# Patient Record
Sex: MALE | Race: ASIAN | NOT HISPANIC OR LATINO
[De-identification: names, ages, dates, MRNs, and addresses within clinical notes are randomized per-mention and may not be internally consistent; named-entity substitution may affect disease eponyms.]

---

## 2018-08-15 PROBLEM — Z00.00 ENCOUNTER FOR PREVENTIVE HEALTH EXAMINATION: Status: ACTIVE | Noted: 2018-08-15

## 2018-08-17 ENCOUNTER — OTHER (OUTPATIENT)
Age: 78
End: 2018-08-17

## 2018-08-17 DIAGNOSIS — K31.89 OTHER DISEASES OF STOMACH AND DUODENUM: ICD-10-CM

## 2020-01-30 ENCOUNTER — APPOINTMENT (OUTPATIENT)
Dept: CARDIOLOGY | Facility: CLINIC | Age: 80
End: 2020-01-30
Payer: MEDICARE

## 2020-01-30 ENCOUNTER — NON-APPOINTMENT (OUTPATIENT)
Age: 80
End: 2020-01-30

## 2020-01-30 VITALS
TEMPERATURE: 98.1 F | HEART RATE: 79 BPM | BODY MASS INDEX: 24.99 KG/M2 | DIASTOLIC BLOOD PRESSURE: 65 MMHG | RESPIRATION RATE: 18 BRPM | SYSTOLIC BLOOD PRESSURE: 136 MMHG | WEIGHT: 150 LBS | OXYGEN SATURATION: 96 % | HEIGHT: 65 IN

## 2020-01-30 DIAGNOSIS — I10 ESSENTIAL (PRIMARY) HYPERTENSION: ICD-10-CM

## 2020-01-30 DIAGNOSIS — E78.5 HYPERLIPIDEMIA, UNSPECIFIED: ICD-10-CM

## 2020-01-30 DIAGNOSIS — R94.31 ABNORMAL ELECTROCARDIOGRAM [ECG] [EKG]: ICD-10-CM

## 2020-01-30 DIAGNOSIS — Z87.891 PERSONAL HISTORY OF NICOTINE DEPENDENCE: ICD-10-CM

## 2020-01-30 PROCEDURE — 93000 ELECTROCARDIOGRAM COMPLETE: CPT

## 2020-01-30 PROCEDURE — 99205 OFFICE O/P NEW HI 60 MIN: CPT

## 2020-01-30 RX ORDER — ASPIRIN 81 MG/1
81 TABLET ORAL
Qty: 30 | Refills: 5 | Status: ACTIVE | COMMUNITY
Start: 2020-01-30 | End: 1900-01-01

## 2020-01-30 NOTE — HISTORY OF PRESENT ILLNESS
[FreeTextEntry1] : 1. HTN: on medications, controlled.\par 2. Hyperlipidemia: on statin.\par 3. CP: midline, substernal, without radiation lasting minutes and associated with dizziness. The patient also has exertional SOB that is progressive, increasing in severity and associated with mild leg edema. The symptoms occur on a daily basis and are worsening over one week. He has palpitations as well.\par His symptoms limit his ET. \par His symptoms are progressively worsening.

## 2020-01-30 NOTE — REASON FOR VISIT
[Initial Evaluation] : an initial evaluation of [Chest Pain] : chest pain [Dyspnea] : dyspnea [Hyperlipidemia] : hyperlipidemia [Hypertension] : hypertension [FreeTextEntry1] : 79 M HTN hyperlipidemia ex-smoker with CP and SOB.

## 2020-01-30 NOTE — DISCUSSION/SUMMARY
[FreeTextEntry1] : 79 M HTN hyperlipidemia ex-smoker with CP and SOB.\par CHECK ECHOCARDIOGRAM.\par CHECK NST TO ASSESS PERFUSION (limited ET).\par Continue medications for HTN.\par Start ASA 81 qd.\par Condition deteriorating, see me after tests.

## 2020-01-30 NOTE — PHYSICAL EXAM
[General Appearance - Well Developed] : well developed [Well Groomed] : well groomed [Normal Appearance] : normal appearance [No Deformities] : no deformities [General Appearance - Well Nourished] : well nourished [General Appearance - In No Acute Distress] : no acute distress [Normal Conjunctiva] : the conjunctiva exhibited no abnormalities [Normal Oral Mucosa] : normal oral mucosa [Eyelids - No Xanthelasma] : the eyelids demonstrated no xanthelasmas [No Oral Cyanosis] : no oral cyanosis [No Oral Pallor] : no oral pallor [Normal Jugular Venous A Waves Present] : normal jugular venous A waves present [Normal Jugular Venous V Waves Present] : normal jugular venous V waves present [No Jugular Venous Whitten A Waves] : no jugular venous whitten A waves [Heart Rate And Rhythm] : heart rate and rhythm were normal [Heart Sounds] : normal S1 and S2 [Respiration, Rhythm And Depth] : normal respiratory rhythm and effort [Exaggerated Use Of Accessory Muscles For Inspiration] : no accessory muscle use [Auscultation Breath Sounds / Voice Sounds] : lungs were clear to auscultation bilaterally [Abdomen Tenderness] : non-tender [Abdomen Soft] : soft [Abdomen Mass (___ Cm)] : no abdominal mass palpated [Petechial Hemorrhages (___cm)] : no petechial hemorrhages [Nail Clubbing] : no clubbing of the fingernails [Cyanosis, Localized] : no localized cyanosis [] : no ischemic changes [Skin Color & Pigmentation] : normal skin color and pigmentation [No Venous Stasis] : no venous stasis [Skin Lesions] : no skin lesions [No Xanthoma] : no  xanthoma was observed [No Skin Ulcers] : no skin ulcer [FreeTextEntry1] : 2/6 [employer]

## 2020-02-14 ENCOUNTER — APPOINTMENT (OUTPATIENT)
Dept: CARDIOLOGY | Facility: CLINIC | Age: 80
End: 2020-02-14
Payer: MEDICARE

## 2020-02-14 PROCEDURE — 93306 TTE W/DOPPLER COMPLETE: CPT

## 2020-02-14 PROCEDURE — A9500: CPT

## 2020-02-14 PROCEDURE — 78452 HT MUSCLE IMAGE SPECT MULT: CPT

## 2020-02-14 PROCEDURE — 93015 CV STRESS TEST SUPVJ I&R: CPT

## 2020-02-20 ENCOUNTER — APPOINTMENT (OUTPATIENT)
Dept: CARDIOLOGY | Facility: CLINIC | Age: 80
End: 2020-02-20
Payer: MEDICARE

## 2020-02-20 VITALS
RESPIRATION RATE: 18 BRPM | BODY MASS INDEX: 24.96 KG/M2 | OXYGEN SATURATION: 95 % | SYSTOLIC BLOOD PRESSURE: 124 MMHG | WEIGHT: 150 LBS | TEMPERATURE: 97.8 F | DIASTOLIC BLOOD PRESSURE: 66 MMHG | HEART RATE: 90 BPM

## 2020-02-20 PROCEDURE — 99214 OFFICE O/P EST MOD 30 MIN: CPT

## 2020-02-20 PROCEDURE — 93000 ELECTROCARDIOGRAM COMPLETE: CPT

## 2020-02-20 RX ORDER — LIFITEGRAST 50 MG/ML
5 SOLUTION/ DROPS OPHTHALMIC
Qty: 60 | Refills: 0 | Status: ACTIVE | COMMUNITY
Start: 2020-02-04

## 2020-02-20 RX ORDER — ATORVASTATIN CALCIUM 10 MG/1
10 TABLET, FILM COATED ORAL
Refills: 0 | Status: ACTIVE | COMMUNITY
Start: 2020-02-20

## 2020-02-20 RX ORDER — LATANOPROST/PF 0.005 %
0.01 DROPS OPHTHALMIC (EYE)
Qty: 8 | Refills: 0 | Status: ACTIVE | COMMUNITY
Start: 2020-01-30

## 2020-02-20 RX ORDER — LISINOPRIL AND HYDROCHLOROTHIAZIDE TABLETS 10; 12.5 MG/1; MG/1
10-12.5 TABLET ORAL
Refills: 0 | Status: ACTIVE | COMMUNITY
Start: 2020-02-20

## 2020-02-20 NOTE — HISTORY OF PRESENT ILLNESS
[FreeTextEntry1] : 1. HTN: on medications, no SE noted. \par 2. Hyperlipidemia: on statin. Lipid profile is followed by PMD.\par 3. CP: midline, substernal, without radiation lasting minutes and associated with dizziness. The patient also has exertional SOB that is progressive, increasing in severity and associated with mild leg edema. The symptoms occur on a daily basis and are worsening over one week. He has palpitations as well.\par His symptoms have been worsening over the last month and are severe.\par

## 2020-02-20 NOTE — REVIEW OF SYSTEMS
[Chest Pain] : chest pain [Dyspnea on exertion] : dyspnea during exertion [Shortness Of Breath] : shortness of breath [Negative] : Heme/Lymph

## 2020-02-20 NOTE — DISCUSSION/SUMMARY
[FreeTextEntry1] : 79 M HTN hyperlipidemia ex-smoker abnormal EKG with inferior wall defect on NST and symptoms consistent with progressive angina.\par REFER FOR CARDIAC CATHETERIZATION STAT.\par Continue medications for HTN.\par Continue statin.\par Condition deteriorating, see me after tests.

## 2020-02-20 NOTE — PHYSICAL EXAM
[General Appearance - Well Developed] : well developed [General Appearance - Well Nourished] : well nourished [Normal Appearance] : normal appearance [Well Groomed] : well groomed [General Appearance - In No Acute Distress] : no acute distress [No Deformities] : no deformities [Normal Conjunctiva] : the conjunctiva exhibited no abnormalities [No Oral Pallor] : no oral pallor [Eyelids - No Xanthelasma] : the eyelids demonstrated no xanthelasmas [Normal Oral Mucosa] : normal oral mucosa [No Oral Cyanosis] : no oral cyanosis [Normal Jugular Venous V Waves Present] : normal jugular venous V waves present [Normal Jugular Venous A Waves Present] : normal jugular venous A waves present [No Jugular Venous Whitten A Waves] : no jugular venous whitten A waves [Respiration, Rhythm And Depth] : normal respiratory rhythm and effort [Heart Rate And Rhythm] : heart rate and rhythm were normal [Auscultation Breath Sounds / Voice Sounds] : lungs were clear to auscultation bilaterally [Exaggerated Use Of Accessory Muscles For Inspiration] : no accessory muscle use [FreeTextEntry1] : 2/6 [employer]  [Abdomen Tenderness] : non-tender [Abdomen Soft] : soft [Heart Sounds] : normal S1 and S2 [Nail Clubbing] : no clubbing of the fingernails [Abdomen Mass (___ Cm)] : no abdominal mass palpated [Petechial Hemorrhages (___cm)] : no petechial hemorrhages [Cyanosis, Localized] : no localized cyanosis [No Venous Stasis] : no venous stasis [Skin Color & Pigmentation] : normal skin color and pigmentation [] : no rash [No Xanthoma] : no  xanthoma was observed [Skin Lesions] : no skin lesions [No Skin Ulcers] : no skin ulcer [Mood] : the mood was normal [Affect] : the affect was normal [Oriented To Time, Place, And Person] : oriented to person, place, and time [No Anxiety] : not feeling anxious

## 2020-02-20 NOTE — REASON FOR VISIT
[Follow-Up - Clinic] : a clinic follow-up of [Chest Pain] : chest pain [Dyspnea] : dyspnea [Hyperlipidemia] : hyperlipidemia [Hypertension] : hypertension [FreeTextEntry1] : 79 M HTN hyperlipidemia ex-smoker with CP and SOB. \par

## 2020-02-27 VITALS
DIASTOLIC BLOOD PRESSURE: 66 MMHG | RESPIRATION RATE: 16 BRPM | SYSTOLIC BLOOD PRESSURE: 144 MMHG | WEIGHT: 147.93 LBS | OXYGEN SATURATION: 96 % | TEMPERATURE: 99 F | HEIGHT: 65 IN | HEART RATE: 68 BPM

## 2020-02-27 RX ORDER — CHLORHEXIDINE GLUCONATE 213 G/1000ML
1 SOLUTION TOPICAL ONCE
Refills: 0 | Status: DISCONTINUED | OUTPATIENT
Start: 2020-02-28 | End: 2020-02-28

## 2020-02-27 NOTE — H&P ADULT - ASSESSMENT
Patient is a 80 yo M with a PMH of HLD, HTN, BPH who presented to outpatient cardiologist Dr. Mendieta with complaints of  progressively worsening, non-radiating, SSCP  with associated DUNLAP & dizziness, lasting minutes before self-resolving, after walking up 1 large hill or after climbing 1-2 flights of stairs over past 1 month.  As per MD note, pt with mild b/l LE edema and occasional palpitations but  patient currently denies.  He denies syncope or PND/orthopnea. NST 2/14/20: Small, moderate defect in the inferior wall that is fixed, suggestive of diaphragmatic attenuation artifact. LEVF 50% with mild global hypokinesis. In light of patient’s RF, CCS anginal equivalent class III sx and abnormal NST, patient is referred for cardiac catheterization with possible intervention if clinically indicated.   Pt states he last took his Ecotrin 81 mg PO on 2/27/20. Loaded with Ecotrin 325 mg PO x1 and Plavix 600 mg PO x1 pre-cath.   IV NS @ 75 cc/hr pre-cath fluids. Euvolemic on exam.   Endorses dark stools. Denies BRBPR. H/H WNL. Has never had colonoscopy/EGD. Encouraged pt to see Gastroenterologist at outpatient.   ASA Class III    Mallampati Class III    Risks & benefits of procedure and alternative therapy have been explained to the patient including but not limited to: allergic reaction, bleeding with possible need for blood transfusion, infection, renal and vascular compromise, limb damage, arrhythmia, stroke, vessel dissection/perforation, Myocardial infarction, emergent CABG. Informed consent obtained and in chart.       Patient a candidate for sedation: Yes    Sedation Type: Moderate Patient is a 78 yo M with a PMH of HLD, HTN, BPH who presented to outpatient cardiologist Dr. Mendieta with complaints of  progressively worsening, non-radiating, SSCP  with associated DUNLAP & dizziness, lasting minutes before self-resolving, after walking up 1 large hill or after climbing 1-2 flights of stairs over past 1 month.  As per MD note, pt with mild b/l LE edema and occasional palpitations but  patient currently denies.  He denies syncope or PND/orthopnea. NST 2/14/20: Small, moderate defect in the inferior wall that is fixed, suggestive of diaphragmatic attenuation artifact. LEVF 50% with mild global hypokinesis. In light of patient’s RF, CCS anginal equivalent class III sx and abnormal NST, patient is referred for cardiac catheterization with possible intervention if clinically indicated.   Pt states he last took his Ecotrin 81 mg PO on 2/27/20. Loaded with Ecotrin 325 mg PO x1 and Plavix 600 mg PO x1 pre-cath.   IV NS @ 75 cc/hr pre-cath fluids. Euvolemic on exam.   Endorses dark stools. Denies BRBPR. H/H WNL. Has never had colonoscopy/EGD. Encouraged pt to see Gastroenterologist at outpatient.   Recent COPD exacerbation, on prednisone (3 doses left), lungs currently CTA without w/r/r  ASA Class III    Mallampati Class III    Risks & benefits of procedure and alternative therapy have been explained to the patient including but not limited to: allergic reaction, bleeding with possible need for blood transfusion, infection, renal and vascular compromise, limb damage, arrhythmia, stroke, vessel dissection/perforation, Myocardial infarction, emergent CABG. Informed consent obtained and in chart.       Patient a candidate for sedation: Yes    Sedation Type: Moderate Patient is a 80 yo M with a PMH of HLD, HTN, BPH who presented to outpatient cardiologist Dr. Mendieta with complaints of  progressively worsening, non-radiating, SSCP  with associated DUNLAP & dizziness, lasting minutes before self-resolving, after walking up 1 large hill or after climbing 1-2 flights of stairs over past 1 month.  As per MD note, pt with mild b/l LE edema and occasional palpitations but  patient currently denies.  He denies syncope or PND/orthopnea. NST 2/14/20: Small, moderate defect in the inferior wall that is fixed, suggestive of diaphragmatic attenuation artifact. LEVF 50% with mild global hypokinesis. In light of patient’s RF, CCS anginal equivalent class III sx and abnormal NST, patient is referred for cardiac catheterization with possible intervention if clinically indicated.   Pt states he last took his Ecotrin 81 mg PO on 2/27/20. Loaded with Ecotrin 325 mg PO x1 and Plavix 600 mg PO x1 pre-cath.   IV NS @ 75 cc/hr pre-cath fluids. Euvolemic on exam.   Endorses dark stools. Denies BRBPR. H/H WNL. Has never had colonoscopy/EGD. Encouraged pt to see Gastroenterologist at outpatient.   Recent COPD exacerbation, on prednisone (3 doses left), lungs currently CTA without w/r/r  Oral temp 99.4F. No leukocytosis. Recent URI but denies any current  symptoms. Feels warm on exam. Requested that RN Kitty complete rectal temp and order placed.   ASA Class III    Mallampati Class III    Risks & benefits of procedure and alternative therapy have been explained to the patient including but not limited to: allergic reaction, bleeding with possible need for blood transfusion, infection, renal and vascular compromise, limb damage, arrhythmia, stroke, vessel dissection/perforation, Myocardial infarction, emergent CABG. Informed consent obtained and in chart.       Patient a candidate for sedation: Yes    Sedation Type: Moderate Patient is a 78 yo M with a PMH of HLD, HTN, BPH who presented to outpatient cardiologist Dr. Mendieta with complaints of  progressively worsening, non-radiating, SSCP  with associated DUNLAP & dizziness, lasting minutes before self-resolving, after walking up 1 large hill or after climbing 1-2 flights of stairs over past 1 month.  As per MD note, pt with mild b/l LE edema and occasional palpitations but  patient currently denies.  He denies syncope or PND/orthopnea. NST 2/14/20: Small, moderate defect in the inferior wall that is fixed, suggestive of diaphragmatic attenuation artifact. LEVF 50% with mild global hypokinesis. In light of patient’s RF, CCS anginal equivalent class III sx and abnormal NST, patient is referred for cardiac catheterization with possible intervention if clinically indicated.   Pt states he last took his Ecotrin 81 mg PO on 2/27/20. Loaded with Ecotrin 325 mg PO x1 and Plavix 600 mg PO x1 pre-cath.   IV NS @ 75 cc/hr pre-cath fluids. Euvolemic on exam.   Endorses dark stools. Denies BRBPR. H/H WNL. Has never had colonoscopy/EGD. Encouraged pt to see Gastroenterologist at outpatient.   Recent COPD exacerbation, on prednisone (3 doses left), lungs currently CTA without w/r/r  Forehead temp 99.4F --> 00.7F. No leukocytosis. Recent URI but denies any current  symptoms. Feels warm on exam. Requested that RN Kitty complete rectal temp and order placed.  No recent travel or sick contact. RVP sent. Dr. Garcias and Dr. Yusuf made aware.   ASA Class III    Mallampati Class III    Risks & benefits of procedure and alternative therapy have been explained to the patient including but not limited to: allergic reaction, bleeding with possible need for blood transfusion, infection, renal and vascular compromise, limb damage, arrhythmia, stroke, vessel dissection/perforation, Myocardial infarction, emergent CABG. Informed consent obtained and in chart.       Patient a candidate for sedation: Yes    Sedation Type: Moderate

## 2020-02-27 NOTE — H&P ADULT - NSHPLABSRESULTS_GEN_ALL_CORE
14.3   6.73  )-----------( 209      ( 28 Feb 2020 11:33 )             41.0       02-28    140  |  102  |  15  ----------------------------<  107<H>  4.0   |  25  |  1.26    Ca    9.2      28 Feb 2020 11:33    TPro  6.5  /  Alb  4.1  /  TBili  1.3<H>  /  DBili  x   /  AST  21  /  ALT  21  /  AlkPhos  60  02-28      PT/INR - ( 28 Feb 2020 11:33 )   PT: 11.2 sec;   INR: 0.98          PTT - ( 28 Feb 2020 11:33 )  PTT:22.3 sec    CARDIAC MARKERS ( 28 Feb 2020 11:33 )  x     / x     / 34 U/L / x     / 1.1 ng/mL        EKG: NSR at 69 BPM with RBBB and 1st degree AV block

## 2020-02-27 NOTE — H&P ADULT - HISTORY OF PRESENT ILLNESS
SKELETON - INCOMPLETE (Meds obtained from [employer] note on 2/20/20 however needs to be verified)    80 yo M with a PMH of HLD, HTN, bPH who presented to outpatient cardiologist Dr. Mendieta with complaints of midline, substernal chest pain without radiation lasting minutes. CP is associated with dizziness, DUNLAP to ___ blocks increasing in severity, mild b/l LE edema, and palpitations. (?rest) He denies ____. His symptoms have been worsening over the past month. NST 2/14/20: Small, moderate defect in the inferior wall that is fixed, suggestive of diaphragmatic attenuation artifact. LEVF 50% with mild global hypokinesis.   In light of patient’s RF, CCS angina class ___ sx and abnormal NST, patient is referred for cardiac catheterization with possible intervention if clinically indicated. Hx obtained via [place]  [id number]  (Meds obtained from [employer] note on 2/20/20 however needs to be verified)    Patient is a 78 yo M with a PMH of HLD, HTN, BPH who presented to outpatient cardiologist Dr. Mendieta with complaints of midline, substernal chest pain without radiation lasting minutes. CP is associated with dizziness, mild b/l LE edema, and palpitations per MD note patient currently denies. He endorses DUNLAP when on an incline or 1-2 flight of stairs, resolving with rest. He denies syncope or PND/orthopnea. His symptoms have been worsening over the past month. NST 2/14/20: Small, moderate defect in the inferior wall that is fixed, suggestive of diaphragmatic attenuation artifact. LEVF 50% with mild global hypokinesis.   In light of patient’s RF, CCS anginal equivalent class III sx and abnormal NST, patient is referred for cardiac catheterization with possible intervention if clinically indicated. Hx obtained via [place]  [id number]  (Meds obtained from [facility]\"" note on 2/20/20 however needs to be verified)    Patient is a 80 yo M with a PMH of HLD, HTN, BPH who presented to outpatient cardiologist Dr. Mendieta with complaints of  progressively worsening, non-radiating, SSCP  with associated DUNLAP & dizziness, lasting minutes before self-resolving, after walking up 1 large hill or after climbing 1-2 flights of stairs over past 1 month.  As per MD note, pt with mild b/l LE edema and occasional palpitations but  patient currently denies.  He denies syncope or PND/orthopnea. NST 2/14/20: Small, moderate defect in the inferior wall that is fixed, suggestive of diaphragmatic attenuation artifact. LEVF 50% with mild global hypokinesis. In light of patient’s RF, CCS anginal equivalent class III sx and abnormal NST, patient is referred for cardiac catheterization with possible intervention if clinically indicated.

## 2020-02-28 ENCOUNTER — OUTPATIENT (OUTPATIENT)
Dept: OUTPATIENT SERVICES | Facility: HOSPITAL | Age: 80
LOS: 1 days | Discharge: NURSING, REHAB OR LONG-TERM CARE | End: 2020-02-28
Payer: MEDICARE

## 2020-02-28 LAB
ALBUMIN SERPL ELPH-MCNC: 4.1 G/DL — SIGNIFICANT CHANGE UP (ref 3.3–5)
ALP SERPL-CCNC: 60 U/L — SIGNIFICANT CHANGE UP (ref 40–120)
ALT FLD-CCNC: 21 U/L — SIGNIFICANT CHANGE UP (ref 10–45)
ANION GAP SERPL CALC-SCNC: 13 MMOL/L — SIGNIFICANT CHANGE UP (ref 5–17)
APTT BLD: 22.3 SEC — LOW (ref 27.5–36.3)
AST SERPL-CCNC: 21 U/L — SIGNIFICANT CHANGE UP (ref 10–40)
BASOPHILS # BLD AUTO: 0.05 K/UL — SIGNIFICANT CHANGE UP (ref 0–0.2)
BASOPHILS NFR BLD AUTO: 0.7 % — SIGNIFICANT CHANGE UP (ref 0–2)
BILIRUB SERPL-MCNC: 1.3 MG/DL — HIGH (ref 0.2–1.2)
BUN SERPL-MCNC: 15 MG/DL — SIGNIFICANT CHANGE UP (ref 7–23)
CALCIUM SERPL-MCNC: 9.2 MG/DL — SIGNIFICANT CHANGE UP (ref 8.4–10.5)
CHLORIDE SERPL-SCNC: 102 MMOL/L — SIGNIFICANT CHANGE UP (ref 96–108)
CHOLEST SERPL-MCNC: 142 MG/DL — SIGNIFICANT CHANGE UP (ref 10–199)
CK MB CFR SERPL CALC: 1.1 NG/ML — SIGNIFICANT CHANGE UP (ref 0–6.7)
CK SERPL-CCNC: 34 U/L — SIGNIFICANT CHANGE UP (ref 30–200)
CO2 SERPL-SCNC: 25 MMOL/L — SIGNIFICANT CHANGE UP (ref 22–31)
CREAT SERPL-MCNC: 1.26 MG/DL — SIGNIFICANT CHANGE UP (ref 0.5–1.3)
CRP SERPL-MCNC: <0.03 MG/DL — SIGNIFICANT CHANGE UP (ref 0–0.4)
EOSINOPHIL # BLD AUTO: 0.08 K/UL — SIGNIFICANT CHANGE UP (ref 0–0.5)
EOSINOPHIL NFR BLD AUTO: 1.2 % — SIGNIFICANT CHANGE UP (ref 0–6)
GLUCOSE SERPL-MCNC: 107 MG/DL — HIGH (ref 70–99)
HBA1C BLD-MCNC: 5.9 % — HIGH (ref 4–5.6)
HCT VFR BLD CALC: 41 % — SIGNIFICANT CHANGE UP (ref 39–50)
HDLC SERPL-MCNC: 68 MG/DL — SIGNIFICANT CHANGE UP
HGB BLD-MCNC: 14.3 G/DL — SIGNIFICANT CHANGE UP (ref 13–17)
IMM GRANULOCYTES NFR BLD AUTO: 0.1 % — SIGNIFICANT CHANGE UP (ref 0–1.5)
INR BLD: 0.98 — SIGNIFICANT CHANGE UP (ref 0.88–1.16)
LIPID PNL WITH DIRECT LDL SERPL: 54 MG/DL — SIGNIFICANT CHANGE UP
LYMPHOCYTES # BLD AUTO: 2.1 K/UL — SIGNIFICANT CHANGE UP (ref 1–3.3)
LYMPHOCYTES # BLD AUTO: 31.2 % — SIGNIFICANT CHANGE UP (ref 13–44)
MCHC RBC-ENTMCNC: 32 PG — SIGNIFICANT CHANGE UP (ref 27–34)
MCHC RBC-ENTMCNC: 34.9 GM/DL — SIGNIFICANT CHANGE UP (ref 32–36)
MCV RBC AUTO: 91.7 FL — SIGNIFICANT CHANGE UP (ref 80–100)
MONOCYTES # BLD AUTO: 0.78 K/UL — SIGNIFICANT CHANGE UP (ref 0–0.9)
MONOCYTES NFR BLD AUTO: 11.6 % — SIGNIFICANT CHANGE UP (ref 2–14)
NEUTROPHILS # BLD AUTO: 3.71 K/UL — SIGNIFICANT CHANGE UP (ref 1.8–7.4)
NEUTROPHILS NFR BLD AUTO: 55.2 % — SIGNIFICANT CHANGE UP (ref 43–77)
NRBC # BLD: 0 /100 WBCS — SIGNIFICANT CHANGE UP (ref 0–0)
PLATELET # BLD AUTO: 209 K/UL — SIGNIFICANT CHANGE UP (ref 150–400)
POTASSIUM SERPL-MCNC: 4 MMOL/L — SIGNIFICANT CHANGE UP (ref 3.5–5.3)
POTASSIUM SERPL-SCNC: 4 MMOL/L — SIGNIFICANT CHANGE UP (ref 3.5–5.3)
PROT SERPL-MCNC: 6.5 G/DL — SIGNIFICANT CHANGE UP (ref 6–8.3)
PROTHROM AB SERPL-ACNC: 11.2 SEC — SIGNIFICANT CHANGE UP (ref 10–12.9)
RAPID RVP RESULT: SIGNIFICANT CHANGE UP
RBC # BLD: 4.47 M/UL — SIGNIFICANT CHANGE UP (ref 4.2–5.8)
RBC # FLD: 12 % — SIGNIFICANT CHANGE UP (ref 10.3–14.5)
SODIUM SERPL-SCNC: 140 MMOL/L — SIGNIFICANT CHANGE UP (ref 135–145)
TOTAL CHOLESTEROL/HDL RATIO MEASUREMENT: 2.1 RATIO — LOW (ref 3.4–9.6)
TRIGL SERPL-MCNC: 99 MG/DL — SIGNIFICANT CHANGE UP (ref 10–149)
WBC # BLD: 6.73 K/UL — SIGNIFICANT CHANGE UP (ref 3.8–10.5)
WBC # FLD AUTO: 6.73 K/UL — SIGNIFICANT CHANGE UP (ref 3.8–10.5)

## 2020-02-28 PROCEDURE — 93458 L HRT ARTERY/VENTRICLE ANGIO: CPT | Mod: 26

## 2020-02-28 PROCEDURE — 83036 HEMOGLOBIN GLYCOSYLATED A1C: CPT

## 2020-02-28 PROCEDURE — 82550 ASSAY OF CK (CPK): CPT

## 2020-02-28 PROCEDURE — 87581 M.PNEUMON DNA AMP PROBE: CPT

## 2020-02-28 PROCEDURE — 87633 RESP VIRUS 12-25 TARGETS: CPT

## 2020-02-28 PROCEDURE — C1769: CPT

## 2020-02-28 PROCEDURE — C1894: CPT

## 2020-02-28 PROCEDURE — 85730 THROMBOPLASTIN TIME PARTIAL: CPT

## 2020-02-28 PROCEDURE — C1887: CPT

## 2020-02-28 PROCEDURE — 80061 LIPID PANEL: CPT

## 2020-02-28 PROCEDURE — 85610 PROTHROMBIN TIME: CPT

## 2020-02-28 PROCEDURE — 80053 COMPREHEN METABOLIC PANEL: CPT

## 2020-02-28 PROCEDURE — 85025 COMPLETE CBC W/AUTO DIFF WBC: CPT

## 2020-02-28 PROCEDURE — 86140 C-REACTIVE PROTEIN: CPT

## 2020-02-28 PROCEDURE — 87798 DETECT AGENT NOS DNA AMP: CPT

## 2020-02-28 PROCEDURE — 87486 CHLMYD PNEUM DNA AMP PROBE: CPT

## 2020-02-28 PROCEDURE — 99235 HOSP IP/OBS SAME DATE MOD 70: CPT

## 2020-02-28 PROCEDURE — 93458 L HRT ARTERY/VENTRICLE ANGIO: CPT

## 2020-02-28 PROCEDURE — 82553 CREATINE MB FRACTION: CPT

## 2020-02-28 RX ORDER — SODIUM CHLORIDE 9 MG/ML
500 INJECTION INTRAMUSCULAR; INTRAVENOUS; SUBCUTANEOUS
Refills: 0 | Status: DISCONTINUED | OUTPATIENT
Start: 2020-02-28 | End: 2020-02-28

## 2020-02-28 RX ORDER — CLOPIDOGREL BISULFATE 75 MG/1
600 TABLET, FILM COATED ORAL ONCE
Refills: 0 | Status: COMPLETED | OUTPATIENT
Start: 2020-02-28 | End: 2020-02-28

## 2020-02-28 RX ORDER — FLUTICASONE FUROATE AND VILANTEROL TRIFENATATE 100; 25 UG/1; UG/1
1 POWDER RESPIRATORY (INHALATION)
Qty: 0 | Refills: 0 | DISCHARGE

## 2020-02-28 RX ORDER — TIOTROPIUM BROMIDE 18 UG/1
2 CAPSULE ORAL; RESPIRATORY (INHALATION)
Qty: 0 | Refills: 0 | DISCHARGE

## 2020-02-28 RX ORDER — ASPIRIN/CALCIUM CARB/MAGNESIUM 324 MG
325 TABLET ORAL ONCE
Refills: 0 | Status: COMPLETED | OUTPATIENT
Start: 2020-02-28 | End: 2020-02-28

## 2020-02-28 RX ORDER — LATANOPROST 0.05 MG/ML
1 SOLUTION/ DROPS OPHTHALMIC; TOPICAL
Qty: 0 | Refills: 0 | DISCHARGE

## 2020-02-28 RX ADMIN — Medication 325 MILLIGRAM(S): at 15:28

## 2020-02-28 RX ADMIN — CLOPIDOGREL BISULFATE 600 MILLIGRAM(S): 75 TABLET, FILM COATED ORAL at 15:29

## 2020-02-28 NOTE — PROGRESS NOTE ADULT - SUBJECTIVE AND OBJECTIVE BOX
Interventional Cardiology PA SDA Discharge Note    Patient without complaints. Ambulated and voided without difficulties    Afebrile, VSS    Ext:    				Right            Radial :  no  hematoma,  no   bleeding, dressing; C/D/I      Pulses:    intact RAD to baseline     A/P:        80 yo M with a PMH of HLD, HTN, BPH who presented to outpatient cardiologist Dr. Mendieta with complaints of  progressively worsening, non-radiating, SSCP  with associated DUNLAP & dizziness, lasting minutes before self-resolving, after walking up 1 large hill or after climbing 1-2 flights of stairs over past 1 month.  As per MD note, pt with mild b/l LE edema and occasional palpitations but  patient currently denies.  He denies syncope or PND/orthopnea. NST 2/14/20: Small, moderate defect in the inferior wall that is fixed, suggestive of diaphragmatic attenuation artifact. LEVF 50% with mild global hypokinesis. In light of patient’s RF, CCS anginal equivalent class III sx and abnormal NST, patient is referred for cardiac catheterization with possible intervention if clinically indicated. Pt is now s/p diagnostic cardiac cath showing normal coronaries.           1.	Stable for discharge as per attending Dr. Garcias after bed rest, pt voids, wrist stable and 30 minutes of ambulation.  2.	Follow-up with PMD/Cardiologist Dr Mendieta in 1-2 weeks  3.	Discharged forms signed and copies in chart

## 2020-03-03 PROBLEM — I10 ESSENTIAL (PRIMARY) HYPERTENSION: Chronic | Status: ACTIVE | Noted: 2020-02-27

## 2020-03-03 PROBLEM — E78.5 HYPERLIPIDEMIA, UNSPECIFIED: Chronic | Status: ACTIVE | Noted: 2020-02-27

## 2020-03-03 PROBLEM — N40.0 BENIGN PROSTATIC HYPERPLASIA WITHOUT LOWER URINARY TRACT SYMPTOMS: Chronic | Status: ACTIVE | Noted: 2020-02-27

## 2020-03-05 ENCOUNTER — APPOINTMENT (OUTPATIENT)
Dept: CARDIOLOGY | Facility: CLINIC | Age: 80
End: 2020-03-05

## 2020-03-16 ENCOUNTER — APPOINTMENT (OUTPATIENT)
Dept: CARDIOLOGY | Facility: CLINIC | Age: 80
End: 2020-03-16

## 2021-05-20 ENCOUNTER — INPATIENT (INPATIENT)
Facility: HOSPITAL | Age: 81
LOS: 3 days | Discharge: HOME | End: 2021-05-24
Attending: STUDENT IN AN ORGANIZED HEALTH CARE EDUCATION/TRAINING PROGRAM | Admitting: STUDENT IN AN ORGANIZED HEALTH CARE EDUCATION/TRAINING PROGRAM
Payer: MEDICARE

## 2021-05-20 VITALS
HEART RATE: 89 BPM | HEIGHT: 65 IN | OXYGEN SATURATION: 98 % | RESPIRATION RATE: 16 BRPM | WEIGHT: 141.98 LBS | TEMPERATURE: 98 F | SYSTOLIC BLOOD PRESSURE: 185 MMHG | DIASTOLIC BLOOD PRESSURE: 79 MMHG

## 2021-05-20 LAB
ALBUMIN SERPL ELPH-MCNC: 4.3 G/DL — SIGNIFICANT CHANGE UP (ref 3.5–5.2)
ALP SERPL-CCNC: 69 U/L — SIGNIFICANT CHANGE UP (ref 30–115)
ALT FLD-CCNC: 21 U/L — SIGNIFICANT CHANGE UP (ref 0–41)
ANION GAP SERPL CALC-SCNC: 12 MMOL/L — SIGNIFICANT CHANGE UP (ref 7–14)
APTT BLD: 34.8 SEC — SIGNIFICANT CHANGE UP (ref 27–39.2)
AST SERPL-CCNC: 27 U/L — SIGNIFICANT CHANGE UP (ref 0–41)
BASOPHILS # BLD AUTO: 0.04 K/UL — SIGNIFICANT CHANGE UP (ref 0–0.2)
BASOPHILS NFR BLD AUTO: 0.5 % — SIGNIFICANT CHANGE UP (ref 0–1)
BILIRUB SERPL-MCNC: 1.4 MG/DL — HIGH (ref 0.2–1.2)
BUN SERPL-MCNC: 20 MG/DL — SIGNIFICANT CHANGE UP (ref 10–20)
CALCIUM SERPL-MCNC: 9.2 MG/DL — SIGNIFICANT CHANGE UP (ref 8.5–10.1)
CHLORIDE SERPL-SCNC: 100 MMOL/L — SIGNIFICANT CHANGE UP (ref 98–110)
CO2 SERPL-SCNC: 22 MMOL/L — SIGNIFICANT CHANGE UP (ref 17–32)
CREAT SERPL-MCNC: 1.1 MG/DL — SIGNIFICANT CHANGE UP (ref 0.7–1.5)
EOSINOPHIL # BLD AUTO: 0.05 K/UL — SIGNIFICANT CHANGE UP (ref 0–0.7)
EOSINOPHIL NFR BLD AUTO: 0.6 % — SIGNIFICANT CHANGE UP (ref 0–8)
GLUCOSE SERPL-MCNC: 111 MG/DL — HIGH (ref 70–99)
HCT VFR BLD CALC: 44.2 % — SIGNIFICANT CHANGE UP (ref 42–52)
HGB BLD-MCNC: 15.3 G/DL — SIGNIFICANT CHANGE UP (ref 14–18)
IMM GRANULOCYTES NFR BLD AUTO: 0.6 % — HIGH (ref 0.1–0.3)
INR BLD: 1.03 RATIO — SIGNIFICANT CHANGE UP (ref 0.65–1.3)
LYMPHOCYTES # BLD AUTO: 1.4 K/UL — SIGNIFICANT CHANGE UP (ref 1.2–3.4)
LYMPHOCYTES # BLD AUTO: 17.6 % — LOW (ref 20.5–51.1)
MCHC RBC-ENTMCNC: 31.2 PG — HIGH (ref 27–31)
MCHC RBC-ENTMCNC: 34.6 G/DL — SIGNIFICANT CHANGE UP (ref 32–37)
MCV RBC AUTO: 90.2 FL — SIGNIFICANT CHANGE UP (ref 80–94)
MONOCYTES # BLD AUTO: 0.7 K/UL — HIGH (ref 0.1–0.6)
MONOCYTES NFR BLD AUTO: 8.8 % — SIGNIFICANT CHANGE UP (ref 1.7–9.3)
NEUTROPHILS # BLD AUTO: 5.72 K/UL — SIGNIFICANT CHANGE UP (ref 1.4–6.5)
NEUTROPHILS NFR BLD AUTO: 71.9 % — SIGNIFICANT CHANGE UP (ref 42.2–75.2)
NRBC # BLD: 0 /100 WBCS — SIGNIFICANT CHANGE UP (ref 0–0)
PLATELET # BLD AUTO: 208 K/UL — SIGNIFICANT CHANGE UP (ref 130–400)
POTASSIUM SERPL-MCNC: 4.1 MMOL/L — SIGNIFICANT CHANGE UP (ref 3.5–5)
POTASSIUM SERPL-SCNC: 4.1 MMOL/L — SIGNIFICANT CHANGE UP (ref 3.5–5)
PROT SERPL-MCNC: 6.7 G/DL — SIGNIFICANT CHANGE UP (ref 6–8)
PROTHROM AB SERPL-ACNC: 11.9 SEC — SIGNIFICANT CHANGE UP (ref 9.95–12.87)
RBC # BLD: 4.9 M/UL — SIGNIFICANT CHANGE UP (ref 4.7–6.1)
RBC # FLD: 12.1 % — SIGNIFICANT CHANGE UP (ref 11.5–14.5)
SARS-COV-2 RNA SPEC QL NAA+PROBE: SIGNIFICANT CHANGE UP
SODIUM SERPL-SCNC: 134 MMOL/L — LOW (ref 135–146)
TROPONIN T SERPL-MCNC: <0.01 NG/ML — SIGNIFICANT CHANGE UP
TROPONIN T SERPL-MCNC: <0.01 NG/ML — SIGNIFICANT CHANGE UP
WBC # BLD: 7.96 K/UL — SIGNIFICANT CHANGE UP (ref 4.8–10.8)
WBC # FLD AUTO: 7.96 K/UL — SIGNIFICANT CHANGE UP (ref 4.8–10.8)

## 2021-05-20 PROCEDURE — 93010 ELECTROCARDIOGRAM REPORT: CPT

## 2021-05-20 PROCEDURE — 70496 CT ANGIOGRAPHY HEAD: CPT | Mod: 26,MA

## 2021-05-20 PROCEDURE — 99285 EMERGENCY DEPT VISIT HI MDM: CPT

## 2021-05-20 PROCEDURE — 99283 EMERGENCY DEPT VISIT LOW MDM: CPT

## 2021-05-20 PROCEDURE — 70498 CT ANGIOGRAPHY NECK: CPT | Mod: 26,MA

## 2021-05-20 PROCEDURE — 70450 CT HEAD/BRAIN W/O DYE: CPT | Mod: 26,MA,59

## 2021-05-20 RX ORDER — MIRTAZAPINE 45 MG/1
15 TABLET, ORALLY DISINTEGRATING ORAL AT BEDTIME
Refills: 0 | Status: DISCONTINUED | OUTPATIENT
Start: 2021-05-20 | End: 2021-05-24

## 2021-05-20 RX ORDER — ENOXAPARIN SODIUM 100 MG/ML
40 INJECTION SUBCUTANEOUS DAILY
Refills: 0 | Status: DISCONTINUED | OUTPATIENT
Start: 2021-05-20 | End: 2021-05-24

## 2021-05-20 RX ORDER — MECLIZINE HCL 12.5 MG
25 TABLET ORAL THREE TIMES A DAY
Refills: 0 | Status: DISCONTINUED | OUTPATIENT
Start: 2021-05-20 | End: 2021-05-24

## 2021-05-20 RX ORDER — TAMSULOSIN HYDROCHLORIDE 0.4 MG/1
0.4 CAPSULE ORAL AT BEDTIME
Refills: 0 | Status: DISCONTINUED | OUTPATIENT
Start: 2021-05-20 | End: 2021-05-24

## 2021-05-20 RX ORDER — ZOLPIDEM TARTRATE 10 MG/1
5 TABLET ORAL AT BEDTIME
Refills: 0 | Status: DISCONTINUED | OUTPATIENT
Start: 2021-05-20 | End: 2021-05-24

## 2021-05-20 RX ORDER — ATORVASTATIN CALCIUM 80 MG/1
10 TABLET, FILM COATED ORAL AT BEDTIME
Refills: 0 | Status: DISCONTINUED | OUTPATIENT
Start: 2021-05-20 | End: 2021-05-24

## 2021-05-20 RX ORDER — ASPIRIN/CALCIUM CARB/MAGNESIUM 324 MG
81 TABLET ORAL DAILY
Refills: 0 | Status: DISCONTINUED | OUTPATIENT
Start: 2021-05-20 | End: 2021-05-24

## 2021-05-20 RX ORDER — QUETIAPINE FUMARATE 200 MG/1
50 TABLET, FILM COATED ORAL AT BEDTIME
Refills: 0 | Status: DISCONTINUED | OUTPATIENT
Start: 2021-05-20 | End: 2021-05-24

## 2021-05-20 RX ORDER — PANTOPRAZOLE SODIUM 20 MG/1
40 TABLET, DELAYED RELEASE ORAL
Refills: 0 | Status: DISCONTINUED | OUTPATIENT
Start: 2021-05-20 | End: 2021-05-24

## 2021-05-20 RX ORDER — LISINOPRIL 2.5 MG/1
10 TABLET ORAL DAILY
Refills: 0 | Status: DISCONTINUED | OUTPATIENT
Start: 2021-05-20 | End: 2021-05-23

## 2021-05-20 RX ORDER — ROFLUMILAST 500 UG/1
1 TABLET ORAL
Qty: 0 | Refills: 0 | DISCHARGE

## 2021-05-20 RX ORDER — HYDROCHLOROTHIAZIDE 25 MG
12.5 TABLET ORAL DAILY
Refills: 0 | Status: DISCONTINUED | OUTPATIENT
Start: 2021-05-20 | End: 2021-05-23

## 2021-05-20 RX ORDER — CHOLECALCIFEROL (VITAMIN D3) 125 MCG
1000 CAPSULE ORAL DAILY
Refills: 0 | Status: DISCONTINUED | OUTPATIENT
Start: 2021-05-20 | End: 2021-05-24

## 2021-05-20 RX ORDER — MONTELUKAST 4 MG/1
10 TABLET, CHEWABLE ORAL DAILY
Refills: 0 | Status: DISCONTINUED | OUTPATIENT
Start: 2021-05-20 | End: 2021-05-24

## 2021-05-20 RX ADMIN — ATORVASTATIN CALCIUM 10 MILLIGRAM(S): 80 TABLET, FILM COATED ORAL at 22:41

## 2021-05-20 RX ADMIN — TAMSULOSIN HYDROCHLORIDE 0.4 MILLIGRAM(S): 0.4 CAPSULE ORAL at 22:41

## 2021-05-20 RX ADMIN — ZOLPIDEM TARTRATE 5 MILLIGRAM(S): 10 TABLET ORAL at 22:42

## 2021-05-20 RX ADMIN — MIRTAZAPINE 15 MILLIGRAM(S): 45 TABLET, ORALLY DISINTEGRATING ORAL at 22:41

## 2021-05-20 RX ADMIN — LISINOPRIL 10 MILLIGRAM(S): 2.5 TABLET ORAL at 22:41

## 2021-05-20 RX ADMIN — QUETIAPINE FUMARATE 50 MILLIGRAM(S): 200 TABLET, FILM COATED ORAL at 23:34

## 2021-05-20 NOTE — ED PROVIDER NOTE - CLINICAL SUMMARY MEDICAL DECISION MAKING FREE TEXT BOX
79 yo M presented to ED for gait instability and stroke code was called. CT did not demonstrate any acute abnormality. Pt admitted for further workup.

## 2021-05-20 NOTE — CONSULT NOTE ADULT - SUBJECTIVE AND OBJECTIVE BOX
Stroke Consult Note:    LATASHA WATTS    1. Chief Complaint: dizziness    HPI: 81 yo male hx of hypertension presenting with acute onset of dizziness and gait instability since this morning at 10 am. Initially it was mostly positional, but then he continued to be dizzy regardless of the position. He went to Urgent care and they sent him to ER for cardio w/u. In ED stroke code was called, NIHSS is 0. CTH/CTA are negative for acute pathology. Patient denies any headache, visual changes, or unsteady gait at this time.      2. Relevant PMH:   Prior ischemic stroke/TIA[ ], Afib [ ], CAD [ ], HTN [ ], DLD [ ], DM [ ], PVD [ ], Obesity [ ],   Sedentary lifestyle [ ], CHF [ ], IVANNA [ ], Cancer Hx [ ].    3. Social History: Smoking [ ], Drug Use [ ], Alcohol Use [ ], Other [ ]    4. Possible Location of Stroke:    5. Relevant Brain Tissue Imagin. Relevant Cerebrovascular Imaging:   CT Angio Neck w/ IV Cont:   EXAM:  CT ANGIO BRAIN (W)AW IC        EXAM:  CT ANGIO NECK (W)AW IC            PROCEDURE DATE:  2021            INTERPRETATION:  Clinical History / Reason for exam: Stroke code. Dizziness.    Technique: CT angiogram of the head and neck. CTA of the head and neck was performed following the intravenous administration of 100 cc Optiray 320 (0 cc discarded) with coronal, sagittal and multiple 3-D MIP and volume rendered reformats.    Comparison: None    Findings:    NECK:  The visualized aortic arch and great vessel origins demonstrate atheromatous plaque with mild stenosis of the proximal left subclavian artery. There is dilatation of the ascending aorta up to 4.8 cm.    The common carotid arteries are patent. There is calcific plaque at the carotid bifurcations bilaterally without significant stenosis.    The vertebral arteries are patent, dominant on the left.    HEAD:  There is calcific plaque of the carotid siphons without significant stenosis.. The anterior and middle cerebral arteries are patent. There is hypoplastic A1 segment of the right CLYDE, normal variant.    The right vertebral artery is diminutive beyond the PICA origin, normal variant. The left vertebral artery is patent. The basilar artery is patent. The posteriorcerebral arteries are patent.    OTHER:  Moderate degenerative changes of the cervical spine.  Emphysematous changes at the lung apices.  3 mm left thyroid nodule.  There is apparent medialization and/or thickening of the right vocal cord.    IMPRESSION:    1.  No evidence of major vascular stenosis or occlusion.    2.  Apparent medialization and/or thickening of the right vocal cord, recommend correlation with direct visualization.    3.  Pulmonary emphysema.              HIEU BRYANT MD; Attending Radiologist  This document has been electronically signed. May 20 2021  2:22PM (21 @ 13:45)     CT Angio Head w/ IV Cont:   EXAM:  CT ANGIO BRAIN (W)AW IC        EXAM:  CT ANGIO NECK (W)AW IC            PROCEDURE DATE:  2021            INTERPRETATION:  Clinical History / Reason for exam: Stroke code. Dizziness.    Technique: CT angiogram of the head and neck. CTA of the head and neck was performed following the intravenous administration of 100 cc Optiray 320 (0 cc discarded) with coronal, sagittal and multiple 3-D MIP and volume rendered reformats.    Comparison: None    Findings:    NECK:  The visualized aortic arch and great vessel origins demonstrate atheromatous plaque with mild stenosis of the proximal left subclavian artery. There is dilatation of the ascending aorta up to 4.8 cm.    The common carotid arteries are patent. There is calcific plaque at the carotid bifurcations bilaterally without significant stenosis.    The vertebral arteries are patent, dominant on the left.    HEAD:  There is calcific plaque of the carotid siphons without significant stenosis.. The anterior and middle cerebral arteries are patent. There is hypoplastic A1 segment of the right CLYDE, normal variant.    The right vertebral artery is diminutive beyond the PICA origin, normal variant. The left vertebral artery is patent. The basilar artery is patent. The posteriorcerebral arteries are patent.    OTHER:  Moderate degenerative changes of the cervical spine.  Emphysematous changes at the lung apices.  3 mm left thyroid nodule.  There is apparent medialization and/or thickening of the right vocal cord.    IMPRESSION:    1.  No evidence of major vascular stenosis or occlusion.    2.  Apparent medialization and/or thickening of the right vocal cord, recommend correlation with direct visualization.    3.  Pulmonary emphysema.      HIEU BRYANT MD; Attending Radiologist  This document has been electronically signed. May 20 2021  2:22PM (21 @ 13:45)            7. Relevant blood tests:      8. Relevant cardiac rhythm monitorin. Relevant Cardiac Structure: (TTE/NICK +/-):[ ]No intracardiac thrombus/[ ] no vegetation/[ ]no [place]/EF:    Home Medications:  aspirin 81 mg oral tablet: 1 tab(s) orally once a day (2020 12:42)  atorvastatin 10 mg oral tablet: 1 tab(s) orally once a day (2020 12:42)  Breo Ellipta 200 mcg-25 mcg/inh inhalation powder: 1 puff(s) inhaled once a day (2020 12:42)  Flomax 0.4 mg oral capsule: 1 cap(s) orally once a day (2020 12:42)  lisinopril-hydrochlorothiazide 10 mg-12.5 mg oral tablet: 1 tab(s) orally once a day (2020 12:42)  montelukast 10 mg oral tablet: 1 tab(s) orally once a day (2020 12:42)  Mucinex 600 mg oral tablet, extended release: 1 tab(s) orally every 12 hours, As Needed (2020 12:42)  omeprazole 20 mg oral delayed release capsule: 1 cap(s) orally once a day (2020 12:42)  predniSONE 5 mg oral tablet: 1 tab(s) orally 2 times a day (to complete 5 day course, 3 more doses left) (2020 12:42)  raNITIdine 150 mg oral capsule: 1 cap(s) orally 2 times a day (2020 12:42)  roflumilast 250 mcg oral tablet: 1 tab(s) orally once a day (2020 12:42)  Vitamin D3 1000 intl units (25 mcg) oral tablet: 1 tab(s) orally once a day (2020 12:42)      MEDICATIONS  (STANDING):      10. PT/OT/Speech/Rehab/S&Sw/ Cognitive eval results and recommendations:    11. Exam:    Vital Signs Last 24 Hrs  T(C): 37.1 (20 May 2021 15:28), Max: 37.1 (20 May 2021 15:28)  T(F): 98.8 (20 May 2021 15:28), Max: 98.8 (20 May 2021 15:28)  HR: 88 (20 May 2021 15:28) (88 - 89)  BP: 159/67 (20 May 2021 15:28) (159/67 - 185/79)  BP(mean): 97 (20 May 2021 15:28) (97 - 97)  RR: 16 (20 May 2021 15:28) (16 - 16)  SpO2: 97% (20 May 2021 15:28) (97% - 98%)    12.   NIH STROKE SCALE  Item	                                                        Score  1 a.	Level of Consciousness	               	0  1 b. LOC Questions	                                0  1 c.	LOC Commands	                               	0  2.	Best Gaze	                                        0  3.	Visual	                                                0  4.	Facial Palsy	                                        0  5 a.	Motor Arm - Left	                                0  5 b.	Motor Arm - Right	                        0  6 a.	Motor Leg - Left	                                0  6 b.	Motor Leg - Right	                                0  7.	Limb Ataxia	                                        0  8.	Sensory	                                                0  9.	Language	                                        0  10.	Dysarthria	                                        0  11.	Extinction and Inattention  	        0  ______________________________________  TOTAL	                                                        0    Total NIHSS on admission:      NIHSS yesterday:      NIHSS today: 0    mRS:  0 No symptoms at all  1 No significant disability despite symptoms; able to carry out all usual duties and activities without assistance  2 Slight disability; unable to carry out all previous activities, but able to look after own affairs  3 Moderate disability; requiring some help, but able to walk without assistance  4 Moderately severe disability; unable to walk without assistance and unable to attend to own bodily needs without assistance  5 Severe disability; bedridden, incontinent and requiring constant nursing care and attention  6 Dead      13. Impression:  81 yo male hx of hypertension presenting with acute onset of dizziness and gait instability since this morning at 10 am. Initially it was mostly positional, but then he continued to be dizzy regardless of the position. He went to Urgent care and they sent him to ER for cardio w/u. In ED stroke code was called, NIHSS is 0. CTH/CTA are negative for acute pathology. Patient denies any headache, visual changes, or unsteady gait at this time.  He is not a tpa candidate, since his NIHSS is 0.    R/o post circ stroke      15. Suggestions:   Telemetry  MRI brain NC  Check orthostatics  PT eval  Try meclizine 25 mg TID PRN

## 2021-05-20 NOTE — ED PROVIDER NOTE - OBJECTIVE STATEMENT
81 yo male hx of hypertension presenting with acute onset of dizziness and gait instability since this morning at 10 am. otherwise denies headache, fever, chest pain, sob, weakness, numbness, tingling, visual changes. 79 yo male hx of hypertension presenting with acute onset of dizziness and gait instability since this morning at 10 am. otherwise denies headache, fever, chest pain, sob, weakness, numbness, tingling, visual changes.     Code stroke called in ED triage, NIH 0 on exam with mild gait instability. CT/CTA neg, pt's dizziness improved on rpt exam with no intervention, as per neuro can go to tele for further work-up.
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
## 2021-05-20 NOTE — H&P ADULT - NSHPSOCIALHISTORY_GEN_ALL_CORE
Non smoker, occasional drinker. 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

## 2021-05-20 NOTE — H&P ADULT - ATTENDING COMMENTS
**Pt refused , asked that his Grandson at bedside interpret for him    79 YO M with a PMH of HTN, BPH, COPD, mood disorder, and HLD who was sent to the hospital from [facility] for eval of elevated BP and dizziness (room-spinning) that started abruptly this AM. Associated with gait instability. The pt denies any sensation/visual changes, or headache. In the ED, STROKE CODE activation for NIHSS of 0. CTH was negative for acute changes. CTP/CTA was negative for acute process. No tPA administered due to low NIHSS. Neuro consulted and recommended the pt be admitted to tele unit, MRI, echo, orthostatics, and Meclizine trial.     Physical exam showed pt in NAD. VSS, afebrile, not hypoxic on RA. A&Ox3. 5/5 strength in RUE/RLE/LUE/LLE, otherwise negative. No sensation changes. CNs are intact. No dysarthria. CTA B/L. RRR, no M/G/R. ABD is soft and non-tender. LEs without swelling. Labs and radiology as above.     Dizziness, suspect peripheral vs hypertensive vs ischemic CVA. Neuro is following. Tele admit. MRI. Echo. A1c, lipids, and TSH. Meclizine PRN. Fall precautions.    HX of BPH, COPD, mood disorder, and HLD. Restart home meds, except as stated above. DVT PPX. Inform PCP of pt's admission to hospital. My note supersedes the residents note.

## 2021-05-20 NOTE — H&P ADULT - ASSESSMENT
80 M PMH HTN presenting for dizziness and gait instability since morning. Patient symptoms began around 10AM on day of admission. Was brought into hospital by patient son.  In ED, patient was a code stroke for dizziness, although NIH was 0. There was some mild gait instability noted. Patient CTH and CTA H/N were both unremarkable. EKG showing ? twave inversions with possible AV block, but troponin negative X 1 with no chest pain    Patient being admitted for workup of dizziness. Seen by neurology team, [facility] tele admit     IMPRESSION  Dizziness  History of Hypertension    #Dizziness-r/o CVA. never syncopized. no evidence for stroke, no imaging findings concerning for stroke. Only symptom was "dizziness".  -neuro check per routine  -neuro follow up  -tele monitoring  -can repeat 1 set tropns  -check TSH  -check A1c/  -checl lipid panel  -PT  -OT  -physiatry  -check orthostatics, patient on anti-hypertensive therapy, possible etiology of symptoms    #HTN  ________________________  DVT ppx-lovenox  DIET-DASH  Activity with assistance   dispo-jarrett, tele monitoring        80 M PMH HTN presenting for dizziness and gait instability since morning. Patient symptoms began around 10AM on day of admission. Was brought into hospital by patient son.  In ED, patient was a code stroke for dizziness, although NIH was 0. There was some mild gait instability noted. Patient CTH and CTA H/N were both unremarkable. EKG showing ? twave inversions with possible AV block, but troponin negative X 1 with no chest pain    Patient being admitted for workup of dizziness. Seen by neurology team, re[facility] tele admit     IMPRESSION  Dizziness  History of Hypertension    #Dizziness-r/o CVA. never syncopized. no evidence for stroke, no imaging findings concerning for stroke. Only symptom was "dizziness".  -neuro check per routine  -neuro follow up  -tele monitoring  -can repeat 1 set tropns  -check TSH  -check A1c/  -checl lipid panel  -PT  -OT  -physiatry  -check orthostatics, patient on anti-hypertensive therapy, possible etiology of symptoms  -check echo  -MR brain as per neuro reccs    #HTN-c/w. check orthostatics  #HLD-check lipid panel. c/w  ________________________  DVT ppx- lovenox  DIET-DASH  Activity with assistance   dispo-from home, tele monitoring        80 M PMH HTN presenting for dizziness and gait instability since morning. Patient symptoms began around 10AM on day of admission. Was brought into hospital by patient son.  In ED, patient was a code stroke for dizziness, although NIH was 0. There was some mild gait instability noted. Patient CTH and CTA H/N were both unremarkable. EKG showing ? twave inversions with possible AV block, but troponin negative X 1 with no chest pain    Patient being admitted for workup of dizziness. Seen by neurology team, [facility] tele admit     IMPRESSION  Dizziness  History of Hypertension    #Dizziness-r/o CVA. never syncopized. no evidence for stroke, no imaging findings concerning for stroke. Only symptom was "dizziness". Possible t-wave inversion ekg but no other sign/sx of ACS; plus negative cath in 2020.   -neuro check per routine  -neuro follow up  -tele monitoring  -can repeat 1 set tropns  -check TSH  -check A1c  -checl lipid panel  -PT  -OT  -physiatry  -check orthostatics, patient on anti-hypertensive therapy, possible etiology of symptoms  -check echo  -MR brain as per neuro reccs. As per grandson-no history metal in body    #HTN-c/w lisinopril 10, HCTZ 12.5 check orthostatics  #HLD- check lipid panel. c/w Lipitor 20mg  #Mood Disorder/Insomina- c/w Seroquel 50mg, Ambien 10mg (5+5prn), Mirtazapine   #COPD-nebs prn, c/w moteleukast  ________________________  DVT ppx- lovenox 40mg  DIET-DASH  Activity with assistance   dispo-from home, tele monitoring    FULL CODE    Med recc completed with patient's grandson Adalberto at 4:30PM

## 2021-05-20 NOTE — ED PROVIDER NOTE - PHYSICAL EXAMINATION
CONSTITUTIONAL: Well-developed; well-nourished; in no acute distress.   SKIN: warm, dry  HEAD: Normocephalic; atraumatic.  EYES: PERRL, EOMI, normal sclera and conjunctiva   ENT: No nasal discharge; airway clear.  NECK: Supple; non tender.  CARD: S1, S2 normal; no murmurs, gallops, or rubs. Regular rate and rhythm.   RESP: No wheezes, rales or rhonchi.  ABD: soft ntnd  EXT: Normal ROM.  No clubbing, cyanosis or edema.   LYMPH: No acute cervical adenopathy.  NEURO: Alert, oriented, grossly unremarkable. no pronator drift. mild gait instability. normal strength and sensation b/l, no facial droop.   PSYCH: Cooperative, appropriate.

## 2021-05-20 NOTE — H&P ADULT - NSICDXPASTMEDICALHX_GEN_ALL_CORE_FT
PAST MEDICAL HISTORY:  BPH (benign prostatic hyperplasia)     HLD (hyperlipidemia)     HTN (hypertension)

## 2021-05-20 NOTE — H&P ADULT - HISTORY OF PRESENT ILLNESS
80 M PMH HTN presenting for dizziness and gait instability since morning. Patient symptoms began around 10AM on day of admission. Was brought into hospital by patient son.  In ED, patient was a code stroke for dizziness, although NIH was 0. There was some mild gait instability noted. Patient CTH and CTA H/N were both unremarkable. EKG showing ? twave inversions with possible AV block, but troponin negative X 1 with no chest pain    Patient being admitted for workup of dizziness. Seen by neurology team, [facility] tele admit      80 M PMH HTN, BPH, HLD presenting for dizziness and gait instability since morning. Patient symptoms began around 10AM on day of admission. Was brought into hospital by patient son.  In ED, patient was a code stroke for dizziness, although NIH was 0. There was some mild gait instability noted. Patient CTH and CTA H/N were both unremarkable. EKG showing ? twave inversions with possible AV block, but troponin negative X 1 with no chest pain. Patient had diagnostic cath with Dr. Mendieta in Feb 2020, result showed normal coronaries. Last known echo was 50%.    Patient being admitted for workup of dizziness. Seen by neurology team, [facility] tele admit       80 M PMH HTN, BPH, HLD presenting for dizziness and gait instability since morning. Patient symptoms began around 10AM on day of admission. He notified his family around 11:30AM and was brought into hospital by patient son. Patient had associated sensation of "numbness" in his legs.   Supplemental history obtained from grandson Adalberto. Patient has history insomnia, did not sleep well overnight. Is very functional at baseline, works around house etc. No history dementia. Denied any chest pain, recent illnesses, travel, shortness of breath, new bowel/urinary symptoms.     In ED, patient was a code stroke for dizziness, although NIH was 0. There was some mild gait instability noted. Patient CTH and CTA H/N were both unremarkable. EKG showing ? twave inversions with possible AV block, but troponin negative X 1 with no chest pain. Patient had diagnostic cath with Dr. Mendieta in Feb 2020, result showed normal coronaries. Last known echo was 50%.    Patient being admitted for workup of dizziness. Seen by neurology team, [facility] tele admit

## 2021-05-20 NOTE — PATIENT PROFILE ADULT - LEGAL HELP
Anesthesia ROS/Med Hx        Pulmonary Review:    Negative for pulmonary    Neuro/Psych Review:    Pt. positive for psychiatric history (anxiety)    Cardiovascular Review:    Exercise tolerance: good  Pt. positive for hypertension - poorly controlled    GI/HEPATIC/RENAL Review:    Pt. positive for renal disease - ESRD and dialysis    End/Other Review:    Pt. positive for diabetes - poorly controlled - using insulin  Pt. positive for anemia - Chronic      Anesthesia Plan     ASA Status: 3  Anesthesia Type: MAC  Induction: Intravenous  Reviewed: Allergies, Past Med History, Problem List, Medications, NPO Status, Patient Summary, Pre-Induction Reassessment, DNR Status, Beta Blocker Status and Lab Results  The proposed anesthetic plan, including its risks and benefits, have been discussed with the Patient - along with the risks and benefits of alternatives.  Questions were encouraged and answered and the patient and/or representative agrees to proceed.  Blood Products: Not Anticipated  Plan Comments: Pr requested iv sedation for procedure due to anxiety      Physical Exam  Mallampati: II  TM Distance: >3 FB  Neck ROM: Full  cardiovascular exam normal  pulmonary exam normal  Patient Demonstrates:  Gravid                   no

## 2021-05-20 NOTE — ED PROVIDER NOTE - NS ED ROS FT
Constitutional:  see HPI  Head:  dizziness  Eyes:  no visual changes; no eye pain, redness, or discharge  ENMT:  no ear pain or discharge; no hearing problems; no mouth or throat sores or lesions; no throat pain  Cardiac: no chest pain, tachycardia or palpitations  Respiratory: no cough, wheezing, shortness of breath, chest tightness, or trouble breathing  GI: no nausea, vomiting, diarrhea or abdominal pain  :  no dysuria, frequency, or burning with urination; no change in urine output  MS: no myalgias, muscle weakness, joint pain,or  injury; no joint swelling  Neuro: gait instability  Skin:  no rashes or color changes; no lacerations or abrasions

## 2021-05-20 NOTE — H&P ADULT - NSHPPHYSICALEXAM_GEN_ALL_CORE
VITAL SIGNS: AFebrile, vital signs stable  CONSTITUTIONAL: Well-developed; well-nourished; in no acute distress. Appears younger than stated age. well groomed   SKIN: Skin exam is warm and dry, no acute rash.  HEAD: Normocephalic; atraumatic.  EYES: Pupils equal round reactive to light, Extraocular movements intact; conjunctiva and sclera clear.  ENT: No nasal discharge; airway clear. Moist mucus membranes.  NECK: Supple; non tender. No rigidity  CARD: Regular rate and rhythm. Normal S1, S2; no murmurs, gallops, or rubs.  RESP: Lungs clear to auscultation bilaterally. No wheezes, rales or rhonchi.  ABD: Abdomen soft; non-tender; non-distended;  no hepatosplenomegaly. No costovertebral angle tenderness.   EXT: Normal ROM. No clubbing, cyanosis or edema. No calf tenderness to palpation.  NEURO: Alert and oriented x 3. No focal deficits.  PSYCH: Cooperative, appropriate.

## 2021-05-20 NOTE — ED PROVIDER NOTE - ATTENDING CONTRIBUTION TO CARE
79 yo M with PMH of HTN presents to ED for dizziness and gait instability which began at 10am. No weakness, blurred vision, nausea, vomiting, CP, SOB, weakness.     Const: Well nourished, well developed, appears stated age  Eyes: PERRL, no conjunctival injection  HENT:  Neck supple without meningismus   CV: RRR, Warm, well-perfused extremities  RESP: CTA B/L, no tachypnea   GI: soft, non-tender, non-distended  MSK: No gross deformities appreciated  Skin: Warm, dry. No rashes  Neuro: Alert, CNs II-XII grossly intact. Sensation and motor function of extremities grossly intact. Normal gait. Normal finger to nose.   Psych: Appropriate mood and affect.    Stroke code

## 2021-05-20 NOTE — ED ADULT TRIAGE NOTE - CHIEF COMPLAINT QUOTE
pt sent to ED from urgent care for high blood pressure, new onset of dizziness that began this morning. pt is having balance, coordination issues. pt is having difficulty ambulating. began at 10AM.  stroke code initiated in triage   FS = 107

## 2021-05-20 NOTE — ED ADULT TRIAGE NOTE - BEFAST BALANCE
I have reviewed and agree with my Chiropractic Technician's note.    SUBJECTIVE (cont'd):  Neck \"feeling much better, 90%.\" Intermittent lower back pain comes and goes.     OBJECTIVE FINDINGS:   (Cervical spine) Cervical spine facet joint function is within normal limits except for her C5-7 facet joints that exhibited limited passive range of motion and segmental restriction with tenderness upon palpation. The following muscles were examined for normal flexibility and tone: right and left paraspinal muscles; right and left upper trapezius muscle: right and left scalene muscle; right and left levator scapulae muscle;  right and left suboccipital muscle. These muscles were within normal limits except for her bilateral suboccipital muscles that exhibited limited flexibility and were hypertonic at rest.    (Thoracic spine) Thoracic spine facet joint function is within normal limits except for her T3-7 facet joints that exhibited limited passive range of motion and segmental restriction and tenderness upon palpation. The following muscles were examined for normal flexibility and tone: right and left rhomboid muscle; right and left serratus muscle; thoracic paraspinal muscles. These muscles were within normal limits except for her bilateral trapezius and bilateral rhomboid muscles that exhibited limited flexibility and abnormal resting tone.    (Lumbar, sacral and Pelvic Spine) Lumbar spine facet joint function is within normal limits except for her L1-5 facet joints that exhibited limited passive range of motion and segmental restriction and tenderness on palpation; sacroiliac joint function is within normal limits except for her left Sacroiliac joint that exhibited limited passive range of motion and joint restriction.The following muscles were examined for flexibility and tone at rest: right and left piriformis muscle; right and left hamstring muscle; right and left gluteus medius muscle and gluteus orin muscle;  right and left quadratus lumborum; right and left tensor fasciae latae muscle; right and left quadriceps muscle; right and left lumbar paraspinal muscles. These muscles were found to be within normal limits except for her bilateral quadratus lumborum muscles that exhibited limited flexibility and was hypertonic at rest.    Orthopedic/Neurological tests:  None performed today.     Assessment:   1. Cervical disc disorder    2. Muscle spasm    3. Somatic dysfunction of spine, thoracic    4. Somatic dysfunction of lumbar region      Complicating Factors/Co-morbidities:   Likely cervical disc injury    Plan:  Patient was evaluated and then treated with manipulation to her thoracic spine via diversified manipulation technique and to her cervical spine via manual cervical traction to improve function and passive range of motion of facet and/or sacroiliac joints. Patient also treated with contract/relax stretch to muscle noted as taut in objective findings to improve flexibility and decrease strain to spinal structures. Patient reports no adverse response to manipulation.     Therapeutic Exercise/Rehab/Modalities performed today: Attended interferential electrical stimulation was performed to her bilateral lumbar paravertebral muscles for 8 minutes.     Patient Instruction/Education:   Patient was educated in the nature of condition and likely pain generators as well as plan of care to resolve symptoms and improve muscular and skeletal function.  Patient noted verbal understanding of condition and is agreeable to plan of care. Patient stated understanding of, and was in agreement with, the discussed instructions.    Goals of Care: Goal of care is to improve muscular and skeletal function and provide symptom relief.   Additional Goals Include and to be obtained by end of this plan of care.   To be obtained by end of this plan of care:  1. Patient independent with modified and progressed home exercise program.  2. Patient  will decrease symptoms by 60-80% of current Visual Analog Pain Scale Score.  3. Patient’s active range-of-motion will be within normal limits with no/minimal pain.   4. Patient will be able to tolerate standing activities for greater than or equal to 90 minutes without pain/difficulty.  5. Patient will demonstrate proper body mechanics for sitting and standing.  6. Patient will be able to tolerate sitting activities for greater than or equal to 90 minutes without pain/difficulty.  7. Patient will be able to sleep/rollover in bed without pain or difficulty.   8. Patient will be able to walk without pain or difficulty for greater than or equal to 45 minutes.   9. Patient will be able to bend and lift for activities of daily living and instrumental activities of daily living completion at home and work without pain/difficulty.  10. Patient’s DOD/VA pain questionnaire will be decreased by 50-70%.     Other treatment options discussed with patient: Start home prone tim work.     Patient rates her pain post treatment at 1/10 with 10 being worst.    Patient is to return Monday for continued care and treatment of her condition consistent with plan of care.    Length of Visit: 30 minutes           Yes

## 2021-05-21 LAB
A1C WITH ESTIMATED AVERAGE GLUCOSE RESULT: 5.7 % — HIGH (ref 4–5.6)
ALBUMIN SERPL ELPH-MCNC: 3.9 G/DL — SIGNIFICANT CHANGE UP (ref 3.5–5.2)
ALP SERPL-CCNC: 73 U/L — SIGNIFICANT CHANGE UP (ref 30–115)
ALT FLD-CCNC: 17 U/L — SIGNIFICANT CHANGE UP (ref 0–41)
ANION GAP SERPL CALC-SCNC: 14 MMOL/L — SIGNIFICANT CHANGE UP (ref 7–14)
AST SERPL-CCNC: 19 U/L — SIGNIFICANT CHANGE UP (ref 0–41)
BASOPHILS # BLD AUTO: 0.02 K/UL — SIGNIFICANT CHANGE UP (ref 0–0.2)
BASOPHILS NFR BLD AUTO: 0.4 % — SIGNIFICANT CHANGE UP (ref 0–1)
BILIRUB SERPL-MCNC: 1.7 MG/DL — HIGH (ref 0.2–1.2)
BUN SERPL-MCNC: 16 MG/DL — SIGNIFICANT CHANGE UP (ref 10–20)
CALCIUM SERPL-MCNC: 9.3 MG/DL — SIGNIFICANT CHANGE UP (ref 8.5–10.1)
CHLORIDE SERPL-SCNC: 102 MMOL/L — SIGNIFICANT CHANGE UP (ref 98–110)
CHOLEST SERPL-MCNC: 137 MG/DL — SIGNIFICANT CHANGE UP
CO2 SERPL-SCNC: 21 MMOL/L — SIGNIFICANT CHANGE UP (ref 17–32)
COVID-19 SPIKE DOMAIN AB INTERP: POSITIVE
COVID-19 SPIKE DOMAIN ANTIBODY RESULT: >250 U/ML — HIGH
CREAT SERPL-MCNC: 1.1 MG/DL — SIGNIFICANT CHANGE UP (ref 0.7–1.5)
EOSINOPHIL # BLD AUTO: 0.07 K/UL — SIGNIFICANT CHANGE UP (ref 0–0.7)
EOSINOPHIL NFR BLD AUTO: 1.2 % — SIGNIFICANT CHANGE UP (ref 0–8)
ESTIMATED AVERAGE GLUCOSE: 117 MG/DL — HIGH (ref 68–114)
GLUCOSE SERPL-MCNC: 93 MG/DL — SIGNIFICANT CHANGE UP (ref 70–99)
HCT VFR BLD CALC: 41.8 % — LOW (ref 42–52)
HDLC SERPL-MCNC: 58 MG/DL — SIGNIFICANT CHANGE UP
HGB BLD-MCNC: 14.2 G/DL — SIGNIFICANT CHANGE UP (ref 14–18)
IMM GRANULOCYTES NFR BLD AUTO: 0.4 % — HIGH (ref 0.1–0.3)
LIPID PNL WITH DIRECT LDL SERPL: 69 MG/DL — SIGNIFICANT CHANGE UP
LYMPHOCYTES # BLD AUTO: 1.77 K/UL — SIGNIFICANT CHANGE UP (ref 1.2–3.4)
LYMPHOCYTES # BLD AUTO: 31.2 % — SIGNIFICANT CHANGE UP (ref 20.5–51.1)
MAGNESIUM SERPL-MCNC: 2.2 MG/DL — SIGNIFICANT CHANGE UP (ref 1.8–2.4)
MCHC RBC-ENTMCNC: 30.9 PG — SIGNIFICANT CHANGE UP (ref 27–31)
MCHC RBC-ENTMCNC: 34 G/DL — SIGNIFICANT CHANGE UP (ref 32–37)
MCV RBC AUTO: 91.1 FL — SIGNIFICANT CHANGE UP (ref 80–94)
MONOCYTES # BLD AUTO: 0.58 K/UL — SIGNIFICANT CHANGE UP (ref 0.1–0.6)
MONOCYTES NFR BLD AUTO: 10.2 % — HIGH (ref 1.7–9.3)
NEUTROPHILS # BLD AUTO: 3.21 K/UL — SIGNIFICANT CHANGE UP (ref 1.4–6.5)
NEUTROPHILS NFR BLD AUTO: 56.6 % — SIGNIFICANT CHANGE UP (ref 42.2–75.2)
NON HDL CHOLESTEROL: 79 MG/DL — SIGNIFICANT CHANGE UP
NRBC # BLD: 0 /100 WBCS — SIGNIFICANT CHANGE UP (ref 0–0)
PLATELET # BLD AUTO: 222 K/UL — SIGNIFICANT CHANGE UP (ref 130–400)
POTASSIUM SERPL-MCNC: 4.1 MMOL/L — SIGNIFICANT CHANGE UP (ref 3.5–5)
POTASSIUM SERPL-SCNC: 4.1 MMOL/L — SIGNIFICANT CHANGE UP (ref 3.5–5)
PROT SERPL-MCNC: 6.1 G/DL — SIGNIFICANT CHANGE UP (ref 6–8)
RBC # BLD: 4.59 M/UL — LOW (ref 4.7–6.1)
RBC # FLD: 12.2 % — SIGNIFICANT CHANGE UP (ref 11.5–14.5)
SARS-COV-2 IGG+IGM SERPL QL IA: >250 U/ML — HIGH
SARS-COV-2 IGG+IGM SERPL QL IA: POSITIVE
SODIUM SERPL-SCNC: 137 MMOL/L — SIGNIFICANT CHANGE UP (ref 135–146)
TRIGL SERPL-MCNC: 97 MG/DL — SIGNIFICANT CHANGE UP
TROPONIN T SERPL-MCNC: <0.01 NG/ML — SIGNIFICANT CHANGE UP
TSH SERPL-MCNC: 0.92 UIU/ML — SIGNIFICANT CHANGE UP (ref 0.27–4.2)
WBC # BLD: 5.67 K/UL — SIGNIFICANT CHANGE UP (ref 4.8–10.8)
WBC # FLD AUTO: 5.67 K/UL — SIGNIFICANT CHANGE UP (ref 4.8–10.8)

## 2021-05-21 PROCEDURE — 70551 MRI BRAIN STEM W/O DYE: CPT | Mod: 26

## 2021-05-21 PROCEDURE — 99222 1ST HOSP IP/OBS MODERATE 55: CPT

## 2021-05-21 PROCEDURE — 99223 1ST HOSP IP/OBS HIGH 75: CPT

## 2021-05-21 RX ADMIN — PANTOPRAZOLE SODIUM 40 MILLIGRAM(S): 20 TABLET, DELAYED RELEASE ORAL at 06:46

## 2021-05-21 RX ADMIN — Medication 1000 UNIT(S): at 11:25

## 2021-05-21 RX ADMIN — Medication 81 MILLIGRAM(S): at 11:25

## 2021-05-21 RX ADMIN — QUETIAPINE FUMARATE 50 MILLIGRAM(S): 200 TABLET, FILM COATED ORAL at 21:43

## 2021-05-21 RX ADMIN — ATORVASTATIN CALCIUM 10 MILLIGRAM(S): 80 TABLET, FILM COATED ORAL at 21:43

## 2021-05-21 RX ADMIN — Medication 12.5 MILLIGRAM(S): at 05:47

## 2021-05-21 RX ADMIN — ZOLPIDEM TARTRATE 5 MILLIGRAM(S): 10 TABLET ORAL at 21:46

## 2021-05-21 RX ADMIN — LISINOPRIL 10 MILLIGRAM(S): 2.5 TABLET ORAL at 11:25

## 2021-05-21 RX ADMIN — TAMSULOSIN HYDROCHLORIDE 0.4 MILLIGRAM(S): 0.4 CAPSULE ORAL at 21:44

## 2021-05-21 RX ADMIN — MIRTAZAPINE 15 MILLIGRAM(S): 45 TABLET, ORALLY DISINTEGRATING ORAL at 21:43

## 2021-05-21 RX ADMIN — ENOXAPARIN SODIUM 40 MILLIGRAM(S): 100 INJECTION SUBCUTANEOUS at 11:25

## 2021-05-21 RX ADMIN — MONTELUKAST 10 MILLIGRAM(S): 4 TABLET, CHEWABLE ORAL at 11:25

## 2021-05-21 NOTE — PROGRESS NOTE ADULT - SUBJECTIVE AND OBJECTIVE BOX
LATASHA WATTS   [id number]  80y   Male   SUBJECTIVE:  Patient is a 80y old Male who presents with a chief complaint of Dizziness (20 May 2021 16:05)    Today is hospital day 1d. This morning he is resting comfortably in bed and assigned RN reports no new issues or overnight events.   Currently admitted to medicine with the primary diagnosis of Dizziness       PAST MEDICAL & SURGICAL HISTORY  HTN (hypertension)    HLD (hyperlipidemia)    BPH (benign prostatic hyperplasia)    No significant past surgical history      FAMILY HISTORY:  No pertinent family history in first degree relatives    ALLERGIES:  No Known Allergies    MEDICATIONS:  STANDING MEDICATIONS  aspirin  chewable 81 milliGRAM(s) Oral daily  atorvastatin 10 milliGRAM(s) Oral at bedtime  cholecalciferol 1000 Unit(s) Oral daily  enoxaparin Injectable 40 milliGRAM(s) SubCutaneous daily  hydrochlorothiazide 12.5 milliGRAM(s) Oral daily  lisinopril 10 milliGRAM(s) Oral daily  mirtazapine 15 milliGRAM(s) Oral at bedtime  montelukast 10 milliGRAM(s) Oral daily  pantoprazole    Tablet 40 milliGRAM(s) Oral before breakfast  QUEtiapine 50 milliGRAM(s) Oral at bedtime  tamsulosin 0.4 milliGRAM(s) Oral at bedtime    PRN MEDICATIONS  meclizine 25 milliGRAM(s) Oral three times a day PRN  zolpidem 5 milliGRAM(s) Oral at bedtime PRN  zolpidem 5 milliGRAM(s) Oral at bedtime PRN    VITALS:   T(F): 96.4 (05-21-21 @ 04:56)  HR: 68 (05-21-21 @ 06:53)  BP: 105/53 (05-21-21 @ 06:53)  BP(mean): 97 (05-20-21 @ 15:28)  RR: 18 (05-21-21 @ 04:56)  SpO2: 95% (05-21-21 @ 05:44)    LABS:                        14.2   5.67  )-----------( 222      ( 21 May 2021 05:10 )             41.8     Hemoglobin: 14.2 g/dL (05-21 @ 05:10)  Hemoglobin: 15.3 g/dL (05-20 @ 14:03)    05-21    137  |  102  |  16  ----------------------------<  93  4.1   |  21  |  1.1    Ca    9.3      21 May 2021 05:10  Mg     2.2     05-21    TPro  6.1  /  Alb  3.9  /  TBili  1.7<H>  /  DBili  x   /  AST  19  /  ALT  17  /  AlkPhos  73  05-21    Potassium Trend: 4.1<--, 4.1<--  SODIUM TREND:  Sodium 137 [05-21 @ 05:10]  Sodium 134 [05-20 @ 14:03]    Creatinine Trend: 1.1<--, 1.1<--  PT/INR - ( 20 May 2021 14:03 )   PT: 11.90 sec;   INR: 1.03 ratio         PTT - ( 20 May 2021 14:03 )  PTT:34.8 sec      Troponin T, Serum: <0.01 ng/mL (05-21-21 @ 05:10)  Calcium, Total Serum: 9.3 mg/dL (05-21-21 @ 05:10)  Magnesium, Serum: 2.2 mg/dL (05-21-21 @ 05:10)  Troponin T, Serum: <0.01 ng/mL (05-20-21 @ 21:16)  Calcium, Total Serum: 9.2 mg/dL (05-20-21 @ 14:03)  Troponin T, Serum: <0.01 ng/mL (05-20-21 @ 14:03)      CARDIAC MARKERS ( 21 May 2021 05:10 )  x     / <0.01 ng/mL / x     / x     / x      CARDIAC MARKERS ( 20 May 2021 21:16 )  x     / <0.01 ng/mL / x     / x     / x      CARDIAC MARKERS ( 20 May 2021 14:03 )  x     / <0.01 ng/mL / x     / x     / x          COVID  05-20-21 @ 15:14  COVID -   [facility]      RADIOLOGY:  < from: CT Brain Stroke Protocol (05.20.21 @ 13:33) >  IMPRESSION:    No CT evidence of acute intracranial hemorrhage or large territory infarct.    < end of copied text >  < from: CT Angio Head w/ IV Cont (05.20.21 @ 13:45) >  OTHER:  Moderate degenerative changes of the cervical spine.  Emphysematous changes at the lung apices.  3 mm left thyroid nodule.  There is apparent medialization and/or thickening of the right vocal cord.    IMPRESSION:    1.  No evidence of major vascular stenosis or occlusion.    2.  Apparent medialization and/or thickening of the right vocal cord, recommend correlation with direct visualization.    3.  Pulmonary emphysema.    < end of copied text >  CARDIOLOGY IMAGING:  < from: 12 Lead ECG (05.20.21 @ 14:27) >  Ventricular Rate 86 BPM    Atrial Rate 86 BPM    P-R Interval 236 ms    QRS Duration 128 ms    Q-T Interval 396 ms    QTC Calculation(Bazett) 473 ms    P Axis 30 degrees    R Axis -76 degrees    T Axis 21 degrees    Diagnosis Line *** Poor data quality, interpretation may be adversely affected  Sinus rhythm with 1st degree A-V block  Right bundle branch block  Left anterior fascicular block  *** Bifascicular block ***  Abnormal ECG    < end of copied text >    CARDIAC TELEMETRY: no events overnight    PHYSICAL EXAM:  GEN: No acute distress  HEENT: normocephalic, atraumatic, aniceteric  LUNGS: Clear to auscultation bilaterally, no rales/wheezing/ rhonchi  HEART: S1/S2 present. RRR, no murmurs  ABD: Soft, non-tender, non-distended. Bowel sounds present  EXT: Warm, well perfused, skin color appropriate for ethnicity  NEURO: Awake, alert    PHYSICIAN COMMUNICATION:  Case discussed with Dr. Silvio Pollard the outpt cardiologist of the pt, EKG's reviewed with him telephonically, Old EKG's and cath results reviewed in allscripts. He agrees with EP consult regarding new bifascicular block.

## 2021-05-21 NOTE — CONSULT NOTE ADULT - ASSESSMENT
IMPRESSION: Rehab of ataxia and vertigo yesterday, resolved today; cervical rotation doesn't elicit any vertigo today    PRECAUTIONS: [  ] Cardiac  [  ] Respiratory  [  ] Seizures [  ] Contact Isolation  [  ] Droplet Isolation  [  ] Other    Weight Bearing Status:     RECOMMENDATION:  He was indep with amb with PT; PT is not required at this time. Vestibular rehab could be a consideration if vertigo returns.    Out of Bed to Chair     DVT/Decubiti Prophylaxis    REHAB PLAN:     [ x  ] Bedside P/T 3-5 times a week   [   ]   Bedside O/T  2-3 times a week             [   ] No Rehab Therapy Indicated                   [   ]  Speech Therapy   Conditioning/ROM                                    ADL  Bed Mobility                                               Conditioning/ROM  Transfers                                                     Bed Mobility  Sitting /Standing Balance                         Transfers                                        Gait Training                                               Sitting/Standing Balance  Stair Training [   ]Applicable                    Home equipment Eval                                                                        Splinting  [   ] Only      GOALS:   ADL   [x   ]   Independent                    Transfers  [x   ] Independent                          Ambulation  [x   ] Independent     [    ] With device                            [   ]  CG                                                         [   ]  CG                                                                  [   ] CG                            [    ] Min A                                                   [   ] Min A                                                              [   ] Min  A          DISCHARGE PLAN:   [   ]  Good candidate for Intensive Rehabilitation/Hospital based-4A [facility]                                             Will tolerate 3hrs Intensive Rehab Daily                                       [    ]  Short Term Rehab in Skilled Nursing Facility                                       [   x ]  Home with Outpatient or VN services                                         [    ]  Possible Candidate for Intensive Hospital based Rehab

## 2021-05-21 NOTE — OCCUPATIONAL THERAPY INITIAL EVALUATION ADULT - PERTINENT HX OF CURRENT PROBLEM, REHAB EVAL
Pt is a right hand dominant male admitted 5/20 with c/o dizziness, gait instability, and LE paresthesias. Paresthesias has since resolved per pt report. CTH, CTA H/N (-), possible T WAVE inversion on EKG.

## 2021-05-21 NOTE — OCCUPATIONAL THERAPY INITIAL EVALUATION ADULT - SHORT TERM MEMORY, REHAB EVAL
Pt able to repeat 3/3 words, recall 0/3 words following 3 min delay despite prompts. Pt reports being I with medication and financial management

## 2021-05-21 NOTE — PHYSICAL THERAPY INITIAL EVALUATION ADULT - SPECIFY REASON(S)
Upon assessment ; pt is independent with transfers and ambulation ; without an Assistive Device ; will not require skilled PT at this time.

## 2021-05-21 NOTE — CONSULT NOTE ADULT - SUBJECTIVE AND OBJECTIVE BOX
HPI:  80 M PMH HTN, BPH, HLD presenting for dizziness and gait instability since morning. Patient symptoms began around 10AM on day of admission. He notified his family around 11:30AM and was brought into hospital by patient son. Patient had associated sensation of "numbness" in his legs.   Supplemental history obtained from grandson Adalberto. Patient has history insomnia, did not sleep well overnight. Is very functional at baseline, works around house etc. No history dementia. Denied any chest pain, recent illnesses, travel, shortness of breath, new bowel/urinary symptoms.     In ED, patient was a code stroke for dizziness, although NIH was 0. There was some mild gait instability noted. Patient CTH and CTA H/N were both unremarkable. EKG showing ? twave inversions with possible AV block, but troponin negative X 1 with no chest pain. Patient had diagnostic cath with Dr. Mendieta in Feb 2020, result showed normal coronaries. Last known echo was 50%.    Patient being admitted for workup of dizziness. Seen by neurology team, [facility] tele admit       (20 May 2021 16:05)      PAST MEDICAL & SURGICAL HISTORY:  HTN (hypertension)    HLD (hyperlipidemia)    BPH (benign prostatic hyperplasia)    No significant past surgical history        Hospital Course:  Had vertigo yesterday but gone today. he amb nicely with PT and doesn't require an assistive device.   TODAY'S SUBJECTIVE & REVIEW OF SYMPTOMS:     Constitutional WNL   Cardio WNL   Resp WNL   GI WNL  Heme WNL  Endo WNL  Skin WNL  MSK WNL  Neuro WNL  Cognitive WNL  Psych WNL      MEDICATIONS  (STANDING):  aspirin  chewable 81 milliGRAM(s) Oral daily  atorvastatin 10 milliGRAM(s) Oral at bedtime  cholecalciferol 1000 Unit(s) Oral daily  enoxaparin Injectable 40 milliGRAM(s) SubCutaneous daily  hydrochlorothiazide 12.5 milliGRAM(s) Oral daily  lisinopril 10 milliGRAM(s) Oral daily  mirtazapine 15 milliGRAM(s) Oral at bedtime  montelukast 10 milliGRAM(s) Oral daily  pantoprazole    Tablet 40 milliGRAM(s) Oral before breakfast  QUEtiapine 50 milliGRAM(s) Oral at bedtime  tamsulosin 0.4 milliGRAM(s) Oral at bedtime    MEDICATIONS  (PRN):  meclizine 25 milliGRAM(s) Oral three times a day PRN Dizziness  zolpidem 5 milliGRAM(s) Oral at bedtime PRN Insomnia  zolpidem 5 milliGRAM(s) Oral at bedtime PRN Insomnia      FAMILY HISTORY:  No pertinent family history in first degree relatives        Allergies    No Known Allergies    Intolerances        SOCIAL HISTORY:    [  ] Etoh  [  ] Smoking  [  ] Substance abuse     Home Environment:  [  ] Home Alone  [  ] Lives with Family  [  ] Home Health Aid    Dwelling:  [  ] Apartment  [  ] Private House  [  ] Adult Home  [  ] Skilled Nursing Facility      [  ] Short Term  [  ] Long Term  [  ] Stairs       Elevator [  ]    FUNCTIONAL STATUS PTA: (Check all that apply)  Ambulation: [   ]Independent    [  ] Dependent     [  ] Non-Ambulatory  Assistive Device: [  ] SA Cane  [  ]  Q Cane  [  ] Walker  [  ]  Wheelchair  ADL : [  ] Independent  [  ]  Dependent       Vital Signs Last 24 Hrs  T(C): 35.8 (21 May 2021 04:56), Max: 37.1 (20 May 2021 15:28)  T(F): 96.4 (21 May 2021 04:56), Max: 98.8 (20 May 2021 15:28)  HR: 68 (21 May 2021 06:53) (68 - 89)  BP: 105/53 (21 May 2021 06:53) (105/53 - 185/79)  BP(mean): 97 (20 May 2021 15:28) (97 - 97)  RR: 18 (21 May 2021 04:56) (16 - 18)  SpO2: 95% (21 May 2021 05:44) (95% - 98%)      PHYSICAL EXAM: Alert & Oriented X3  GENERAL: NAD, well-groomed, well-developed  HEAD:  Atraumatic, Normocephalic  EYES: EOMI, PERRLA, conjunctiva and sclera clear  NECK: Supple, No JVD, Normal thyroid  CHEST/LUNG: Clear  bilaterally; No rales, rhonchi, wheezing, or rubs  HEART: Regular rate and rhythm; No murmurs, rubs, or gallops  ABDOMEN: Soft, Nontender, Nondistended; Bowel sounds present  EXTREMITIES:  2+ Peripheral Pulses, No clubbing, cyanosis, or edema    NERVOUS SYSTEM:  Cranial Nerves 2-12 intact [  ] Abnormal  [  ]  ROM: WFL all extremities [  ]  Abnormal [  ]  Motor Strength: WFL all extremities  [ x ]  Abnormal [  ] 5/5 all 4 extrem  Sensation: intact to light touch [x  ] Abnormal [  ]  Reflexes: Symmetric [  ]  Abnormal [  ]    FUNCTIONAL STATUS:  Bed Mobility: Independent [  ]  Supervision [  ]  Needs Assistance [  ]  N/A [  ]  Transfers: Independent [  ]  Supervision [  ]  Needs Assistance [  ]  N/A [  ]   Ambulation: Independent [  ]  Supervision [  ]  Needs Assistance [  ]  N/A [  ]  ADL: Independent [  ] Requires Assistance [  ] N/A [  ]      LABS:                        14.2   5.67  )-----------( 222      ( 21 May 2021 05:10 )             41.8     05-21    137  |  102  |  16  ----------------------------<  93  4.1   |  21  |  1.1    Ca    9.3      21 May 2021 05:10  Mg     2.2     05-21    TPro  6.1  /  Alb  3.9  /  TBili  1.7<H>  /  DBili  x   /  AST  19  /  ALT  17  /  AlkPhos  73  05-21    PT/INR - ( 20 May 2021 14:03 )   PT: 11.90 sec;   INR: 1.03 ratio         PTT - ( 20 May 2021 14:03 )  PTT:34.8 sec      RADIOLOGY & ADDITIONAL STUDIES:    Assesment:

## 2021-05-21 NOTE — CONSULT NOTE ADULT - ATTENDING COMMENTS
Patient examined in CT scanner and stroke score = 0.  TPA not given due to minor symptom and low (0) stroke score.  No LVO on vascular imaging.  Continue aspirin 81 mg/day, hydrate and check orthostatics.  MRI brain w/o.
Dizziness with positive orthostatics. 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     Rec  - long term monitoring with MCOT vs loop (depending on MRI brain) given evidence of conduction abnormalities  - IVF and repeat orthostatics  - Avoid alpha blockers  - Follow up in 6 weeks

## 2021-05-21 NOTE — PROGRESS NOTE ADULT - ASSESSMENT
ASSESSMENT:  80 M PMH HTN presenting for dizziness and gait instability since morning. Patient symptoms began around 10AM on day of admission. Was brought into hospital by patient son.  In ED, patient was a code stroke for dizziness, although NIH was 0. There was some mild gait instability noted. Patient CTH and CTA H/N were both unremarkable. EKG showing ? twave inversions with possible AV block, but troponin. negative X 1 with no chest pain. Patient being admitted for workup of dizziness.   PLAN/ACTIVE PROBLEMS:  # Dizziness   - neuro check per routine  - neuro follow up  - tele monitoring  - Trop negative x 3  - f/u TSH  - f/u A1c  - f/u lipid panel  - PT/OT  - physiatry  - f/u 2D echo  - f/u MR brain    # EKG changes  - EP consult, Cardio,   - Possible t-wave inversion ekg + RBBB (old) +/- NEW BiFascicular block sees Dr. Mendieta outpt will call to obtain old EKG   - cath in 2/2020 performed by Dr. Silvio Mendieta @ [facility]: 30% occlusion LAD no interventions    # Chronic Essential HTN  -c/w lisinopril 10  - c/w HCTZ 12.5   - check orthostatics    # HLD  - f/u lipid panel  - c/w Lipitor 20mg    #Mood Disorder/Insomina  - c/w Seroquel 50mg  - c/w Ambien 10mg (5+5prn), Mirtazapine   #COPD-nebs prn, c/w moteleukast    DVT ppx- lovenox 40mg  DIET-DASH  Activity with assistance   dispo-from home, tele monitoring    FULL CODE  DISPOSITION: acute pending EP

## 2021-05-21 NOTE — CONSULT NOTE ADULT - ASSESSMENT
Cardiologist: Dr. Silvio Mendieta    80 M PMH HTN, BPH, HLD presenting for dizziness and gait instability since morning, w/u for CVA. EP consulted c/w AVB on EKG.     Impression:      Plan:   Cardiologist: Dr. Silvio Mendieta    80 M PMH HTN, BPH, HLD, cath 2/2020 (LAD 30% stenosis), Echo 2/2020 (55-60%), RBBB (2/2020) presenting for dizziness and gait instability since this morning, w/u for CVA. EP consulted c/w AVB on EKG.     Impression:  Dizziness  Hx HTN, HLD    Plan:   Cardiologist: Dr. Silvio Mendieta    80 M PMH HTN, BPH, HLD, cath 2/2020 at [place] (LAD 30% stenosis), Echo 2/2020 (55-60%), RBBB (2/2020) presenting for dizziness and gait instability since this morning, w/u for CVA. EP consulted c/w TWI and AVB on EKG.     Impression:  Dizziness  EKG: RBBB (), 1st AVB, (), LAFB  Hx HTN, HLD    Plan:  - Recommend MCOT vs ILR on discharge pending MRI brain  - No further EP intervention at current time  - Echo pending  - TSH pending  - Care per primary team  - Cont tele  - Will follow    Plan discussed with pt and Adalberto rider [phone]    Georgian  # [id number] Leander ; # [id number] Chantel Robin Cardiologist: Dr. Silvio Mendieta    80 M PMH HTN, BPH, HLD, cath 2/2020 at [place] (LAD 30% stenosis), Echo 2/2020 (55-60%), RBBB (2/2020) presenting for dizziness and gait instability since this morning, w/u for CVA. EP consulted c/w TWI and AVB on EKG. + orthostatics.     Impression:  Dizziness, + orthostatic VS  EKG: RBBB (), 1st AVB, (), LAFB  Hx HTN, HLD    Plan:  - Recommend MCOT vs ILR on discharge pending MRI brain  - Please repeat orthostatic VS daily  - No further EP intervention at current time  - Echo pending  - TSH pending  - Encourage PO fluid intake  - Care per primary team  - Cont tele  - Will follow    Plan discussed with pt and Adalberto rider [phone]    Divehi  # [id number] Leander ; # [id number] Chantel Robin Cardiologist: Dr. Silvio Mendieta    80 M PMH HTN, BPH, HLD, cath 2/2020 at [place] (LAD 30% stenosis), Echo 2/2020 (55-60%), RBBB (2/2020) presenting for dizziness and gait instability since this morning, w/u for CVA. EP consulted c/w TWI and AVB on EKG. + orthostatics.     Impression:  Dizziness, + orthostatic VS  EKG: RBBB (), 1st AVB, (), LAFB  Hx HTN, HLD    Plan:  - Recommend MCOT vs ILR on discharge pending MRI brain  - Please repeat orthostatic VS daily  - Echo pending  - TSH pending  - Encourage PO fluid intake  - Care per primary team  - Cont tele  - Will follow    Plan discussed with pt and Adalberto rider [phone]    English  # [id number] Leander ; # [id number] Chantel Robin

## 2021-05-21 NOTE — PHYSICAL THERAPY INITIAL EVALUATION ADULT - GENERAL OBSERVATIONS, REHAB EVAL
[id number] am Chart reviewed. Pt. seen semirecline in bed , in No apparent distress , + telemetry , IV lock; Belarusian  used to communicate with pt ( ID [id number]). Pt. agreed to activity/therapy.

## 2021-05-21 NOTE — OCCUPATIONAL THERAPY INITIAL EVALUATION ADULT - GENERAL OBSERVATIONS, REHAB EVAL
Pt encountered semi oakley in bed, NAD. +TELE, +iv lock. PT Jackelyn present at bedside. Pt agreeable to eval. Estonian  #[id number] utilized during assessment. /59 mmHg. Pt left in b/s chair, +chair alarm, in NAD.

## 2021-05-21 NOTE — CHART NOTE - NSCHARTNOTEFT_GEN_A_CORE
Pt seen and examined at bedside. Maori  [id number] used. Pt states he didn't feel dizzy, he felt like his "head was clouded". He report a sensation of coldness behind his knees which radiated to feet. Symtpoms have resolved. New bifascicular block found compared to outpt EKG. EP consulted. Neuro on board, recommending MRI non contrast. Pt aware.

## 2021-05-21 NOTE — PHYSICAL THERAPY INITIAL EVALUATION ADULT - PERTINENT HX OF CURRENT PROBLEM, REHAB EVAL
80 M PMH HTN presenting for dizziness and gait instability since morning. Patient symptoms began around 10AM on day of admission. Was brought into hospital by patient son.Patient being admitted for workup of dizziness.

## 2021-05-21 NOTE — CONSULT NOTE ADULT - SUBJECTIVE AND OBJECTIVE BOX
Patient is a 80y old  Male who presents with a chief complaint of Dizziness (21 May 2021 10:30)    HPI:  80 M PMH HTN, BPH, HLD presenting for dizziness and gait instability since morning. Patient symptoms began around 10AM on day of admission. He notified his family around 11:30AM and was brought into hospital by patient son. Patient had associated sensation of "numbness" in his legs.   Supplemental history obtained from grandson Adalberto. Patient has history insomnia, did not sleep well overnight. Is very functional at baseline, works around house etc. No history dementia. Denied any chest pain, recent illnesses, travel, shortness of breath, new bowel/urinary symptoms.     In ED, patient was a code stroke for dizziness, although NIH was 0. There was some mild gait instability noted. Patient CTH and CTA H/N were both unremarkable. EKG showing ? twave inversions with possible AV block, but troponin negative X 1 with no chest pain. Patient had diagnostic cath with Dr. Mendieta in Feb 2020, result showed normal coronaries. Last known echo was 50%.    Patient being admitted for workup of dizziness. Seen by neurology team, [facility] tele admit   (20 May 2021 16:05)    EP consulted for evaluation of AVB.     REVIEW OF SYSTEMS    [x] A ten-point review of systems was otherwise negative except as noted.  [ ] Due to altered mental status/intubation, subjective information were not able to be obtained from the patient. History was obtained, to the extent possible, from review of the chart and collateral sources of information.      PAST MEDICAL & SURGICAL HISTORY:  HTN (hypertension)    HLD (hyperlipidemia)    BPH (benign prostatic hyperplasia)    No significant past surgical history        Home Medications:  aspirin 81 mg oral tablet: 1 tab(s) orally once a day (20 May 2021 16:20)  atorvastatin 10 mg oral tablet: 1 tab(s) orally once a day (20 May 2021 16:20)  Breo Ellipta 200 mcg-25 mcg/inh inhalation powder: 1 puff(s) inhaled once a day (20 May 2021 16:20)  Flomax 0.4 mg oral capsule: 1 cap(s) orally once a day (20 May 2021 16:20)  hydroCHLOROthiazide 12.5 mg oral capsule: 1 cap(s) orally once a day (20 May 2021 16:33)  lisinopril 10 mg oral tablet: 1 tab(s) orally once a day (20 May 2021 16:32)  lisinopril-hydrochlorothiazide 10 mg-12.5 mg oral tablet: 1 tab(s) orally once a day (20 May 2021 16:20)  mirtazapine 15 mg oral tablet: 1 tab(s) orally once a day (at bedtime) (20 May 2021 16:34)  montelukast 10 mg oral tablet: 1 tab(s) orally once a day (28 Feb 2020 12:42)  Mucinex 600 mg oral tablet, extended release: 1 tab(s) orally every 12 hours, As Needed (28 Feb 2020 12:42)  omeprazole 20 mg oral delayed release capsule: 1 cap(s) orally once a day (28 Feb 2020 12:42)  raNITIdine 150 mg oral capsule: 1 cap(s) orally 2 times a day (28 Feb 2020 12:42)  SEROquel XR 50 mg oral tablet, extended release: 1 tab(s) orally once a day (in the evening) (20 May 2021 16:34)  Vitamin D3 1000 intl units (25 mcg) oral tablet: 1 tab(s) orally once a day (28 Feb 2020 12:42)  zolpidem 10 mg oral tablet: 1 tab(s) orally once a day (at bedtime) (20 May 2021 16:34)      Allergies:  No Known Allergies    FAMILY HISTORY:  No pertinent family history in first degree relatives    SOCIAL HISTORY:  Non smoker, occasional drinker. 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    MEDICATIONS  (STANDING):  aspirin  chewable 81 milliGRAM(s) Oral daily  atorvastatin 10 milliGRAM(s) Oral at bedtime  cholecalciferol 1000 Unit(s) Oral daily  enoxaparin Injectable 40 milliGRAM(s) SubCutaneous daily  hydrochlorothiazide 12.5 milliGRAM(s) Oral daily  lisinopril 10 milliGRAM(s) Oral daily  mirtazapine 15 milliGRAM(s) Oral at bedtime  montelukast 10 milliGRAM(s) Oral daily  pantoprazole    Tablet 40 milliGRAM(s) Oral before breakfast  QUEtiapine 50 milliGRAM(s) Oral at bedtime  tamsulosin 0.4 milliGRAM(s) Oral at bedtime    MEDICATIONS  (PRN):  meclizine 25 milliGRAM(s) Oral three times a day PRN Dizziness  zolpidem 5 milliGRAM(s) Oral at bedtime PRN Insomnia  zolpidem 5 milliGRAM(s) Oral at bedtime PRN Insomnia      Vital Signs Last 24 Hrs  T(C): 35.8 (21 May 2021 04:56), Max: 37.1 (20 May 2021 15:28)  T(F): 96.4 (21 May 2021 04:56), Max: 98.8 (20 May 2021 15:28)  HR: 68 (21 May 2021 06:53) (68 - 89)  BP: 105/53 (21 May 2021 06:53) (105/53 - 185/79)  BP(mean): 97 (20 May 2021 15:28) (97 - 97)  RR: 18 (21 May 2021 04:56) (16 - 18)  SpO2: 95% (21 May 2021 05:44) (95% - 98%)    PHYSICAL EXAM:    GENERAL: In no apparent distress, well nourished, and hydrated.  HEART: Regular rate and rhythm; No murmurs, rubs, or gallops.  PULMONARY: Clear to auscultation and perfusion.  No rales, wheezing, or rhonchi bilaterally.  ABDOMEN: Soft, Nontender, Nondistended; Bowel sounds present  EXTREMITIES:  2+ Peripheral Pulses, No clubbing, cyanosis, or edema  NEUROLOGICAL: Grossly nonfocal      INTERPRETATION OF TELEMETRY:      ECG: < from: 12 Lead ECG (05.20.21 @ 14:27) >    Ventricular Rate 86 BPM    Atrial Rate 86 BPM    P-R Interval 236 ms    QRS Duration 128 ms    Q-T Interval 396 ms    QTC Calculation(Bazett) 473 ms    P Axis 30 degrees    R Axis -76 degrees    T Axis 21 degrees    Diagnosis Line *** Poor data quality, interpretation may be adversely affected  Sinus rhythm with 1st degree A-V block  Right bundle branch block  Left anterior fascicular block  *** Bifascicular block ***  Abnormal ECG    < end of copied text >      I&O's Detail      LABS:                        14.2   5.67  )-----------( 222      ( 21 May 2021 05:10 )             41.8     05-21    137  |  102  |  16  ----------------------------<  93  4.1   |  21  |  1.1    Ca    9.3      21 May 2021 05:10  Mg     2.2     05-21    TPro  6.1  /  Alb  3.9  /  TBili  1.7<H>  /  DBili  x   /  AST  19  /  ALT  17  /  AlkPhos  73  05-21    CARDIAC MARKERS ( 21 May 2021 05:10 )  x     / <0.01 ng/mL / x     / x     / x      CARDIAC MARKERS ( 20 May 2021 21:16 )  x     / <0.01 ng/mL / x     / x     / x      CARDIAC MARKERS ( 20 May 2021 14:03 )  x     / <0.01 ng/mL / x     / x     / x          PT/INR - ( 20 May 2021 14:03 )   PT: 11.90 sec;   INR: 1.03 ratio         PTT - ( 20 May 2021 14:03 )  PTT:34.8 sec    I&O's Detail    Daily Height in cm: 162.56 (20 May 2021 20:00)    Daily     RADIOLOGY & ADDITIONAL STUDIES:  < from: CT Brain Stroke Protocol (05.20.21 @ 13:33) >  Findings:    The ventricles and cortical sulci are within normal limits for age.    There are patchy hypodensities throughout the hemispheric white matter without mass effect compatible with mild chronic microvascular changes.    There is no acute intracranial hemorrhage, extra-axial fluid collection or midline shift.  Gray white matter differentiation is maintained.    There is calcific atherosclerotic disease at the skull base.    The visualized paranasal sinuses and mastoids are clear.    IMPRESSION:    No CT evidence of acute intracranial hemorrhage or large territory infarct.    < end of copied text >   Patient is a 80y old  Male who presents with a chief complaint of Dizziness (21 May 2021 10:30)    HPI:  80 M PMH HTN, BPH, HLD presenting for dizziness and gait instability since morning. Patient symptoms began around 10AM on day of admission. He notified his family around 11:30AM and was brought into hospital by patient son. Patient had associated sensation of "numbness" in his legs.   Supplemental history obtained from grandson Adalberto. Patient has history insomnia, did not sleep well overnight. Is very functional at baseline, works around house etc. No history dementia. Denied any chest pain, recent illnesses, travel, shortness of breath, new bowel/urinary symptoms.     In ED, patient was a code stroke for dizziness, although NIH was 0. There was some mild gait instability noted. Patient CTH and CTA H/N were both unremarkable. EKG showing ? twave inversions with possible AV block, but troponin negative X 1 with no chest pain. Patient had diagnostic cath with Dr. Mendieta in Feb 2020, result showed normal coronaries. Last known echo was 50%.    Patient being admitted for workup of dizziness. Seen by neurology team, [facility] tele admit   (20 May 2021 16:05)    EP consulted for evaluation of AVB. Pt states the night before his symptoms began, he was drinking [place] wine. Denies any history of prior dizziness or syncope. CTA head neg.     REVIEW OF SYSTEMS    [x] A ten-point review of systems was otherwise negative except as noted.  [ ] Due to altered mental status/intubation, subjective information were not able to be obtained from the patient. History was obtained, to the extent possible, from review of the chart and collateral sources of information.      PAST MEDICAL & SURGICAL HISTORY:  HTN (hypertension)    HLD (hyperlipidemia)    BPH (benign prostatic hyperplasia)    No significant past surgical history        Home Medications:  aspirin 81 mg oral tablet: 1 tab(s) orally once a day (20 May 2021 16:20)  atorvastatin 10 mg oral tablet: 1 tab(s) orally once a day (20 May 2021 16:20)  Breo Ellipta 200 mcg-25 mcg/inh inhalation powder: 1 puff(s) inhaled once a day (20 May 2021 16:20)  Flomax 0.4 mg oral capsule: 1 cap(s) orally once a day (20 May 2021 16:20)  hydroCHLOROthiazide 12.5 mg oral capsule: 1 cap(s) orally once a day (20 May 2021 16:33)  lisinopril 10 mg oral tablet: 1 tab(s) orally once a day (20 May 2021 16:32)  lisinopril-hydrochlorothiazide 10 mg-12.5 mg oral tablet: 1 tab(s) orally once a day (20 May 2021 16:20)  mirtazapine 15 mg oral tablet: 1 tab(s) orally once a day (at bedtime) (20 May 2021 16:34)  montelukast 10 mg oral tablet: 1 tab(s) orally once a day (28 Feb 2020 12:42)  Mucinex 600 mg oral tablet, extended release: 1 tab(s) orally every 12 hours, As Needed (28 Feb 2020 12:42)  omeprazole 20 mg oral delayed release capsule: 1 cap(s) orally once a day (28 Feb 2020 12:42)  raNITIdine 150 mg oral capsule: 1 cap(s) orally 2 times a day (28 Feb 2020 12:42)  SEROquel XR 50 mg oral tablet, extended release: 1 tab(s) orally once a day (in the evening) (20 May 2021 16:34)  Vitamin D3 1000 intl units (25 mcg) oral tablet: 1 tab(s) orally once a day (28 Feb 2020 12:42)  zolpidem 10 mg oral tablet: 1 tab(s) orally once a day (at bedtime) (20 May 2021 16:34)      Allergies:  No Known Allergies    FAMILY HISTORY:  No pertinent family history in first degree relatives    SOCIAL HISTORY:  Non smoker, occasional drinker. 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    MEDICATIONS  (STANDING):  aspirin  chewable 81 milliGRAM(s) Oral daily  atorvastatin 10 milliGRAM(s) Oral at bedtime  cholecalciferol 1000 Unit(s) Oral daily  enoxaparin Injectable 40 milliGRAM(s) SubCutaneous daily  hydrochlorothiazide 12.5 milliGRAM(s) Oral daily  lisinopril 10 milliGRAM(s) Oral daily  mirtazapine 15 milliGRAM(s) Oral at bedtime  montelukast 10 milliGRAM(s) Oral daily  pantoprazole    Tablet 40 milliGRAM(s) Oral before breakfast  QUEtiapine 50 milliGRAM(s) Oral at bedtime  tamsulosin 0.4 milliGRAM(s) Oral at bedtime    MEDICATIONS  (PRN):  meclizine 25 milliGRAM(s) Oral three times a day PRN Dizziness  zolpidem 5 milliGRAM(s) Oral at bedtime PRN Insomnia  zolpidem 5 milliGRAM(s) Oral at bedtime PRN Insomnia      Vital Signs Last 24 Hrs  T(C): 35.8 (21 May 2021 04:56), Max: 37.1 (20 May 2021 15:28)  T(F): 96.4 (21 May 2021 04:56), Max: 98.8 (20 May 2021 15:28)  HR: 68 (21 May 2021 06:53) (68 - 89)  BP: 105/53 (21 May 2021 06:53) (105/53 - 185/79)  BP(mean): 97 (20 May 2021 15:28) (97 - 97)  RR: 18 (21 May 2021 04:56) (16 - 18)  SpO2: 95% (21 May 2021 05:44) (95% - 98%)    PHYSICAL EXAM:    GENERAL: In no apparent distress, well nourished, and hydrated.  HEART: Regular rate and rhythm; No murmurs, rubs, or gallops.  PULMONARY: Clear to auscultation and perfusion.  No rales, wheezing, or rhonchi bilaterally.  ABDOMEN: Soft, Nontender, Nondistended; Bowel sounds present  EXTREMITIES:  2+ Peripheral Pulses, No clubbing, cyanosis, or edema  NEUROLOGICAL: Grossly nonfocal      INTERPRETATION OF TELEMETRY:  SR 85 BPM / no events     ECG: < from: 12 Lead ECG (05.20.21 @ 14:27) >    Ventricular Rate 86 BPM    Atrial Rate 86 BPM    P-R Interval 236 ms    QRS Duration 128 ms    Q-T Interval 396 ms    QTC Calculation(Bazett) 473 ms    P Axis 30 degrees    R Axis -76 degrees    T Axis 21 degrees    Diagnosis Line *** Poor data quality, interpretation may be adversely affected  Sinus rhythm with 1st degree A-V block  Right bundle branch block  Left anterior fascicular block  *** Bifascicular block ***  Abnormal ECG    < end of copied text >      I&O's Detail      LABS:                        14.2   5.67  )-----------( 222      ( 21 May 2021 05:10 )             41.8     05-21    137  |  102  |  16  ----------------------------<  93  4.1   |  21  |  1.1    Ca    9.3      21 May 2021 05:10  Mg     2.2     05-21    TPro  6.1  /  Alb  3.9  /  TBili  1.7<H>  /  DBili  x   /  AST  19  /  ALT  17  /  AlkPhos  73  05-21    CARDIAC MARKERS ( 21 May 2021 05:10 )  x     / <0.01 ng/mL / x     / x     / x      CARDIAC MARKERS ( 20 May 2021 21:16 )  x     / <0.01 ng/mL / x     / x     / x      CARDIAC MARKERS ( 20 May 2021 14:03 )  x     / <0.01 ng/mL / x     / x     / x          PT/INR - ( 20 May 2021 14:03 )   PT: 11.90 sec;   INR: 1.03 ratio         PTT - ( 20 May 2021 14:03 )  PTT:34.8 sec    I&O's Detail    Daily Height in cm: 162.56 (20 May 2021 20:00)    Daily     RADIOLOGY & ADDITIONAL STUDIES:  < from: CT Brain Stroke Protocol (05.20.21 @ 13:33) >  Findings:    The ventricles and cortical sulci are within normal limits for age.    There are patchy hypodensities throughout the hemispheric white matter without mass effect compatible with mild chronic microvascular changes.    There is no acute intracranial hemorrhage, extra-axial fluid collection or midline shift.  Gray white matter differentiation is maintained.    There is calcific atherosclerotic disease at the skull base.    The visualized paranasal sinuses and mastoids are clear.    IMPRESSION:    No CT evidence of acute intracranial hemorrhage or large territory infarct.    < end of copied text >   Patient is a 80y old  Male who presents with a chief complaint of Dizziness (21 May 2021 10:30)    HPI:  80 M PMH HTN, BPH, HLD presenting for dizziness and gait instability since morning. Patient symptoms began around 10AM on day of admission. He notified his family around 11:30AM and was brought into hospital by patient son. Patient had associated sensation of "numbness" in his legs.   Supplemental history obtained from grandson Adalberto. Patient has history insomnia, did not sleep well overnight. Is very functional at baseline, works around house etc. No history dementia. Denied any chest pain, recent illnesses, travel, shortness of breath, new bowel/urinary symptoms.     In ED, patient was a code stroke for dizziness, although NIH was 0. There was some mild gait instability noted. Patient CTH and CTA H/N were both unremarkable. EKG showing ? twave inversions with possible AV block, but troponin negative X 1 with no chest pain. Patient had diagnostic cath with Dr. Mendieta in Feb 2020, result showed normal coronaries. Last known echo was 50%.    Patient being admitted for workup of dizziness. Seen by neurology team, [facility] tele admit   (20 May 2021 16:05)    EP consulted for evaluation of AVB. Pt states the night before his symptoms began, he was drinking wine. Denies any history of prior dizziness or syncope. CTA head neg.     REVIEW OF SYSTEMS    [x] A ten-point review of systems was otherwise negative except as noted.  [ ] Due to altered mental status/intubation, subjective information were not able to be obtained from the patient. History was obtained, to the extent possible, from review of the chart and collateral sources of information.      PAST MEDICAL & SURGICAL HISTORY:  HTN (hypertension)    HLD (hyperlipidemia)    BPH (benign prostatic hyperplasia)    No significant past surgical history        Home Medications:  aspirin 81 mg oral tablet: 1 tab(s) orally once a day (20 May 2021 16:20)  atorvastatin 10 mg oral tablet: 1 tab(s) orally once a day (20 May 2021 16:20)  Breo Ellipta 200 mcg-25 mcg/inh inhalation powder: 1 puff(s) inhaled once a day (20 May 2021 16:20)  Flomax 0.4 mg oral capsule: 1 cap(s) orally once a day (20 May 2021 16:20)  hydroCHLOROthiazide 12.5 mg oral capsule: 1 cap(s) orally once a day (20 May 2021 16:33)  lisinopril 10 mg oral tablet: 1 tab(s) orally once a day (20 May 2021 16:32)  lisinopril-hydrochlorothiazide 10 mg-12.5 mg oral tablet: 1 tab(s) orally once a day (20 May 2021 16:20)  mirtazapine 15 mg oral tablet: 1 tab(s) orally once a day (at bedtime) (20 May 2021 16:34)  montelukast 10 mg oral tablet: 1 tab(s) orally once a day (28 Feb 2020 12:42)  Mucinex 600 mg oral tablet, extended release: 1 tab(s) orally every 12 hours, As Needed (28 Feb 2020 12:42)  omeprazole 20 mg oral delayed release capsule: 1 cap(s) orally once a day (28 Feb 2020 12:42)  raNITIdine 150 mg oral capsule: 1 cap(s) orally 2 times a day (28 Feb 2020 12:42)  SEROquel XR 50 mg oral tablet, extended release: 1 tab(s) orally once a day (in the evening) (20 May 2021 16:34)  Vitamin D3 1000 intl units (25 mcg) oral tablet: 1 tab(s) orally once a day (28 Feb 2020 12:42)  zolpidem 10 mg oral tablet: 1 tab(s) orally once a day (at bedtime) (20 May 2021 16:34)      Allergies:  No Known Allergies    FAMILY HISTORY:  No pertinent family history in first degree relatives    SOCIAL HISTORY:  Non smoker, occasional drinker. 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    MEDICATIONS  (STANDING):  aspirin  chewable 81 milliGRAM(s) Oral daily  atorvastatin 10 milliGRAM(s) Oral at bedtime  cholecalciferol 1000 Unit(s) Oral daily  enoxaparin Injectable 40 milliGRAM(s) SubCutaneous daily  hydrochlorothiazide 12.5 milliGRAM(s) Oral daily  lisinopril 10 milliGRAM(s) Oral daily  mirtazapine 15 milliGRAM(s) Oral at bedtime  montelukast 10 milliGRAM(s) Oral daily  pantoprazole    Tablet 40 milliGRAM(s) Oral before breakfast  QUEtiapine 50 milliGRAM(s) Oral at bedtime  tamsulosin 0.4 milliGRAM(s) Oral at bedtime    MEDICATIONS  (PRN):  meclizine 25 milliGRAM(s) Oral three times a day PRN Dizziness  zolpidem 5 milliGRAM(s) Oral at bedtime PRN Insomnia  zolpidem 5 milliGRAM(s) Oral at bedtime PRN Insomnia      Vital Signs Last 24 Hrs  T(C): 35.8 (21 May 2021 04:56), Max: 37.1 (20 May 2021 15:28)  T(F): 96.4 (21 May 2021 04:56), Max: 98.8 (20 May 2021 15:28)  HR: 68 (21 May 2021 06:53) (68 - 89)  BP: 105/53 (21 May 2021 06:53) (105/53 - 185/79)  BP(mean): 97 (20 May 2021 15:28) (97 - 97)  RR: 18 (21 May 2021 04:56) (16 - 18)  SpO2: 95% (21 May 2021 05:44) (95% - 98%)    PHYSICAL EXAM:    GENERAL: In no apparent distress, well nourished, and hydrated.  HEART: Regular rate and rhythm; No murmurs, rubs, or gallops.  PULMONARY: Clear to auscultation and perfusion.  No rales, wheezing, or rhonchi bilaterally.  ABDOMEN: Soft, Nontender, Nondistended; Bowel sounds present  EXTREMITIES:  2+ Peripheral Pulses, No clubbing, cyanosis, or edema  NEUROLOGICAL: AO x4, CURTIS, speech clear.       INTERPRETATION OF TELEMETRY:  SR 85 BPM / no events     ECG: < from: 12 Lead ECG (05.20.21 @ 14:27) >    Ventricular Rate 86 BPM    Atrial Rate 86 BPM    P-R Interval 236 ms    QRS Duration 128 ms    Q-T Interval 396 ms    QTC Calculation(Bazett) 473 ms    P Axis 30 degrees    R Axis -76 degrees    T Axis 21 degrees    Diagnosis Line *** Poor data quality, interpretation may be adversely affected  Sinus rhythm with 1st degree A-V block  Right bundle branch block  Left anterior fascicular block  *** Bifascicular block ***  Abnormal ECG    < end of copied text >      I&O's Detail      LABS:                        14.2   5.67  )-----------( 222      ( 21 May 2021 05:10 )             41.8     05-21    137  |  102  |  16  ----------------------------<  93  4.1   |  21  |  1.1    Ca    9.3      21 May 2021 05:10  Mg     2.2     05-21    TPro  6.1  /  Alb  3.9  /  TBili  1.7<H>  /  DBili  x   /  AST  19  /  ALT  17  /  AlkPhos  73  05-21    CARDIAC MARKERS ( 21 May 2021 05:10 )  x     / <0.01 ng/mL / x     / x     / x      CARDIAC MARKERS ( 20 May 2021 21:16 )  x     / <0.01 ng/mL / x     / x     / x      CARDIAC MARKERS ( 20 May 2021 14:03 )  x     / <0.01 ng/mL / x     / x     / x          PT/INR - ( 20 May 2021 14:03 )   PT: 11.90 sec;   INR: 1.03 ratio         PTT - ( 20 May 2021 14:03 )  PTT:34.8 sec    I&O's Detail    Daily Height in cm: 162.56 (20 May 2021 20:00)    Daily     RADIOLOGY & ADDITIONAL STUDIES:  < from: CT Brain Stroke Protocol (05.20.21 @ 13:33) >  Findings:    The ventricles and cortical sulci are within normal limits for age.    There are patchy hypodensities throughout the hemispheric white matter without mass effect compatible with mild chronic microvascular changes.    There is no acute intracranial hemorrhage, extra-axial fluid collection or midline shift.  Gray white matter differentiation is maintained.    There is calcific atherosclerotic disease at the skull base.    The visualized paranasal sinuses and mastoids are clear.    IMPRESSION:    No CT evidence of acute intracranial hemorrhage or large territory infarct.    < end of copied text >

## 2021-05-22 ENCOUNTER — TRANSCRIPTION ENCOUNTER (OUTPATIENT)
Age: 81
End: 2021-05-22

## 2021-05-22 PROCEDURE — 93306 TTE W/DOPPLER COMPLETE: CPT | Mod: 26

## 2021-05-22 PROCEDURE — 99233 SBSQ HOSP IP/OBS HIGH 50: CPT

## 2021-05-22 RX ORDER — TAMSULOSIN HYDROCHLORIDE 0.4 MG/1
1 CAPSULE ORAL
Qty: 0 | Refills: 0 | DISCHARGE

## 2021-05-22 RX ORDER — LISINOPRIL 2.5 MG/1
1 TABLET ORAL
Qty: 0 | Refills: 0 | DISCHARGE

## 2021-05-22 RX ADMIN — ATORVASTATIN CALCIUM 10 MILLIGRAM(S): 80 TABLET, FILM COATED ORAL at 21:39

## 2021-05-22 RX ADMIN — Medication 1000 UNIT(S): at 12:00

## 2021-05-22 RX ADMIN — ZOLPIDEM TARTRATE 5 MILLIGRAM(S): 10 TABLET ORAL at 21:40

## 2021-05-22 RX ADMIN — QUETIAPINE FUMARATE 50 MILLIGRAM(S): 200 TABLET, FILM COATED ORAL at 21:40

## 2021-05-22 RX ADMIN — PANTOPRAZOLE SODIUM 40 MILLIGRAM(S): 20 TABLET, DELAYED RELEASE ORAL at 05:56

## 2021-05-22 RX ADMIN — LISINOPRIL 10 MILLIGRAM(S): 2.5 TABLET ORAL at 05:56

## 2021-05-22 RX ADMIN — MONTELUKAST 10 MILLIGRAM(S): 4 TABLET, CHEWABLE ORAL at 12:00

## 2021-05-22 RX ADMIN — ENOXAPARIN SODIUM 40 MILLIGRAM(S): 100 INJECTION SUBCUTANEOUS at 12:00

## 2021-05-22 RX ADMIN — TAMSULOSIN HYDROCHLORIDE 0.4 MILLIGRAM(S): 0.4 CAPSULE ORAL at 21:40

## 2021-05-22 RX ADMIN — MIRTAZAPINE 15 MILLIGRAM(S): 45 TABLET, ORALLY DISINTEGRATING ORAL at 21:40

## 2021-05-22 RX ADMIN — Medication 81 MILLIGRAM(S): at 12:00

## 2021-05-22 RX ADMIN — Medication 12.5 MILLIGRAM(S): at 05:56

## 2021-05-22 NOTE — DISCHARGE NOTE PROVIDER - NSDCMRMEDTOKEN_GEN_ALL_CORE_FT
aspirin 81 mg oral tablet: 1 tab(s) orally once a day  atorvastatin 10 mg oral tablet: 1 tab(s) orally once a day  Breo Ellipta 200 mcg-25 mcg/inh inhalation powder: 1 puff(s) inhaled once a day  lisinopril-hydrochlorothiazide 10 mg-12.5 mg oral tablet: 1 tab(s) orally once a day  mirtazapine 15 mg oral tablet: 1 tab(s) orally once a day (at bedtime)  montelukast 10 mg oral tablet: 1 tab(s) orally once a day  Mucinex 600 mg oral tablet, extended release: 1 tab(s) orally every 12 hours, As Needed  omeprazole 20 mg oral delayed release capsule: 1 cap(s) orally once a day  raNITIdine 150 mg oral capsule: 1 cap(s) orally 2 times a day  SEROquel XR 50 mg oral tablet, extended release: 1 tab(s) orally once a day (in the evening)  Vitamin D3 1000 intl units (25 mcg) oral tablet: 1 tab(s) orally once a day  zolpidem 10 mg oral tablet: 1 tab(s) orally once a day (at bedtime)   aspirin 81 mg oral tablet: 1 tab(s) orally once a day  atorvastatin 10 mg oral tablet: 1 tab(s) orally once a day  Breo Ellipta 200 mcg-25 mcg/inh inhalation powder: 1 puff(s) inhaled once a day  mirtazapine 15 mg oral tablet: 1 tab(s) orally once a day (at bedtime)  montelukast 10 mg oral tablet: 1 tab(s) orally once a day  Mucinex 600 mg oral tablet, extended release: 1 tab(s) orally every 12 hours, As Needed  omeprazole 20 mg oral delayed release capsule: 1 cap(s) orally once a day  raNITIdine 150 mg oral capsule: 1 cap(s) orally 2 times a day  SEROquel XR 50 mg oral tablet, extended release: 1 tab(s) orally once a day (in the evening)  Vitamin D3 1000 intl units (25 mcg) oral tablet: 1 tab(s) orally once a day  zolpidem 10 mg oral tablet: 1 tab(s) orally once a day (at bedtime)

## 2021-05-22 NOTE — DISCHARGE NOTE PROVIDER - CARE PROVIDER_API CALL
Silvio Mendieta)  Cardiovascular Disease; Internal Medicine  [address], [address] [place] [address]  Phone: [phone]  Fax: [phone]  Established Patient  Follow Up Time: 1 week    Primary Care Doctor,   Phone: (   )    -  Fax: (   )    -  Follow Up Time:    Silvio Mendieta)  Cardiovascular Disease; Internal Medicine  [address], [address] [place] [address]  Phone: [phone]  Fax: [phone]  Established Patient  Follow Up Time: 1 week    Primary Care Doctor,   Phone: (   )    -  Fax: (   )    -  Follow Up Time:     Idania Angulo)  Cardiovascular Disease; Internal Medicine  [address] [facility], [address]  [place], [place] [id number]  Phone: [phone]  Fax: [phone]  Follow Up Time: 1 month

## 2021-05-22 NOTE — DISCHARGE NOTE PROVIDER - CARE PROVIDERS DIRECT ADDRESSES
[email or web address],DirectAddress_Unknown ,[email or web address],DirectAddress_Unknown,[email or web address]

## 2021-05-22 NOTE — PROGRESS NOTE ADULT - SUBJECTIVE AND OBJECTIVE BOX
LATASHA WATTS   [id number]  80y   Male     SUBJECTIVE:  Patient is a 80y old Male who presents with a chief complaint of Dizziness (21 May 2021 12:15)    Today is hospital day 2d. This morning he is resting comfortably in bed and reports no new issues or overnight events.   Currently admitted to medicine with the primary diagnosis of Dizziness       PAST MEDICAL & SURGICAL HISTORY  HTN (hypertension)    HLD (hyperlipidemia)    BPH (benign prostatic hyperplasia)    No significant past surgical history      FAMILY HISTORY:  No pertinent family history in first degree relatives    ALLERGIES:  No Known Allergies    MEDICATIONS:  STANDING MEDICATIONS  aspirin  chewable 81 milliGRAM(s) Oral daily  atorvastatin 10 milliGRAM(s) Oral at bedtime  cholecalciferol 1000 Unit(s) Oral daily  enoxaparin Injectable 40 milliGRAM(s) SubCutaneous daily  hydrochlorothiazide 12.5 milliGRAM(s) Oral daily  lisinopril 10 milliGRAM(s) Oral daily  mirtazapine 15 milliGRAM(s) Oral at bedtime  montelukast 10 milliGRAM(s) Oral daily  pantoprazole    Tablet 40 milliGRAM(s) Oral before breakfast  QUEtiapine 50 milliGRAM(s) Oral at bedtime  tamsulosin 0.4 milliGRAM(s) Oral at bedtime    PRN MEDICATIONS  meclizine 25 milliGRAM(s) Oral three times a day PRN  zolpidem 5 milliGRAM(s) Oral at bedtime PRN  zolpidem 5 milliGRAM(s) Oral at bedtime PRN    VITALS:   T(F): 96.5 (05-22-21 @ 05:00)  HR: 72 (05-22-21 @ 05:00)  BP: 122/58 (05-22-21 @ 05:00)  BP(mean): --  RR: 18 (05-21-21 @ 20:49)  SpO2: 92% (05-22-21 @ 05:55)    LABS:                        14.2   5.67  )-----------( 222      ( 21 May 2021 05:10 )             41.8     Hemoglobin: 14.2 g/dL (05-21 @ 05:10)  Hemoglobin: 15.3 g/dL (05-20 @ 14:03)    05-21    137  |  102  |  16  ----------------------------<  93  4.1   |  21  |  1.1    Ca    9.3      21 May 2021 05:10  Mg     2.2     05-21    TPro  6.1  /  Alb  3.9  /  TBili  1.7<H>  /  DBili  x   /  AST  19  /  ALT  17  /  AlkPhos  73  05-21    Potassium Trend: 4.1<--, 4.1<--  SODIUM TREND:  Sodium 137 [05-21 @ 05:10]  Sodium 134 [05-20 @ 14:03]    Creatinine Trend: 1.1<--, 1.1<--  PT/INR - ( 20 May 2021 14:03 )   PT: 11.90 sec;   INR: 1.03 ratio         PTT - ( 20 May 2021 14:03 )  PTT:34.8 sec          CARDIAC MARKERS ( 21 May 2021 05:10 )  x     / <0.01 ng/mL / x     / x     / x      CARDIAC MARKERS ( 20 May 2021 21:16 )  x     / <0.01 ng/mL / x     / x     / x      CARDIAC MARKERS ( 20 May 2021 14:03 )  x     / <0.01 ng/mL / x     / x     / x          COVID  05-20-21 @ 15:14  COVID -   [facility]      RADIOLOGY:  < from: CT Brain Stroke Protocol (05.20.21 @ 13:33) >  IMPRESSION:    No CT evidence of acute intracranial hemorrhage or large territory infarct.    < end of copied text >  < from: CT Angio Head w/ IV Cont (05.20.21 @ 13:45) >  OTHER:  Moderate degenerative changes of the cervical spine.  Emphysematous changes at the lung apices.  3 mm left thyroid nodule.  There is apparent medialization and/or thickening of the right vocal cord.    IMPRESSION:    1.  No evidence of major vascular stenosis or occlusion.    2.  Apparent medialization and/or thickening of the right vocal cord, recommend correlation with direct visualization.    3.  Pulmonary emphysema.    < end of copied text >  < from: MR Head No Cont (05.21.21 @ 15:36) >  IMPRESSION:    1.  No evidence of recent infarct or acute intracranial hemorrhage.    2.  Mild chronic microvascular changes.    < end of copied text >    CARDIOLOGY IMAGING:  < from: 12 Lead ECG (05.20.21 @ 14:27) >  Ventricular Rate 86 BPM    Atrial Rate 86 BPM    P-R Interval 236 ms    QRS Duration 128 ms    Q-T Interval 396 ms    QTC Calculation(Bazett) 473 ms    P Axis 30 degrees    R Axis -76 degrees    T Axis 21 degrees    Diagnosis Line *** Poor data quality, interpretation may be adversely affected  Sinus rhythm with 1st degree A-V block  Right bundle branch block  Left anterior fascicular block  *** Bifascicular block ***  Abnormal ECG    < end of copied text >    CARDIAC TELEMETRY: no events overnight    PHYSICAL EXAM:  GEN: No acute distress  HEENT: normocephalic, atraumatic, aniceteric  LUNGS: Clear to auscultation bilaterally, no rales/wheezing/ rhonchi  HEART: S1/S2 present. RRR, no murmurs  ABD: Soft, non-tender, non-distended. Bowel sounds present  EXT: Warm, well perfused, skin color appropriate for ethnicity, +distal pulses, +motor/sensory fnxn intact x all 4 ext's.   NEURO: AAOX3, normal affect

## 2021-05-22 NOTE — PROGRESS NOTE ADULT - ASSESSMENT
ASSESSMENT:  80 M PMH HTN presenting for dizziness and gait instability since morning. Patient admitted for workup of dizziness.     PLAN/ACTIVE PROBLEMS:  # Dizziness - resolved   - neuro check per routine  - neuro follow up  - tele monitoring  - Trop negative x 3  - TSH: 0.97  - A1c: 5.7  - lipid panel: WNL  - PT/OT  - physiatry  - f/u 2D echo  - MR Brain no acute findings  - EP for event monitor    # EKG changes  - EP consult, Cardio,   - Possible t-wave inversion ekg + RBBB (old) +/- NEW BiFascicular block - Discussed w/Dr. Mendieta pt will follow outpt for possible PPM  - cath in 2/2020 performed by Dr. Silvio Mendieta @ [facility]: 30% occlusion LAD no interventions    # Chronic Essential HTN  -c/w lisinopril 10  - c/w HCTZ 12.5     # HLD  - c/w Lipitor 20mg    #Mood Disorder/Insomina  - c/w Seroquel 50mg  - c/w Ambien 10mg (5+5prn), Mirtazapine   #COPD-nebs prn, c/w moteleukast    DVT ppx- lovenox 40mg  DIET-DASH  Activity with assistance   dispo-from home, tele monitoring    FULL CODE  DISPOSITION: acute pending MR head and MCOT

## 2021-05-22 NOTE — DISCHARGE NOTE PROVIDER - NSDCCPCAREPLAN_GEN_ALL_CORE_FT
PRINCIPAL DISCHARGE DIAGNOSIS  Diagnosis: Dizziness  Assessment and Plan of Treatment: Vertigo  ImageVertigo means that you feel like you are moving when you are not. Vertigo can also make you feel like things around you are moving when they are not. This feeling can come and go at any time. Vertigo often goes away on its own.  Follow these instructions at home:  Avoid making fast movements.  Avoid driving.  Avoid using heavy machinery.  Avoid doing any task or activity that might cause danger to you or other people if you would have a vertigo attack while you are doing it.  Sit down right away if you feel dizzy or have trouble with your balance.  Take over-the-counter and prescription medicines only as told by your doctor.  Follow instructions from your doctor about which positions or movements you should avoid.  Drink enough fluid to keep your pee (urine) clear or pale yellow.  Keep all follow-up visits as told by your doctor. This is important.  Contact a doctor if:  Medicine does not help your vertigo.  You have a fever.  Your problems get worse or you have new symptoms.  Your family or friends see changes in your behavior.  You feel sick to your stomach (nauseous) or you throw up (vomit).  You have a “pins and needles” feeling or you are numb in part of your body.  Get help right away if:  You have trouble moving or talking.  You are always dizzy.  You pass out (faint).  You get very bad headaches.  You feel weak or have trouble using your hands, arms, or legs.  You have changes in your hearing.  You have changes in your seeing (vision).  You get a stiff neck.  Bright light starts to bother you.  This information is not intended to replace advice given to you by your health care provider. Make sure you discuss any questions you have with your health care provider.      SECONDARY DISCHARGE DIAGNOSES  Diagnosis: 1st degree AV block  Assessment and Plan of Treatment:      PRINCIPAL DISCHARGE DIAGNOSIS  Diagnosis: Dizziness  Assessment and Plan of Treatment: This may be related to the electricity in your heart. An MCOT was placed which is a device that will monitor your heart beat. Please follow up with Dr. Mendieta and Dr. Angulo for follow up. You may need a pace maker.      SECONDARY DISCHARGE DIAGNOSES  Diagnosis: Orthostatic hypertension  Assessment and Plan of Treatment: Your blood pressure drops when you stand up. Please stand up slowly to avoid passing out. WE are also holding your blood pressure medications for now. Please follow up with your PMD within a week to have your blood pressure checked.    Diagnosis: Dilated aortic root  Assessment and Plan of Treatment: Your aorta is dilated to 4.5 cm. Typically no intervention is done until it grows to 5 cm. If the aorta becomes very dilated it can be extremely dangerous or even deadly. You must have follow up imaging with in 3 months.

## 2021-05-22 NOTE — DISCHARGE NOTE PROVIDER - HOSPITAL COURSE
80 M PMH HTN presenting for dizziness and gait instability since morning. Patient admitted for workup of dizziness. EKG showing existing RBBB +  new 1 deg AV block. Case discussed with pt outpatient cardiologist who recommends PPM outpatient. Dizziness evaluated w CT head Otherwise workup negative for any acute pathology, EP evaluated pt will place MCOT prior to d/c. Pt medically stable for discharge at this time. All symptoms have resolved as of this AM.

## 2021-05-22 NOTE — DISCHARGE NOTE PROVIDER - PROVIDER TOKENS
PROVIDER:[TOKEN:[[id number]],FOLLOWUP:[1 week],ESTABLISHEDPATIENT:[T]],FREE:[LAST:[Primary Care Doctor],PHONE:[(   )    -],FAX:[(   )    -]] PROVIDER:[TOKEN:[[id number]],FOLLOWUP:[1 week],ESTABLISHEDPATIENT:[T]],FREE:[LAST:[Primary Care Doctor],PHONE:[(   )    -],FAX:[(   )    -]],PROVIDER:[TOKEN:[[id number]],FOLLOWUP:[1 month]]

## 2021-05-22 NOTE — PROGRESS NOTE ADULT - ASSESSMENT
79 yo M PMHx HTN presented for evaluation of head clouding, leg weakness which began morning of presentation. Found to have EKG changes    Dizziness/weakness  - resolved  - likely due to cardiac conduction deficits  - MRI normal  - pending ECHO read, MCOT placement  - outpt for PPM  - pt's out pt cardiologist aware    Chronic Essential HTN  -c/w lisinopril , HCTZ    HLD  - c/w Lipitor    Mood Disorder/Insomina  - c/w Seroquel, ambien, mirtazapine     COPD-nebs prn, c/w moteleukast    #Progress Note Handoff:  Pending (specify):  echo  Family discussion: discussed with pt (pacific intepreter [id number]) and grandson Adalberto that dc should be tomorrow-MCOT to be placed in AM  Disposition: Homex___/SNF___/Other________/Unknown at this time________    FULL CODE  DISPOSITION: acute pending MR head and MCOT

## 2021-05-22 NOTE — PROGRESS NOTE ADULT - SUBJECTIVE AND OBJECTIVE BOX
CHIEF COMPLAINT:    Patient is a 80y old  Male who presents with a chief complaint of Dizziness     INTERVAL HPI/OVERNIGHT EVENTS:    Patient seen and examined at bedside. No acute overnight events occurred.    ROS: Denies dizziness, chest pain, leg weakness. All other systems are negative.    Vital Signs:    T(F): 96.5 (05-22-21 @ 05:00), Max: 97.3 (05-21-21 @ 20:49)  HR: 72 (05-22-21 @ 05:00) (70 - 72)  BP: 122/58 (05-22-21 @ 05:00) (122/58 - 122/59)  RR: 18 (05-21-21 @ 20:49) (18 - 18)  SpO2: 92% (05-22-21 @ 05:55) (92% - 98%)  I&O's Summary    Daily     Daily   CAPILLARY BLOOD GLUCOSE          PHYSICAL EXAM:  GENERAL:  NAD  SKIN: No rashes or lesions  HEENT: Atraumatic. Normocephalic. Anicteric  NECK:  No JVD.   PULMONARY: Clear to ausculation bilaterally. No wheezing. No rales  CVS: Normal S1, S2. Regular rate and rhythm. No murmurs.  ABDOMEN/GI: Soft, Nontender, Nondistended; Bowel sounds are present  EXTREMITIES:  No edema B/L LE.  NEUROLOGIC:  No motor deficit.  PSYCH: Alert & oriented x 3, normal affect    Consultant(s) Notes Reviewed:  [x ] YES  [ ] NO  Care Discussed with Consultants/Other Providers [ x] YES  [ ] NO    LABS:                        14.2   5.67  )-----------( 222      ( 21 May 2021 05:10 )             41.8     05-21    137  |  102  |  16  ----------------------------<  93  4.1   |  21  |  1.1    Ca    9.3      21 May 2021 05:10  Mg     2.2     05-21    TPro  6.1  /  Alb  3.9  /  TBili  1.7<H>  /  DBili  x   /  AST  19  /  ALT  17  /  AlkPhos  73  05-21        Trop <0.01, CKMB --, CK --, 05-21-21 @ 05:10  Trop <0.01, CKMB --, CK --, 05-20-21 @ 21:16  Trop <0.01, CKMB --, CK --, 05-20-21 @ 14:03        RADIOLOGY & ADDITIONAL TESTS:  Imaging or report Personally Reviewed:  [ ] YES  [ ] NO    EKG reviewed independently    Medications:  Standing  aspirin  chewable 81 milliGRAM(s) Oral daily  atorvastatin 10 milliGRAM(s) Oral at bedtime  cholecalciferol 1000 Unit(s) Oral daily  enoxaparin Injectable 40 milliGRAM(s) SubCutaneous daily  hydrochlorothiazide 12.5 milliGRAM(s) Oral daily  lisinopril 10 milliGRAM(s) Oral daily  mirtazapine 15 milliGRAM(s) Oral at bedtime  montelukast 10 milliGRAM(s) Oral daily  pantoprazole    Tablet 40 milliGRAM(s) Oral before breakfast  QUEtiapine 50 milliGRAM(s) Oral at bedtime  tamsulosin 0.4 milliGRAM(s) Oral at bedtime    PRN Meds  meclizine 25 milliGRAM(s) Oral three times a day PRN  zolpidem 5 milliGRAM(s) Oral at bedtime PRN  zolpidem 5 milliGRAM(s) Oral at bedtime PRN      Case discussed with resident  Care discussed with pt

## 2021-05-23 LAB
ALBUMIN SERPL ELPH-MCNC: 4 G/DL — SIGNIFICANT CHANGE UP (ref 3.5–5.2)
ALP SERPL-CCNC: 67 U/L — SIGNIFICANT CHANGE UP (ref 30–115)
ALT FLD-CCNC: 16 U/L — SIGNIFICANT CHANGE UP (ref 0–41)
ANION GAP SERPL CALC-SCNC: 10 MMOL/L — SIGNIFICANT CHANGE UP (ref 7–14)
AST SERPL-CCNC: 20 U/L — SIGNIFICANT CHANGE UP (ref 0–41)
BASOPHILS # BLD AUTO: 0.05 K/UL — SIGNIFICANT CHANGE UP (ref 0–0.2)
BASOPHILS NFR BLD AUTO: 0.8 % — SIGNIFICANT CHANGE UP (ref 0–1)
BILIRUB SERPL-MCNC: 0.6 MG/DL — SIGNIFICANT CHANGE UP (ref 0.2–1.2)
BUN SERPL-MCNC: 24 MG/DL — HIGH (ref 10–20)
CALCIUM SERPL-MCNC: 9 MG/DL — SIGNIFICANT CHANGE UP (ref 8.5–10.1)
CHLORIDE SERPL-SCNC: 99 MMOL/L — SIGNIFICANT CHANGE UP (ref 98–110)
CO2 SERPL-SCNC: 23 MMOL/L — SIGNIFICANT CHANGE UP (ref 17–32)
CREAT SERPL-MCNC: 1.4 MG/DL — SIGNIFICANT CHANGE UP (ref 0.7–1.5)
EOSINOPHIL # BLD AUTO: 0.15 K/UL — SIGNIFICANT CHANGE UP (ref 0–0.7)
EOSINOPHIL NFR BLD AUTO: 2.3 % — SIGNIFICANT CHANGE UP (ref 0–8)
GLUCOSE SERPL-MCNC: 156 MG/DL — HIGH (ref 70–99)
HCT VFR BLD CALC: 42.3 % — SIGNIFICANT CHANGE UP (ref 42–52)
HGB BLD-MCNC: 14.7 G/DL — SIGNIFICANT CHANGE UP (ref 14–18)
IMM GRANULOCYTES NFR BLD AUTO: 0.5 % — HIGH (ref 0.1–0.3)
LYMPHOCYTES # BLD AUTO: 1.84 K/UL — SIGNIFICANT CHANGE UP (ref 1.2–3.4)
LYMPHOCYTES # BLD AUTO: 28.6 % — SIGNIFICANT CHANGE UP (ref 20.5–51.1)
MAGNESIUM SERPL-MCNC: 1.9 MG/DL — SIGNIFICANT CHANGE UP (ref 1.8–2.4)
MCHC RBC-ENTMCNC: 31.1 PG — HIGH (ref 27–31)
MCHC RBC-ENTMCNC: 34.8 G/DL — SIGNIFICANT CHANGE UP (ref 32–37)
MCV RBC AUTO: 89.6 FL — SIGNIFICANT CHANGE UP (ref 80–94)
MONOCYTES # BLD AUTO: 0.74 K/UL — HIGH (ref 0.1–0.6)
MONOCYTES NFR BLD AUTO: 11.5 % — HIGH (ref 1.7–9.3)
NEUTROPHILS # BLD AUTO: 3.62 K/UL — SIGNIFICANT CHANGE UP (ref 1.4–6.5)
NEUTROPHILS NFR BLD AUTO: 56.3 % — SIGNIFICANT CHANGE UP (ref 42.2–75.2)
NRBC # BLD: 0 /100 WBCS — SIGNIFICANT CHANGE UP (ref 0–0)
PLATELET # BLD AUTO: 222 K/UL — SIGNIFICANT CHANGE UP (ref 130–400)
POTASSIUM SERPL-MCNC: 4.1 MMOL/L — SIGNIFICANT CHANGE UP (ref 3.5–5)
POTASSIUM SERPL-SCNC: 4.1 MMOL/L — SIGNIFICANT CHANGE UP (ref 3.5–5)
PROT SERPL-MCNC: 6 G/DL — SIGNIFICANT CHANGE UP (ref 6–8)
RBC # BLD: 4.72 M/UL — SIGNIFICANT CHANGE UP (ref 4.7–6.1)
RBC # FLD: 12.1 % — SIGNIFICANT CHANGE UP (ref 11.5–14.5)
SODIUM SERPL-SCNC: 132 MMOL/L — LOW (ref 135–146)
WBC # BLD: 6.43 K/UL — SIGNIFICANT CHANGE UP (ref 4.8–10.8)
WBC # FLD AUTO: 6.43 K/UL — SIGNIFICANT CHANGE UP (ref 4.8–10.8)

## 2021-05-23 PROCEDURE — 99232 SBSQ HOSP IP/OBS MODERATE 35: CPT

## 2021-05-23 PROCEDURE — 99233 SBSQ HOSP IP/OBS HIGH 50: CPT

## 2021-05-23 PROCEDURE — 71275 CT ANGIOGRAPHY CHEST: CPT | Mod: 26

## 2021-05-23 RX ADMIN — Medication 12.5 MILLIGRAM(S): at 05:04

## 2021-05-23 RX ADMIN — ZOLPIDEM TARTRATE 5 MILLIGRAM(S): 10 TABLET ORAL at 21:42

## 2021-05-23 RX ADMIN — QUETIAPINE FUMARATE 50 MILLIGRAM(S): 200 TABLET, FILM COATED ORAL at 21:42

## 2021-05-23 RX ADMIN — TAMSULOSIN HYDROCHLORIDE 0.4 MILLIGRAM(S): 0.4 CAPSULE ORAL at 21:42

## 2021-05-23 RX ADMIN — PANTOPRAZOLE SODIUM 40 MILLIGRAM(S): 20 TABLET, DELAYED RELEASE ORAL at 05:04

## 2021-05-23 RX ADMIN — ENOXAPARIN SODIUM 40 MILLIGRAM(S): 100 INJECTION SUBCUTANEOUS at 11:39

## 2021-05-23 RX ADMIN — Medication 1000 UNIT(S): at 11:39

## 2021-05-23 RX ADMIN — Medication 81 MILLIGRAM(S): at 11:39

## 2021-05-23 RX ADMIN — MIRTAZAPINE 15 MILLIGRAM(S): 45 TABLET, ORALLY DISINTEGRATING ORAL at 21:43

## 2021-05-23 RX ADMIN — MONTELUKAST 10 MILLIGRAM(S): 4 TABLET, CHEWABLE ORAL at 11:39

## 2021-05-23 RX ADMIN — ATORVASTATIN CALCIUM 10 MILLIGRAM(S): 80 TABLET, FILM COATED ORAL at 21:43

## 2021-05-23 RX ADMIN — LISINOPRIL 10 MILLIGRAM(S): 2.5 TABLET ORAL at 05:04

## 2021-05-23 NOTE — PROGRESS NOTE ADULT - ATTENDING COMMENTS
plan as above  Treat positive orthostatics  Consider d/c'ing HCTZ  MCOT placed and teaching performed with family

## 2021-05-23 NOTE — PROGRESS NOTE ADULT - SUBJECTIVE AND OBJECTIVE BOX
CHIEF COMPLAINT:    Patient is a 80y old  Male who presents with a chief complaint of Dizziness     INTERVAL HPI/OVERNIGHT EVENTS:    Patient seen and examined at bedside. No acute overnight events occurred.    ROS: Denies dizziness, sob, weakness. All other systems are negative.    Vital Signs:    T(F): 97.6 (05-23-21 @ 05:12), Max: 97.7 (05-22-21 @ 22:54)  HR: 79 (05-22-21 @ 22:54) (79 - 84)  BP: 113/67 (05-22-21 @ 22:54) (111/58 - 113/67)  RR: 20 (05-22-21 @ 22:54) (18 - 20)    PHYSICAL EXAM:  GENERAL:  NAD  SKIN: No rashes or lesions  HEENT: Atraumatic. Normocephalic. Anicteric  NECK:  No JVD.   PULMONARY: Clear to ausculation bilaterally. No wheezing. No rales  CVS: Normal S1, S2. Regular rate and rhythm. No murmurs.  ABDOMEN/GI: Soft, Nontender, Nondistended; Bowel sounds are present  EXTREMITIES:  No edema B/L LE.  NEUROLOGIC:  No motor deficit.  PSYCH: Alert & oriented x 3, normal affect    Consultant(s) Notes Reviewed:  [x ] YES  [ ] NO  Care Discussed with Consultants/Other Providers [ x] YES  [ ] NO    LABS:              Trop <0.01, CKMB --, CK --, 05-21-21 @ 05:10  Trop <0.01, CKMB --, CK --, 05-20-21 @ 21:16  Trop <0.01, CKMB --, CK --, 05-20-21 @ 14:03        RADIOLOGY & ADDITIONAL TESTS:  Imaging or report Personally Reviewed:  [ ] YES  [ ] NO    EKG reviewed independently    Medications:  Standing  aspirin  chewable 81 milliGRAM(s) Oral daily  atorvastatin 10 milliGRAM(s) Oral at bedtime  cholecalciferol 1000 Unit(s) Oral daily  enoxaparin Injectable 40 milliGRAM(s) SubCutaneous daily  mirtazapine 15 milliGRAM(s) Oral at bedtime  montelukast 10 milliGRAM(s) Oral daily  pantoprazole    Tablet 40 milliGRAM(s) Oral before breakfast  QUEtiapine 50 milliGRAM(s) Oral at bedtime  tamsulosin 0.4 milliGRAM(s) Oral at bedtime    PRN Meds  meclizine 25 milliGRAM(s) Oral three times a day PRN  zolpidem 5 milliGRAM(s) Oral at bedtime PRN  zolpidem 5 milliGRAM(s) Oral at bedtime PRN      Case discussed with resident  Care discussed with pt

## 2021-05-23 NOTE — PROGRESS NOTE ADULT - SUBJECTIVE AND OBJECTIVE BOX
INTERVAL HPI/OVERNIGHT EVENTS:    evaluated with tereso Glover over Facetime    no events on tele  still orthostatic on AM vitals      MEDICATIONS  (STANDING):  aspirin  chewable 81 milliGRAM(s) Oral daily  atorvastatin 10 milliGRAM(s) Oral at bedtime  cholecalciferol 1000 Unit(s) Oral daily  enoxaparin Injectable 40 milliGRAM(s) SubCutaneous daily  hydrochlorothiazide 12.5 milliGRAM(s) Oral daily  lisinopril 10 milliGRAM(s) Oral daily  mirtazapine 15 milliGRAM(s) Oral at bedtime  montelukast 10 milliGRAM(s) Oral daily  pantoprazole    Tablet 40 milliGRAM(s) Oral before breakfast  QUEtiapine 50 milliGRAM(s) Oral at bedtime  tamsulosin 0.4 milliGRAM(s) Oral at bedtime    MEDICATIONS  (PRN):  meclizine 25 milliGRAM(s) Oral three times a day PRN Dizziness  zolpidem 5 milliGRAM(s) Oral at bedtime PRN Insomnia  zolpidem 5 milliGRAM(s) Oral at bedtime PRN Insomnia      Allergies    No Known Allergies    Intolerances        REVIEW OF SYSTEMS    [ x] A ten-point review of systems was otherwise negative except as noted.  [ ] Due to altered mental status/intubation, subjective information were not able to be obtained from the patient. History was obtained, to the extent possible, from review of the chart and collateral sources of information.      Vital Signs Last 24 Hrs  T(C): 36.4 (23 May 2021 05:12), Max: 36.5 (22 May 2021 22:54)  T(F): 97.6 (23 May 2021 05:12), Max: 97.7 (22 May 2021 22:54)  HR: 79 (22 May 2021 22:54) (79 - 84)  BP: 113/67 (22 May 2021 22:54) (111/58 - 113/67)  BP(mean): --  RR: 20 (22 May 2021 22:54) (18 - 20)  SpO2: --    Orthostatic VS    05-23-21 @ 05:12                10:40am  Lying BP: 118/54 HR: 71      113/53  82  Sitting BP: 119/58 HR: 75     98/53    91  Standing BP: 92/48 HR: 96  94/47    97  Site: --   Mode: --      PHYSICAL EXAM:    GENERAL: In no apparent distress, well nourished, and hydrated.  HEART: Regular rate and rhythm; No murmur; NO rubs, or gallops.  PULMONARY: Clear to auscultation and percussion.  Normal expansion/effort. No rales, wheezing, or rhonchi bilaterally.  ABDOMEN: Soft, Nontender, Nondistended; Bowel sounds present  EXTREMITIES:  Extremities warm, pink, well-perfused, 2+ Peripheral Pulses, No clubbing, cyanosis, or edema  NEUROLOGICAL: alert & oriented x 3, no focal deficits, PERRLA, EOMI    LABS:    Thyroid Stimulating Hormone, Serum: 0.92 uIU/mL (05.21.21 @ 05:10)                 RADIOLOGY & ADDITIONAL TESTS:    < from: CT Angio Neck w/ IV Cont (05.20.21 @ 13:45) >  Findings:    NECK:  The visualized aortic arch and great vessel origins demonstrate atheromatous plaque with mild stenosis of the proximal left subclavian artery. There is dilatation of the ascending aorta up to 4.8 cm.    The common carotid arteries are patent. There is calcific plaque at the carotid bifurcations bilaterally without significant stenosis.    The vertebral arteries are patent, dominant on the left.    HEAD:  There is calcific plaque of the carotid siphons without significant stenosis.. The anterior and middle cerebral arteries are patent. There is hypoplastic A1 segment of the right CLYDE, normal variant.    The right vertebral artery is diminutive beyond the PICA origin, normal variant. The left vertebral artery is patent. The basilar artery is patent. The posteriorcerebral arteries are patent.    OTHER:  Moderate degenerative changes of the cervical spine.  Emphysematous changes at the lung apices.  3 mm left thyroid nodule.  There is apparent medialization and/or thickening of the right vocal cord.    IMPRESSION:    1.  No evidence of major vascular stenosis or occlusion.    2.  Apparent medialization and/or thickening of the right vocal cord, recommend correlation with direct visualization.    3.  Pulmonary emphysema.      < end of copied text >    < from: TTE Echo Complete w/o Contrast w/ Doppler (05.22.21 @ 10:04) >  Summary:   1. Normal global left ventricular systolic function.   2. LV Ejection Fraction by Montanez's Method with a biplane EF of 51 %.   3. Spectral Doppler shows impaired relaxation pattern of left ventricular myocardial filling (Grade I diastolic dysfunction).   4. Normal left atrial size.   5. Normal right atrial size.   6. Mild thickening of the anterior mitral valve leaflet.   7. Trace mitral valve regurgitation.   8. Moderate aortic regurgitation.   9. Dilatation of the aortic root and ascending aorta.  10. Recommend CTA of the chest for evaluation of ascending aortic aneurysm.    < end of copied text >   INTERVAL HPI/OVERNIGHT EVENTS:  evaluated with tereso Glover over Facetime  no events on tele  still orthostatic on AM vitals      MEDICATIONS  (STANDING):  aspirin  chewable 81 milliGRAM(s) Oral daily  atorvastatin 10 milliGRAM(s) Oral at bedtime  cholecalciferol 1000 Unit(s) Oral daily  enoxaparin Injectable 40 milliGRAM(s) SubCutaneous daily  hydrochlorothiazide 12.5 milliGRAM(s) Oral daily  lisinopril 10 milliGRAM(s) Oral daily  mirtazapine 15 milliGRAM(s) Oral at bedtime  montelukast 10 milliGRAM(s) Oral daily  pantoprazole    Tablet 40 milliGRAM(s) Oral before breakfast  QUEtiapine 50 milliGRAM(s) Oral at bedtime  tamsulosin 0.4 milliGRAM(s) Oral at bedtime    MEDICATIONS  (PRN):  meclizine 25 milliGRAM(s) Oral three times a day PRN Dizziness  zolpidem 5 milliGRAM(s) Oral at bedtime PRN Insomnia  zolpidem 5 milliGRAM(s) Oral at bedtime PRN Insomnia      Allergies    No Known Allergies    Intolerances        REVIEW OF SYSTEMS    [ x] A ten-point review of systems was otherwise negative except as noted.  [ ] Due to altered mental status/intubation, subjective information were not able to be obtained from the patient. History was obtained, to the extent possible, from review of the chart and collateral sources of information.      Vital Signs Last 24 Hrs  T(C): 36.4 (23 May 2021 05:12), Max: 36.5 (22 May 2021 22:54)  T(F): 97.6 (23 May 2021 05:12), Max: 97.7 (22 May 2021 22:54)  HR: 79 (22 May 2021 22:54) (79 - 84)  BP: 113/67 (22 May 2021 22:54) (111/58 - 113/67)  BP(mean): --  RR: 20 (22 May 2021 22:54) (18 - 20)  SpO2: --    Orthostatic VS    05-23-21 @ 05:12                10:40am  Lying BP: 118/54 HR: 71      113/53  82  Sitting BP: 119/58 HR: 75     98/53    91  Standing BP: 92/48 HR: 96  94/47    97  Site: --   Mode: --      PHYSICAL EXAM:  GENERAL: In no apparent distress, well nourished, and hydrated.  HEART: Regular rate and rhythm; No murmur; NO rubs, or gallops.  PULMONARY: Clear to auscultation and percussion.  Normal expansion/effort. No rales, wheezing, or rhonchi bilaterally.  ABDOMEN: Soft, Nontender, Nondistended; Bowel sounds present  EXTREMITIES:  Extremities warm, pink, well-perfused, 2+ Peripheral Pulses, No clubbing, cyanosis, or edema  NEUROLOGICAL: alert & oriented x 3, no focal deficits, PERRLA, EOMI    LABS:    Thyroid Stimulating Hormone, Serum: 0.92 uIU/mL (05.21.21 @ 05:10)       RADIOLOGY & ADDITIONAL TESTS:    < from: CT Angio Neck w/ IV Cont (05.20.21 @ 13:45) >  Findings:    NECK:  The visualized aortic arch and great vessel origins demonstrate atheromatous plaque with mild stenosis of the proximal left subclavian artery. There is dilatation of the ascending aorta up to 4.8 cm.    The common carotid arteries are patent. There is calcific plaque at the carotid bifurcations bilaterally without significant stenosis.    The vertebral arteries are patent, dominant on the left.    HEAD:  There is calcific plaque of the carotid siphons without significant stenosis.. The anterior and middle cerebral arteries are patent. There is hypoplastic A1 segment of the right CLYDE, normal variant.    The right vertebral artery is diminutive beyond the PICA origin, normal variant. The left vertebral artery is patent. The basilar artery is patent. The posteriorcerebral arteries are patent.    OTHER:  Moderate degenerative changes of the cervical spine.  Emphysematous changes at the lung apices.  3 mm left thyroid nodule.  There is apparent medialization and/or thickening of the right vocal cord.    IMPRESSION:    1.  No evidence of major vascular stenosis or occlusion.    2.  Apparent medialization and/or thickening of the right vocal cord, recommend correlation with direct visualization.    3.  Pulmonary emphysema.      < end of copied text >    < from: TTE Echo Complete w/o Contrast w/ Doppler (05.22.21 @ 10:04) >  Summary:   1. Normal global left ventricular systolic function.   2. LV Ejection Fraction by Montanez's Method with a biplane EF of 51 %.   3. Spectral Doppler shows impaired relaxation pattern of left ventricular myocardial filling (Grade I diastolic dysfunction).   4. Normal left atrial size.   5. Normal right atrial size.   6. Mild thickening of the anterior mitral valve leaflet.   7. Trace mitral valve regurgitation.   8. Moderate aortic regurgitation.   9. Dilatation of the aortic root and ascending aorta.  10. Recommend CTA of the chest for evaluation of ascending aortic aneurysm.    < end of copied text >

## 2021-05-23 NOTE — PROGRESS NOTE ADULT - ASSESSMENT
79 yo M PMHx HTN presented for evaluation of head clouding, leg weakness which began morning of presentation. Found to have EKG changes    Dizziness/weakness  - resolved  - likely due to cardiac conduction deficits  - MRI normal  - MCOT planned  - orthostatics positive today  hold HCTZ, change lisinopril to PM dosing (start tomorrow, pt already received meds today)  - outpt for PPM  - pt's out pt cardiologist aware    Dilated ascending aorta  - seen on echo  - cardio recommended CTA chest  - pt and grandson aware    Chronic Essential HTN  -c/w lisinopril , HCTZ    HLD  - c/w Lipitor    Mood Disorder/Insomina  - c/w Seroquel, ambien, mirtazapine     COPD-nebs prn, c/w moteleukast    #Progress Note Handoff:  Pending (specify):  echo  Family discussion: discussed with pt (pacific intepreter [id number]) and grandson Adalberto that dc is held due to orthostatics and dilated aortic aneurysm   Disposition: Home x___/SNF___/Other________/Unknown at this time________

## 2021-05-23 NOTE — CHART NOTE - NSCHARTNOTEFT_GEN_A_CORE
Electrophysiology PA Note    Event monitor [employer] placed on the patient for 30 days  Bedside teaching provided to patient and family, grandmarc Glover over FT  Follow up with Dr Angulo in 6 weeks      Instructions for use:  - Patch placed on left chest wall  - Take patch off every 7 days and remove the white sensor. Throw the patch away and place the sensor on the  until fully charged.  - After sensor charged, attach to a new patch and place on left side of chest. Repeat this EVERY 7 DAYS  - Event monitor comes with phone monitor to assess for any patient symptoms. It is touch screen and needs to be charged EVERY night. If patient has any symptoms follow the prompts on the phone which will send to the office. If no symptoms, phone does not need to be used.  - Patient is to wear MCOT for 30 days,   - When patch is removed, place everything back in the box and use included the pre-paid UPS envelop to send back to the company.  - All chargers and patches can be found in the second small box inside the big box  - Call company, [employer], for more supplies  - For any questions regarding use can call the office [phone]

## 2021-05-23 NOTE — PROGRESS NOTE ADULT - ASSESSMENT
Cardiologist: Dr. Silvio Mendieta    80 M PMH HTN, BPH, HLD, cath 2/2020 at [facility] (LAD 30% stenosis), Echo 2/2020 (55-60%), RBBB (2/2020) presenting for dizziness and gait instability since this morning, w/u for CVA. EP consulted c/w TWI and AVB on EKG. + orthostatics.     Impression:  Dizziness, + orthostatic VS  EKG: RBBB (), 1st AVB, (), LAFB  Hx HTN, HLD    MCOT placed, teaching with family over FT  follow up with Dr Idania Angulo in 6 weeks  [address] [place] [address], [address]  [phone]     Echo as above  Please is persistently orthostatic  Consider stopping HCTZ and give Lisinopril at night  Encourage PO fluid intake  Discuss with Cardiology ascending aortic aneurysm on CTA brain,  Care per primary team   Cardiologist: Dr. Silvio Mendieta    80 M PMH HTN, BPH, HLD, cath 2/2020 at [facility] (LAD 30% stenosis), Echo 2/2020 (55-60%), RBBB (2/2020) presenting for dizziness and gait instability since this morning, w/u for CVA. EP consulted c/w TWI and AVB on EKG. + orthostatics.     Impression:  Dizziness, + orthostatic VS  Aortic aneursym  EKG: RBBB (), 1st AVB, (), LAFB  Hx HTN, HLD    MCOT placed, teaching with family over FT  follow up with Dr Idania Angulo in 6 weeks  [address] [place] [address], [address]  [phone]     Echo as above  Please is persistently orthostatic  Consider stopping HCTZ and give Lisinopril at night  Encourage PO fluid intake  Discuss with Cardiology ascending aortic aneurysm on CTA brain  Care per primary team

## 2021-05-24 ENCOUNTER — TRANSCRIPTION ENCOUNTER (OUTPATIENT)
Age: 81
End: 2021-05-24

## 2021-05-24 VITALS
HEART RATE: 81 BPM | TEMPERATURE: 99 F | SYSTOLIC BLOOD PRESSURE: 138 MMHG | DIASTOLIC BLOOD PRESSURE: 63 MMHG | RESPIRATION RATE: 19 BRPM

## 2021-05-24 PROCEDURE — 99239 HOSP IP/OBS DSCHRG MGMT >30: CPT

## 2021-05-24 RX ORDER — LISINOPRIL/HYDROCHLOROTHIAZIDE 10-12.5 MG
1 TABLET ORAL
Qty: 0 | Refills: 0 | DISCHARGE

## 2021-05-24 RX ADMIN — MONTELUKAST 10 MILLIGRAM(S): 4 TABLET, CHEWABLE ORAL at 11:38

## 2021-05-24 RX ADMIN — ENOXAPARIN SODIUM 40 MILLIGRAM(S): 100 INJECTION SUBCUTANEOUS at 11:38

## 2021-05-24 RX ADMIN — PANTOPRAZOLE SODIUM 40 MILLIGRAM(S): 20 TABLET, DELAYED RELEASE ORAL at 05:14

## 2021-05-24 RX ADMIN — Medication 1000 UNIT(S): at 11:38

## 2021-05-24 RX ADMIN — Medication 81 MILLIGRAM(S): at 11:38

## 2021-05-24 NOTE — PROGRESS NOTE ADULT - SUBJECTIVE AND OBJECTIVE BOX
DAILY PROGRESS NOTE  ===========================================================    Patient Information:  LATASHA WATTS  /  80y  /  Male  /  MRN#: [id number]    Hospital Day: 4d   Location: [address] ([address])    |:::::::::::::::::::::::::::| SUBJECTIVE |:::::::::::::::::::::::::::|    OVERNIGHT EVENTS: No acute events overnight.   TODAY: Patient was seen today at bedside. Feels ok. Able to walk to bathroom. Review of systems is otherwise negative.    |:::::::::::::::::::::::::::| ADMISSION HPI |:::::::::::::::::::::::::::|    HPI:  80 M PMH HTN, BPH, HLD presenting for dizziness and gait instability since morning. Patient symptoms began around 10AM on day of admission. He notified his family around 11:30AM and was brought into hospital by patient son. Patient had associated sensation of "numbness" in his legs.   Supplemental history obtained from grandson Adalberto. Patient has history insomnia, did not sleep well overnight. Is very functional at baseline, works around house etc. No history dementia. Denied any chest pain, recent illnesses, travel, shortness of breath, new bowel/urinary symptoms.     In ED, patient was a code stroke for dizziness, although NIH was 0. There was some mild gait instability noted. Patient CTH and CTA H/N were both unremarkable. EKG showing ? twave inversions with possible AV block, but troponin negative X 1 with no chest pain. Patient had diagnostic cath with Dr. Mendieta in Feb 2020, result showed normal coronaries. Last known echo was 50%.    Patient being admitted for workup of dizziness. Seen by neurology team, [facility] tele admit       (20 May 2021 16:05)      |:::::::::::::::::::::::::::| OBJECTIVE |:::::::::::::::::::::::::::|    STANDING MEDICATIONS  aspirin  chewable 81 milliGRAM(s) Oral daily  atorvastatin 10 milliGRAM(s) Oral at bedtime  cholecalciferol 1000 Unit(s) Oral daily  enoxaparin Injectable 40 milliGRAM(s) SubCutaneous daily  mirtazapine 15 milliGRAM(s) Oral at bedtime  montelukast 10 milliGRAM(s) Oral daily  pantoprazole    Tablet 40 milliGRAM(s) Oral before breakfast  QUEtiapine 50 milliGRAM(s) Oral at bedtime  tamsulosin 0.4 milliGRAM(s) Oral at bedtime    PRN MEDICATIONS  meclizine 25 milliGRAM(s) Oral three times a day PRN  zolpidem 5 milliGRAM(s) Oral at bedtime PRN  zolpidem 5 milliGRAM(s) Oral at bedtime PRN    HOME MEDICATIONS  aspirin 81 mg oral tablet: 1 tab(s) orally once a day  atorvastatin 10 mg oral tablet: 1 tab(s) orally once a day  Breo Ellipta 200 mcg-25 mcg/inh inhalation powder: 1 puff(s) inhaled once a day  lisinopril-hydrochlorothiazide 10 mg-12.5 mg oral tablet: 1 tab(s) orally once a day  mirtazapine 15 mg oral tablet: 1 tab(s) orally once a day (at bedtime)  montelukast 10 mg oral tablet: 1 tab(s) orally once a day  Mucinex 600 mg oral tablet, extended release: 1 tab(s) orally every 12 hours, As Needed  omeprazole 20 mg oral delayed release capsule: 1 cap(s) orally once a day  raNITIdine 150 mg oral capsule: 1 cap(s) orally 2 times a day  SEROquel XR 50 mg oral tablet, extended release: 1 tab(s) orally once a day (in the evening)  Vitamin D3 1000 intl units (25 mcg) oral tablet: 1 tab(s) orally once a day  zolpidem 10 mg oral tablet: 1 tab(s) orally once a day (at bedtime)      VITAL SIGNS: Last 24 Hours  T(C): 36 (24 May 2021 05:00), Max: 36.4 (23 May 2021 19:49)  T(F): 96.8 (24 May 2021 05:00), Max: 97.6 (23 May 2021 19:49)  HR: 77 (24 May 2021 05:00) (77 - 83)  BP: 124/59 (23 May 2021 20:52) (114/58 - 124/59)  BP(mean): --  RR: 20 (24 May 2021 05:00) (20 - 20)  SpO2: 94% (24 May 2021 08:00) (94% - 94%)    Input-Output      PHYSICAL EXAM:  GEN: No acute distress.  LUNGS: Clear to auscultation bilaterally.  HEART: Regular rate and rhythm. No murmurs.  ABD: Soft, non-tender, non-distended.  EXT: Normal range of motion. No edema.  NEURO: Alert, attentive, oriented. Clear, fluent speech. Eye movements intact. Moves all extremities.  PSYCH: Cooperative, appropriate.    LAB RESULTS:                        14.7   6.43  )-----------( 222      ( 23 May 2021 17:39 )             42.3     05-23    132<L>  |  99  |  24<H>  ----------------------------<  156<H>  4.1   |  23  |  1.4    Ca    9.0      23 May 2021 17:39  Mg     1.9     05-23    TPro  6.0  /  Alb  4.0  /  TBili  0.6  /  DBili  x   /  AST  20  /  ALT  16  /  AlkPhos  67  05-23                MICROBIOLOGY:    IMAGING/STUDIES:    < from: 12 Lead ECG (05.20.21 @ 14:27) >  Diagnosis Line *** Poor data quality, interpretation may be adversely affected  Sinus rhythm with 1st degree A-V block  Right bundle branch block  Left anterior fascicular block  *** Bifascicular block ***  Abnormal ECG    < end of copied text >      ALLERGIES:  No Known Allergies      ===========================================================

## 2021-05-24 NOTE — DISCHARGE NOTE NURSING/CASE MANAGEMENT/SOCIAL WORK - PATIENT PORTAL LINK FT
You can access the FollowMyHealth Patient Portal offered by [facility] by registering at the following website: [email or web address]. By joining [employer]’s FollowMyHealth portal, you will also be able to view your health information using other applications (apps) compatible with our system.

## 2021-05-24 NOTE — PROGRESS NOTE ADULT - PROVIDER SPECIALTY LIST ADULT
Electrophysiology
Internal Medicine

## 2021-05-24 NOTE — PROGRESS NOTE ADULT - ASSESSMENT
80 year-old man with a history of HTN, BPH, HLD, who presented with dizziness and was found to have T-wave inversions on EKG as well as ascending aortic dilation.    # Dizziness and weakness  - Bifascicular block, RBBB, 1st degree AVB on EKG.  - Orthostatic vitals positive. HCTZ and Lisinopril are being held.  - MILE stockings and abdominal binder.  - C/w meclizine PRN.  - Event monitor placed. F/u with Dr. Angulo (cardiac EP) in 6 weeks.    # Ascending aorta dilation  - Seen incidentally on CTA H/N.  - CTA chest was obtained showing Dilatation ascending aorta, 4.5 cm (6/140). Dilated sinus of Valsalva 5.4 cm (12/169) Extensive aortic calcifications. No dissection.  - F/u outpatient with his cardiologist.    # HTN  - Holding meds.    # HLD  - C/w atorvastatin    # BPH  - C/w tamsulosin.    # Mood disorder  - C/w quetiapine  - C/w mirtazapine    C/w vitamin D supplement.  C/w montelukast home med.  C/w aspirin home med.    DVT PPx: Lovenox 40 mg s/c  GI PPX: Protonix 40 mg PO  Diet: DASH  Activity: Increase as tolerated  Dispo: from home  Code Status: full code  80 year-old man with a history of HTN, BPH, HLD, who presented with dizziness and was found to have T-wave inversions on EKG as well as ascending aortic dilation.    # Dizziness and weakness  - Bifascicular block, RBBB, 1st degree AVB on EKG.  - Orthostatic vitals positive. HCTZ and Lisinopril are being held.  - MILE stockings and abdominal binder.  - C/w meclizine PRN.  - Event monitor placed. F/u with Dr. Angulo (cardiac EP) in 6 weeks.    # Ascending aorta dilation  - Seen incidentally on CTA H/N.  - CTA chest was obtained showing Dilatation ascending aorta, 4.5 cm (6/140). Dilated sinus of Valsalva 5.4 cm (12/169) Extensive aortic calcifications. No dissection.  - F/u outpatient with his cardiologist.    # HTN  - Holding meds.    # HLD  - C/w atorvastatin    # BPH  - C/w tamsulosin.    # Mood disorder  - C/w quetiapine  - C/w mirtazapine    C/w vitamin D supplement.  C/w montelukast home med.  C/w aspirin home med.    DVT PPx: Lovenox 40 mg s/c  GI PPX: Protonix 40 mg PO  Diet: DASH  Activity: Increase as tolerated  Dispo: from home  Code Status: full code      Around 3pm spoke to the patient's grandson Adalberto ([phone]), informed him about discharge and to come  the patient.

## 2021-05-24 NOTE — CHART NOTE - NSCHARTNOTEFT_GEN_A_CORE
Pt seen and examined at bedside. Denies dizziness. CTA shows aorta dilataion to 4.5 cm. Discussed this with grandson (unable to obtain Latvian  to discuss with pt directly) who understands risk and will ensure pt has follow up. Stable for dc home.    PHYSICAL EXAM:  GENERAL: NAD, speaks in full sentences, no signs of respiratory distress  HEAD:  Atraumatic, Normocephalic  EYES: Anicteric  NECK: Supple, No JVD  CHEST/LUNG: Clear to auscultation bilaterally; No wheeze; No crackles; No accessory muscles used  HEART: Regular rate and rhythm; No murmurs;   ABDOMEN: Soft, Nontender, Nondistended; Bowel sounds present; No guarding  EXTREMITIES:  2+ Peripheral Pulses, No cyanosis or edema  PSYCH: AAOx3  NEUROLOGY: non-focal  SKIN: No rashes or lesions    Time spent coordinating discharge 39 minutes

## 2021-06-02 DIAGNOSIS — I44.0 ATRIOVENTRICULAR BLOCK, FIRST DEGREE: ICD-10-CM

## 2021-06-02 DIAGNOSIS — E78.5 HYPERLIPIDEMIA, UNSPECIFIED: ICD-10-CM

## 2021-06-02 DIAGNOSIS — I77.819 AORTIC ECTASIA, UNSPECIFIED SITE: ICD-10-CM

## 2021-06-02 DIAGNOSIS — I45.2 BIFASCICULAR BLOCK: ICD-10-CM

## 2021-06-02 DIAGNOSIS — Z79.52 LONG TERM (CURRENT) USE OF SYSTEMIC STEROIDS: ICD-10-CM

## 2021-06-02 DIAGNOSIS — J43.9 EMPHYSEMA, UNSPECIFIED: ICD-10-CM

## 2021-06-02 DIAGNOSIS — R27.0 ATAXIA, UNSPECIFIED: ICD-10-CM

## 2021-06-02 DIAGNOSIS — R42 DIZZINESS AND GIDDINESS: ICD-10-CM

## 2021-06-02 DIAGNOSIS — I10 ESSENTIAL (PRIMARY) HYPERTENSION: ICD-10-CM

## 2021-06-02 DIAGNOSIS — N40.0 BENIGN PROSTATIC HYPERPLASIA WITHOUT LOWER URINARY TRACT SYMPTOMS: ICD-10-CM

## 2021-06-02 DIAGNOSIS — Z79.51 LONG TERM (CURRENT) USE OF INHALED STEROIDS: ICD-10-CM

## 2021-06-02 DIAGNOSIS — Z79.82 LONG TERM (CURRENT) USE OF ASPIRIN: ICD-10-CM

## 2021-06-02 DIAGNOSIS — F39 UNSPECIFIED MOOD [AFFECTIVE] DISORDER: ICD-10-CM

## 2021-06-02 DIAGNOSIS — I95.1 ORTHOSTATIC HYPOTENSION: ICD-10-CM

## 2021-06-28 ENCOUNTER — APPOINTMENT (OUTPATIENT)
Dept: CARDIOLOGY | Facility: CLINIC | Age: 81
End: 2021-06-28

## 2021-07-16 ENCOUNTER — INPATIENT (INPATIENT)
Facility: HOSPITAL | Age: 81
LOS: 3 days | Discharge: HOME HEALTH | End: 2021-07-20
Attending: STUDENT IN AN ORGANIZED HEALTH CARE EDUCATION/TRAINING PROGRAM | Admitting: STUDENT IN AN ORGANIZED HEALTH CARE EDUCATION/TRAINING PROGRAM
Payer: MEDICARE

## 2021-07-16 VITALS
HEIGHT: 64 IN | OXYGEN SATURATION: 99 % | TEMPERATURE: 98 F | RESPIRATION RATE: 20 BRPM | HEART RATE: 89 BPM | SYSTOLIC BLOOD PRESSURE: 206 MMHG | DIASTOLIC BLOOD PRESSURE: 82 MMHG

## 2021-07-16 LAB
ALBUMIN SERPL ELPH-MCNC: 3.6 G/DL — SIGNIFICANT CHANGE UP (ref 3.5–5.2)
ALP SERPL-CCNC: 68 U/L — SIGNIFICANT CHANGE UP (ref 30–115)
ALT FLD-CCNC: 20 U/L — SIGNIFICANT CHANGE UP (ref 0–41)
ANION GAP SERPL CALC-SCNC: 10 MMOL/L — SIGNIFICANT CHANGE UP (ref 7–14)
APTT BLD: 32.6 SEC — SIGNIFICANT CHANGE UP (ref 27–39.2)
AST SERPL-CCNC: 39 U/L — SIGNIFICANT CHANGE UP (ref 0–41)
BASOPHILS # BLD AUTO: 0.02 K/UL — SIGNIFICANT CHANGE UP (ref 0–0.2)
BASOPHILS NFR BLD AUTO: 0.4 % — SIGNIFICANT CHANGE UP (ref 0–1)
BILIRUB SERPL-MCNC: 0.6 MG/DL — SIGNIFICANT CHANGE UP (ref 0.2–1.2)
BUN SERPL-MCNC: 9 MG/DL — LOW (ref 10–20)
CALCIUM SERPL-MCNC: 8.7 MG/DL — SIGNIFICANT CHANGE UP (ref 8.5–10.1)
CHLORIDE SERPL-SCNC: 107 MMOL/L — SIGNIFICANT CHANGE UP (ref 98–110)
CK SERPL-CCNC: 81 U/L — SIGNIFICANT CHANGE UP (ref 0–225)
CO2 SERPL-SCNC: 24 MMOL/L — SIGNIFICANT CHANGE UP (ref 17–32)
CREAT SERPL-MCNC: 1.1 MG/DL — SIGNIFICANT CHANGE UP (ref 0.7–1.5)
D DIMER BLD IA.RAPID-MCNC: 244 NG/ML DDU — HIGH (ref 0–230)
EOSINOPHIL # BLD AUTO: 0.07 K/UL — SIGNIFICANT CHANGE UP (ref 0–0.7)
EOSINOPHIL NFR BLD AUTO: 1.2 % — SIGNIFICANT CHANGE UP (ref 0–8)
GLUCOSE SERPL-MCNC: 99 MG/DL — SIGNIFICANT CHANGE UP (ref 70–99)
HCT VFR BLD CALC: 40.8 % — LOW (ref 42–52)
HGB BLD-MCNC: 13.7 G/DL — LOW (ref 14–18)
IMM GRANULOCYTES NFR BLD AUTO: 0.5 % — HIGH (ref 0.1–0.3)
INR BLD: 1.03 RATIO — SIGNIFICANT CHANGE UP (ref 0.65–1.3)
LYMPHOCYTES # BLD AUTO: 0.97 K/UL — LOW (ref 1.2–3.4)
LYMPHOCYTES # BLD AUTO: 17.1 % — LOW (ref 20.5–51.1)
MAGNESIUM SERPL-MCNC: 2 MG/DL — SIGNIFICANT CHANGE UP (ref 1.8–2.4)
MCHC RBC-ENTMCNC: 31.4 PG — HIGH (ref 27–31)
MCHC RBC-ENTMCNC: 33.6 G/DL — SIGNIFICANT CHANGE UP (ref 32–37)
MCV RBC AUTO: 93.4 FL — SIGNIFICANT CHANGE UP (ref 80–94)
MONOCYTES # BLD AUTO: 0.58 K/UL — SIGNIFICANT CHANGE UP (ref 0.1–0.6)
MONOCYTES NFR BLD AUTO: 10.2 % — HIGH (ref 1.7–9.3)
NEUTROPHILS # BLD AUTO: 4 K/UL — SIGNIFICANT CHANGE UP (ref 1.4–6.5)
NEUTROPHILS NFR BLD AUTO: 70.6 % — SIGNIFICANT CHANGE UP (ref 42.2–75.2)
NRBC # BLD: 0 /100 WBCS — SIGNIFICANT CHANGE UP (ref 0–0)
NT-PROBNP SERPL-SCNC: 1142 PG/ML — HIGH (ref 0–300)
PLATELET # BLD AUTO: 209 K/UL — SIGNIFICANT CHANGE UP (ref 130–400)
POTASSIUM SERPL-MCNC: 3.9 MMOL/L — SIGNIFICANT CHANGE UP (ref 3.5–5)
POTASSIUM SERPL-SCNC: 3.9 MMOL/L — SIGNIFICANT CHANGE UP (ref 3.5–5)
PROT SERPL-MCNC: 5.9 G/DL — LOW (ref 6–8)
PROTHROM AB SERPL-ACNC: 11.8 SEC — SIGNIFICANT CHANGE UP (ref 9.95–12.87)
RBC # BLD: 4.37 M/UL — LOW (ref 4.7–6.1)
RBC # FLD: 12.6 % — SIGNIFICANT CHANGE UP (ref 11.5–14.5)
SODIUM SERPL-SCNC: 141 MMOL/L — SIGNIFICANT CHANGE UP (ref 135–146)
TROPONIN T SERPL-MCNC: <0.01 NG/ML — SIGNIFICANT CHANGE UP
WBC # BLD: 5.67 K/UL — SIGNIFICANT CHANGE UP (ref 4.8–10.8)
WBC # FLD AUTO: 5.67 K/UL — SIGNIFICANT CHANGE UP (ref 4.8–10.8)

## 2021-07-16 PROCEDURE — 93010 ELECTROCARDIOGRAM REPORT: CPT

## 2021-07-16 PROCEDURE — 99285 EMERGENCY DEPT VISIT HI MDM: CPT

## 2021-07-16 PROCEDURE — 71045 X-RAY EXAM CHEST 1 VIEW: CPT | Mod: 26

## 2021-07-16 RX ORDER — FUROSEMIDE 40 MG
40 TABLET ORAL ONCE
Refills: 0 | Status: COMPLETED | OUTPATIENT
Start: 2021-07-16 | End: 2021-07-16

## 2021-07-16 NOTE — ED ADULT TRIAGE NOTE - CHIEF COMPLAINT QUOTE
80 y M presents to ED with palpitation and HTN and mild dyspnea on exertion. ekg completed in triage. patient states he up to date with blood pressure meds.

## 2021-07-16 NOTE — ED PROVIDER NOTE - ATTENDING CONTRIBUTION TO CARE
Patient states that was admitted to hospital recently and sent home, about a week ago started feeling lightheaded, intermittent episodes, associated with DUNLAP, symptoms continued, so had decided to come to ED for evaluation. patient denies cp/abd pain/n/v/syncope/ symptoms. Patient states that, when he feels episodes of lightheadedness, feels warmth in the back. Denies back pain. No trauma, no rash. Patient states that was admitted to hospital recently and sent home, about a week ago started feeling lightheaded, intermittent episodes, associated with DUNLAP, symptoms continued, so had decided to come to ED for evaluation. patient denies cp/abd pain/n/v/syncope/ symptoms. Patient states that, when he feels episodes of lightheadedness, feels warmth in the back. Denies back pain. No trauma, no rash.  Vitals reviewed.   patient is awake, alert, resting comfortably on the bed, appears well and is not in distress.   Lungs: CTA  abd: +BS, NT, ND, soft, no rebound, no guarding, no pulsatile mass.   All 4 ext have full ROM and are distally NVI.   CNS: awake, alert, answering questions appropriately, moving all 4 ext.   A/P: Dyspnea,   labs, EKG, CXR,   reevaluation.

## 2021-07-16 NOTE — ED PROVIDER NOTE - CLINICAL SUMMARY MEDICAL DECISION MAKING FREE TEXT BOX
patient presented to ED for DUNLAP, patient remained hemodynamically stable, results of the labs, imaging findings reviewed and discussed with patient, discussed with admitting physician and MAR, patient is admitted to Medicine for further evaluation and care.

## 2021-07-16 NOTE — ED PROVIDER NOTE - NS ED ROS FT
Review of Systems:  	•	CONSTITUTIONAL: no fever, no chills  	•	SKIN: no rash  	•	ENT: no sore throat   	•	RESPIRATORY: +shortness of breath, no cough  	•	CARDIAC: no chest pain, no palpitations  	•	GI: no abd pain, no nausea, no vomiting, no diarrhea  	•	GENITO-URINARY: no discharge, no dysuria; no hematuria, no increased urinary frequency  	•	MUSCULOSKELETAL: +b/l le swelling/pain  	•	NEUROLOGIC: no weakness/numbness/paresthesias, no syncope, no loc   	•	PSYCH: no anxiety, non suicidal, non homicidal, no hallucination, no depression

## 2021-07-16 NOTE — ED PROVIDER NOTE - OBJECTIVE STATEMENT
80 year old male, past medical history HTN, first degree heart block, who presents with lightheadedness and DUNLAP. patient reports admission 5/2021, had known aortic aneurysm and first degree heart block, discharged with holter monitor. patient reports gradual worsening of shortness of breath, worsening xseveral days prompting presentation to ed. patient reports LE pain/swelling. denies fever, chills, chest pain, back pain, hemoptysis, abd pain, nausea/vomiting/diarrhea, urinary symptoms, skin changes. no recent trauma. no AC.

## 2021-07-16 NOTE — ED PROVIDER NOTE - PHYSICAL EXAMINATION
CONSTITUTIONAL: Well-developed; well-nourished; in no acute distress, nontoxic appearing  SKIN: skin exam is warm and dry  HEAD: Normocephalic; atraumatic  EYES: PERRL, conjunctiva and sclera clear.  ENT: MMM  NECK:  ROM intact  CARD: S1, S2 normal, no murmur  RESP: No wheezes, rales or rhonchi. Good air movement bilaterally  ABD: soft; non-distended; non-tender.   EXT: Normal ROM. No calf tenderness/swelling. No pitting edema. Pulses 2+ UE/LE bilaterally.   NEURO: awake, alert, following commands, oriented, grossly unremarkable. No Focal deficits. GCS 15.   PSYCH: Cooperative, appropriate.

## 2021-07-17 LAB
ALBUMIN SERPL ELPH-MCNC: 3.2 G/DL — LOW (ref 3.5–5.2)
ALP SERPL-CCNC: 62 U/L — SIGNIFICANT CHANGE UP (ref 30–115)
ALT FLD-CCNC: 15 U/L — SIGNIFICANT CHANGE UP (ref 0–41)
ANION GAP SERPL CALC-SCNC: 8 MMOL/L — SIGNIFICANT CHANGE UP (ref 7–14)
AST SERPL-CCNC: 22 U/L — SIGNIFICANT CHANGE UP (ref 0–41)
BASOPHILS # BLD AUTO: 0.02 K/UL — SIGNIFICANT CHANGE UP (ref 0–0.2)
BASOPHILS NFR BLD AUTO: 0.4 % — SIGNIFICANT CHANGE UP (ref 0–1)
BILIRUB SERPL-MCNC: 0.6 MG/DL — SIGNIFICANT CHANGE UP (ref 0.2–1.2)
BUN SERPL-MCNC: 7 MG/DL — LOW (ref 10–20)
CALCIUM SERPL-MCNC: 8.6 MG/DL — SIGNIFICANT CHANGE UP (ref 8.5–10.1)
CHLORIDE SERPL-SCNC: 111 MMOL/L — HIGH (ref 98–110)
CK MB CFR SERPL CALC: <1 NG/ML — SIGNIFICANT CHANGE UP (ref 0.6–6.3)
CK SERPL-CCNC: 62 U/L — SIGNIFICANT CHANGE UP (ref 0–225)
CO2 SERPL-SCNC: 23 MMOL/L — SIGNIFICANT CHANGE UP (ref 17–32)
COVID-19 SPIKE DOMAIN AB INTERP: POSITIVE
COVID-19 SPIKE DOMAIN ANTIBODY RESULT: >250 U/ML — HIGH
CREAT SERPL-MCNC: 1 MG/DL — SIGNIFICANT CHANGE UP (ref 0.7–1.5)
EOSINOPHIL # BLD AUTO: 0.09 K/UL — SIGNIFICANT CHANGE UP (ref 0–0.7)
EOSINOPHIL NFR BLD AUTO: 1.8 % — SIGNIFICANT CHANGE UP (ref 0–8)
GLUCOSE SERPL-MCNC: 106 MG/DL — HIGH (ref 70–99)
HCT VFR BLD CALC: 35.6 % — LOW (ref 42–52)
HGB BLD-MCNC: 12 G/DL — LOW (ref 14–18)
IMM GRANULOCYTES NFR BLD AUTO: 0.2 % — SIGNIFICANT CHANGE UP (ref 0.1–0.3)
LYMPHOCYTES # BLD AUTO: 1.58 K/UL — SIGNIFICANT CHANGE UP (ref 1.2–3.4)
LYMPHOCYTES # BLD AUTO: 31.8 % — SIGNIFICANT CHANGE UP (ref 20.5–51.1)
MAGNESIUM SERPL-MCNC: 2 MG/DL — SIGNIFICANT CHANGE UP (ref 1.8–2.4)
MCHC RBC-ENTMCNC: 30.9 PG — SIGNIFICANT CHANGE UP (ref 27–31)
MCHC RBC-ENTMCNC: 33.7 G/DL — SIGNIFICANT CHANGE UP (ref 32–37)
MCV RBC AUTO: 91.8 FL — SIGNIFICANT CHANGE UP (ref 80–94)
MONOCYTES # BLD AUTO: 0.61 K/UL — HIGH (ref 0.1–0.6)
MONOCYTES NFR BLD AUTO: 12.3 % — HIGH (ref 1.7–9.3)
NEUTROPHILS # BLD AUTO: 2.66 K/UL — SIGNIFICANT CHANGE UP (ref 1.4–6.5)
NEUTROPHILS NFR BLD AUTO: 53.5 % — SIGNIFICANT CHANGE UP (ref 42.2–75.2)
NRBC # BLD: 0 /100 WBCS — SIGNIFICANT CHANGE UP (ref 0–0)
PLATELET # BLD AUTO: 204 K/UL — SIGNIFICANT CHANGE UP (ref 130–400)
POTASSIUM SERPL-MCNC: 3.9 MMOL/L — SIGNIFICANT CHANGE UP (ref 3.5–5)
POTASSIUM SERPL-SCNC: 3.9 MMOL/L — SIGNIFICANT CHANGE UP (ref 3.5–5)
PROT SERPL-MCNC: 5.2 G/DL — LOW (ref 6–8)
RBC # BLD: 3.88 M/UL — LOW (ref 4.7–6.1)
RBC # FLD: 12.7 % — SIGNIFICANT CHANGE UP (ref 11.5–14.5)
SARS-COV-2 IGG+IGM SERPL QL IA: >250 U/ML — HIGH
SARS-COV-2 IGG+IGM SERPL QL IA: POSITIVE
SARS-COV-2 RNA SPEC QL NAA+PROBE: SIGNIFICANT CHANGE UP
SODIUM SERPL-SCNC: 142 MMOL/L — SIGNIFICANT CHANGE UP (ref 135–146)
TROPONIN T SERPL-MCNC: <0.01 NG/ML — SIGNIFICANT CHANGE UP
TSH SERPL-MCNC: 0.76 UIU/ML — SIGNIFICANT CHANGE UP (ref 0.27–4.2)
WBC # BLD: 4.97 K/UL — SIGNIFICANT CHANGE UP (ref 4.8–10.8)
WBC # FLD AUTO: 4.97 K/UL — SIGNIFICANT CHANGE UP (ref 4.8–10.8)

## 2021-07-17 PROCEDURE — 93306 TTE W/DOPPLER COMPLETE: CPT | Mod: 26

## 2021-07-17 PROCEDURE — 71275 CT ANGIOGRAPHY CHEST: CPT | Mod: 26

## 2021-07-17 PROCEDURE — 99221 1ST HOSP IP/OBS SF/LOW 40: CPT

## 2021-07-17 PROCEDURE — 93010 ELECTROCARDIOGRAM REPORT: CPT

## 2021-07-17 PROCEDURE — 74174 CTA ABD&PLVS W/CONTRAST: CPT | Mod: 26

## 2021-07-17 PROCEDURE — 99222 1ST HOSP IP/OBS MODERATE 55: CPT

## 2021-07-17 PROCEDURE — 99223 1ST HOSP IP/OBS HIGH 75: CPT | Mod: 25

## 2021-07-17 RX ORDER — ATORVASTATIN CALCIUM 80 MG/1
10 TABLET, FILM COATED ORAL DAILY
Refills: 0 | Status: DISCONTINUED | OUTPATIENT
Start: 2021-07-17 | End: 2021-07-19

## 2021-07-17 RX ORDER — LABETALOL HCL 100 MG
5 TABLET ORAL ONCE
Refills: 0 | Status: DISCONTINUED | OUTPATIENT
Start: 2021-07-17 | End: 2021-07-17

## 2021-07-17 RX ORDER — CHLORHEXIDINE GLUCONATE 213 G/1000ML
1 SOLUTION TOPICAL
Refills: 0 | Status: DISCONTINUED | OUTPATIENT
Start: 2021-07-17 | End: 2021-07-20

## 2021-07-17 RX ORDER — LABETALOL HCL 100 MG
200 TABLET ORAL THREE TIMES A DAY
Refills: 0 | Status: DISCONTINUED | OUTPATIENT
Start: 2021-07-17 | End: 2021-07-18

## 2021-07-17 RX ORDER — MIRTAZAPINE 45 MG/1
15 TABLET, ORALLY DISINTEGRATING ORAL AT BEDTIME
Refills: 0 | Status: DISCONTINUED | OUTPATIENT
Start: 2021-07-17 | End: 2021-07-20

## 2021-07-17 RX ORDER — QUETIAPINE FUMARATE 200 MG/1
50 TABLET, FILM COATED ORAL AT BEDTIME
Refills: 0 | Status: DISCONTINUED | OUTPATIENT
Start: 2021-07-17 | End: 2021-07-20

## 2021-07-17 RX ORDER — LABETALOL HCL 100 MG
5 TABLET ORAL ONCE
Refills: 0 | Status: COMPLETED | OUTPATIENT
Start: 2021-07-17 | End: 2021-07-17

## 2021-07-17 RX ORDER — LISINOPRIL 2.5 MG/1
40 TABLET ORAL DAILY
Refills: 0 | Status: DISCONTINUED | OUTPATIENT
Start: 2021-07-17 | End: 2021-07-18

## 2021-07-17 RX ORDER — MONTELUKAST 4 MG/1
10 TABLET, CHEWABLE ORAL DAILY
Refills: 0 | Status: DISCONTINUED | OUTPATIENT
Start: 2021-07-17 | End: 2021-07-20

## 2021-07-17 RX ORDER — ENOXAPARIN SODIUM 100 MG/ML
40 INJECTION SUBCUTANEOUS DAILY
Refills: 0 | Status: DISCONTINUED | OUTPATIENT
Start: 2021-07-17 | End: 2021-07-20

## 2021-07-17 RX ORDER — LABETALOL HCL 100 MG
10 TABLET ORAL ONCE
Refills: 0 | Status: COMPLETED | OUTPATIENT
Start: 2021-07-17 | End: 2021-07-17

## 2021-07-17 RX ORDER — LABETALOL HCL 100 MG
10 TABLET ORAL ONCE
Refills: 0 | Status: DISCONTINUED | OUTPATIENT
Start: 2021-07-17 | End: 2021-07-17

## 2021-07-17 RX ORDER — LABETALOL HCL 100 MG
100 TABLET ORAL THREE TIMES A DAY
Refills: 0 | Status: DISCONTINUED | OUTPATIENT
Start: 2021-07-17 | End: 2021-07-17

## 2021-07-17 RX ORDER — ZOLPIDEM TARTRATE 10 MG/1
5 TABLET ORAL AT BEDTIME
Refills: 0 | Status: DISCONTINUED | OUTPATIENT
Start: 2021-07-17 | End: 2021-07-20

## 2021-07-17 RX ORDER — PANTOPRAZOLE SODIUM 20 MG/1
40 TABLET, DELAYED RELEASE ORAL
Refills: 0 | Status: DISCONTINUED | OUTPATIENT
Start: 2021-07-17 | End: 2021-07-20

## 2021-07-17 RX ADMIN — LISINOPRIL 40 MILLIGRAM(S): 2.5 TABLET ORAL at 19:47

## 2021-07-17 RX ADMIN — Medication 10 MILLIGRAM(S): at 17:03

## 2021-07-17 RX ADMIN — Medication 100 MILLIGRAM(S): at 06:06

## 2021-07-17 RX ADMIN — ENOXAPARIN SODIUM 40 MILLIGRAM(S): 100 INJECTION SUBCUTANEOUS at 12:11

## 2021-07-17 RX ADMIN — Medication 100 MILLIGRAM(S): at 12:10

## 2021-07-17 RX ADMIN — QUETIAPINE FUMARATE 50 MILLIGRAM(S): 200 TABLET, FILM COATED ORAL at 21:14

## 2021-07-17 RX ADMIN — Medication 200 MILLIGRAM(S): at 21:15

## 2021-07-17 RX ADMIN — ATORVASTATIN CALCIUM 10 MILLIGRAM(S): 80 TABLET, FILM COATED ORAL at 12:11

## 2021-07-17 RX ADMIN — MIRTAZAPINE 15 MILLIGRAM(S): 45 TABLET, ORALLY DISINTEGRATING ORAL at 21:15

## 2021-07-17 RX ADMIN — PANTOPRAZOLE SODIUM 40 MILLIGRAM(S): 20 TABLET, DELAYED RELEASE ORAL at 06:06

## 2021-07-17 RX ADMIN — MONTELUKAST 10 MILLIGRAM(S): 4 TABLET, CHEWABLE ORAL at 12:11

## 2021-07-17 RX ADMIN — CHLORHEXIDINE GLUCONATE 1 APPLICATION(S): 213 SOLUTION TOPICAL at 06:07

## 2021-07-17 RX ADMIN — Medication 5 MILLIGRAM(S): at 02:32

## 2021-07-17 NOTE — H&P ADULT - HISTORY OF PRESENT ILLNESS
79 YO M with a PMH of HTN, BPH, COPD, mood disorder, HLD, and known ascending aortic aneurysm (4.5 cm in May 2021) who presents to the hospital with a c/o sudden onset SOB that started this PM while walking with family. Associated with palpitations, feeling of warmth in his upper back (progressively worsening since May 2021), cold B/L LEs, and B/L LE swelling. Denies any CP, ABD pain, N/V/D, rashes, dysuria, dizziness, headaches, or fevers/chills. Patient is Belarusian speaking, translation services used ([id number])    In the ED, Chest X-Ray with pulm congestion. BNP elevated. BP elevated (206/82).

## 2021-07-17 NOTE — CONSULT NOTE ADULT - ASSESSMENT
79 YO M with a PMH of HTN, BPH, COPD, mood disorder, HLD, and known ascending aortic aneurysm (4.5 cm in May 2021) who presents to the hospital with a c/o sudden onset SOB    Amharic  ID# [id number]    # Shortness of breath  - Opacity seen on CT chest; severe emphysema; long history of heavy smoking  - Less likely due to pulmonary edema  - BP better controlled  - Would hold diuretics for now  - Check ABG  - Pulm evaluation    # Ascending aortic aneurysm  - Stable  - CT surgery following  - Will need follow-up 81 YO M with a PMH of HTN, BPH, COPD, mood disorder, HLD, and known ascending aortic aneurysm (4.5 cm in May 2021) who presents to the hospital with a c/o sudden onset SOB    Upper sorbian  ID# [id number]    # Shortness of breath  - Opacity seen on CT chest; severe emphysema; long history of heavy smoking  - Less likely due to pulmonary edema at this time  - BP better controlled  - Would hold diuretics for now  - Check ABG  - Pulm evaluation for emphysema    # Ascending aortic aneurysm  - Stable  - CT surgery following  - Will need follow-up    # HTN  - Would discontinue Labetalol and start Lisinopril 20mg daily

## 2021-07-17 NOTE — H&P ADULT - ATTENDING COMMENTS
79 YO M with a PMH of HTN, BPH, COPD, mood disorder, HLD, and known ascending aortic aneurysm (4.5 cm in May 2021) who presents to the hospital with a c/o sudden onset SOB that started this PM while walking with family. Associated with palpitations, feeling of warmth in his upper back, cold B/L LEs, and B/L LE swelling. Denies any CP, ABD pain, N/V/D, rashes, dysuria, dizziness, headaches, or fevers/chils.     In the ED, Chest X-Ray with pulm congestion. BNP elevated. BP elevated (206/82).     FMHx: Reviewed, not relevant    Physical exam shows pt in NAD. VSS, afebrile, not hypoxic on RA (<90% on RA). A&Ox3. Non-focal neuro exam. Muscle strength/sensation intact. CTA B/L with no W/C/R. RRR, no M/G/R. ABD is soft and non-tender, normoactive BSs. LEs without swelling. No rashes. Labs and radiology as above.     Incomplete Note. 81 YO M with a PMH of HTN, BPH, COPD, mood disorder, HLD, and known ascending aortic aneurysm (4.5 cm in May 2021) who presents to the hospital with a c/o sudden onset SOB that started this PM while walking with family. Associated with palpitations, feeling of warmth in his upper back, cold B/L LEs, and B/L LE swelling. Denies any CP, ABD pain, N/V/D, rashes, dysuria, dizziness, headaches, or fevers/chills.     In the ED, Chest X-Ray with pulm congestion. BNP elevated. BP elevated (206/82).     FMHx: Reviewed, not relevant    Physical exam shows pt in NAD. RUE BP (161/71) LUE BP (141/67), afebrile, not hypoxic on RA. A&Ox3. Non-focal neuro exam. Muscle strength/sensation intact. Fine bibasilar crackles. RRR, murmur. ABD is soft and non-tender, normoactive BSs. LEs with trace pitting edema B/L; distal pulses are intact. No rashes. Labs and radiology as above.     Sudden-onset dyspnea + LE swelling, high-suspicion for progressing of ascending aortic aneurysm causing acute heart failure. STAT CTA-Chest/ABD/Pelvis. Hold IV Lasix. Echo. TSH. Supplemental O2 PRN. Upgrade to tele.     Hx of HTN, BPH, COPD, mood disorder, and HLD. Restart home meds, except as stated above. DVT PPX. Inform PCP of pt's admission to hospital. My note supersedes the residents note.     Spoke with family: Adalberto (Grandson) over the phone 81 YO M with a PMH of HTN, BPH, COPD, mood disorder, HLD, and known ascending aortic aneurysm (4.5 cm in May 2021) who presents to the hospital with a c/o sudden onset SOB that started this PM while walking with family. Associated with palpitations, feeling of warmth in his upper back (progressively worsening since May 2021), cold B/L LEs, and B/L LE swelling. Denies any CP, ABD pain, N/V/D, rashes, dysuria, dizziness, headaches, or fevers/chills.     In the ED, Chest X-Ray with pulm congestion. BNP elevated. BP elevated (206/82).     FMHx: Reviewed, not relevant    Physical exam shows pt in NAD. RUE BP (161/71) LUE BP (141/67), afebrile, not hypoxic on RA. A&Ox3. Non-focal neuro exam. Muscle strength/sensation intact. Fine bibasilar crackles. RRR, murmur. ABD is soft and non-tender, normoactive BSs. LEs with trace pitting edema B/L; distal pulses are intact. No rashes. Labs and radiology as above.     Sudden-onset dyspnea + LE swelling, high-suspicion for progressing of ascending aortic aneurysm causing acute heart failure. STAT CTA-Chest/ABD/Pelvis. Hold IV Lasix. Echo. TSH. Supplemental O2 PRN. Upgrade to tele.     Hx of HTN, BPH, COPD, mood disorder, and HLD. Restart home meds, except as stated above. DVT PPX. Inform PCP of pt's admission to hospital. My note supersedes the residents note.     Spoke with family: Adalberto (Grandson) over the phone  phone used: Kaveh [phone] (Syriac)     81 YO M with a PMH of HTN, BPH, COPD, mood disorder, HLD, and known ascending aortic aneurysm (4.5 cm in May 2021) who presents to the hospital with a c/o sudden onset SOB that started this PM while walking with family. Associated with palpitations, feeling of warmth in his upper back (progressively worsening since May 2021), cold B/L LEs, and B/L LE swelling. Denies any CP, ABD pain, N/V/D, rashes, dysuria, dizziness, headaches, or fevers/chills.     In the ED, Chest X-Ray with pulm congestion. BNP elevated. BP elevated (206/82).     FMHx: Reviewed, not relevant    Physical exam shows pt in NAD. RUE BP (161/71) LUE BP (141/67), afebrile, not hypoxic on RA. A&Ox3. Non-focal neuro exam. Muscle strength/sensation intact. Fine bibasilar crackles. RRR, murmur. ABD is soft and non-tender, normoactive BSs. LEs with trace pitting edema B/L; distal pulses are intact. No rashes. Labs and radiology as above.     Sudden-onset dyspnea + LE swelling, high-suspicion for progressing of ascending aortic aneurysm causing acute heart failure. STAT CTA-Chest/ABD/Pelvis. Hold IV Lasix. Echo. TSH. Supplemental O2 PRN. Upgrade to tele.     Hx of HTN, BPH, COPD, mood disorder, and HLD. Restart home meds, except as stated above. DVT PPX. Inform PCP of pt's admission to hospital. My note supersedes the residents note.     Spoke with family: Adalberto (Grandson) over the phone  phone used: Kaveh [phone] (Bengali)     81 YO M with a PMH of HTN, BPH, COPD, mood disorder, HLD, and known ascending aortic aneurysm (4.5 cm in May 2021) who presents to the hospital with a c/o sudden onset SOB that started this PM while walking with family. Associated with palpitations, feeling of warmth in his upper back (progressively worsening since May 2021), cold B/L LEs, and B/L LE swelling. Denies any CP, ABD pain, N/V/D, rashes, dysuria, dizziness, headaches, or fevers/chills.     In the ED, Chest X-Ray with pulm congestion. BNP elevated. BP elevated (206/82).     FMHx: Reviewed, not relevant    Physical exam shows pt in NAD. RUE BP (161/71) LUE BP (141/67), afebrile, not hypoxic on RA. A&Ox3. Non-focal neuro exam. Muscle strength/sensation intact. Fine bibasilar crackles. RRR, murmur. ABD is soft and non-tender, normoactive BSs. LEs with trace pitting edema B/L; distal pulses are intact. No rashes. Labs and radiology as above.     Sudden-onset dyspnea + LE swelling, high-suspicion for progressing of ascending aortic aneurysm causing acute heart failure. STAT CTA-Chest/ABD/Pelvis. Hold IV Lasix. Echo. TSH. Supplemental O2 PRN. Upgrade to tele. Cardio consult     Hx of HTN, BPH, COPD, mood disorder, and HLD. Restart home meds, except as stated above. DVT PPX. Inform PCP of pt's admission to hospital. My note supersedes the residents note.     Spoke with family: Adalberto (Grandson) over the phone  phone used: Kaveh [phone] (Sami)     79 YO M with a PMH of HTN, BPH, COPD, mood disorder, HLD, and known ascending aortic aneurysm (4.5 cm in May 2021) who presents to the hospital with a c/o sudden onset SOB that started this PM while walking with family. Associated with palpitations, feeling of warmth in his upper back (progressively worsening since May 2021), cold B/L LEs, and B/L LE swelling. Denies any CP, ABD pain, N/V/D, rashes, dysuria, dizziness, headaches, or fevers/chills.     In the ED, Chest X-Ray with pulm congestion. BNP elevated. BP elevated (206/82).     FMHx: Reviewed, not relevant    Physical exam shows pt in NAD. RUE BP (161/71) LUE BP (141/67), afebrile, not hypoxic on RA. A&Ox3. Non-focal neuro exam. Muscle strength/sensation intact. Fine bibasilar crackles. RRR, murmur. ABD is soft and non-tender, normoactive BSs. LEs with trace pitting edema B/L; distal pulses are intact. No rashes. Labs and radiology as above.     Sudden-onset dyspnea + LE swelling, high-suspicion for progressing of ascending aortic aneurysm causing acute heart failure. STAT CTA-Chest/ABD/Pelvis. Hold IV Lasix. Echo. TSH. Serial cardiac enzymes and EKGs. Supplemental O2 PRN. Upgrade to tele. Cardio consult     Hx of HTN, BPH, COPD, mood disorder, and HLD. Restart home meds, except as stated above. DVT PPX. Inform PCP of pt's admission to hospital. My note supersedes the residents note.     Spoke with family: Adalberto (Grandson) over the phone

## 2021-07-17 NOTE — CHART NOTE - NSCHARTNOTEFT_GEN_A_CORE
79 YO M with a PMH of HTN, BPH, COPD, mood disorder, HLD, and known ascending aortic aneurysm (4.5 cm in May 2021) who presents to the hospital with a c/o sudden onset SOB that started walking with family, associated with palpitations, b/l le swelling and cold LE extremities.  Patient seen and examined, currently HD stable, feels well, saturating 95 on RA    s/p CTA, AAA unchanged    strict BP control, target SBP <120, can titrate labetalol up as needed  check 2d echo  Cardio eval and CT sx fu  continue with telemetry monitoring for now  OOB to chair

## 2021-07-17 NOTE — CONSULT NOTE ADULT - SUBJECTIVE AND OBJECTIVE BOX
Surgeon: Dr. Han    HISTORY OF PRESENT ILLNESS:  79 YO M with a PMH of HTN, BPH, COPD, mood disorder, HLD, and known ascending aortic aneurysm (4.5 cm in May 2021) who presents to the hospital with a c/o sudden onset SOB that started this PM while walking with family. Associated with palpitations, feeling of warmth in his upper back (progressively worsening since May 2021), cold B/L LEs, and B/L LE swelling. Denies any CP, ABD pain, N/V/D, rashes, dysuria, dizziness, headaches, or fevers/chills. Patient is Nepali speaking, translation services used ([id number])    In the ED, Chest X-Ray with pulm congestion. BNP elevated. BP elevated (206/82). CTA chest re-demonstrated stable ascending aortic aneurysm. CTS consulted for possible surgical intervention.      PAST MEDICAL & SURGICAL HISTORY:  HTN (hypertension)  HLD (hyperlipidemia)  BPH (benign prostatic hyperplasia)  No significant past surgical history        MEDICATIONS  (STANDING):  atorvastatin Oral Tab/Cap - Peds 10 milliGRAM(s) Oral daily  chlorhexidine 4% Liquid 1 Application(s) Topical <User Schedule>  enoxaparin Injectable 40 milliGRAM(s) SubCutaneous daily  labetalol 100 milliGRAM(s) Oral three times a day  labetalol Injectable 5 milliGRAM(s) IV Push once  mirtazapine 15 milliGRAM(s) Oral at bedtime  montelukast 10 milliGRAM(s) Oral daily  pantoprazole    Tablet 40 milliGRAM(s) Oral before breakfast  QUEtiapine 50 milliGRAM(s) Oral at bedtime    MEDICATIONS  (PRN):  zolpidem 5 milliGRAM(s) Oral at bedtime PRN Insomnia    Home Medications:  aspirin 81 mg oral tablet: 1 tab(s) orally once a day (20 May 2021 16:20)  atorvastatin 10 mg oral tablet: 1 tab(s) orally once a day (20 May 2021 16:20)  Breo Ellipta 200 mcg-25 mcg/inh inhalation powder: 1 puff(s) inhaled once a day (20 May 2021 16:20)  mirtazapine 15 mg oral tablet: 1 tab(s) orally once a day (at bedtime) (20 May 2021 16:34)  montelukast 10 mg oral tablet: 1 tab(s) orally once a day (28 Feb 2020 12:42)  Mucinex 600 mg oral tablet, extended release: 1 tab(s) orally every 12 hours, As Needed (28 Feb 2020 12:42)  omeprazole 20 mg oral delayed release capsule: 1 cap(s) orally once a day (28 Feb 2020 12:42)  raNITIdine 150 mg oral capsule: 1 cap(s) orally 2 times a day (28 Feb 2020 12:42)  SEROquel XR 50 mg oral tablet, extended release: 1 tab(s) orally once a day (in the evening) (20 May 2021 16:34)  Vitamin D3 1000 intl units (25 mcg) oral tablet: 1 tab(s) orally once a day (28 Feb 2020 12:42)  zolpidem 10 mg oral tablet: 1 tab(s) orally once a day (at bedtime) (20 May 2021 16:34)      Allergies    No Known Allergies    Intolerances        Review of Systems  CONSTITUTIONAL:  Fevers[ ] chills[ ] sweats[ ] fatigue[ x] weight loss[ ] weight gain [ ]                                   NEURO:  paresthesias[ ] seizures [ ]  syncope [ ]  confusion [ ]                                                                                NEGATIVE[ X]   EYES: glasses[ ]  blurry vision[ ]  discharge[ ] pain[ ] glaucoma [ ]                                                                          NEGATIVE[X ]   ENMT:  difficulty hearing [ ]  vertigo[ ]  dysphagia[ ] epistaxis[ ] recent dental work [ ]                                    NEGATIVE[ X]   CV:  chest pain[ ] palpitations[ ] DUNLAP [x ] diaphoresis [ ]                                                                                           RESPIRATORY:  wheezing[ ] SOB[x ] cough [ ] sputum[ ] hemoptysis[ ]                                                                   GI:  nausea[ ]  vomiting [ ]  diarrhea[ ] constipation [ ] melena [ ]                                                                         NEGATIVE[ X]   : hematuria[ ]  dysuria[ ] urgency[ ] incontinence[ ]                                                                                            NEGATIVE[ X]   MUSKULOSKELETAL:  arthritis[ ]  joint swelling [ ] muscle weakness [ ] Hx vein stripping [ ]                             NEGATIVE[X ]   SKIN/BREAST:  rash[ ] itching [ ]  hair loss[ ] masses[ ]                                                                                              NEGATIVE[ X]   PSYCH:  dementia [ ] depression [ ] anxiety[ ]                                                                                                               NEGATIVE[X ]   HEME/LYMPH:  bruises easily[ ] enlarged lymph nodes[ ] tender lymph nodes[ ]                                               NEGATIVE[ X]   ENDOCRINE:  cold intolerance[ ] heat intolerance[ ] polydipsia[ ]                                                                          NEGATIVE[ X]     PHYSICAL EXAM  Vital Signs Last 24 Hrs  T(C): 36.2 (17 Jul 2021 07:00), Max: 36.8 (16 Jul 2021 19:41)  T(F): 97.1 (17 Jul 2021 07:00), Max: 98.2 (16 Jul 2021 19:41)  HR: 69 (17 Jul 2021 07:00) (68 - 89)  BP: 123/58 (17 Jul 2021 07:00) (121/58 - 206/82)  BP(mean): 96 (17 Jul 2021 03:49) (96 - 96)  RR: 20 (17 Jul 2021 03:49) (17 - 20)  SpO2: 95% (17 Jul 2021 03:49) (95% - 99%)      CONSTITUTIONAL:  WNL[ x]   GENERAL: NAD, lying in bed  HEAD:  Atraumatic, Normocephalic  EYES: EOMI, PERRLA, anicteric sclera  NECK: Supple, No JVD  CHEST/LUNG: slight crackles in bases b/l; No wheeze; No crackles; No accessory muscles used  HEART: Regular rate and rhythm; No murmurs;   ABDOMEN: Soft, Nontender, Nondistended; Bowel sounds present; No guarding  EXTREMITIES:  2+ Peripheral Pulses, trace edema b/l LE  PSYCH: AAOx3  NEUROLOGY: non-focal  SKIN: No rashes or lesions                                                          LABS:                        12.0   4.97  )-----------( 204      ( 17 Jul 2021 07:13 )             35.6     07-17    142  |  111<H>  |  7<L>  ----------------------------<  106<H>  3.9   |  23  |  1.0    Ca    8.6      17 Jul 2021 07:13  Mg     2.0     07-17    TPro  5.2<L>  /  Alb  3.2<L>  /  TBili  0.6  /  DBili  x   /  AST  22  /  ALT  15  /  AlkPhos  62  07-17    PT/INR - ( 16 Jul 2021 21:45 )   PT: 11.80 sec;   INR: 1.03 ratio         PTT - ( 16 Jul 2021 21:45 )  PTT:32.6 sec    CARDIAC MARKERS ( 17 Jul 2021 07:13 )  x     / <0.01 ng/mL / 62 U/L / x     / <1.0 ng/mL  CARDIAC MARKERS ( 16 Jul 2021 21:45 )  x     / <0.01 ng/mL / 81 U/L / x     / x              COVID Result: COVID-19 PCR: NotDetec (07-17-21 @ 00:08)  COVID-19 PCR: NotDetec (05-20-21 @ 15:14)      TTE: < from: TTE Echo Complete w/o Contrast w/ Doppler (07.17.21 @ 10:21) >  Summary:   1. Normal global left ventricular systolic function.   2. LV Ejection Fraction by Montanez's Method with a biplane EF of 63 %.   3. Normal left ventricular internal cavity size.   4. Mildly enlarged left atrium.   5. Normal right atrial size.   6. There is no evidence of pericardial effusion.   7. Mild mitral annular calcification.   8. Mild thickening of the anterior and posterior mitral valve leaflets.   9. Mild to moderate mitral valve regurgitation.  10. Mild tricuspid regurgitation.  11. Moderate aortic regurgitation.  12. Dilatation of the aortic root and ascending aorta.  13. Aortic root diameter measures 4.7 cm.      Ascending aorta measures 5.3 cm.    PHYSICIAN INTERPRETATION:  Left Ventricle: The left ventricular internal cavity size is normal. Left ventricular wall thickness is normal. Global LV systolic function was normal.  Right Ventricle: The right ventricular size is normal. RV systolic function is normal.  Left Atrium: Mildly enlarged left atrium.  Right Atrium: Normal right atrial size.  Pericardium: There is no evidence ofpericardial effusion.  Mitral Valve: Mild thickening of the anterior and posterior mitral valve leaflets. There is mild mitral annular calcification. Mild to moderate mitral valve regurgitation is seen.  Tricuspid Valve: The tricuspid valve is normalin structure. Mild tricuspid regurgitation is visualized.  Aortic Valve: The aortic valve is trileaflet. No evidence of aortic stenosis. Peak transaortic gradient equals 5.5 mmHg, mean transaortic gradient equals 3.0 mmHg, the calculated aortic valvearea equals 2.84 cm² by the continuity equation consistent with normally opening aortic valve. Moderate aortic valve regurgitation is seen.  Pulmonic Valve: The pulmonic valve is normal. No indication of pulmonic valve regurgitation.  Aorta: There isdilatation of the aortic root and ascending aorta. Aortic root diameter measures 4.7 cm.  Ascending aorta measures 5.3 cm.  Venous: The inferior vena cava was normal sized, with respiratory size variation greater than 50%.    < end of copied text >    CTA CHEST: < from: CT Angio Chest w/ IV Cont (05.23.21 @ 22:42) >  Findings:  Tubes/lines:none    Central airways/ Lung parenchyma/ pleura : Mucoid debris within trachea wall. Noendobronchial obstruction mass or effusions. Bilateral basilar atelectasis. Subsegmental atelectasis, lingula segment left upper lobe (6/171).  Cystic likely emphysematous changes right lower lobe (6/157).    Pulmonary Nodules:  Calcified granuloma, right middle lobe (6/158)    Chest wall/axilla: unremarkable    Mediastinum/Great vessels:No adenopathy or mass. No pericardial effusions.    No pulmonary emboli. Extensive thoracic aortic calcifications. No dissection.. Patent brachiocephalic vessels.    Aortic dimensions:  Dilatation, sinus of Valsalva 5.4 cm (12/169)  Sinotubular junction 3.5 cm (12/64)  Dilatation, Mid ascending aorta 4.5 cm (6/140).  mid aortic arch 3.4 cm (12/150).  Distal aortic arch 3.0 cm (12/138)  Mid descending aorta 2.5 cm (12/131).  Distal descending aorta 2.6 cm (12/143).      Upper abdomen:2.5 cm cyst, right kidney (6/299)    Osseous structures:      Impression:    Dilatation ascending aorta, 4.5 cm (6/140).    Dilated sinus of Valsalva 5.4 cm (12/169)    Extensive aortic calcifications. No dissection.    < end of copied text >          Assessment/ Plan: 79 YO M with a PMH of HTN, BPH, COPD, mood disorder, HLD, and known ascending aortic aneurysm (4.5 cm in May 2021) presents w/SOB found to be HTN w/SBP ~200 mmHg and w/elevated BNP. CTA chest re-demonstrated stable ascending aortic aneurysm 4.5cm. CTS consulted for possible surgical intervention.    -Case and plan discussed with CT surgeon Dr. Han. Attending note to follow.   Patient with stable ascending aortic aneurysm w/no dissection.   SOB likely pulmonary edema from poorly controlled HTN.     -Cardiology consult for medical management.  -No surgical intervention at this time.

## 2021-07-17 NOTE — H&P ADULT - NSHPPHYSICALEXAM_GEN_ALL_CORE
PHYSICAL EXAM:  GENERAL: NAD, lying in bed  HEAD:  Atraumatic, Normocephalic  EYES: EOMI, PERRLA, anicteric sclera  NECK: Supple, No JVD  CHEST/LUNG: slight crackles in bases b/l; No wheeze; No crackles; No accessory muscles used  HEART: Regular rate and rhythm; No murmurs;   ABDOMEN: Soft, Nontender, Nondistended; Bowel sounds present; No guarding  EXTREMITIES:  2+ Peripheral Pulses, trace edema b/l LE  PSYCH: AAOx3  NEUROLOGY: non-focal  SKIN: No rashes or lesions

## 2021-07-17 NOTE — CONSULT NOTE ADULT - SUBJECTIVE AND OBJECTIVE BOX
HPI:  79 YO M with a PMH of HTN, BPH, COPD, mood disorder, HLD, and known ascending aortic aneurysm (4.5 cm in May 2021) who presents to the hospital with a c/o sudden onset SOB that started this PM while walking with family. Associated with palpitations, feeling of warmth in his upper back (progressively worsening since May 2021), cold B/L LEs, and B/L LE swelling. Patient reports that for the last month and a half, he noticed shortness of breath on exertion as well as bringing up a lot of phlegm. Denies any CP, ABD pain, N/V/D, rashes, dysuria, dizziness, headaches, or fevers/chills. Patient is Greek speaking, translation services used    In the ED, Chest X-Ray with pulm congestion. BNP elevated. BP elevated (206/82).    (17 Jul 2021 01:16)      PAST MEDICAL & SURGICAL HISTORY  HTN (hypertension)    HLD (hyperlipidemia)    BPH (benign prostatic hyperplasia)    No significant past surgical history        FAMILY HISTORY:  FAMILY HISTORY:  No pertinent family history in first degree relatives        SOCIAL HISTORY:  []smoker: prior heavy smoker; quit 3 years ago  [x]Alcohol  []Drug    ALLERGIES:  No Known Allergies      MEDICATIONS:  MEDICATIONS  (STANDING):  atorvastatin Oral Tab/Cap - Peds 10 milliGRAM(s) Oral daily  chlorhexidine 4% Liquid 1 Application(s) Topical <User Schedule>  enoxaparin Injectable 40 milliGRAM(s) SubCutaneous daily  labetalol 100 milliGRAM(s) Oral three times a day  labetalol Injectable 5 milliGRAM(s) IV Push once  mirtazapine 15 milliGRAM(s) Oral at bedtime  montelukast 10 milliGRAM(s) Oral daily  pantoprazole    Tablet 40 milliGRAM(s) Oral before breakfast  QUEtiapine 50 milliGRAM(s) Oral at bedtime    MEDICATIONS  (PRN):  zolpidem 5 milliGRAM(s) Oral at bedtime PRN Insomnia      HOME MEDICATIONS:  Home Medications:  aspirin 81 mg oral tablet: 1 tab(s) orally once a day (20 May 2021 16:20)  atorvastatin 10 mg oral tablet: 1 tab(s) orally once a day (20 May 2021 16:20)  Breo Ellipta 200 mcg-25 mcg/inh inhalation powder: 1 puff(s) inhaled once a day (20 May 2021 16:20)  mirtazapine 15 mg oral tablet: 1 tab(s) orally once a day (at bedtime) (20 May 2021 16:34)  montelukast 10 mg oral tablet: 1 tab(s) orally once a day (28 Feb 2020 12:42)  Mucinex 600 mg oral tablet, extended release: 1 tab(s) orally every 12 hours, As Needed (28 Feb 2020 12:42)  omeprazole 20 mg oral delayed release capsule: 1 cap(s) orally once a day (28 Feb 2020 12:42)  raNITIdine 150 mg oral capsule: 1 cap(s) orally 2 times a day (28 Feb 2020 12:42)  SEROquel XR 50 mg oral tablet, extended release: 1 tab(s) orally once a day (in the evening) (20 May 2021 16:34)  Vitamin D3 1000 intl units (25 mcg) oral tablet: 1 tab(s) orally once a day (28 Feb 2020 12:42)  zolpidem 10 mg oral tablet: 1 tab(s) orally once a day (at bedtime) (20 May 2021 16:34)      VITALS:   T(F): 98 (07-17 @ 16:35), Max: 98.2 (07-16 @ 19:41)  HR: 73 (07-17 @ 16:35) (68 - 89)  BP: 145/64 (07-17 @ 16:35) (121/58 - 206/82)  BP(mean): 96 (07-17 @ 03:49) (96 - 96)  RR: 20 (07-17 @ 16:35) (17 - 20)  SpO2: 95% (07-17 @ 16:35) (95% - 99%)    I&O's Summary      REVIEW OF SYSTEMS:  CONSTITUTIONAL: No weakness, fevers or chills  EYES: No visual changes  ENT: No vertigo or throat pain   NECK: No pain or stiffness  RESPIRATORY: Shortness of breath and phlegm production as described in HPI  CARDIOVASCULAR: No chest pain or palpitations; LE edema  GASTROINTESTINAL: No abdominal or epigastric pain. No nausea, vomiting, or hematemesis; No diarrhea or constipation. No melena or hematochezia.  GENITOURINARY: No dysuria, frequency or hematuria  NEUROLOGICAL: No numbness or weakness  SKIN: No itching, no rashes  MSK: No pain    PHYSICAL EXAM:  NEURO: patient is awake , alert and oriented  GEN: Not in acute distress  NECK: no thyroid enlargement, no JVD  LUNGS: Clear to auscultation bilaterally   CARDIOVASCULAR: S1/S2 present, RRR , no murmurs or rubs, no carotid bruits,  + PP bilaterally  ABD: Soft, non-tender, non-distended, +BS  EXT: No KUMAR  SKIN: Intact    LABS:                        12.0   4.97  )-----------( 204      ( 17 Jul 2021 07:13 )             35.6     07-17    142  |  111<H>  |  7<L>  ----------------------------<  106<H>  3.9   |  23  |  1.0    Ca    8.6      17 Jul 2021 07:13  Mg     2.0     07-17    TPro  5.2<L>  /  Alb  3.2<L>  /  TBili  0.6  /  DBili  x   /  AST  22  /  ALT  15  /  AlkPhos  62  07-17    PT/INR - ( 16 Jul 2021 21:45 )   PT: 11.80 sec;   INR: 1.03 ratio         PTT - ( 16 Jul 2021 21:45 )  PTT:32.6 sec  Creatine Kinase, Serum: 62 U/L (07-17-21 @ 07:13)  Troponin T, Serum: <0.01 ng/mL (07-17-21 @ 07:13)  Troponin T, Serum: <0.01 ng/mL (07-16-21 @ 21:45)  Creatine Kinase, Serum: 81 U/L (07-16-21 @ 21:45)    CARDIAC MARKERS ( 17 Jul 2021 07:13 )  x     / <0.01 ng/mL / 62 U/L / x     / <1.0 ng/mL  CARDIAC MARKERS ( 16 Jul 2021 21:45 )  x     / <0.01 ng/mL / 81 U/L / x     / x            Troponin trend:    Serum Pro-Brain Natriuretic Peptide: 1142 pg/mL (07-16-21 @ 22:37)    05-21 Chol 137 LDL -- HDL 58 Trig 97      RADIOLOGY:  -CXR:  < from: Xray Chest 1 View- PORTABLE-Urgent (Xray Chest 1 View- PORTABLE-Urgent .) (07.16.21 @ 21:41) >  IMPRESSION:    Medial right upper lobe consolidative opacity is better seen on chest CT.    Bilateral interstitial opacities noted. Correlate with fluid status.    < end of copied text >    -TTE:  < from: TTE Echo Complete w/o Contrast w/ Doppler (07.17.21 @ 10:21) >  Summary:   1. Normal global left ventricular systolic function.   2. LV Ejection Fraction by Montanez's Method with a biplane EF of 63 %.   3. Normal left ventricular internal cavity size.   4. Mildly enlarged left atrium.   5. Normal right atrial size.   6. There is no evidence of pericardial effusion.   7. Mild mitral annular calcification.   8. Mild thickening of the anterior and posterior mitral valve leaflets.   9. Mild to moderate mitral valve regurgitation.  10. Mild tricuspid regurgitation.  11. Moderate aortic regurgitation.  12. Dilatation of the aortic root and ascending aorta.  13. Aortic root diameter measures 4.7 cm.      Ascending aorta measures 5.3 cm.    < end of copied text >    -CCTA:  -STRESS TEST:  -CATHETERIZATION in 2/2020: non-obstructive CAD    < from: CT Angio Chest Aorta w/wo IV Cont (07.17.21 @ 01:37) >  IMPRESSION:    1. Right upperlobe consolidative airspace opacity, consistent with pneumonia in the appropriate clinical setting.    2. New left lower lobe 0.9 cm nodular opacity, possibly infectious in etiology given short interval development. Follow-up CT chest in 3 months recommended.    3. No evidence of acute aortic pathology.    4 .Unchanged aneurysm dilation of ascending thoracic aorta up to 4.4 cm.    5. Trace bilateral pleural effusions.    6. Emphysema.    < end of copied text >      ECG: NSR with 1st degree Av block, RBBB HPI:  81 YO M with a PMH of HTN, BPH, COPD, mood disorder, HLD, and known ascending aortic aneurysm (4.5 cm in May 2021) who presents to the hospital with a c/o sudden onset SOB that started this PM while walking with family. Associated with palpitations, feeling of warmth in his upper back (progressively worsening since May 2021), cold B/L LEs, and B/L LE swelling. Patient reports that for the last month and a half, he noticed shortness of breath on exertion as well as bringing up a lot of phlegm. Denies any CP, ABD pain, N/V/D, rashes, dysuria, dizziness, headaches, or fevers/chills. Patient is Danish speaking, translation services used    In the ED, Chest X-Ray with pulm congestion. BNP elevated. BP elevated (206/82).    (17 Jul 2021 01:16)      PAST MEDICAL & SURGICAL HISTORY  HTN (hypertension)    HLD (hyperlipidemia)    BPH (benign prostatic hyperplasia)    No significant past surgical history        FAMILY HISTORY:  FAMILY HISTORY:  No pertinent family history in first degree relatives        SOCIAL HISTORY:  []smoker: prior heavy smoker; quit 3 years ago  [x]Alcohol  []Drug    ALLERGIES:  No Known Allergies      MEDICATIONS:  MEDICATIONS  (STANDING):  atorvastatin Oral Tab/Cap - Peds 10 milliGRAM(s) Oral daily  chlorhexidine 4% Liquid 1 Application(s) Topical <User Schedule>  enoxaparin Injectable 40 milliGRAM(s) SubCutaneous daily  labetalol 100 milliGRAM(s) Oral three times a day  labetalol Injectable 5 milliGRAM(s) IV Push once  mirtazapine 15 milliGRAM(s) Oral at bedtime  montelukast 10 milliGRAM(s) Oral daily  pantoprazole    Tablet 40 milliGRAM(s) Oral before breakfast  QUEtiapine 50 milliGRAM(s) Oral at bedtime    MEDICATIONS  (PRN):  zolpidem 5 milliGRAM(s) Oral at bedtime PRN Insomnia      HOME MEDICATIONS:  Home Medications:  aspirin 81 mg oral tablet: 1 tab(s) orally once a day (20 May 2021 16:20)  atorvastatin 10 mg oral tablet: 1 tab(s) orally once a day (20 May 2021 16:20)  Breo Ellipta 200 mcg-25 mcg/inh inhalation powder: 1 puff(s) inhaled once a day (20 May 2021 16:20)  mirtazapine 15 mg oral tablet: 1 tab(s) orally once a day (at bedtime) (20 May 2021 16:34)  montelukast 10 mg oral tablet: 1 tab(s) orally once a day (28 Feb 2020 12:42)  Mucinex 600 mg oral tablet, extended release: 1 tab(s) orally every 12 hours, As Needed (28 Feb 2020 12:42)  omeprazole 20 mg oral delayed release capsule: 1 cap(s) orally once a day (28 Feb 2020 12:42)  raNITIdine 150 mg oral capsule: 1 cap(s) orally 2 times a day (28 Feb 2020 12:42)  SEROquel XR 50 mg oral tablet, extended release: 1 tab(s) orally once a day (in the evening) (20 May 2021 16:34)  Vitamin D3 1000 intl units (25 mcg) oral tablet: 1 tab(s) orally once a day (28 Feb 2020 12:42)  zolpidem 10 mg oral tablet: 1 tab(s) orally once a day (at bedtime) (20 May 2021 16:34)      VITALS:   T(F): 98 (07-17 @ 16:35), Max: 98.2 (07-16 @ 19:41)  HR: 73 (07-17 @ 16:35) (68 - 89)  BP: 145/64 (07-17 @ 16:35) (121/58 - 206/82)  BP(mean): 96 (07-17 @ 03:49) (96 - 96)  RR: 20 (07-17 @ 16:35) (17 - 20)  SpO2: 95% (07-17 @ 16:35) (95% - 99%)    I&O's Summary      REVIEW OF SYSTEMS:  CONSTITUTIONAL: No weakness, fevers or chills  EYES: No visual changes  ENT: No vertigo or throat pain   NECK: No pain or stiffness  RESPIRATORY: Shortness of breath and phlegm production as described in HPI  CARDIOVASCULAR: No chest pain or palpitations; LE edema  GASTROINTESTINAL: No abdominal or epigastric pain. No nausea, vomiting, or hematemesis; No diarrhea or constipation. No melena or hematochezia.  GENITOURINARY: No dysuria, frequency or hematuria  NEUROLOGICAL: No numbness or weakness  SKIN: No itching, no rashes  MSK: No pain    PHYSICAL EXAM:  NEURO: patient is awake , alert and oriented  GEN: Not in acute distress  NECK: no thyroid enlargement, no JVD  LUNGS: Reduced breath sounds bilaterally   CARDIOVASCULAR: S1/S2 present, RRR , no murmurs or rubs, no carotid bruits,  + PP bilaterally  ABD: Soft, non-tender, non-distended, +BS  EXT: No KUMAR  SKIN: Intact    LABS:                        12.0   4.97  )-----------( 204      ( 17 Jul 2021 07:13 )             35.6     07-17    142  |  111<H>  |  7<L>  ----------------------------<  106<H>  3.9   |  23  |  1.0    Ca    8.6      17 Jul 2021 07:13  Mg     2.0     07-17    TPro  5.2<L>  /  Alb  3.2<L>  /  TBili  0.6  /  DBili  x   /  AST  22  /  ALT  15  /  AlkPhos  62  07-17    PT/INR - ( 16 Jul 2021 21:45 )   PT: 11.80 sec;   INR: 1.03 ratio         PTT - ( 16 Jul 2021 21:45 )  PTT:32.6 sec  Creatine Kinase, Serum: 62 U/L (07-17-21 @ 07:13)  Troponin T, Serum: <0.01 ng/mL (07-17-21 @ 07:13)  Troponin T, Serum: <0.01 ng/mL (07-16-21 @ 21:45)  Creatine Kinase, Serum: 81 U/L (07-16-21 @ 21:45)    CARDIAC MARKERS ( 17 Jul 2021 07:13 )  x     / <0.01 ng/mL / 62 U/L / x     / <1.0 ng/mL  CARDIAC MARKERS ( 16 Jul 2021 21:45 )  x     / <0.01 ng/mL / 81 U/L / x     / x            Troponin trend:    Serum Pro-Brain Natriuretic Peptide: 1142 pg/mL (07-16-21 @ 22:37)    05-21 Chol 137 LDL -- HDL 58 Trig 97      RADIOLOGY:  -CXR:  < from: Xray Chest 1 View- PORTABLE-Urgent (Xray Chest 1 View- PORTABLE-Urgent .) (07.16.21 @ 21:41) >  IMPRESSION:    Medial right upper lobe consolidative opacity is better seen on chest CT.    Bilateral interstitial opacities noted. Correlate with fluid status.    < end of copied text >    -TTE:  < from: TTE Echo Complete w/o Contrast w/ Doppler (07.17.21 @ 10:21) >  Summary:   1. Normal global left ventricular systolic function.   2. LV Ejection Fraction by Montanez's Method with a biplane EF of 63 %.   3. Normal left ventricular internal cavity size.   4. Mildly enlarged left atrium.   5. Normal right atrial size.   6. There is no evidence of pericardial effusion.   7. Mild mitral annular calcification.   8. Mild thickening of the anterior and posterior mitral valve leaflets.   9. Mild to moderate mitral valve regurgitation.  10. Mild tricuspid regurgitation.  11. Moderate aortic regurgitation.  12. Dilatation of the aortic root and ascending aorta.  13. Aortic root diameter measures 4.7 cm.      Ascending aorta measures 5.3 cm.    < end of copied text >    -CCTA:  -STRESS TEST:  -CATHETERIZATION in 2/2020: non-obstructive CAD    < from: CT Angio Chest Aorta w/wo IV Cont (07.17.21 @ 01:37) >  IMPRESSION:    1. Right upperlobe consolidative airspace opacity, consistent with pneumonia in the appropriate clinical setting.    2. New left lower lobe 0.9 cm nodular opacity, possibly infectious in etiology given short interval development. Follow-up CT chest in 3 months recommended.    3. No evidence of acute aortic pathology.    4 .Unchanged aneurysm dilation of ascending thoracic aorta up to 4.4 cm.    5. Trace bilateral pleural effusions.    6. Emphysema.    < end of copied text >      ECG: NSR with 1st degree Av block, RBBB

## 2021-07-17 NOTE — H&P ADULT - NSHPLABSRESULTS_GEN_ALL_CORE
13.7   5.67  )-----------( 209      ( 16 Jul 2021 21:45 )             40.8       07-16    141  |  107  |  9<L>  ----------------------------<  99  3.9   |  24  |  1.1    Ca    8.7      16 Jul 2021 21:45  Mg     2.0     07-16    TPro  5.9<L>  /  Alb  3.6  /  TBili  0.6  /  DBili  x   /  AST  39  /  ALT  20  /  AlkPhos  68  07-16                  PT/INR - ( 16 Jul 2021 21:45 )   PT: 11.80 sec;   INR: 1.03 ratio         PTT - ( 16 Jul 2021 21:45 )  PTT:32.6 sec    Lactate Trend      CARDIAC MARKERS ( 16 Jul 2021 21:45 )  x     / <0.01 ng/mL / 81 U/L / x     / x            CAPILLARY BLOOD GLUCOSE

## 2021-07-17 NOTE — ED ADULT NURSE NOTE - OBJECTIVE STATEMENT
pt presents to ed with c/o heart palpitation with dyspnea on exertion. pt recently diagnosed with aortic aneurysm 3 months ago.

## 2021-07-17 NOTE — CHART NOTE - NSCHARTNOTEFT_GEN_A_CORE
CT-chest/ABD/pelvis performed, pending official read. Pt with new-onset HF, normal echo in May 2021. STAT consult CTS. Give Labetalol IV and target SBP <120-130, if pt can tolerate. Supplemental O2 PRN.       CCT CT-chest/ABD/pelvis performed, pending official read. Pt with new-onset HF, normal echo in May 2021. STAT consult CTS. Give Labetalol IV and target SBP <120-130, if pt can tolerate. Supplemental O2 PRN.       Update at 0214:   -CTS states that because no dissection on CTA, they want pt to go to MICU. Will consult MICU.       CCT CT-chest/ABD/pelvis performed, pending official read. Pt with new-onset HF, normal echo in May 2021. STAT consult CTS. Give Labetalol IV and target SBP <120-130, if pt can tolerate. Supplemental O2 PRN.       Update at 0214:   -CTS states that because no dissection on CTA, they want pt to go to MICU. Will consult MICU.     Update at 0223:  -MICU states that the pt can go to the SDU. Pt updated on plan. Pt BP responded well to IV Labetalol. Will start on PO Labetalol.      CCT

## 2021-07-17 NOTE — H&P ADULT - ASSESSMENT
81 YO M with a PMH of HTN, BPH, COPD, mood disorder, HLD, and known ascending aortic aneurysm (4.5 cm in May 2021) who presents to the hospital with a c/o sudden onset SOB that started this PM while walking with family.    #Ascending Aortic Aneurysm/ concern for Acute HF  - New onset LE edema, shortness of breath  - 6.2 cm aneurysm on bedside echo, CTA chest read: Unchanged aneurysm dilation of ascending thoracic aorta up to 4.4 cm  - Goal systolic BP<120  - Labetalol 100 PO TID  - F/u echo, tsh  - CTS consult  - Cardio consult  - Tele monitoring    #HTN/HLD  -Labetalol 100 TID  -c/w atorvastatin    #COPD  - c/w Duonebs prn, Singulair    #Mood disorder  - c/w quetiapine mirtazepine      #DVT PPx- Lovenox  #GI PPx- Protonix  #Diet- NPO  #CHG  #Activity- bedrest  #Dispo- Acute  #Code- full

## 2021-07-17 NOTE — ED ADULT NURSE NOTE - NSIMPLEMENTINTERV_GEN_ALL_ED
Implemented All Universal Safety Interventions:  [place] to call system. Call bell, personal items and telephone within reach. Instruct patient to call for assistance. Room bathroom lighting operational. Non-slip footwear when patient is off stretcher. Physically safe environment: no spills, clutter or unnecessary equipment. Stretcher in lowest position, wheels locked, appropriate side rails in place.

## 2021-07-17 NOTE — H&P ADULT - CONVERSATION DETAILS
phone used: [employer] [id number] ([place])     The pt wishes to be FULL CODE with no limitations in medical interventions.  phone used: [employer] [id number] (French)     The pt wishes to be FULL CODE with no limitations in medical interventions.

## 2021-07-18 LAB
ALBUMIN SERPL ELPH-MCNC: 3.5 G/DL — SIGNIFICANT CHANGE UP (ref 3.5–5.2)
ALP SERPL-CCNC: 66 U/L — SIGNIFICANT CHANGE UP (ref 30–115)
ALT FLD-CCNC: 16 U/L — SIGNIFICANT CHANGE UP (ref 0–41)
ANION GAP SERPL CALC-SCNC: 13 MMOL/L — SIGNIFICANT CHANGE UP (ref 7–14)
AST SERPL-CCNC: 21 U/L — SIGNIFICANT CHANGE UP (ref 0–41)
BASOPHILS # BLD AUTO: 0.03 K/UL — SIGNIFICANT CHANGE UP (ref 0–0.2)
BASOPHILS NFR BLD AUTO: 0.6 % — SIGNIFICANT CHANGE UP (ref 0–1)
BILIRUB SERPL-MCNC: 0.8 MG/DL — SIGNIFICANT CHANGE UP (ref 0.2–1.2)
BUN SERPL-MCNC: 10 MG/DL — SIGNIFICANT CHANGE UP (ref 10–20)
CALCIUM SERPL-MCNC: 8.6 MG/DL — SIGNIFICANT CHANGE UP (ref 8.5–10.1)
CHLORIDE SERPL-SCNC: 108 MMOL/L — SIGNIFICANT CHANGE UP (ref 98–110)
CO2 SERPL-SCNC: 23 MMOL/L — SIGNIFICANT CHANGE UP (ref 17–32)
CREAT SERPL-MCNC: 1.2 MG/DL — SIGNIFICANT CHANGE UP (ref 0.7–1.5)
EOSINOPHIL # BLD AUTO: 0.19 K/UL — SIGNIFICANT CHANGE UP (ref 0–0.7)
EOSINOPHIL NFR BLD AUTO: 4 % — SIGNIFICANT CHANGE UP (ref 0–8)
GLUCOSE SERPL-MCNC: 105 MG/DL — HIGH (ref 70–99)
HCT VFR BLD CALC: 38 % — LOW (ref 42–52)
HGB BLD-MCNC: 12.4 G/DL — LOW (ref 14–18)
IMM GRANULOCYTES NFR BLD AUTO: 0.4 % — HIGH (ref 0.1–0.3)
LYMPHOCYTES # BLD AUTO: 1.58 K/UL — SIGNIFICANT CHANGE UP (ref 1.2–3.4)
LYMPHOCYTES # BLD AUTO: 33.1 % — SIGNIFICANT CHANGE UP (ref 20.5–51.1)
MAGNESIUM SERPL-MCNC: 2.2 MG/DL — SIGNIFICANT CHANGE UP (ref 1.8–2.4)
MCHC RBC-ENTMCNC: 30.8 PG — SIGNIFICANT CHANGE UP (ref 27–31)
MCHC RBC-ENTMCNC: 32.6 G/DL — SIGNIFICANT CHANGE UP (ref 32–37)
MCV RBC AUTO: 94.3 FL — HIGH (ref 80–94)
MONOCYTES # BLD AUTO: 0.62 K/UL — HIGH (ref 0.1–0.6)
MONOCYTES NFR BLD AUTO: 13 % — HIGH (ref 1.7–9.3)
NEUTROPHILS # BLD AUTO: 2.33 K/UL — SIGNIFICANT CHANGE UP (ref 1.4–6.5)
NEUTROPHILS NFR BLD AUTO: 48.9 % — SIGNIFICANT CHANGE UP (ref 42.2–75.2)
NRBC # BLD: 0 /100 WBCS — SIGNIFICANT CHANGE UP (ref 0–0)
PLATELET # BLD AUTO: 208 K/UL — SIGNIFICANT CHANGE UP (ref 130–400)
POTASSIUM SERPL-MCNC: 4 MMOL/L — SIGNIFICANT CHANGE UP (ref 3.5–5)
POTASSIUM SERPL-SCNC: 4 MMOL/L — SIGNIFICANT CHANGE UP (ref 3.5–5)
PROT SERPL-MCNC: 5.5 G/DL — LOW (ref 6–8)
RBC # BLD: 4.03 M/UL — LOW (ref 4.7–6.1)
RBC # FLD: 12.6 % — SIGNIFICANT CHANGE UP (ref 11.5–14.5)
SODIUM SERPL-SCNC: 144 MMOL/L — SIGNIFICANT CHANGE UP (ref 135–146)
WBC # BLD: 4.77 K/UL — LOW (ref 4.8–10.8)
WBC # FLD AUTO: 4.77 K/UL — LOW (ref 4.8–10.8)

## 2021-07-18 PROCEDURE — 99233 SBSQ HOSP IP/OBS HIGH 50: CPT

## 2021-07-18 RX ORDER — METOPROLOL TARTRATE 50 MG
25 TABLET ORAL ONCE
Refills: 0 | Status: COMPLETED | OUTPATIENT
Start: 2021-07-18 | End: 2021-07-18

## 2021-07-18 RX ORDER — NIFEDIPINE 30 MG
30 TABLET, EXTENDED RELEASE 24 HR ORAL ONCE
Refills: 0 | Status: COMPLETED | OUTPATIENT
Start: 2021-07-18 | End: 2021-07-18

## 2021-07-18 RX ORDER — METOPROLOL TARTRATE 50 MG
25 TABLET ORAL DAILY
Refills: 0 | Status: DISCONTINUED | OUTPATIENT
Start: 2021-07-19 | End: 2021-07-20

## 2021-07-18 RX ORDER — METOPROLOL TARTRATE 50 MG
12.5 TABLET ORAL ONCE
Refills: 0 | Status: COMPLETED | OUTPATIENT
Start: 2021-07-18 | End: 2021-07-18

## 2021-07-18 RX ORDER — LISINOPRIL 2.5 MG/1
20 TABLET ORAL DAILY
Refills: 0 | Status: DISCONTINUED | OUTPATIENT
Start: 2021-07-19 | End: 2021-07-20

## 2021-07-18 RX ADMIN — Medication 30 MILLIGRAM(S): at 22:55

## 2021-07-18 RX ADMIN — Medication 200 MILLIGRAM(S): at 05:00

## 2021-07-18 RX ADMIN — Medication 25 MILLIGRAM(S): at 17:19

## 2021-07-18 RX ADMIN — QUETIAPINE FUMARATE 50 MILLIGRAM(S): 200 TABLET, FILM COATED ORAL at 21:06

## 2021-07-18 RX ADMIN — CHLORHEXIDINE GLUCONATE 1 APPLICATION(S): 213 SOLUTION TOPICAL at 05:00

## 2021-07-18 RX ADMIN — PANTOPRAZOLE SODIUM 40 MILLIGRAM(S): 20 TABLET, DELAYED RELEASE ORAL at 05:00

## 2021-07-18 RX ADMIN — MIRTAZAPINE 15 MILLIGRAM(S): 45 TABLET, ORALLY DISINTEGRATING ORAL at 21:06

## 2021-07-18 RX ADMIN — ATORVASTATIN CALCIUM 10 MILLIGRAM(S): 80 TABLET, FILM COATED ORAL at 11:29

## 2021-07-18 RX ADMIN — ENOXAPARIN SODIUM 40 MILLIGRAM(S): 100 INJECTION SUBCUTANEOUS at 11:29

## 2021-07-18 RX ADMIN — MONTELUKAST 10 MILLIGRAM(S): 4 TABLET, CHEWABLE ORAL at 11:29

## 2021-07-18 RX ADMIN — Medication 12.5 MILLIGRAM(S): at 19:40

## 2021-07-18 RX ADMIN — LISINOPRIL 40 MILLIGRAM(S): 2.5 TABLET ORAL at 05:00

## 2021-07-18 NOTE — PROGRESS NOTE ADULT - SUBJECTIVE AND OBJECTIVE BOX
Patient is a 80y old  Male who presents with a chief complaint of SOB (17 Jul 2021 14:10)      Patient seen and examined at bedside.  Patient denies any chest pain, states having some shortness of breath.     ALLERGIES:  No Known Allergies    MEDICATIONS:  atorvastatin Oral Tab/Cap - Peds 10 milliGRAM(s) Oral daily  chlorhexidine 4% Liquid 1 Application(s) Topical <User Schedule>  enoxaparin Injectable 40 milliGRAM(s) SubCutaneous daily  metoprolol tartrate 25 milliGRAM(s) Oral once  mirtazapine 15 milliGRAM(s) Oral at bedtime  montelukast 10 milliGRAM(s) Oral daily  pantoprazole    Tablet 40 milliGRAM(s) Oral before breakfast  QUEtiapine 50 milliGRAM(s) Oral at bedtime  zolpidem 5 milliGRAM(s) Oral at bedtime PRN    Vital Signs Last 24 Hrs  T(F): 97 (18 Jul 2021 08:35), Max: 98.1 (17 Jul 2021 20:00)  HR: 58 (18 Jul 2021 08:35) (58 - 75)  BP: 128/60 (18 Jul 2021 08:35) (128/60 - 168/84)  RR: 22 (18 Jul 2021 08:35) (20 - 22)  SpO2: 94% (18 Jul 2021 04:55) (93% - 95%)  I&O's Summary      PHYSICAL EXAM:  General: NAD, A/O x 3  ENT: MMM  Neck: Supple, No JVD  Lungs: diminished lungs sounds, no crackles heard  Cardio: RRR, S1/S2, 2/6 systolic murmur   Abdomen: Soft, Nontender, Nondistended; Bowel sounds present  Extremities: No cyanosis, No edema    LABS:                        12.4   4.77  )-----------( 208      ( 18 Jul 2021 06:30 )             38.0     07-18    144  |  108  |  10  ----------------------------<  105  4.0   |  23  |  1.2    Ca    8.6      18 Jul 2021 06:30  Mg     2.2     07-18    TPro  5.5  /  Alb  3.5  /  TBili  0.8  /  DBili  x   /  AST  21  /  ALT  16  /  AlkPhos  66  07-18    eGFR if Non African American: 57 mL/min/1.73M2 (07-18-21 @ 06:30)  eGFR if African American: 66 mL/min/1.73M2 (07-18-21 @ 06:30)    PT/INR - ( 16 Jul 2021 21:45 )   PT: 11.80 sec;   INR: 1.03 ratio         PTT - ( 16 Jul 2021 21:45 )  PTT:32.6 sec    CARDIAC MARKERS ( 17 Jul 2021 07:13 )  x     / <0.01 ng/mL / 62 U/L / x     / <1.0 ng/mL  CARDIAC MARKERS ( 16 Jul 2021 21:45 )  x     / <0.01 ng/mL / 81 U/L / x     / x          05-21 Chol 137 mg/dL LDL -- HDL 58 mg/dL Trig 97 mg/dL  TSH 0.76   TSH with FT4 reflex --  Total T3 --                      COVID-19 PCR: NotDetec (07-17-21 @ 00:08)      RADIOLOGY & ADDITIONAL TESTS:  < from: CT Angio Chest Aorta w/wo IV Cont (07.17.21 @ 01:37) >  IMPRESSION:    1. Right upperlobe consolidative airspace opacity, consistent with pneumonia in the appropriate clinical setting.    2. New left lower lobe 0.9 cm nodular opacity, possibly infectious in etiology given short interval development. Follow-up CT chest in 3 months recommended.    3. No evidence of acute aortic pathology.    4 .Unchanged aneurysm dilation of ascending thoracic aorta up to 4.4 cm.    5. Trace bilateral pleural effusions.    6. Emphysema.    < end of copied text >  < from: Xray Chest 1 View- PORTABLE-Urgent (Xray Chest 1 View- PORTABLE-Urgent .) (07.16.21 @ 21:41) >    IMPRESSION:    Medial right upper lobe consolidative opacity is better seen on chest CT.    Bilateral interstitial opacities noted. Correlate with fluid status.    < end of copied text >    Care Discussed with Consultants/Other Providers:

## 2021-07-19 LAB
ALBUMIN SERPL ELPH-MCNC: 3.8 G/DL — SIGNIFICANT CHANGE UP (ref 3.5–5.2)
ALP SERPL-CCNC: 66 U/L — SIGNIFICANT CHANGE UP (ref 30–115)
ALT FLD-CCNC: 15 U/L — SIGNIFICANT CHANGE UP (ref 0–41)
ANION GAP SERPL CALC-SCNC: 11 MMOL/L — SIGNIFICANT CHANGE UP (ref 7–14)
AST SERPL-CCNC: 17 U/L — SIGNIFICANT CHANGE UP (ref 0–41)
BASE EXCESS BLDA CALC-SCNC: 2.1 MMOL/L — HIGH (ref -2–2)
BASOPHILS # BLD AUTO: 0.03 K/UL — SIGNIFICANT CHANGE UP (ref 0–0.2)
BASOPHILS NFR BLD AUTO: 0.5 % — SIGNIFICANT CHANGE UP (ref 0–1)
BILIRUB SERPL-MCNC: 0.7 MG/DL — SIGNIFICANT CHANGE UP (ref 0.2–1.2)
BUN SERPL-MCNC: 13 MG/DL — SIGNIFICANT CHANGE UP (ref 10–20)
CALCIUM SERPL-MCNC: 9.2 MG/DL — SIGNIFICANT CHANGE UP (ref 8.5–10.1)
CHLORIDE SERPL-SCNC: 107 MMOL/L — SIGNIFICANT CHANGE UP (ref 98–110)
CO2 SERPL-SCNC: 26 MMOL/L — SIGNIFICANT CHANGE UP (ref 17–32)
CREAT SERPL-MCNC: 1.2 MG/DL — SIGNIFICANT CHANGE UP (ref 0.7–1.5)
EOSINOPHIL # BLD AUTO: 0.14 K/UL — SIGNIFICANT CHANGE UP (ref 0–0.7)
EOSINOPHIL NFR BLD AUTO: 2.2 % — SIGNIFICANT CHANGE UP (ref 0–8)
GLUCOSE SERPL-MCNC: 109 MG/DL — HIGH (ref 70–99)
HCO3 BLDA-SCNC: 26 MMOL/L — SIGNIFICANT CHANGE UP (ref 23–27)
HCT VFR BLD CALC: 39 % — LOW (ref 42–52)
HGB BLD-MCNC: 13.3 G/DL — LOW (ref 14–18)
IMM GRANULOCYTES NFR BLD AUTO: 0.3 % — SIGNIFICANT CHANGE UP (ref 0.1–0.3)
LYMPHOCYTES # BLD AUTO: 1.3 K/UL — SIGNIFICANT CHANGE UP (ref 1.2–3.4)
LYMPHOCYTES # BLD AUTO: 20.2 % — LOW (ref 20.5–51.1)
MAGNESIUM SERPL-MCNC: 2 MG/DL — SIGNIFICANT CHANGE UP (ref 1.8–2.4)
MCHC RBC-ENTMCNC: 31.4 PG — HIGH (ref 27–31)
MCHC RBC-ENTMCNC: 34.1 G/DL — SIGNIFICANT CHANGE UP (ref 32–37)
MCV RBC AUTO: 92.2 FL — SIGNIFICANT CHANGE UP (ref 80–94)
MONOCYTES # BLD AUTO: 0.74 K/UL — HIGH (ref 0.1–0.6)
MONOCYTES NFR BLD AUTO: 11.5 % — HIGH (ref 1.7–9.3)
NEUTROPHILS # BLD AUTO: 4.2 K/UL — SIGNIFICANT CHANGE UP (ref 1.4–6.5)
NEUTROPHILS NFR BLD AUTO: 65.3 % — SIGNIFICANT CHANGE UP (ref 42.2–75.2)
NRBC # BLD: 0 /100 WBCS — SIGNIFICANT CHANGE UP (ref 0–0)
PCO2 BLDA: 38 MMHG — SIGNIFICANT CHANGE UP (ref 38–42)
PH BLDA: 7.44 — HIGH (ref 7.38–7.42)
PLATELET # BLD AUTO: 215 K/UL — SIGNIFICANT CHANGE UP (ref 130–400)
PO2 BLDA: 90 MMHG — SIGNIFICANT CHANGE UP (ref 78–95)
POTASSIUM SERPL-MCNC: 4 MMOL/L — SIGNIFICANT CHANGE UP (ref 3.5–5)
POTASSIUM SERPL-SCNC: 4 MMOL/L — SIGNIFICANT CHANGE UP (ref 3.5–5)
PROT SERPL-MCNC: 5.9 G/DL — LOW (ref 6–8)
RBC # BLD: 4.23 M/UL — LOW (ref 4.7–6.1)
RBC # FLD: 12.6 % — SIGNIFICANT CHANGE UP (ref 11.5–14.5)
SAO2 % BLDA: 97 % — SIGNIFICANT CHANGE UP (ref 94–98)
SODIUM SERPL-SCNC: 144 MMOL/L — SIGNIFICANT CHANGE UP (ref 135–146)
WBC # BLD: 6.43 K/UL — SIGNIFICANT CHANGE UP (ref 4.8–10.8)
WBC # FLD AUTO: 6.43 K/UL — SIGNIFICANT CHANGE UP (ref 4.8–10.8)

## 2021-07-19 PROCEDURE — 99233 SBSQ HOSP IP/OBS HIGH 50: CPT

## 2021-07-19 PROCEDURE — 99222 1ST HOSP IP/OBS MODERATE 55: CPT

## 2021-07-19 RX ORDER — ATORVASTATIN CALCIUM 80 MG/1
20 TABLET, FILM COATED ORAL AT BEDTIME
Refills: 0 | Status: DISCONTINUED | OUTPATIENT
Start: 2021-07-19 | End: 2021-07-20

## 2021-07-19 RX ORDER — TAMSULOSIN HYDROCHLORIDE 0.4 MG/1
0.4 CAPSULE ORAL AT BEDTIME
Refills: 0 | Status: DISCONTINUED | OUTPATIENT
Start: 2021-07-19 | End: 2021-07-20

## 2021-07-19 RX ORDER — FINASTERIDE 5 MG/1
5 TABLET, FILM COATED ORAL DAILY
Refills: 0 | Status: DISCONTINUED | OUTPATIENT
Start: 2021-07-19 | End: 2021-07-20

## 2021-07-19 RX ADMIN — ATORVASTATIN CALCIUM 20 MILLIGRAM(S): 80 TABLET, FILM COATED ORAL at 22:21

## 2021-07-19 RX ADMIN — MONTELUKAST 10 MILLIGRAM(S): 4 TABLET, CHEWABLE ORAL at 12:15

## 2021-07-19 RX ADMIN — TAMSULOSIN HYDROCHLORIDE 0.4 MILLIGRAM(S): 0.4 CAPSULE ORAL at 02:56

## 2021-07-19 RX ADMIN — LISINOPRIL 20 MILLIGRAM(S): 2.5 TABLET ORAL at 04:55

## 2021-07-19 RX ADMIN — CHLORHEXIDINE GLUCONATE 1 APPLICATION(S): 213 SOLUTION TOPICAL at 05:04

## 2021-07-19 RX ADMIN — FINASTERIDE 5 MILLIGRAM(S): 5 TABLET, FILM COATED ORAL at 17:13

## 2021-07-19 RX ADMIN — TAMSULOSIN HYDROCHLORIDE 0.4 MILLIGRAM(S): 0.4 CAPSULE ORAL at 22:21

## 2021-07-19 RX ADMIN — Medication 25 MILLIGRAM(S): at 04:55

## 2021-07-19 RX ADMIN — Medication 40 MILLIGRAM(S): at 17:13

## 2021-07-19 RX ADMIN — ENOXAPARIN SODIUM 40 MILLIGRAM(S): 100 INJECTION SUBCUTANEOUS at 12:15

## 2021-07-19 RX ADMIN — PANTOPRAZOLE SODIUM 40 MILLIGRAM(S): 20 TABLET, DELAYED RELEASE ORAL at 05:03

## 2021-07-19 RX ADMIN — QUETIAPINE FUMARATE 50 MILLIGRAM(S): 200 TABLET, FILM COATED ORAL at 22:21

## 2021-07-19 RX ADMIN — MIRTAZAPINE 15 MILLIGRAM(S): 45 TABLET, ORALLY DISINTEGRATING ORAL at 22:21

## 2021-07-19 NOTE — CHART NOTE - NSCHARTNOTEFT_GEN_A_CORE
Acute agitation and confusion    Pt was helped by nurses to void approx 330am, where upon he became agitated and violent, pushing nurse away. Pt Acute agitation and confusion    Pt was helped by nurses to void approx 330am, where upon he became agitated and violent, pushing nurse away. Pt is asking to leave to another room or leave the building. Pt is not oriented to place, time or why he is in the hospital. Pt is Vietnamese and speaks minimal english. Vietnamese  not available. Initial attempts by family over phone to reason with pt were unsuccessful. Pt was led back to his room momentarily where vitals were taken. Of note BP was elevated to 175/71 although patient had just been in stressful situation, HR was 64. Temp was taken but pt had his mouth open. Not willing to take stat abg or repeat vitals. Family agreed to come to hospital and sit with pt. At this time Pt eventually led back to room. Laying comfortably in bed. Ordered UA, UCx as pt endorsed urinary frequency and hesitancy.

## 2021-07-19 NOTE — CONSULT NOTE ADULT - ATTENDING COMMENTS
80-year-old gentleman we were called to evaluate when he was admitted through the emergency room for some vague chest discomfort which is very difficult to pinpoint.    The patient has a known anxiety and mood disorder and has been complaining of some vague chest discomfort that has been ongoing for the past few months and he was recently diagnosed with an ascending aortic aneurysm of 4.5 cm in size.    He said that the pain was a pressure sensation that he noticed in the middle of his chest and it was not related to exertion.  He has a history of uncontrolled hypertension and he was quite hypertensive on admission at the time of his pain.    He denied any radiation of the pain to the neck or to the back rarely and denied any abdominal pain or neurological symptoms.    The patient himself is only French speaking and we had to use an .    On physical examination his peripheral pulses all seemed intact and hemodynamically was stable and when we examined him he had no complaints of chest pain.    I had a chance to review the CT scan of his chest which shows a stable ascending aortic size of about 4.5 cm.    The aortic root in an oblique dimension appears to be more than 5 cm in size and the echocardiogram showed a trileaflet aortic valve with mild to moderate aortic regurgitation.    For the present time he should be fully evaluated by the cardiology team.  They also probably need to rule out coronary artery disease.    The patient will need close clinical follow-up as well as radiological follow-up and consideration will be given to surgery in the future.    For now he should have strict blood pressure control and management of his other medical comorbidities.
Seen / examined and above reviewed.    TAA  HTN  Diastolic Dysfunction    Hospitalized with COPD exacerbation.  Mild diastolic CHF decompensation with uncontrolled HTN.    Comfortable when evaluated.  's.  EKG: NSR, RBBB, FAV  CT: 4.4cm ascending aortic aneurysm (stable).  ECHO: nL LVSF. PN filling.  Mod AI.    - Stop Labetalol.  - Start Lisinopril 20mg 24 and Toprol 20mg q24.
Events noted, SOB/ COPD/ RUL opacity/ small bl effusion, prednisone/ ABX ( levaquin), keep equal, pox ra, will follow

## 2021-07-19 NOTE — CONSULT NOTE ADULT - SUBJECTIVE AND OBJECTIVE BOX
Patient is a 80y old  Male who presents with a chief complaint of shortness of breath (18 Jul 2021 14:27)      HPI:  79 YO M with a PMH of HTN, BPH, COPD, mood disorder, HLD, and known ascending aortic aneurysm (4.5 cm in May 2021) who presents to the hospital with a c/o sudden onset SOB that started this PM while walking with family. Associated with palpitations, feeling of warmth in his upper back (progressively worsening since May 2021), cold B/L LEs, and B/L LE swelling. Denies any CP, ABD pain, N/V/D, rashes, dysuria, dizziness, headaches, or fevers/chills. Patient is Swedish speaking, translation services used ([id number])    In the ED, Chest X-Ray with pulm congestion. BNP 1142. BP elevated (206/82).      (17 Jul 2021 01:16)    Interval Hx:  CT chest was done which showed  Right upper lobe consolidative airspace opacity,  New left lower lobe 0.9 cm nodular opacity. Pt reports that he has had SOB since the time he was discharged from hospital in May, but it has progressively worsened since past Friday to [facility] where patient is unable to walk now. CT also showed 4.5 cm Ascending aortic aneurysm. Pt was started on labetolol and then transitioned to toprol and lisinopril for elevated BP. Pulmonology consulted for evaluation of possible COPD.       PAST MEDICAL & SURGICAL HISTORY:  HTN (hypertension)  HLD (hyperlipidemia)  BPH (benign prostatic hyperplasia)  No significant past surgical history        SOCIAL HX:   Smoking: Former smoker, quit 3 years ago,  1ppd x 45 years                       ETOH     occasional                       Other    FAMILY HISTORY:  No pertinent family history in first degree relatives    .  No cardiovascular or pulmonary family history     REVIEW OF SYSTEMS:    All ROS are negative except per HPI     Allergies    No Known Allergies    Intolerances          PHYSICAL EXAM  Vital Signs Last 24 Hrs  T(C): 35.9 (19 Jul 2021 09:37), Max: 36.8 (18 Jul 2021 16:29)  T(F): 96.7 (19 Jul 2021 09:37), Max: 98.3 (18 Jul 2021 16:29)  HR: 63 (19 Jul 2021 09:37) (53 - 66)  BP: 100/46 (19 Jul 2021 09:37) (100/46 - 174/71)  BP(mean): 77 (19 Jul 2021 02:15) (77 - 86)  RR: 20 (19 Jul 2021 09:37) (18 - 20)  SpO2: 96% (19 Jul 2021 09:37) (88% - 96%)    CONSTITUTIONAL:  Well nourished.  NAD    ENT:   Airway patent,   No thrush    EYES:   Clear bilaterally,   pupils equal,   round and reactive to light.    CARDIAC:   Normal rate,   regular rhythm.    no edema      CAROTID:   normal systolic impulse  no bruits    RESPIRATORY:   No wheezing  Nnormal chest expansion  Not tachypneic,  No use of accessory muscles    GASTROINTESTINAL:  Abdomen soft,   non-tender,   no guarding,   + BS    GENITOURINARY  normal genitalia for sex  no edema    MUSCULOSKELETAL:   range of motion is not limited,  no clubbing, cyanosis    NEUROLOGICAL:   Alert and oriented   no motor deficits.    SKIN:   Skin normal color for race,   No evidence of rash.    PSYCHIATRIC:   normal mood and affect.   no apparent risk to self or others.    HEME LYMPH:   No cervical  lymphadenopathy.  no inguinal lymphadenopathy          LABS:                          13.3   6.43  )-----------( 215      ( 19 Jul 2021 07:34 )             39.0                                               07-19    144  |  107  |  13  ----------------------------<  109<H>  4.0   |  26  |  1.2    Ca    9.2      19 Jul 2021 07:34  Mg     2.0     07-19    TPro  5.9<L>  /  Alb  3.8  /  TBili  0.7  /  DBili  x   /  AST  17  /  ALT  15  /  AlkPhos  66  07-19                                                                                           LIVER FUNCTIONS - ( 19 Jul 2021 07:34 )  Alb: 3.8 g/dL / Pro: 5.9 g/dL / ALK PHOS: 66 U/L / ALT: 15 U/L / AST: 17 U/L / GGT: x                                                                                                MEDICATIONS  (STANDING):  atorvastatin Oral Tab/Cap - Peds 10 milliGRAM(s) Oral daily  chlorhexidine 4% Liquid 1 Application(s) Topical <User Schedule>  enoxaparin Injectable 40 milliGRAM(s) SubCutaneous daily  lisinopril 20 milliGRAM(s) Oral daily  metoprolol succinate ER 25 milliGRAM(s) Oral daily  mirtazapine 15 milliGRAM(s) Oral at bedtime  montelukast 10 milliGRAM(s) Oral daily  pantoprazole    Tablet 40 milliGRAM(s) Oral before breakfast  QUEtiapine 50 milliGRAM(s) Oral at bedtime  tamsulosin 0.4 milliGRAM(s) Oral at bedtime    MEDICATIONS  (PRN):  zolpidem 5 milliGRAM(s) Oral at bedtime PRN Insomnia      X-Rays reviewed:    CXR interpreted by me: Patient is a 80y old  Male who presents with a chief complaint of shortness of breath (18 Jul 2021 14:27)      HPI:  79 YO M with a PMH of HTN, BPH, COPD, mood disorder, HLD, and known ascending aortic aneurysm (4.5 cm in May 2021) who presents to the hospital with a c/o sudden onset SOB that started this PM while walking with family. Associated with palpitations, feeling of warmth in his upper back (progressively worsening since May 2021), cold B/L LEs, and B/L LE swelling. Denies any CP, ABD pain, N/V/D, rashes, dysuria, dizziness, headaches, or fevers/chills. Patient is Turkmen speaking, translation services used ([id number])    In the ED, Chest X-Ray with pulm congestion. BNP 1142. BP elevated (206/82).      (17 Jul 2021 01:16)    Interval Hx:  CT chest was done which showed  Right upper lobe consolidative airspace opacity,  New left lower lobe 0.9 cm nodular opacity. Pt reports that he has had SOB since the time he was discharged from hospital in May, but it has progressively worsened since past Friday to [facility] where patient is unable to walk now. CT also showed 4.5 cm Ascending aortic aneurysm. Pt was started on labetolol and then transitioned to toprol and lisinopril for elevated BP. Pulmonology consulted for evaluation of COPD/ pneumonia      PAST MEDICAL & SURGICAL HISTORY:  HTN (hypertension)  HLD (hyperlipidemia)  BPH (benign prostatic hyperplasia)  No significant past surgical history        SOCIAL HX:   Smoking: Former smoker, quit 3 years ago,  1ppd x 45 years                       ETOH     occasional                       Other    FAMILY HISTORY:  No pertinent family history in first degree relatives    .  No cardiovascular or pulmonary family history     REVIEW OF SYSTEMS:    All ROS are negative except per HPI     Allergies    No Known Allergies    Intolerances          PHYSICAL EXAM  Vital Signs Last 24 Hrs  T(C): 35.9 (19 Jul 2021 09:37), Max: 36.8 (18 Jul 2021 16:29)  T(F): 96.7 (19 Jul 2021 09:37), Max: 98.3 (18 Jul 2021 16:29)  HR: 63 (19 Jul 2021 09:37) (53 - 66)  BP: 100/46 (19 Jul 2021 09:37) (100/46 - 174/71)  BP(mean): 77 (19 Jul 2021 02:15) (77 - 86)  RR: 20 (19 Jul 2021 09:37) (18 - 20)  SpO2: 96% (19 Jul 2021 09:37) (88% - 96%)    CONSTITUTIONAL:  ill looking    ENT:   Airway patent,   No thrush    EYES:   Clear bilaterally,   pupils equal,   round and reactive to light.    CARDIAC:   Normal rate,   regular rhythm.    no edema      RESPIRATORY:   dec bs both bases    GASTROINTESTINAL:  Abdomen soft,   non-tender,   no guarding,   + BS      MUSCULOSKELETAL:   range of motion is not limited,  no clubbing, cyanosis    NEUROLOGICAL:   Alert and oriented   no motor deficits.    SKIN:   Skin normal color for race,   No evidence of rash.            LABS:                          13.3   6.43  )-----------( 215      ( 19 Jul 2021 07:34 )             39.0                                               07-19    144  |  107  |  13  ----------------------------<  109<H>  4.0   |  26  |  1.2    Ca    9.2      19 Jul 2021 07:34  Mg     2.0     07-19    TPro  5.9<L>  /  Alb  3.8  /  TBili  0.7  /  DBili  x   /  AST  17  /  ALT  15  /  AlkPhos  66  07-19                                                                                           LIVER FUNCTIONS - ( 19 Jul 2021 07:34 )  Alb: 3.8 g/dL / Pro: 5.9 g/dL / ALK PHOS: 66 U/L / ALT: 15 U/L / AST: 17 U/L / GGT: x                                                                                                MEDICATIONS  (STANDING):  atorvastatin Oral Tab/Cap - Peds 10 milliGRAM(s) Oral daily  chlorhexidine 4% Liquid 1 Application(s) Topical <User Schedule>  enoxaparin Injectable 40 milliGRAM(s) SubCutaneous daily  lisinopril 20 milliGRAM(s) Oral daily  metoprolol succinate ER 25 milliGRAM(s) Oral daily  mirtazapine 15 milliGRAM(s) Oral at bedtime  montelukast 10 milliGRAM(s) Oral daily  pantoprazole    Tablet 40 milliGRAM(s) Oral before breakfast  QUEtiapine 50 milliGRAM(s) Oral at bedtime  tamsulosin 0.4 milliGRAM(s) Oral at bedtime    MEDICATIONS  (PRN):  zolpidem 5 milliGRAM(s) Oral at bedtime PRN Insomnia      X-Rays reviewed:    CXR interpreted by me:

## 2021-07-19 NOTE — CHART NOTE - NSCHARTNOTEFT_GEN_A_CORE
Transfer from:   Transfer to:  (  ) Medicine    (  ) Telemetry    (  ) RCU    (  ) Palliative    (  ) Stroke Unit    (  ) _______________  Accepting physician:      Hospital COURSE: 81 YO Armenian speaking M with a PMH of HTN, BPH, COPD, mood disorder, HLD, and known ascending aortic aneurysm (4.5 cm in May 2021) who presents to the hospital with a c/o sudden onset SOB that started this PM while walking with family that was associated with palpitations, feeling of warmth in his upper back (progressively worsening since May 2021), cold B/L LEs, and B/L LE swelling. On admission, pt was seen by CT surgery who felt no intervention on aneurysm was needed at this time. On CT chest he was found to have evidence of emphysema and will be evaluated by pulm. Pt was seen by cardiology, labetalol was discontinued and pt was started on lisinopril 20mg q24 and Toprol 20mg q24 with good control of blood pressure.       ASSESSMENT & PLAN:   81 YO M with a PMH of HTN, BPH, COPD, mood disorder, HLD, and known ascending aortic aneurysm (4.5 cm in May 2021) who presents to the hospital with a c/o sudden onset SOB, found to have evidence of severe emphysema on CT scan. Patient is medically stable for downgrade to floor.     # Shortness of breath  - Opacity seen on CT chest; severe emphysema; long history of heavy smoking  - Less likely due to pulmonary edema at this time  - Incentive spirometer ordered   - f/u pulm recs     # Ascending aortic aneurysm  - Stable, 4.5cm on CT  - CT surgery consult appreciated -no surgical intervention needed   - Will need follow-up    # HTN  - in discussion with cardiology, labetalol was stopped  - lisinopril moved to 20mg and metoprolol succinate to start 25mg 7/19  - cardiology following     #Maintainence   - DVT ppx: lovenox   - GI ppx: pantoprazole   - Diet: DASH  - Activity: IAT  - Code status: Full  - Dispo: Downgrade to floor     #Handoff   - Monitor BP, controlled on current regimen   - f/u with cardiology, CT surgery outpt, possibly future surgery     Vital Signs Last 24 Hrs  T(C): 36.4 (19 Jul 2021 11:48), Max: 36.8 (18 Jul 2021 16:29)  T(F): 97.6 (19 Jul 2021 11:48), Max: 98.3 (18 Jul 2021 16:29)  HR: 63 (19 Jul 2021 11:48) (53 - 66)  BP: 116/54 (19 Jul 2021 11:48) (100/46 - 174/71)  BP(mean): 77 (19 Jul 2021 02:15) (77 - 86)  RR: 20 (19 Jul 2021 11:48) (18 - 20)  SpO2: 98% (19 Jul 2021 11:48) (88% - 98%)  I&O's Summary    18 Jul 2021 07:01  -  19 Jul 2021 07:00  --------------------------------------------------------  IN: 0 mL / OUT: 350 mL / NET: -350 mL          MEDICATIONS  (STANDING):  atorvastatin Oral Tab/Cap - Peds 10 milliGRAM(s) Oral daily  chlorhexidine 4% Liquid 1 Application(s) Topical <User Schedule>  enoxaparin Injectable 40 milliGRAM(s) SubCutaneous daily  lisinopril 20 milliGRAM(s) Oral daily  metoprolol succinate ER 25 milliGRAM(s) Oral daily  mirtazapine 15 milliGRAM(s) Oral at bedtime  montelukast 10 milliGRAM(s) Oral daily  pantoprazole    Tablet 40 milliGRAM(s) Oral before breakfast  QUEtiapine 50 milliGRAM(s) Oral at bedtime  tamsulosin 0.4 milliGRAM(s) Oral at bedtime    MEDICATIONS  (PRN):  zolpidem 5 milliGRAM(s) Oral at bedtime PRN Insomnia        LABS                                            13.3                  Neurophils% (auto):   65.3   (07-19 @ 07:34):    6.43 )-----------(215          Lymphocytes% (auto):  20.2                                          39.0                   Eosinphils% (auto):   2.2      Manual%: Neutrophils x    ; Lymphocytes x    ; Eosinophils x    ; Bands%: x    ; Blasts x                                    144    |  107    |  13                  Calcium: 9.2   / iCa: x      (07-19 @ 07:34)    ----------------------------<  109       Magnesium: 2.0                              4.0     |  26     |  1.2              Phosphorous: x        TPro  5.9    /  Alb  3.8    /  TBili  0.7    /  DBili  x      /  AST  17     /  ALT  15     /  AlkPhos  66     19 Jul 2021 07:34

## 2021-07-19 NOTE — PROGRESS NOTE ADULT - SUBJECTIVE AND OBJECTIVE BOX
Patient is a 80y old  Male who presents with a chief complaint of shortness of breath (18 Jul 2021 14:27)    24 hour events: patient became confused and combative overnight.    Patient seen and examined at bedside.  Patient denies taking any medication from his family last night.  Reports continued shortness of breath, denies any chest pain    ALLERGIES:  No Known Allergies    MEDICATIONS:  atorvastatin Oral Tab/Cap - Peds 10 milliGRAM(s) Oral daily  chlorhexidine 4% Liquid 1 Application(s) Topical <User Schedule>  enoxaparin Injectable 40 milliGRAM(s) SubCutaneous daily  lisinopril 20 milliGRAM(s) Oral daily  metoprolol succinate ER 25 milliGRAM(s) Oral daily  mirtazapine 15 milliGRAM(s) Oral at bedtime  montelukast 10 milliGRAM(s) Oral daily  pantoprazole    Tablet 40 milliGRAM(s) Oral before breakfast  predniSONE   Tablet 40 milliGRAM(s) Oral daily  QUEtiapine 50 milliGRAM(s) Oral at bedtime  tamsulosin 0.4 milliGRAM(s) Oral at bedtime  zolpidem 5 milliGRAM(s) Oral at bedtime PRN    Vital Signs Last 24 Hrs  T(F): 97.6 (19 Jul 2021 11:48), Max: 98.3 (18 Jul 2021 16:29)  HR: 63 (19 Jul 2021 11:48) (53 - 66)  BP: 116/54 (19 Jul 2021 11:48) (100/46 - 174/71)  RR: 18 (19 Jul 2021 13:40) (18 - 20)  SpO2: 100% (19 Jul 2021 13:40) (88% - 100%)  I&O's Summary    18 Jul 2021 07:01  -  19 Jul 2021 07:00  --------------------------------------------------------  IN: 0 mL / OUT: 350 mL / NET: -350 mL        PHYSICAL EXAM:  General: NAD, A/O x 3  ENT: MMM  Neck: Supple, No JVD  Lungs: diminished lung sounds but no wheezing or crackles   Cardio: RRR, S1/S2, 2/6 systolic murmur   Abdomen: Soft, Nontender, Nondistended; Bowel sounds present  Extremities: No cyanosis, No edema    LABS:                        13.3   6.43  )-----------( 215      ( 19 Jul 2021 07:34 )             39.0     07-19    144  |  107  |  13  ----------------------------<  109  4.0   |  26  |  1.2    Ca    9.2      19 Jul 2021 07:34  Mg     2.0     07-19    TPro  5.9  /  Alb  3.8  /  TBili  0.7  /  DBili  x   /  AST  17  /  ALT  15  /  AlkPhos  66  07-19    eGFR if Non African American: 57 mL/min/1.73M2 (07-19-21 @ 07:34)  eGFR if African American: 66 mL/min/1.73M2 (07-19-21 @ 07:34)    PT/INR - ( 16 Jul 2021 21:45 )   PT: 11.80 sec;   INR: 1.03 ratio         PTT - ( 16 Jul 2021 21:45 )  PTT:32.6 sec    CARDIAC MARKERS ( 17 Jul 2021 07:13 )  x     / <0.01 ng/mL / 62 U/L / x     / <1.0 ng/mL  CARDIAC MARKERS ( 16 Jul 2021 21:45 )  x     / <0.01 ng/mL / 81 U/L / x     / x          05-21 Chol 137 mg/dL LDL -- HDL 58 mg/dL Trig 97 mg/dL  TSH 0.76   TSH with FT4 reflex --  Total T3 --                      COVID-19 PCR: NotDetec (07-17-21 @ 00:08)      RADIOLOGY & ADDITIONAL TESTS:  < from: CT Angio Chest Aorta w/wo IV Cont (07.17.21 @ 01:37) >  IMPRESSION:    1. Right upperlobe consolidative airspace opacity, consistent with pneumonia in the appropriate clinical setting.    2. New left lower lobe 0.9 cm nodular opacity, possibly infectious in etiology given short interval development. Follow-up CT chest in 3 months recommended.    3. No evidence of acute aortic pathology.    4 .Unchanged aneurysm dilation of ascending thoracic aorta up to 4.4 cm.    5. Trace bilateral pleural effusions.    6. Emphysema.    < end of copied text >    Care Discussed with Consultants/Other Providers:

## 2021-07-19 NOTE — PHYSICAL THERAPY INITIAL EVALUATION ADULT - GENERAL OBSERVATIONS, REHAB EVAL
Pt received in semifowler position, awake, pleasant and cooperative. Amharic  [id number] used during session. Pt tolerated tx well, pt does not need acute PT services at this time. +tele/pulseox, +3L O2 via NC

## 2021-07-19 NOTE — CONSULT NOTE ADULT - ASSESSMENT
Impression:  COVID +  RUL consolidation - Possible PNA  Left lower lobe 0.9 cm new pulmonary nodule  HO COPD?  Uncontrolled HTN    Estrada:  - start steroids - prednisone 40mg qd x 5 days  - get procalcitonin - start azithromycin if positive  - start inhalers - albuterol HFA q6 and Symbicort  - get ABG / VBG  - out patient CT scan and pulmonary follow up  - out patient PFTs  - BP control as per cardiology  - DVT ppx   Impression:  RUL consolidation - Possible PNA  Left lower lobe 0.9 cm new pulmonary nodule  HO COPD?  Uncontrolled HTN    Estrada:  - start steroids - prednisone 40mg qd x 5 days  - get procalcitonin - start azithromycin if positive  - start inhalers - albuterol HFA q6 and Symbicort  - get ABG / VBG  - out patient CT scan and pulmonary follow up  - out patient PFTs  - BP control as per cardiology  - DVT ppx   Impression:  RUL consolidation - Possible PNA  Left lower lobe 0.9 cm new pulmonary nodule  HO COPD?  Uncontrolled HTN  AAA (4.5 cm)    Estrada:  - start steroids - prednisone 40mg qd x 5 days  - get procalcitonin - start azithromycin if positive  - start inhalers - albuterol HFA q6 and Symbicort  - get ABG / VBG  - out patient CT scan and pulmonary follow up  - out patient PFTs  - BP control as per cardiology  - CTS followup  - DVT ppx   Impression:  RUL consolidation - Possible PNA  Mild COPD exacerbation  Left lower lobe 0.9 cm new pulmonary nodule  HO COPD?  Uncontrolled HTN  AAA (4.5 cm)    Estrada:  - start steroids - prednisone 40mg qd x 5 days  - get procalcitonin  - start azithromycin 500mg PO qd x 3 days   - start inhalers  - get ABG / VBG  - out patient CT scan and pulmonary follow up  - out patient PFTs  - BP control as per cardiology  - CTS followup  - DVT ppx   Impression:    RUL consolidation - Possible PNA  COPD exacerbation  Fluid overload  Left lower lobe 0.9 cm new pulmonary nodule  Uncontrolled HTN  AAA (4.5 cm)    Pan:  - prednisone 40mg qd x 5 days than 20 for 5 days  - get procalcitonin  - azithromycin 500mg PO qd   - symbicort  - get ABG / VBG  - out patient CT scan and pulmonary follow up  - out patient PFTs  - BP control as per cardiology  - DVT ppx

## 2021-07-19 NOTE — PHYSICAL THERAPY INITIAL EVALUATION ADULT - LEVEL OF INDEPENDENCE: STAIR NEGOTIATION, REHAB EVAL
did not want to disconnect pt from monitors and no step available to bring into pt's room/unable to perform

## 2021-07-19 NOTE — PROGRESS NOTE ADULT - SUBJECTIVE AND OBJECTIVE BOX
LATASHA WATTS 80y Male  MRN#: [id number]   CODE STATUS: FULL     Hospital Day: 3d    Pt is currently admitted with cc of SOB     SUBJECTIVE  Hospital Course: 81 YO Estonian speaking M with a PMH of HTN, BPH, COPD, mood disorder, HLD, and known ascending aortic aneurysm (4.5 cm in May 2021) who presents to the hospital with a c/o sudden onset SOB that started this PM while walking with family that was associated with palpitations, feeling of warmth in his upper back (progressively worsening since May 2021), cold B/L LEs, and B/L LE swelling.   Overnight events:    Interval hx/Subjective complaints:     Present Today:   - Osullivan:  No [  ], Yes [   ] : Indication:     - Type of IV Access:       .. CVC/Piccline:  No [  ], Yes [   ] : Indication:       .. Midline: No [  ], Yes [   ] : Indication:                                             ----------------------------------------------------------  OBJECTIVE  PAST MEDICAL & SURGICAL HISTORY  HTN (hypertension)    HLD (hyperlipidemia)    BPH (benign prostatic hyperplasia)    No significant past surgical history                                              -----------------------------------------------------------  ALLERGIES:  No Known Allergies                                            ------------------------------------------------------------    HOME MEDICATIONS  Home Medications:  aspirin 81 mg oral tablet: 1 tab(s) orally once a day (20 May 2021 16:20)  atorvastatin 10 mg oral tablet: 1 tab(s) orally once a day (20 May 2021 16:20)  Breo Ellipta 200 mcg-25 mcg/inh inhalation powder: 1 puff(s) inhaled once a day (20 May 2021 16:20)  mirtazapine 15 mg oral tablet: 1 tab(s) orally once a day (at bedtime) (20 May 2021 16:34)  montelukast 10 mg oral tablet: 1 tab(s) orally once a day (28 Feb 2020 12:42)  Mucinex 600 mg oral tablet, extended release: 1 tab(s) orally every 12 hours, As Needed (28 Feb 2020 12:42)  omeprazole 20 mg oral delayed release capsule: 1 cap(s) orally once a day (28 Feb 2020 12:42)  raNITIdine 150 mg oral capsule: 1 cap(s) orally 2 times a day (28 Feb 2020 12:42)  SEROquel XR 50 mg oral tablet, extended release: 1 tab(s) orally once a day (in the evening) (20 May 2021 16:34)  Vitamin D3 1000 intl units (25 mcg) oral tablet: 1 tab(s) orally once a day (28 Feb 2020 12:42)  zolpidem 10 mg oral tablet: 1 tab(s) orally once a day (at bedtime) (20 May 2021 16:34)                           MEDICATIONS:  STANDING MEDICATIONS  atorvastatin Oral Tab/Cap - Peds 10 milliGRAM(s) Oral daily  chlorhexidine 4% Liquid 1 Application(s) Topical <User Schedule>  enoxaparin Injectable 40 milliGRAM(s) SubCutaneous daily  lisinopril 20 milliGRAM(s) Oral daily  metoprolol succinate ER 25 milliGRAM(s) Oral daily  mirtazapine 15 milliGRAM(s) Oral at bedtime  montelukast 10 milliGRAM(s) Oral daily  pantoprazole    Tablet 40 milliGRAM(s) Oral before breakfast  QUEtiapine 50 milliGRAM(s) Oral at bedtime  tamsulosin 0.4 milliGRAM(s) Oral at bedtime    PRN MEDICATIONS  zolpidem 5 milliGRAM(s) Oral at bedtime PRN                                            ------------------------------------------------------------  VITAL SIGNS: Last 24 Hours  T(C): 35.9 (19 Jul 2021 09:37), Max: 36.8 (18 Jul 2021 16:29)  T(F): 96.7 (19 Jul 2021 09:37), Max: 98.3 (18 Jul 2021 16:29)  HR: 63 (19 Jul 2021 09:37) (53 - 66)  BP: 100/46 (19 Jul 2021 09:37) (100/46 - 174/71)  BP(mean): 77 (19 Jul 2021 02:15) (77 - 86)  RR: 20 (19 Jul 2021 09:37) (18 - 20)  SpO2: 96% (19 Jul 2021 09:37) (88% - 96%)      07-18-21 @ 07:01  -  07-19-21 @ 07:00  --------------------------------------------------------  IN: 0 mL / OUT: 350 mL / NET: -350 mL                                             --------------------------------------------------------------  LABS:                        13.3   6.43  )-----------( 215      ( 19 Jul 2021 07:34 )             39.0     07-19    144  |  107  |  13  ----------------------------<  109<H>  4.0   |  26  |  1.2    Ca    9.2      19 Jul 2021 07:34  Mg     2.0     07-19    TPro  5.9<L>  /  Alb  3.8  /  TBili  0.7  /  DBili  x   /  AST  17  /  ALT  15  /  AlkPhos  66  07-19                                                              -------------------------------------------------------------  RADIOLOGY:                                            --------------------------------------------------------------  PHYSICAL EXAM:  General:   HEENT:  LUNGS:  HEART:  ABDOMEN:  EXT:  NEURO:  SKIN:                                         --------------------------------------------------------------    ASSESSMENT & PLAN        #Maintainence   - DVT ppx:   - GI ppx:   - Diet:  - Activity:   - Code status:  - Dispo:     #Handoff  LATASHA WATTS 80y Male  MRN#: [id number]   CODE STATUS: FULL     Hospital Day: 3d    Pt is currently admitted with cc of SOB     SUBJECTIVE  Hospital Course: 81 YO Tajik speaking M with a PMH of HTN, BPH, COPD, mood disorder, HLD, and known ascending aortic aneurysm (4.5 cm in May 2021) who presents to the hospital with a c/o sudden onset SOB that started this PM while walking with family that was associated with palpitations, feeling of warmth in his upper back (progressively worsening since May 2021), cold B/L LEs, and B/L LE swelling. On admission, pt was seen by CT surgery who felt no intervention on aneurysm was needed at this time. On CT chest he was found to have evidence of emphysema and will be evaluated by pulm. Pt was seen by cardiology, labetalol was discontinued and pt was started on lisinopril 20mg q24 and Toprol 20mg q24 with good control of blood pressure.     Overnight events: Overnight pt remained hypertensive to 150/90 so was given one dose of procardia 30mg with imporvement in blood pressure. Later overnight he began complaining of urinary retention and weak stream (Hx of BPH) so was started on tamsulosin. Later that night he was assisted by RNs to go to bathroom and urinate when he became "wobbly", possibly orthostatic, and became increasing confused and agitated, aox1, was not redirectable by family. There was some concern for family giving him an extra dose of his Ambien and Seroquel however pt denied this this morning.     Interval hx/Subjective complaints: This morning pt reports feeling well and denies any Otherwise pt denies any fevers, chills, fatigue, cough, SOB, CP, palpitations, abdominal pain, n/v/d.     Present Today:   - Osullivan:  No [ x ], Yes [   ] : Indication:     - Type of IV Access:       .. CVC/Piccline:  No [ x ], Yes [   ] : Indication:       .. Midline: No [ x ], Yes [   ] : Indication:                                             ----------------------------------------------------------  OBJECTIVE  PAST MEDICAL & SURGICAL HISTORY  HTN (hypertension)    HLD (hyperlipidemia)    BPH (benign prostatic hyperplasia)    No significant past surgical history                                              -----------------------------------------------------------  ALLERGIES:  No Known Allergies                                            ------------------------------------------------------------    HOME MEDICATIONS  Home Medications:  aspirin 81 mg oral tablet: 1 tab(s) orally once a day (20 May 2021 16:20)  atorvastatin 10 mg oral tablet: 1 tab(s) orally once a day (20 May 2021 16:20)  Breo Ellipta 200 mcg-25 mcg/inh inhalation powder: 1 puff(s) inhaled once a day (20 May 2021 16:20)  mirtazapine 15 mg oral tablet: 1 tab(s) orally once a day (at bedtime) (20 May 2021 16:34)  montelukast 10 mg oral tablet: 1 tab(s) orally once a day (28 Feb 2020 12:42)  Mucinex 600 mg oral tablet, extended release: 1 tab(s) orally every 12 hours, As Needed (28 Feb 2020 12:42)  omeprazole 20 mg oral delayed release capsule: 1 cap(s) orally once a day (28 Feb 2020 12:42)  raNITIdine 150 mg oral capsule: 1 cap(s) orally 2 times a day (28 Feb 2020 12:42)  SEROquel XR 50 mg oral tablet, extended release: 1 tab(s) orally once a day (in the evening) (20 May 2021 16:34)  Vitamin D3 1000 intl units (25 mcg) oral tablet: 1 tab(s) orally once a day (28 Feb 2020 12:42)  zolpidem 10 mg oral tablet: 1 tab(s) orally once a day (at bedtime) (20 May 2021 16:34)                           MEDICATIONS:  STANDING MEDICATIONS  atorvastatin Oral Tab/Cap - Peds 10 milliGRAM(s) Oral daily  chlorhexidine 4% Liquid 1 Application(s) Topical <User Schedule>  enoxaparin Injectable 40 milliGRAM(s) SubCutaneous daily  lisinopril 20 milliGRAM(s) Oral daily  metoprolol succinate ER 25 milliGRAM(s) Oral daily  mirtazapine 15 milliGRAM(s) Oral at bedtime  montelukast 10 milliGRAM(s) Oral daily  pantoprazole    Tablet 40 milliGRAM(s) Oral before breakfast  QUEtiapine 50 milliGRAM(s) Oral at bedtime  tamsulosin 0.4 milliGRAM(s) Oral at bedtime    PRN MEDICATIONS  zolpidem 5 milliGRAM(s) Oral at bedtime PRN                                            ------------------------------------------------------------  VITAL SIGNS: Last 24 Hours  T(C): 35.9 (19 Jul 2021 09:37), Max: 36.8 (18 Jul 2021 16:29)  T(F): 96.7 (19 Jul 2021 09:37), Max: 98.3 (18 Jul 2021 16:29)  HR: 63 (19 Jul 2021 09:37) (53 - 66)  BP: 100/46 (19 Jul 2021 09:37) (100/46 - 174/71)  BP(mean): 77 (19 Jul 2021 02:15) (77 - 86)  RR: 20 (19 Jul 2021 09:37) (18 - 20)  SpO2: 96% (19 Jul 2021 09:37) (88% - 96%)      07-18-21 @ 07:01  -  07-19-21 @ 07:00  --------------------------------------------------------  IN: 0 mL / OUT: 350 mL / NET: -350 mL                                             --------------------------------------------------------------  LABS:                        13.3   6.43  )-----------( 215      ( 19 Jul 2021 07:34 )             39.0     07-19    144  |  107  |  13  ----------------------------<  109<H>  4.0   |  26  |  1.2    Ca    9.2      19 Jul 2021 07:34  Mg     2.0     07-19    TPro  5.9<L>  /  Alb  3.8  /  TBili  0.7  /  DBili  x   /  AST  17  /  ALT  15  /  AlkPhos  66  07-19                                              -------------------------------------------------------------  RADIOLOGY:    < from: CT Angio Abdomen and Pelvis w/ IV Cont (07.17.21 @ 01:37) >  IMPRESSION:    1. Right upperlobe consolidative airspace opacity, consistent with pneumonia in the appropriate clinical setting.    2. New left lower lobe 0.9 cm nodular opacity, possibly infectious in etiology given short interval development. Follow-up CT chest in 3 months recommended.    3. No evidence of acute aortic pathology.    4 .Unchanged aneurysm dilation of ascending thoracic aorta up to 4.4 cm.    5. Trace bilateral pleural effusions.    6. Emphysema.    < end of copied text >  < from: Xray Chest 1 View- PORTABLE-Urgent (Xray Chest 1 View- PORTABLE-Urgent .) (07.16.21 @ 21:41) >  IMPRESSION:    Medial right upper lobe consolidative opacity is better seen on chest CT.    Bilateral interstitial opacities noted. Correlate with fluid status.    < end of copied text >                                          --------------------------------------------------------------  PHYSICAL EXAM:  General: Well appearing man laying in bed in NAD  HEENT: NCAT, EOMI, MMM  LUNGS: CTAB  HEART: s1/s2, RRR, no m/r/g  ABDOMEN: Soft, NTND, BS+, no HSM   EXT: WWP, no cyanosis, clubbing, or edema  NEURO: aox4, no FND  SKIN: Intact                                          --------------------------------------------------------------    ASSESSMENT & PLAN  81 YO M with a PMH of HTN, BPH, COPD, mood disorder, HLD, and known ascending aortic aneurysm (4.5 cm in May 2021) who presents to the hospital with a c/o sudden onset SOB, found to have evidence of severe emphysema on CT scan. Patient is medically stable for downgrade to floor.     # Shortness of breath  - Opacity seen on CT chest; severe emphysema; long history of heavy smoking  - Less likely due to pulmonary edema at this time  - Incentive spirometer ordered   - f/u pulm recs     # Ascending aortic aneurysm  - Stable, 4.5cm on CT  - CT surgery consult appreciated -no surgical intervention needed   - Will need follow-up    # HTN  - in discussion with cardiology, labetalol was stopped  - lisinopril moved to 20mg and metoprolol succinate to start 25mg 7/19  - cardiology following     #Maintainence   - DVT ppx: lovenox   - GI ppx: pantoprazole   - Diet: DASH  - Activity: IAT  - Code status: Full  - Dispo: Downgrade to floor     #Handoff   - Monitor BP, controlled on current regimen   - f/u with cardiology, CT surgery outpt, possibly future surgery

## 2021-07-19 NOTE — CHART NOTE - NSCHARTNOTEFT_GEN_A_CORE
Called pharmacy [phone] and confirmed medications as below:  PMD: Dr. ELVIN SANDERS    celecoxib 200MG PO QD  nephprx xr 1 cap qd  finasteride 5mg po qd  zolpidem 10mg po qd  lisinopril 10mg po qd  vascepa 1g 2 cap bid  quetiapine 50mg PO QD  atorvastatin 020mg po qd  tamsulosin 0.4mg po qd  montelukast 10mg po qd  albuterol inhalers  antonella hdz                                                                                                                                      vascepa 1g 2cap bid

## 2021-07-20 ENCOUNTER — TRANSCRIPTION ENCOUNTER (OUTPATIENT)
Age: 81
End: 2021-07-20

## 2021-07-20 VITALS
DIASTOLIC BLOOD PRESSURE: 64 MMHG | SYSTOLIC BLOOD PRESSURE: 134 MMHG | RESPIRATION RATE: 18 BRPM | TEMPERATURE: 99 F | HEART RATE: 87 BPM

## 2021-07-20 LAB
ANION GAP SERPL CALC-SCNC: 11 MMOL/L — SIGNIFICANT CHANGE UP (ref 7–14)
APPEARANCE UR: CLEAR — SIGNIFICANT CHANGE UP
BASOPHILS # BLD AUTO: 0.01 K/UL — SIGNIFICANT CHANGE UP (ref 0–0.2)
BASOPHILS NFR BLD AUTO: 0.2 % — SIGNIFICANT CHANGE UP (ref 0–1)
BILIRUB UR-MCNC: NEGATIVE — SIGNIFICANT CHANGE UP
BUN SERPL-MCNC: 16 MG/DL — SIGNIFICANT CHANGE UP (ref 10–20)
CALCIUM SERPL-MCNC: 9.2 MG/DL — SIGNIFICANT CHANGE UP (ref 8.5–10.1)
CHLORIDE SERPL-SCNC: 106 MMOL/L — SIGNIFICANT CHANGE UP (ref 98–110)
CO2 SERPL-SCNC: 24 MMOL/L — SIGNIFICANT CHANGE UP (ref 17–32)
COLOR SPEC: SIGNIFICANT CHANGE UP
CREAT SERPL-MCNC: 1.2 MG/DL — SIGNIFICANT CHANGE UP (ref 0.7–1.5)
DIFF PNL FLD: NEGATIVE — SIGNIFICANT CHANGE UP
EOSINOPHIL # BLD AUTO: 0 K/UL — SIGNIFICANT CHANGE UP (ref 0–0.7)
EOSINOPHIL NFR BLD AUTO: 0 % — SIGNIFICANT CHANGE UP (ref 0–8)
GLUCOSE SERPL-MCNC: 134 MG/DL — HIGH (ref 70–99)
GLUCOSE UR QL: NEGATIVE — SIGNIFICANT CHANGE UP
HCT VFR BLD CALC: 40.1 % — LOW (ref 42–52)
HGB BLD-MCNC: 13.3 G/DL — LOW (ref 14–18)
IMM GRANULOCYTES NFR BLD AUTO: 0.5 % — HIGH (ref 0.1–0.3)
KETONES UR-MCNC: NEGATIVE — SIGNIFICANT CHANGE UP
LEUKOCYTE ESTERASE UR-ACNC: NEGATIVE — SIGNIFICANT CHANGE UP
LYMPHOCYTES # BLD AUTO: 0.89 K/UL — LOW (ref 1.2–3.4)
LYMPHOCYTES # BLD AUTO: 15.5 % — LOW (ref 20.5–51.1)
MAGNESIUM SERPL-MCNC: 1.9 MG/DL — SIGNIFICANT CHANGE UP (ref 1.8–2.4)
MCHC RBC-ENTMCNC: 30.2 PG — SIGNIFICANT CHANGE UP (ref 27–31)
MCHC RBC-ENTMCNC: 33.2 G/DL — SIGNIFICANT CHANGE UP (ref 32–37)
MCV RBC AUTO: 91.1 FL — SIGNIFICANT CHANGE UP (ref 80–94)
MONOCYTES # BLD AUTO: 0.16 K/UL — SIGNIFICANT CHANGE UP (ref 0.1–0.6)
MONOCYTES NFR BLD AUTO: 2.8 % — SIGNIFICANT CHANGE UP (ref 1.7–9.3)
NEUTROPHILS # BLD AUTO: 4.67 K/UL — SIGNIFICANT CHANGE UP (ref 1.4–6.5)
NEUTROPHILS NFR BLD AUTO: 81 % — HIGH (ref 42.2–75.2)
NITRITE UR-MCNC: NEGATIVE — SIGNIFICANT CHANGE UP
NRBC # BLD: 0 /100 WBCS — SIGNIFICANT CHANGE UP (ref 0–0)
PH UR: 7 — SIGNIFICANT CHANGE UP (ref 5–8)
PLATELET # BLD AUTO: 246 K/UL — SIGNIFICANT CHANGE UP (ref 130–400)
POTASSIUM SERPL-MCNC: 4.5 MMOL/L — SIGNIFICANT CHANGE UP (ref 3.5–5)
POTASSIUM SERPL-SCNC: 4.5 MMOL/L — SIGNIFICANT CHANGE UP (ref 3.5–5)
PROCALCITONIN SERPL-MCNC: 0.04 NG/ML — SIGNIFICANT CHANGE UP (ref 0.02–0.1)
PROCALCITONIN SERPL-MCNC: <0.02 NG/ML — SIGNIFICANT CHANGE UP (ref 0.02–0.1)
PROT UR-MCNC: NEGATIVE — SIGNIFICANT CHANGE UP
RBC # BLD: 4.4 M/UL — LOW (ref 4.7–6.1)
RBC # FLD: 12.3 % — SIGNIFICANT CHANGE UP (ref 11.5–14.5)
SODIUM SERPL-SCNC: 141 MMOL/L — SIGNIFICANT CHANGE UP (ref 135–146)
SP GR SPEC: 1.01 — SIGNIFICANT CHANGE UP (ref 1.01–1.03)
UROBILINOGEN FLD QL: SIGNIFICANT CHANGE UP
WBC # BLD: 5.76 K/UL — SIGNIFICANT CHANGE UP (ref 4.8–10.8)
WBC # FLD AUTO: 5.76 K/UL — SIGNIFICANT CHANGE UP (ref 4.8–10.8)

## 2021-07-20 PROCEDURE — 99232 SBSQ HOSP IP/OBS MODERATE 35: CPT

## 2021-07-20 PROCEDURE — 99239 HOSP IP/OBS DSCHRG MGMT >30: CPT

## 2021-07-20 RX ORDER — LISINOPRIL 2.5 MG/1
1 TABLET ORAL
Qty: 30 | Refills: 0
Start: 2021-07-20 | End: 2021-08-18

## 2021-07-20 RX ORDER — METOPROLOL TARTRATE 50 MG
1 TABLET ORAL
Qty: 30 | Refills: 0
Start: 2021-07-20 | End: 2021-08-18

## 2021-07-20 RX ORDER — FUROSEMIDE 40 MG
20 TABLET ORAL DAILY
Refills: 0 | Status: DISCONTINUED | OUTPATIENT
Start: 2021-07-20 | End: 2021-07-20

## 2021-07-20 RX ORDER — FUROSEMIDE 40 MG
1 TABLET ORAL
Qty: 30 | Refills: 0
Start: 2021-07-20 | End: 2021-08-18

## 2021-07-20 RX ORDER — SENNA PLUS 8.6 MG/1
2 TABLET ORAL AT BEDTIME
Refills: 0 | Status: DISCONTINUED | OUTPATIENT
Start: 2021-07-20 | End: 2021-07-20

## 2021-07-20 RX ORDER — POLYETHYLENE GLYCOL 3350 17 G/17G
17 POWDER, FOR SOLUTION ORAL DAILY
Refills: 0 | Status: DISCONTINUED | OUTPATIENT
Start: 2021-07-20 | End: 2021-07-20

## 2021-07-20 RX ORDER — FLUTICASONE FUROATE, UMECLIDINIUM BROMIDE AND VILANTEROL TRIFENATATE 200; 62.5; 25 UG/1; UG/1; UG/1
1 POWDER RESPIRATORY (INHALATION)
Qty: 0 | Refills: 0 | DISCHARGE

## 2021-07-20 RX ADMIN — LISINOPRIL 20 MILLIGRAM(S): 2.5 TABLET ORAL at 05:07

## 2021-07-20 RX ADMIN — Medication 40 MILLIGRAM(S): at 05:07

## 2021-07-20 RX ADMIN — Medication 25 MILLIGRAM(S): at 05:07

## 2021-07-20 RX ADMIN — ENOXAPARIN SODIUM 40 MILLIGRAM(S): 100 INJECTION SUBCUTANEOUS at 12:59

## 2021-07-20 RX ADMIN — PANTOPRAZOLE SODIUM 40 MILLIGRAM(S): 20 TABLET, DELAYED RELEASE ORAL at 05:07

## 2021-07-20 RX ADMIN — MONTELUKAST 10 MILLIGRAM(S): 4 TABLET, CHEWABLE ORAL at 13:00

## 2021-07-20 RX ADMIN — FINASTERIDE 5 MILLIGRAM(S): 5 TABLET, FILM COATED ORAL at 12:59

## 2021-07-20 RX ADMIN — Medication 20 MILLIGRAM(S): at 12:58

## 2021-07-20 RX ADMIN — POLYETHYLENE GLYCOL 3350 17 GRAM(S): 17 POWDER, FOR SOLUTION ORAL at 13:00

## 2021-07-20 NOTE — DISCHARGE NOTE PROVIDER - NSDCMRMEDTOKEN_GEN_ALL_CORE_FT
Albuterol (Eqv-ProAir HFA) 90 mcg/inh inhalation aerosol: 2 puff(s) inhaled every 6 hours  aspirin 81 mg oral tablet: 1 tab(s) orally once a day  atorvastatin 10 mg oral tablet: 1 tab(s) orally once a day  Breo Ellipta 200 mcg-25 mcg/inh inhalation powder: 1 puff(s) inhaled once a day  celecoxib 200 mg oral capsule: 1 cap(s) orally once a day  finasteride 5 mg oral tablet: 1 tab(s) orally once a day  mirtazapine 15 mg oral tablet: 1 tab(s) orally once a day (at bedtime)  montelukast 10 mg oral tablet: 1 tab(s) orally once a day  Mucinex 600 mg oral tablet, extended release: 1 tab(s) orally every 12 hours, As Needed  Niferex (as elemental iron) 60 mg oral capsule: 1 cap(s) orally once a day  omeprazole 20 mg oral delayed release capsule: 1 cap(s) orally once a day  raNITIdine 150 mg oral capsule: 1 cap(s) orally 2 times a day  SEROquel XR 50 mg oral tablet, extended release: 1 tab(s) orally once a day (in the evening)  Trelegy Ellipta 100 mcg-62.5 mcg-25 mcg/inh inhalation powder: 1 puff(s) inhaled once a day  Vascepa 1 g oral capsule: 2 cap(s) orally 2 times a day  Vitamin D3 1000 intl units (25 mcg) oral tablet: 1 tab(s) orally once a day  zolpidem 10 mg oral tablet: 1 tab(s) orally once a day (at bedtime)   Albuterol (Eqv-ProAir HFA) 90 mcg/inh inhalation aerosol: 2 puff(s) inhaled every 6 hours  aspirin 81 mg oral tablet: 1 tab(s) orally once a day  atorvastatin 10 mg oral tablet: 1 tab(s) orally once a day  Breo Ellipta 200 mcg-25 mcg/inh inhalation powder: 1 puff(s) inhaled once a day  celecoxib 200 mg oral capsule: 1 cap(s) orally once a day  finasteride 5 mg oral tablet: 1 tab(s) orally once a day  furosemide 20 mg oral tablet: 1 tab(s) orally once a day  levoFLOXacin 250 mg oral tablet: 1 tab(s) orally every 24 hours  lisinopril 20 mg oral tablet: 1 tab(s) orally once a day  metoprolol succinate 25 mg oral tablet, extended release: 1 tab(s) orally once a day  mirtazapine 15 mg oral tablet: 1 tab(s) orally once a day (at bedtime)  montelukast 10 mg oral tablet: 1 tab(s) orally once a day  Mucinex 600 mg oral tablet, extended release: 1 tab(s) orally every 12 hours, As Needed  Niferex (as elemental iron) 60 mg oral capsule: 1 cap(s) orally once a day  omeprazole 20 mg oral delayed release capsule: 1 cap(s) orally once a day  predniSONE 20 mg oral tablet: 2 tab(s) orally once a day x 5 days  1 tab(s) orally once a day x 5 days  raNITIdine 150 mg oral capsule: 1 cap(s) orally 2 times a day  SEROquel XR 50 mg oral tablet, extended release: 1 tab(s) orally once a day (in the evening)  Vascepa 1 g oral capsule: 2 cap(s) orally 2 times a day  Vitamin D3 1000 intl units (25 mcg) oral tablet: 1 tab(s) orally once a day  zolpidem 10 mg oral tablet: 1 tab(s) orally once a day (at bedtime)

## 2021-07-20 NOTE — DISCHARGE NOTE PROVIDER - CARE PROVIDERS DIRECT ADDRESSES
,DirectAddress_Unknown,[email or web address] ,DirectAddress_Unknown,[email or web address],DirectAddress_Unknown

## 2021-07-20 NOTE — PROGRESS NOTE ADULT - SUBJECTIVE AND OBJECTIVE BOX
Patient is a 80y old  Male who presents with a chief complaint of sob (2021 06:30)      Patient seen and examined at bedside.  Patient reports improvement in shortness of breath, denies any chest pain   ALLERGIES:  No Known Allergies    MEDICATIONS:  atorvastatin 20 milliGRAM(s) Oral at bedtime  chlorhexidine 4% Liquid 1 Application(s) Topical <User Schedule>  enoxaparin Injectable 40 milliGRAM(s) SubCutaneous daily  finasteride 5 milliGRAM(s) Oral daily  lisinopril 20 milliGRAM(s) Oral daily  metoprolol succinate ER 25 milliGRAM(s) Oral daily  mirtazapine 15 milliGRAM(s) Oral at bedtime  montelukast 10 milliGRAM(s) Oral daily  pantoprazole    Tablet 40 milliGRAM(s) Oral before breakfast  polyethylene glycol 3350 17 Gram(s) Oral daily  predniSONE   Tablet 40 milliGRAM(s) Oral daily  QUEtiapine 50 milliGRAM(s) Oral at bedtime  senna 2 Tablet(s) Oral at bedtime  tamsulosin 0.4 milliGRAM(s) Oral at bedtime  zolpidem 5 milliGRAM(s) Oral at bedtime PRN    Vital Signs Last 24 Hrs  T(F): 96.5 (2021 07:46), Max: 98.4 (2021 17:00)  HR: 71 (2021 07:46) (59 - 71)  BP: 122/60 (2021 07:46) (113/58 - 134/63)  RR: 20 (2021 10:55) (16 - 20)  SpO2: 89% (2021 10:55) (89% - 100%)  I&O's Summary    2021 07:01  -  2021 11:25  --------------------------------------------------------  IN: 240 mL / OUT: 0 mL / NET: 240 mL        PHYSICAL EXAM:  General: NAD, A/O x 3  ENT: MMM  Neck: Supple, No JVD  Lungs: no crackles, diminished throughout   Cardio: RRR, S1/S2, 2/6 systolic murmur   Abdomen: Soft, Nontender, Nondistended; Bowel sounds present  Extremities: No cyanosis, No edema    LABS:                        13.3   5.76  )-----------( 246      ( 2021 07:36 )             40.1     -    141  |  106  |  16  ----------------------------<  134  4.5   |  24  |  1.2    Ca    9.2      2021 07:36  Mg     1.9         TPro  5.9  /  Alb  3.8  /  TBili  0.7  /  DBili  x   /  AST  17  /  ALT  15  /  AlkPhos  66      eGFR if Non African American: 57 mL/min/1.73M2 (21 @ 07:36)  eGFR if : 66 mL/min/1.73M2 (21 @ 07:36)            - Chol 137 mg/dL LDL -- HDL 58 mg/dL Trig 97 mg/dL      ABG - ( 2021 12:42 )  pH, Arterial: 7.44  pH, Blood: x     /  pCO2: 38    /  pO2: 90    / HCO3: 26    / Base Excess: 2.1   /  SaO2: 97                          Urinalysis Basic - ( 2021 00:00 )    Color: Light Yellow / Appearance: Clear / S.014 / pH: x  Gluc: x / Ketone: Negative  / Bili: Negative / Urobili: <2 mg/dL   Blood: x / Protein: Negative / Nitrite: Negative   Leuk Esterase: Negative / RBC: x / WBC x   Sq Epi: x / Non Sq Epi: x / Bacteria: x        COVID-19 PCR: NotDetec (21 @ 00:08)      RADIOLOGY & ADDITIONAL TESTS:    Care Discussed with Consultants/Other Providers:

## 2021-07-20 NOTE — DISCHARGE NOTE PROVIDER - PROVIDER TOKENS
PROVIDER:[TOKEN:[[id number]]],PROVIDER:[TOKEN:[[id number]]] PROVIDER:[TOKEN:[[id number]]],PROVIDER:[TOKEN:[[id number]]],PROVIDER:[TOKEN:[[id number]]]

## 2021-07-20 NOTE — DISCHARGE NOTE PROVIDER - NSDCCPCAREPLAN_GEN_ALL_CORE_FT
PRINCIPAL DISCHARGE DIAGNOSIS  Diagnosis: Hypertensive emergency  Assessment and Plan of Treatment:       SECONDARY DISCHARGE DIAGNOSES  Diagnosis: PNA (pneumonia)  Assessment and Plan of Treatment:     Diagnosis: History of tobacco use  Assessment and Plan of Treatment:      PRINCIPAL DISCHARGE DIAGNOSIS  Diagnosis: Hypertensive emergency  Assessment and Plan of Treatment: Your blood pressure was elevated on ER admission and this is the likely cause of your pulmonary edema. You were given diuretics and blood pressure lowering medications in the ER and your blood pressure normalized. You were seen by cardio who recommended Lisinopril and Metoprolol. Please continue taking these medications as prescribed. Please follow up with your PMD (referral for Dr. DARCIE linder) as an outpatient in 1-2 weeks.      SECONDARY DISCHARGE DIAGNOSES  Diagnosis: History of tobacco use  Assessment and Plan of Treatment: We recommned tobacco cessation    Diagnosis: Ascending aortic aneurysm  Assessment and Plan of Treatment: You underwent a CT scan of the chest which showed stable size. You were seen by CT surgery who did not recommen any acute intervention at this time. Please follow up with CT surgery for serial monitoring.    Diagnosis: COPD exacerbation  Assessment and Plan of Treatment: You were seen by pulmonology who recommened a steroid taper and antibiotics. Please take these medications as prescribed and follow up with your pulmonologist in 1-2 weeks as an outpatient.     PRINCIPAL DISCHARGE DIAGNOSIS  Diagnosis: Hypertensive emergency  Assessment and Plan of Treatment: Your blood pressure was elevated on ER admission and this is the likely cause of your pulmonary edema. You were given diuretics and blood pressure lowering medications in the ER and your blood pressure normalized. You were seen by cardio who recommended Lisinopril and Metoprolol. Please continue taking these medications as prescribed. Please follow up with your PMD (referral for Dr. DARCIE linder) as an outpatient in 1-2 weeks.      SECONDARY DISCHARGE DIAGNOSES  Diagnosis: History of tobacco use  Assessment and Plan of Treatment: We recommned tobacco cessation    Diagnosis: Ascending aortic aneurysm  Assessment and Plan of Treatment: You underwent a CT scan of the chest which showed stable size. You were seen by CT surgery who did not recommen any acute intervention at this time. Please follow up with CT surgery for serial monitoring.    Diagnosis: COPD exacerbation  Assessment and Plan of Treatment: You were seen by pulmonology who recommened a steroid taper and antibiotics. Please take these medications as prescribed and follow up with your pulmonologist in 1 week as an outpatient.

## 2021-07-20 NOTE — DISCHARGE NOTE PROVIDER - HOSPITAL COURSE
81 YO M with a PMH of HTN, BPH, COPD, mood disorder, HLD, and known ascending aortic aneurysm (4.5 cm in May 2021) who presents to the hospital with a c/o sudden onset SOB that started this PM while walking with family. Associated with palpitations, feeling of warmth in his upper back (progressively worsening since May 2021), cold B/L LEs, and B/L LE swelling. Denies any CP, ABD pain, N/V/D, rashes, dysuria, dizziness, headaches, or fevers/chills. Patient is Latvian speaking, translation services used ([id number])    In ED, CXR revealed pulmonary congestions. CTA showed unchanged aneurysm up to 4.4 cm, no dissection, + emphysematous changes, and a RUL consolidation concerning for PNA. BNP elevated 1142. Trop neg x2. BP elevated systolic 206/82. S/p IV lasix push 40mg in ED     Pt was admitted to [facility] with primary dx of hypertensive emergency with acute COPD exacerbation/pulmonary edema. Pt was started on labetalol. In discussion with Cards, labetalol  was stopped and meds changed to lisinopril and metoprolol with BP kept stable. CTS recommended no surgical intervention at this time as aneurysm size kept stable. Pt to f/u outpt for serial monitoring of aneurysm. Pt transferred to floor once stabilization confirmed and dissection r/o.    As per Pulm recs, Prednisone 40 mg QD x 5d was started. Procal 0.04, azithro not yet started. Repeat procal pending and if trending up, likely will start azithro. Pt has been satting well on RA, has never needed o2.     Pt showing improvement in respiratory distress, with diminished breath sounds at the bases. Pt is stable and ready for discharge. Pt can be sent home for Prednisone 40 mg x 5d, then taper to 20 mg x 5d, Levaquin, and PO Lasix. Pt was informed of discharge plans and was given instructions to return should symptoms worsen. Pt verbally expressed understanding and all questions were appropriately addressed. 81 YO M with a PMH of HTN, BPH, COPD, mood disorder, HLD, and known ascending aortic aneurysm (4.5 cm in May 2021) who presents to the hospital with a c/o sudden onset SOB that started this PM while walking with family. Associated with palpitations, feeling of warmth in his upper back (progressively worsening since May 2021), cold B/L LEs, and B/L LE swelling. Denies any CP, ABD pain, N/V/D, rashes, dysuria, dizziness, headaches, or fevers/chills. Patient is Macedonian speaking, translation services used ([id number])    In ED, CXR revealed pulmonary congestions. CTA showed unchanged aneurysm up to 4.4 cm, no dissection, + emphysematous changes, and a RUL consolidation concerning for PNA. BNP elevated 1142. Trop neg x2. BP elevated systolic 206/82. S/p IV lasix push 40mg in ED     Pt was admitted to [facility] with primary dx of hypertensive emergency with acute COPD exacerbation/pulmonary edema. Pt was started on labetalol. In discussion with Cards, labetalol  was stopped and meds changed to lisinopril and metoprolol with BP kept stable. CTS recommended no surgical intervention at this time as aneurysm size kept stable. Pt to f/u outpt for serial monitoring of aneurysm. Pt transferred to floor once stabilization confirmed and dissection r/o.    As per Pulm recs, Prednisone 40 mg QD x 5d was started and will discharge with PO Levaquin to treat a COPD exacerbation     Pt showing improvement in respiratory distress, with diminished breath sounds at the bases. Pt is stable and ready for discharge. Pt can be sent home for Prednisone 40 mg x 5d, then taper to 20 mg x 5d, Levaquin, and PO Lasix. Pt was informed of discharge plans and was given instructions to return should symptoms worsen. Pt verbally expressed understanding and all questions were appropriately addressed.     Final diagnosis - hypertensive emergency with flash pulmonary edema, copd exacerbation, and a stable ascending aortic aneurysm     I have personally seen and examined patient on the above date.  I discussed the case with Dr. Mckeon  and I agree with findings and plan as detailed per note above, which I have amended where appropriate. 81 YO M with a PMH of HTN, BPH, COPD, mood disorder, HLD, and known ascending aortic aneurysm (4.5 cm in May 2021) who presents to the hospital with a c/o sudden onset SOB that started this PM while walking with family. Associated with palpitations, feeling of warmth in his upper back (progressively worsening since May 2021), cold B/L LEs, and B/L LE swelling. Denies any CP, ABD pain, N/V/D, rashes, dysuria, dizziness, headaches, or fevers/chills. Patient is Italian speaking, translation services used ([id number])    In ED, CXR revealed pulmonary congestions. CTA showed unchanged aneurysm up to 4.4 cm, no dissection, + emphysematous changes, and a RUL consolidation concerning for PNA. BNP elevated 1142. Trop neg x2. BP elevated systolic 206/82. S/p IV lasix push 40mg in ED     Pt was admitted to [facility] with primary dx of hypertensive emergency with acute COPD exacerbation/pulmonary edema. Pt was started on labetalol. In discussion with Cards, labetalol  was stopped and meds changed to lisinopril and metoprolol with BP kept stable. CTS recommended no surgical intervention at this time as aneurysm size kept stable. Pt to f/u outpt for serial monitoring of aneurysm. Pt transferred to floor once stabilization confirmed and dissection r/o.    As per Pulm recs, Prednisone 40 mg QD x 5d was started and will discharge with PO Levaquin to treat a COPD exacerbation     Pt showing improvement in respiratory distress, with diminished breath sounds at the bases. Pt is stable and ready for discharge. Pt can be sent home for Prednisone 40 mg x 5d, then taper to 20 mg x 5d, Levaquin 250mg PO Qd x5day, adjusted for CrCl, and PO Lasix. Pt was informed of discharge plans and was given instructions to return should symptoms worsen. Pt verbally expressed understanding and all questions were appropriately addressed.     Final diagnosis - hypertensive emergency with flash pulmonary edema, copd exacerbation, and a stable ascending aortic aneurysm     I have personally seen and examined patient on the above date.  I discussed the case with Dr. Mckeon  and I agree with findings and plan as detailed per note above, which I have amended where appropriate.

## 2021-07-20 NOTE — DISCHARGE NOTE NURSING/CASE MANAGEMENT/SOCIAL WORK - PATIENT PORTAL LINK FT
You can access the FollowMyHealth Patient Portal offered by [facility] by registering at the following website: [email or web address]. By joining [facility]’s FollowMyHealth portal, you will also be able to view your health information using other applications (apps) compatible with our system.

## 2021-07-20 NOTE — DISCHARGE NOTE PROVIDER - CARE PROVIDER_API CALL
Gama Landry)  Critical Care Medicine; Internal Medicine; Pulmonary Disease; Sleep Medicine  [address] [place] [address]  Phone: [phone]  Fax: [phone]  Follow Up Time:     Darin Hemphill ()  Medicine  Physicians  [address] [place] [address], [address] [place] [address]  Phone: [phone]  Fax: [phone]  Follow Up Time:    Gama Landry)  Critical Care Medicine; Internal Medicine; Pulmonary Disease; Sleep Medicine  [address] [place] [address]  Phone: [phone]  Fax: [phone]  Follow Up Time:     Darin Hemphill ()  Medicine  Physicians  [address] [facility], [address]st [place] [address]  Phone: [phone]  Fax: [phone]  Follow Up Time:     Jerry Han)  Surgery; Thoracic and Cardiac Surgery  [address] [place] [address]  Phone: [phone]  Fax: [phone]  Follow Up Time:

## 2021-07-20 NOTE — PROGRESS NOTE ADULT - ASSESSMENT
79 YO M with a PMH of HTN, BPH, COPD, mood disorder, HLD, and known ascending aortic aneurysm (4.5 cm in May 2021) who presents to the hospital with a c/o sudden onset SOB    # Shortness of breath  - Opacity seen on CT chest; severe emphysema; long history of heavy smoking  - Incentive spirometer ordered   - Pulm evaluation for emphysema appreciated  - In discussion with pulm, prednisone started 7/19 40mg x5 days - day 2/5  - Procal ordered - will start azithro if elevated   - Patient needs pulm outpatient followup     # Ascending aortic aneurysm  - Stable  - CT surgery consult appreciated -no surgical intervention needed   - Will need follow-up    # HTN  - is now normotensive   - lisinopril moved to 20mg and metoprolol succinate to started 25mg 7/19  - cards consult appreciated    # AMS  - had good night last night    Dispo: stable for discharge home   
79 YO M with a PMH of HTN, BPH, COPD, mood disorder, HLD, and known ascending aortic aneurysm (4.5 cm in May 2021) who presents to the hospital with a c/o sudden onset SOB    # Shortness of breath  - Opacity seen on CT chest; severe emphysema; long history of heavy smoking  - Less likely due to pulmonary edema at this time  - Incentive spirometer ordered   - Pulm evaluation for emphysema    # Ascending aortic aneurysm  - Stable  - CT surgery consult appreciated -no surgical intervention needed   - Will need follow-up    # HTN  - in discussion with cardiology, labetalol was stopped  - lisinopril moved to 20mg and metoprolol succinate to start 25mg 7/19  - cards consult appreciated     Dispo: if BP stable plan move to floor 7/19  
81 YO M with a PMH of HTN, BPH, COPD, mood disorder, HLD, and known ascending aortic aneurysm (4.5 cm in May 2021) who presents to the hospital with a c/o sudden onset SOB    # Shortness of breath  - Opacity seen on CT chest; severe emphysema; long history of heavy smoking  - Incentive spirometer ordered   - Pulm evaluation for emphysema appreciated  - In discussion with pulm, prednisone started 7/19 40mg x5 days - day 1/5  - Procal ordered - will start azithro if elevated   - Patient needs pulm outpatient followup     # Ascending aortic aneurysm  - Stable  - CT surgery consult appreciated -no surgical intervention needed   - Will need follow-up    # HTN  - in discussion with cardiology, labetalol was stopped  - lisinopril moved to 20mg and metoprolol succinate to start 25mg 7/19  - cards consult appreciated    # AMS  - concern for family giving patient medications 7/18 at night  - possible sundowning   - currently patient is well oriented  - limit night stimulation, reorient the patient in [place]     Dispo: stable for medical floor   
Impression:    RUL consolidation - Possible PNA  COPD exacerbation  Fluid overload  Left lower lobe 0.9 cm new pulmonary nodule  Uncontrolled HTN  AAA (4.5 cm)    Pan:  - prednisone 40mg qd x 5 days than 20 for 5 days  - levaquin  - symbicort  - po lasix  - out patient CT scan and pulmonary follow up  - out patient PFTs  - DVT ppx  - dc planning

## 2021-07-20 NOTE — PROGRESS NOTE ADULT - SUBJECTIVE AND OBJECTIVE BOX
OVERNIGHT EVENTS: events noted, on RA, afebrile    Vital Signs Last 24 Hrs  T(C): 36.1 (2021 00:00), Max: 36.9 (2021 17:00)  T(F): 97 (2021 00:00), Max: 98.4 (2021 17:00)  HR: 66 (2021 04:49) (59 - 66)  BP: 113/58 (2021 04:49) (100/46 - 134/63)  BP(mean): --  RR: 16 (2021 00:00) (16 - 20)  SpO2: 96% (2021 21:05) (94% - 100%)    PHYSICAL EXAMINATION:    GENERAL: The patient is awake and alert in no apparent distress.     HEENT: Head is normocephalic and atraumatic.     NECK: Supple.    LUNGS: dec bs both bases    HEART: Regular rate and rhythm without murmur.    ABDOMEN: Soft, nontender, and nondistended.      EXTREMITIES: Without any cyanosis, clubbing, rash, lesions or edema.    NEUROLOGIC: Grossly intact.    SKIN: No ulceration or induration present.      LABS:                        13.3   6.43  )-----------( 215      ( 2021 07:34 )             39.0     07-19    144  |  107  |  13  ----------------------------<  109<H>  4.0   |  26  |  1.2    Ca    9.2      2021 07:34  Mg     2.0     07-19    TPro  5.9<L>  /  Alb  3.8  /  TBili  0.7  /  DBili  x   /  AST  17  /  ALT  15  /  AlkPhos  66  07-19      Urinalysis Basic - ( 2021 00:00 )    Color: Light Yellow / Appearance: Clear / S.014 / pH: x  Gluc: x / Ketone: Negative  / Bili: Negative / Urobili: <2 mg/dL   Blood: x / Protein: Negative / Nitrite: Negative   Leuk Esterase: Negative / RBC: x / WBC x   Sq Epi: x / Non Sq Epi: x / Bacteria: x      ABG - ( 2021 12:42 )  pH, Arterial: 7.44  pH, Blood: x     /  pCO2: 38    /  pO2: 90    / HCO3: 26    / Base Excess: 2.1   /  SaO2: 97                          Procalcitonin, Serum: 0.04 ng/mL (21 @ 15:36)        21 @ 07:01  -  21 @ 07:00  --------------------------------------------------------  IN: 0 mL / OUT: 350 mL / NET: -350 mL        MICROBIOLOGY:      MEDICATIONS  (STANDING):  atorvastatin 20 milliGRAM(s) Oral at bedtime  chlorhexidine 4% Liquid 1 Application(s) Topical <User Schedule>  enoxaparin Injectable 40 milliGRAM(s) SubCutaneous daily  finasteride 5 milliGRAM(s) Oral daily  lisinopril 20 milliGRAM(s) Oral daily  metoprolol succinate ER 25 milliGRAM(s) Oral daily  mirtazapine 15 milliGRAM(s) Oral at bedtime  montelukast 10 milliGRAM(s) Oral daily  pantoprazole    Tablet 40 milliGRAM(s) Oral before breakfast  predniSONE   Tablet 40 milliGRAM(s) Oral daily  QUEtiapine 50 milliGRAM(s) Oral at bedtime  tamsulosin 0.4 milliGRAM(s) Oral at bedtime    MEDICATIONS  (PRN):  zolpidem 5 milliGRAM(s) Oral at bedtime PRN Insomnia      RADIOLOGY & ADDITIONAL STUDIES:

## 2021-07-21 LAB
CULTURE RESULTS: SIGNIFICANT CHANGE UP
SPECIMEN SOURCE: SIGNIFICANT CHANGE UP

## 2021-07-26 ENCOUNTER — APPOINTMENT (OUTPATIENT)
Dept: CARDIOLOGY | Facility: CLINIC | Age: 81
End: 2021-07-26
Payer: MEDICARE

## 2021-07-26 VITALS
WEIGHT: 145 LBS | HEART RATE: 72 BPM | DIASTOLIC BLOOD PRESSURE: 63 MMHG | RESPIRATION RATE: 18 BRPM | SYSTOLIC BLOOD PRESSURE: 153 MMHG | TEMPERATURE: 98 F | OXYGEN SATURATION: 95 % | BODY MASS INDEX: 24.13 KG/M2

## 2021-07-26 DIAGNOSIS — R06.00 DYSPNEA, UNSPECIFIED: ICD-10-CM

## 2021-07-26 PROCEDURE — 93000 ELECTROCARDIOGRAM COMPLETE: CPT

## 2021-07-26 PROCEDURE — 99072 ADDL SUPL MATRL&STAF TM PHE: CPT

## 2021-07-26 PROCEDURE — 99214 OFFICE O/P EST MOD 30 MIN: CPT

## 2021-07-26 NOTE — PATIENT PROFILE ADULT - DO YOU FEEL THREATENED BY OTHERS?
Attempted to contact the patient by mobile and home phone; there was no answer, No message left as voicemail is full.     no

## 2021-07-26 NOTE — PHYSICAL EXAM
[Well Developed] : well developed [Well Nourished] : well nourished [No Acute Distress] : no acute distress [Normal Conjunctiva] : normal conjunctiva [Normal Venous Pressure] : normal venous pressure [No Carotid Bruit] : no carotid bruit [Normal S1, S2] : normal S1, S2 [No Rub] : no rub [No Gallop] : no gallop [Clear Lung Fields] : clear lung fields [Good Air Entry] : good air entry [No Respiratory Distress] : no respiratory distress  [Soft] : abdomen soft [Non Tender] : non-tender [No Masses/organomegaly] : no masses/organomegaly [Normal Bowel Sounds] : normal bowel sounds [Normal Gait] : normal gait [No Edema] : no edema [No Cyanosis] : no cyanosis [No Clubbing] : no clubbing [No Varicosities] : no varicosities [No Rash] : no rash [No Skin Lesions] : no skin lesions [Moves all extremities] : moves all extremities [No Focal Deficits] : no focal deficits [Normal Speech] : normal speech [Alert and Oriented] : alert and oriented [Normal memory] : normal memory [de-identified] : 2/6 [employer]

## 2021-07-26 NOTE — DISCUSSION/SUMMARY
[FreeTextEntry1] : 80 M HTN hyperlipidemia ex-smoker PACs and PVCs for f/u with SOB.\par CHECK NST TO ASSESS PERFUSION.\par EKG for SOB.\par Continue medications for HTN and hyperlipidemia.

## 2021-07-26 NOTE — REASON FOR VISIT
[Arrhythmia/ECG Abnorrmalities] : arrhythmia/ECG abnormalities [Hyperlipidemia] : hyperlipidemia [Hypertension] : hypertension [FreeTextEntry1] : 80 M HTN hyperlipidemia ex-smoker PACs/PVCs for f/u.\par \par

## 2021-07-26 NOTE — HISTORY OF PRESENT ILLNESS
[FreeTextEntry1] :  \par 1. HTN: on medications, compliant with medications.\par 2. Hyperlipidemia: on statin. No muscle pain is noted.\par 3. The patient was admitted to [facility] twice due to SOB. He had an echocardiogram that showed normal LV function.\par Holter showed frequent PACs and PVCs.\par The patient still has exertional SOB and CP. His symptoms occur daily and are relieved with rest. His CP does not radiate and is relieved with rest. He also has severe exertional SOB that occurs daily and limits his ET.\par He received his COVID vaccine. \par

## 2021-07-27 DIAGNOSIS — Z71.6 TOBACCO ABUSE COUNSELING: ICD-10-CM

## 2021-07-27 DIAGNOSIS — I71.2 THORACIC AORTIC ANEURYSM, WITHOUT RUPTURE: ICD-10-CM

## 2021-07-27 DIAGNOSIS — R35.0 FREQUENCY OF MICTURITION: ICD-10-CM

## 2021-07-27 DIAGNOSIS — F05 DELIRIUM DUE TO KNOWN PHYSIOLOGICAL CONDITION: ICD-10-CM

## 2021-07-27 DIAGNOSIS — E78.5 HYPERLIPIDEMIA, UNSPECIFIED: ICD-10-CM

## 2021-07-27 DIAGNOSIS — J43.9 EMPHYSEMA, UNSPECIFIED: ICD-10-CM

## 2021-07-27 DIAGNOSIS — F39 UNSPECIFIED MOOD [AFFECTIVE] DISORDER: ICD-10-CM

## 2021-07-27 DIAGNOSIS — I11.0 HYPERTENSIVE HEART DISEASE WITH HEART FAILURE: ICD-10-CM

## 2021-07-27 DIAGNOSIS — I44.0 ATRIOVENTRICULAR BLOCK, FIRST DEGREE: ICD-10-CM

## 2021-07-27 DIAGNOSIS — R39.11 HESITANCY OF MICTURITION: ICD-10-CM

## 2021-07-27 DIAGNOSIS — I50.9 HEART FAILURE, UNSPECIFIED: ICD-10-CM

## 2021-07-27 DIAGNOSIS — I45.10 UNSPECIFIED RIGHT BUNDLE-BRANCH BLOCK: ICD-10-CM

## 2021-07-27 DIAGNOSIS — Z79.82 LONG TERM (CURRENT) USE OF ASPIRIN: ICD-10-CM

## 2021-07-27 DIAGNOSIS — I35.1 NONRHEUMATIC AORTIC (VALVE) INSUFFICIENCY: ICD-10-CM

## 2021-07-27 DIAGNOSIS — I16.0 HYPERTENSIVE URGENCY: ICD-10-CM

## 2021-07-27 DIAGNOSIS — J18.9 PNEUMONIA, UNSPECIFIED ORGANISM: ICD-10-CM

## 2021-07-27 DIAGNOSIS — F17.210 NICOTINE DEPENDENCE, CIGARETTES, UNCOMPLICATED: ICD-10-CM

## 2021-07-27 DIAGNOSIS — I50.33 ACUTE ON CHRONIC DIASTOLIC (CONGESTIVE) HEART FAILURE: ICD-10-CM

## 2021-07-27 DIAGNOSIS — N40.1 BENIGN PROSTATIC HYPERPLASIA WITH LOWER URINARY TRACT SYMPTOMS: ICD-10-CM

## 2021-07-27 DIAGNOSIS — J81.0 ACUTE PULMONARY EDEMA: ICD-10-CM

## 2021-08-06 ENCOUNTER — APPOINTMENT (OUTPATIENT)
Dept: CARDIOLOGY | Facility: CLINIC | Age: 81
End: 2021-08-06
Payer: MEDICARE

## 2021-08-06 DIAGNOSIS — R07.9 CHEST PAIN, UNSPECIFIED: ICD-10-CM

## 2021-08-06 PROCEDURE — A9500: CPT

## 2021-08-06 PROCEDURE — 93015 CV STRESS TEST SUPVJ I&R: CPT

## 2021-08-06 PROCEDURE — 78452 HT MUSCLE IMAGE SPECT MULT: CPT

## 2021-09-27 ENCOUNTER — APPOINTMENT (OUTPATIENT)
Dept: CARDIOLOGY | Facility: CLINIC | Age: 81
End: 2021-09-27
Payer: MEDICARE

## 2021-09-27 VITALS
HEART RATE: 84 BPM | WEIGHT: 149 LBS | DIASTOLIC BLOOD PRESSURE: 63 MMHG | OXYGEN SATURATION: 95 % | SYSTOLIC BLOOD PRESSURE: 159 MMHG | BODY MASS INDEX: 24.79 KG/M2 | RESPIRATION RATE: 18 BRPM | TEMPERATURE: 98.1 F

## 2021-09-27 PROCEDURE — 93000 ELECTROCARDIOGRAM COMPLETE: CPT

## 2021-09-27 PROCEDURE — 99214 OFFICE O/P EST MOD 30 MIN: CPT

## 2021-09-27 NOTE — REASON FOR VISIT
[Arrhythmia/ECG Abnorrmalities] : arrhythmia/ECG abnormalities [Hyperlipidemia] : hyperlipidemia [Hypertension] : hypertension [FreeTextEntry1] : 81 M HTN hyperlipidemia ex-smoker PACs/PVCs for f/u.\par

## 2021-09-27 NOTE — PHYSICAL EXAM
[Well Developed] : well developed [Well Nourished] : well nourished [No Acute Distress] : no acute distress [Normal Conjunctiva] : normal conjunctiva [Normal Venous Pressure] : normal venous pressure [No Carotid Bruit] : no carotid bruit [Normal S1, S2] : normal S1, S2 [No Rub] : no rub [No Gallop] : no gallop [Clear Lung Fields] : clear lung fields [Good Air Entry] : good air entry [No Respiratory Distress] : no respiratory distress  [Soft] : abdomen soft [Non Tender] : non-tender [No Masses/organomegaly] : no masses/organomegaly [Normal Bowel Sounds] : normal bowel sounds [Normal Gait] : normal gait [No Edema] : no edema [No Cyanosis] : no cyanosis [No Clubbing] : no clubbing [No Varicosities] : no varicosities [No Rash] : no rash [No Skin Lesions] : no skin lesions [Moves all extremities] : moves all extremities [No Focal Deficits] : no focal deficits [Normal Speech] : normal speech [Alert and Oriented] : alert and oriented [Normal memory] : normal memory [de-identified] : 2/6 [employer]

## 2021-09-27 NOTE — DISCUSSION/SUMMARY
[FreeTextEntry1] : 81 M HTN hyperlipidemia ex-smoker PACs and PVCs for f/u.\par NST reviewed. Normal perfusion. \par Monitor SOB.\par Continue medications for HTN and hyperlipidemia.\par Monitor PVCs.\par

## 2022-01-20 ENCOUNTER — APPOINTMENT (OUTPATIENT)
Dept: CARDIOLOGY | Facility: CLINIC | Age: 82
End: 2022-01-20
Payer: MEDICARE

## 2022-01-20 VITALS
WEIGHT: 146 LBS | RESPIRATION RATE: 18 BRPM | HEART RATE: 72 BPM | TEMPERATURE: 97.8 F | BODY MASS INDEX: 24.3 KG/M2 | DIASTOLIC BLOOD PRESSURE: 60 MMHG | OXYGEN SATURATION: 95 % | SYSTOLIC BLOOD PRESSURE: 136 MMHG

## 2022-01-20 PROCEDURE — 99214 OFFICE O/P EST MOD 30 MIN: CPT

## 2022-01-20 PROCEDURE — 93000 ELECTROCARDIOGRAM COMPLETE: CPT

## 2022-01-20 NOTE — PHYSICAL EXAM
[Well Developed] : well developed [Well Nourished] : well nourished [No Acute Distress] : no acute distress [Normal Conjunctiva] : normal conjunctiva [Normal Venous Pressure] : normal venous pressure [No Carotid Bruit] : no carotid bruit [Normal S1, S2] : normal S1, S2 [No Rub] : no rub [No Gallop] : no gallop [Clear Lung Fields] : clear lung fields [Good Air Entry] : good air entry [No Respiratory Distress] : no respiratory distress  [Soft] : abdomen soft [Non Tender] : non-tender [No Masses/organomegaly] : no masses/organomegaly [Normal Bowel Sounds] : normal bowel sounds [Normal Gait] : normal gait [No Edema] : no edema [No Cyanosis] : no cyanosis [No Clubbing] : no clubbing [No Varicosities] : no varicosities [No Rash] : no rash [No Skin Lesions] : no skin lesions [Moves all extremities] : moves all extremities [No Focal Deficits] : no focal deficits [Normal Speech] : normal speech [Alert and Oriented] : alert and oriented [Normal memory] : normal memory [de-identified] : 2/6 [employer]

## 2022-01-20 NOTE — HISTORY OF PRESENT ILLNESS
[FreeTextEntry1] :  \par 1. HTN: on medications, no SE noted.\par 2. Hyperlipidemia: on statin.  \par 3. The patient was admitted to [facility] twice due to SOB in the spring of 5/2021. He had an echocardiogram that showed normal LV function.\par 4. Holter showed frequent PACs and PVCs but his symptoms have improved.\par 5. NST in 8/2021 was normal.\par  \par He received his COVID vaccine. \par \par The patient is scheduled for bladder surgery. He denies CP or SOB. \par His ET is normal.\par

## 2022-01-20 NOTE — DISCUSSION/SUMMARY
[FreeTextEntry1] : 81 M HTN hyperlipidemia PVCs for bladder surgery.\par PATIENT IS AT A LOW LEVEL OF CARDIAC RISK FOR INTERMEDIATE RISK SURGERY.\par Echo showed normal LVEF and NST was normal in 8-2021.\par Continue medications for HTN and hyperlipidemia.\par Hold ASA 7days prior to procedure.

## 2022-05-19 ENCOUNTER — APPOINTMENT (OUTPATIENT)
Dept: CARDIOLOGY | Facility: CLINIC | Age: 82
End: 2022-05-19

## 2022-08-16 NOTE — OCCUPATIONAL THERAPY INITIAL EVALUATION ADULT - ASR WT BEARING STATUS EVAL
no weight-bearing restrictions Abbe Flap (Upper To Lower Lip) Text: The defect of the lower lip was assessed and measured.  Given the location and size of the defect, an Abbe flap was deemed most appropriate.  Using a sterile surgical marker, an appropriate Abbe flap was measured and drawn on the upper lip. Local anesthesia was then infiltrated.  A scalpel was then used to incise the upper lip through and through the skin, vermilion, muscle and mucosa, leaving the flap pedicled on the opposite side.  The flap was then rotated and transferred to the lower lip defect.  The flap was then sutured into place with a three layer technique, closing the orbicularis oris muscle layer with subcutaneous buried sutures, followed by a mucosal layer and an epidermal layer.

## 2022-10-27 ENCOUNTER — INPATIENT (INPATIENT)
Facility: HOSPITAL | Age: 82
LOS: 6 days | Discharge: HOME HEALTH | End: 2022-11-03
Attending: INTERNAL MEDICINE | Admitting: INTERNAL MEDICINE

## 2022-10-27 VITALS
TEMPERATURE: 99 F | DIASTOLIC BLOOD PRESSURE: 78 MMHG | SYSTOLIC BLOOD PRESSURE: 201 MMHG | HEIGHT: 64 IN | HEART RATE: 100 BPM | OXYGEN SATURATION: 96 % | RESPIRATION RATE: 24 BRPM

## 2022-10-27 LAB
ALBUMIN SERPL ELPH-MCNC: 3.4 G/DL — LOW (ref 3.5–5.2)
ALP SERPL-CCNC: 125 U/L — HIGH (ref 30–115)
ALT FLD-CCNC: 25 U/L — SIGNIFICANT CHANGE UP (ref 0–41)
ANION GAP SERPL CALC-SCNC: 16 MMOL/L — HIGH (ref 7–14)
APTT BLD: 32.8 SEC — SIGNIFICANT CHANGE UP (ref 27–39.2)
AST SERPL-CCNC: 30 U/L — SIGNIFICANT CHANGE UP (ref 0–41)
BASE EXCESS BLDV CALC-SCNC: 2.7 MMOL/L — SIGNIFICANT CHANGE UP (ref -2–3)
BASE EXCESS BLDV CALC-SCNC: 4.1 MMOL/L — HIGH (ref -2–3)
BASOPHILS # BLD AUTO: 0.02 K/UL — SIGNIFICANT CHANGE UP (ref 0–0.2)
BASOPHILS NFR BLD AUTO: 0.2 % — SIGNIFICANT CHANGE UP (ref 0–1)
BILIRUB SERPL-MCNC: 0.9 MG/DL — SIGNIFICANT CHANGE UP (ref 0.2–1.2)
BLD GP AB SCN SERPL QL: SIGNIFICANT CHANGE UP
BUN SERPL-MCNC: 12 MG/DL — SIGNIFICANT CHANGE UP (ref 10–20)
CA-I SERPL-SCNC: 1.05 MMOL/L — LOW (ref 1.15–1.33)
CA-I SERPL-SCNC: 1.14 MMOL/L — LOW (ref 1.15–1.33)
CALCIUM SERPL-MCNC: 8.1 MG/DL — LOW (ref 8.4–10.5)
CHLORIDE SERPL-SCNC: 105 MMOL/L — SIGNIFICANT CHANGE UP (ref 98–110)
CO2 SERPL-SCNC: 26 MMOL/L — SIGNIFICANT CHANGE UP (ref 17–32)
CREAT SERPL-MCNC: 1.3 MG/DL — SIGNIFICANT CHANGE UP (ref 0.7–1.5)
EGFR: 55 ML/MIN/1.73M2 — LOW
EOSINOPHIL # BLD AUTO: 0.03 K/UL — SIGNIFICANT CHANGE UP (ref 0–0.7)
EOSINOPHIL NFR BLD AUTO: 0.3 % — SIGNIFICANT CHANGE UP (ref 0–8)
GAS PNL BLDV: 134 MMOL/L — LOW (ref 136–145)
GAS PNL BLDV: 136 MMOL/L — SIGNIFICANT CHANGE UP (ref 136–145)
GAS PNL BLDV: SIGNIFICANT CHANGE UP
GLUCOSE SERPL-MCNC: 114 MG/DL — HIGH (ref 70–99)
HCO3 BLDV-SCNC: 29 MMOL/L — SIGNIFICANT CHANGE UP (ref 22–29)
HCO3 BLDV-SCNC: 30 MMOL/L — HIGH (ref 22–29)
HCT VFR BLD CALC: 35.9 % — LOW (ref 42–52)
HCT VFR BLDA CALC: 37 % — LOW (ref 39–51)
HCT VFR BLDA CALC: 39 % — SIGNIFICANT CHANGE UP (ref 39–51)
HGB BLD CALC-MCNC: 12.3 G/DL — LOW (ref 12.6–17.4)
HGB BLD CALC-MCNC: 12.9 G/DL — SIGNIFICANT CHANGE UP (ref 12.6–17.4)
HGB BLD-MCNC: 12.4 G/DL — LOW (ref 14–18)
IMM GRANULOCYTES NFR BLD AUTO: 1 % — HIGH (ref 0.1–0.3)
INR BLD: 1.07 RATIO — SIGNIFICANT CHANGE UP (ref 0.65–1.3)
LACTATE BLDV-MCNC: 1.2 MMOL/L — SIGNIFICANT CHANGE UP (ref 0.5–2)
LACTATE BLDV-MCNC: 1.5 MMOL/L — SIGNIFICANT CHANGE UP (ref 0.5–2)
LYMPHOCYTES # BLD AUTO: 0.68 K/UL — LOW (ref 1.2–3.4)
LYMPHOCYTES # BLD AUTO: 7.2 % — LOW (ref 20.5–51.1)
MCHC RBC-ENTMCNC: 31.3 PG — HIGH (ref 27–31)
MCHC RBC-ENTMCNC: 34.5 G/DL — SIGNIFICANT CHANGE UP (ref 32–37)
MCV RBC AUTO: 90.7 FL — SIGNIFICANT CHANGE UP (ref 80–94)
MONOCYTES # BLD AUTO: 0.96 K/UL — HIGH (ref 0.1–0.6)
MONOCYTES NFR BLD AUTO: 10.2 % — HIGH (ref 1.7–9.3)
NEUTROPHILS # BLD AUTO: 7.66 K/UL — HIGH (ref 1.4–6.5)
NEUTROPHILS NFR BLD AUTO: 81.1 % — HIGH (ref 42.2–75.2)
NRBC # BLD: 0 /100 WBCS — SIGNIFICANT CHANGE UP (ref 0–0)
NT-PROBNP SERPL-SCNC: 607 PG/ML — HIGH (ref 0–300)
PCO2 BLDV: 50 MMHG — SIGNIFICANT CHANGE UP (ref 42–55)
PCO2 BLDV: 51 MMHG — SIGNIFICANT CHANGE UP (ref 42–55)
PH BLDV: 7.37 — SIGNIFICANT CHANGE UP (ref 7.32–7.43)
PH BLDV: 7.38 — SIGNIFICANT CHANGE UP (ref 7.32–7.43)
PLATELET # BLD AUTO: 194 K/UL — SIGNIFICANT CHANGE UP (ref 130–400)
PO2 BLDV: 25 MMHG — SIGNIFICANT CHANGE UP
PO2 BLDV: 36 MMHG — SIGNIFICANT CHANGE UP
POTASSIUM BLDV-SCNC: 3.2 MMOL/L — LOW (ref 3.5–5.1)
POTASSIUM BLDV-SCNC: 3.6 MMOL/L — SIGNIFICANT CHANGE UP (ref 3.5–5.1)
POTASSIUM SERPL-MCNC: 3.5 MMOL/L — SIGNIFICANT CHANGE UP (ref 3.5–5)
POTASSIUM SERPL-SCNC: 3.5 MMOL/L — SIGNIFICANT CHANGE UP (ref 3.5–5)
PROT SERPL-MCNC: 6.1 G/DL — SIGNIFICANT CHANGE UP (ref 6–8)
PROTHROM AB SERPL-ACNC: 12.2 SEC — SIGNIFICANT CHANGE UP (ref 9.95–12.87)
RBC # BLD: 3.96 M/UL — LOW (ref 4.7–6.1)
RBC # FLD: 12.7 % — SIGNIFICANT CHANGE UP (ref 11.5–14.5)
SAO2 % BLDV: 33.5 % — SIGNIFICANT CHANGE UP
SAO2 % BLDV: 64.5 % — SIGNIFICANT CHANGE UP
SARS-COV-2 RNA SPEC QL NAA+PROBE: SIGNIFICANT CHANGE UP
SODIUM SERPL-SCNC: 147 MMOL/L — HIGH (ref 135–146)
TROPONIN T SERPL-MCNC: <0.01 NG/ML — SIGNIFICANT CHANGE UP
WBC # BLD: 9.44 K/UL — SIGNIFICANT CHANGE UP (ref 4.8–10.8)
WBC # FLD AUTO: 9.44 K/UL — SIGNIFICANT CHANGE UP (ref 4.8–10.8)

## 2022-10-27 PROCEDURE — 93010 ELECTROCARDIOGRAM REPORT: CPT

## 2022-10-27 PROCEDURE — 99223 1ST HOSP IP/OBS HIGH 75: CPT

## 2022-10-27 PROCEDURE — 74174 CTA ABD&PLVS W/CONTRAST: CPT | Mod: 26,MA

## 2022-10-27 PROCEDURE — 71045 X-RAY EXAM CHEST 1 VIEW: CPT | Mod: 26

## 2022-10-27 PROCEDURE — 93308 TTE F-UP OR LMTD: CPT | Mod: 26

## 2022-10-27 PROCEDURE — 71275 CT ANGIOGRAPHY CHEST: CPT | Mod: 26,MA

## 2022-10-27 PROCEDURE — 99285 EMERGENCY DEPT VISIT HI MDM: CPT | Mod: 25

## 2022-10-27 RX ORDER — CHOLECALCIFEROL (VITAMIN D3) 125 MCG
1 CAPSULE ORAL
Qty: 0 | Refills: 0 | DISCHARGE

## 2022-10-27 RX ORDER — HEPARIN SODIUM 5000 [USP'U]/ML
5000 INJECTION INTRAVENOUS; SUBCUTANEOUS EVERY 12 HOURS
Refills: 0 | Status: DISCONTINUED | OUTPATIENT
Start: 2022-10-27 | End: 2022-11-03

## 2022-10-27 RX ORDER — IRON POLYSACCHARIDE COMPLEX 150 MG
1 CAPSULE ORAL
Qty: 0 | Refills: 0 | DISCHARGE

## 2022-10-27 RX ORDER — IPRATROPIUM/ALBUTEROL SULFATE 18-103MCG
3 AEROSOL WITH ADAPTER (GRAM) INHALATION
Refills: 0 | Status: COMPLETED | OUTPATIENT
Start: 2022-10-27 | End: 2022-10-27

## 2022-10-27 RX ORDER — QUETIAPINE FUMARATE 200 MG/1
2 TABLET, FILM COATED ORAL
Qty: 0 | Refills: 0 | DISCHARGE

## 2022-10-27 RX ORDER — ONDANSETRON 8 MG/1
4 TABLET, FILM COATED ORAL EVERY 8 HOURS
Refills: 0 | Status: DISCONTINUED | OUTPATIENT
Start: 2022-10-27 | End: 2022-11-03

## 2022-10-27 RX ORDER — IPRATROPIUM/ALBUTEROL SULFATE 18-103MCG
3 AEROSOL WITH ADAPTER (GRAM) INHALATION EVERY 6 HOURS
Refills: 0 | Status: DISCONTINUED | OUTPATIENT
Start: 2022-10-27 | End: 2022-11-03

## 2022-10-27 RX ORDER — ZOLPIDEM TARTRATE 10 MG/1
1 TABLET ORAL
Qty: 0 | Refills: 0 | DISCHARGE

## 2022-10-27 RX ORDER — LANOLIN ALCOHOL/MO/W.PET/CERES
3 CREAM (GRAM) TOPICAL AT BEDTIME
Refills: 0 | Status: DISCONTINUED | OUTPATIENT
Start: 2022-10-27 | End: 2022-11-03

## 2022-10-27 RX ORDER — FUROSEMIDE 40 MG
40 TABLET ORAL ONCE
Refills: 0 | Status: COMPLETED | OUTPATIENT
Start: 2022-10-27 | End: 2022-10-27

## 2022-10-27 RX ORDER — ACETAMINOPHEN 500 MG
650 TABLET ORAL EVERY 6 HOURS
Refills: 0 | Status: DISCONTINUED | OUTPATIENT
Start: 2022-10-27 | End: 2022-11-03

## 2022-10-27 RX ORDER — POLYETHYLENE GLYCOL 3350 17 G/17G
17 POWDER, FOR SOLUTION ORAL DAILY
Refills: 0 | Status: DISCONTINUED | OUTPATIENT
Start: 2022-10-27 | End: 2022-10-28

## 2022-10-27 RX ORDER — QUETIAPINE FUMARATE 200 MG/1
1 TABLET, FILM COATED ORAL
Qty: 0 | Refills: 0 | DISCHARGE

## 2022-10-27 RX ORDER — AZITHROMYCIN 500 MG/1
500 TABLET, FILM COATED ORAL EVERY 24 HOURS
Refills: 0 | Status: DISCONTINUED | OUTPATIENT
Start: 2022-10-27 | End: 2022-10-28

## 2022-10-27 RX ORDER — ATORVASTATIN CALCIUM 80 MG/1
1 TABLET, FILM COATED ORAL
Qty: 0 | Refills: 0 | DISCHARGE

## 2022-10-27 RX ORDER — LACTULOSE 10 G/15ML
20 SOLUTION ORAL THREE TIMES A DAY
Refills: 0 | Status: DISCONTINUED | OUTPATIENT
Start: 2022-10-27 | End: 2022-10-27

## 2022-10-27 RX ORDER — AZITHROMYCIN 500 MG/1
500 TABLET, FILM COATED ORAL ONCE
Refills: 0 | Status: COMPLETED | OUTPATIENT
Start: 2022-10-27 | End: 2022-10-27

## 2022-10-27 RX ORDER — IPRATROPIUM/ALBUTEROL SULFATE 18-103MCG
3 AEROSOL WITH ADAPTER (GRAM) INHALATION EVERY 4 HOURS
Refills: 0 | Status: DISCONTINUED | OUTPATIENT
Start: 2022-10-27 | End: 2022-11-03

## 2022-10-27 RX ORDER — CELECOXIB 200 MG/1
1 CAPSULE ORAL
Qty: 0 | Refills: 0 | DISCHARGE

## 2022-10-27 RX ORDER — RANITIDINE HYDROCHLORIDE 150 MG/1
1 TABLET, FILM COATED ORAL
Qty: 0 | Refills: 0 | DISCHARGE

## 2022-10-27 RX ORDER — ALBUTEROL 90 UG/1
2 AEROSOL, METERED ORAL EVERY 6 HOURS
Refills: 0 | Status: DISCONTINUED | OUTPATIENT
Start: 2022-10-27 | End: 2022-10-27

## 2022-10-27 RX ORDER — BUDESONIDE AND FORMOTEROL FUMARATE DIHYDRATE 160; 4.5 UG/1; UG/1
2 AEROSOL RESPIRATORY (INHALATION)
Refills: 0 | Status: DISCONTINUED | OUTPATIENT
Start: 2022-10-27 | End: 2022-11-03

## 2022-10-27 RX ORDER — PANTOPRAZOLE SODIUM 20 MG/1
40 TABLET, DELAYED RELEASE ORAL
Refills: 0 | Status: DISCONTINUED | OUTPATIENT
Start: 2022-10-27 | End: 2022-11-03

## 2022-10-27 RX ADMIN — Medication 40 MILLIGRAM(S): at 10:32

## 2022-10-27 RX ADMIN — Medication 40 MILLIGRAM(S): at 22:57

## 2022-10-27 RX ADMIN — HEPARIN SODIUM 5000 UNIT(S): 5000 INJECTION INTRAVENOUS; SUBCUTANEOUS at 18:23

## 2022-10-27 RX ADMIN — Medication 125 MILLIGRAM(S): at 12:09

## 2022-10-27 RX ADMIN — AZITHROMYCIN 255 MILLIGRAM(S): 500 TABLET, FILM COATED ORAL at 13:13

## 2022-10-27 RX ADMIN — BUDESONIDE AND FORMOTEROL FUMARATE DIHYDRATE 2 PUFF(S): 160; 4.5 AEROSOL RESPIRATORY (INHALATION) at 22:46

## 2022-10-27 RX ADMIN — Medication 3 MILLILITER(S): at 12:57

## 2022-10-27 RX ADMIN — Medication 3 MILLILITER(S): at 12:09

## 2022-10-27 RX ADMIN — Medication 3 MILLILITER(S): at 12:18

## 2022-10-27 NOTE — ED PROVIDER NOTE - CLINICAL SUMMARY MEDICAL DECISION MAKING FREE TEXT BOX
Patient signed out to me continues to be in respiratory distress with some improvement on noninvasive ventilation.  ED work-up nondiagnostic CT without any worsening signs of hemorrhage from aneurysm.  Pulmonary ICU consulted likely admission to stepdown unit    Due to a high probability of clinically significant, life threatening deterioration, the patient required my highest level of preparedness to intervene emergently and I personally spent this critical care time directly and personally managing the patient. This critical care time included obtaining a history; examining the patient; pulse oximetry; ordering and review of studies; arranging urgent treatment with development of a management plan; evaluation of patient's response to treatment; frequent reassessment; and, discussions with other providers and the patient/patients family. This critical care time was performed to assess and manage the high probability of imminent, life-threatening deterioration that could result in multi-organ failure.

## 2022-10-27 NOTE — ED ADULT NURSE REASSESSMENT NOTE - NS ED NURSE REASSESS COMMENT FT1
Pt. placed on BiPAP, transferred to critical care for closer monitoring. Endorsed pt. to KRIS Tucker.

## 2022-10-27 NOTE — CONSULT NOTE ADULT - ATTENDING COMMENTS
IMPRESSION:    Acute hypoxemic respiratory failure  COPD exacerbation  Hypertensive urgency  Pneumonia  Hypernatremia  HAGMA  Chronic hypercapnic respiratory failure  Severe emphysema  HO HTN, HLD, BPH, COPD, mood disorder  HO Ascending aortic aneurysm, measuring 4.4cm 10/2022  Former smoker    Impression and plan are my own.

## 2022-10-27 NOTE — ED PROVIDER NOTE - ATTENDING CONTRIBUTION TO CARE
# [id number]  82 yr old m w/ a pmh significant for COPD, htn, hld, ascending aortic aneurysm who presents with sob. Pt states that since Sunday he has been having worsening sob. Pt denies any chest pain, nausea, vomiting, fevers, chills or any other complaints. Pt denies any abd pain, or any other complaints.   Of note, on triage pt noted to have an 02 in the upper 80s which minimally improved after 3L. Pt placed on bipap     VITAL SIGNS: I have reviewed nursing notes and confirm.  CONSTITUTIONAL: non-toxic, well appearing  SKIN: no rash, no petechiae.  EYES:  EOMI, pink conjunctiva, anicteric  ENT: tongue midline, no exudates, MMM  NECK: Supple; no meningismus, no JVD  CARD: RRR, no murmurs, equal radial pulses bilaterally 2+  RESP: tacypneic, crackles noted in the lower bases bilaterally   ABD: Soft, non-tender, non-distended, no peritoneal signs, no HSM, no CVA tenderness  EXT: Normal ROM x4. No edema. No calves tenderness  NEURO: Alert, oriented x 3     a/p  82 yr old m that presents with sob -labs  -ekg  -imaging  -pt to be placed on bipap  -pt uptriaged to critical care, pt signed out to Dr. Edwards

## 2022-10-27 NOTE — CONSULT NOTE ADULT - ASSESSMENT
IMPRESSION:    Acute hypoxemic respiratory failure  COPD exacerbation  Hypernatremia  HAGMA  Chronic hypercapnic respiratory failure  Severe emphysema  HO HTN, HLD, BPH, COPD, mood disorder  HO Ascending aortic aneurysm, measuring 4.4cm 10/2022  Former smoker    PLAN:    CNS: Avoid sedatives    HEENT: Oral care    PULMONARY: HOB @ 45 degrees. Aspiration precautions. Duonebs Q4H standing and PRN. Continue BiPAP 4hrs on and off and QHS. Start methylprednisolone 40mg IV Q8H.    CARDIOVASCULAR: s/p Lasix in ED. Monitor urine output.     GI: GI prophylaxis. Feeding: PO when off NIV. Bowel regimen    RENAL: Follow up renal function and lytes. Correct as needed.    INFECTIOUS DISEASE: Monitor vital signs. Follow up cultures. RVP. Continue azithromycin 250mg IV QD.     HEMATOLOGICAL: DVT prophylaxis    ENDOCRINE: Follow up fingersticks. Insulin protocol if needed    MUSCULOSKELETAL: Bedrest    DISPO:  IMPRESSION:    Acute hypoxemic respiratory failure  COPD exacerbation  Hypertensive urgency  Hypernatremia  HAGMA  Chronic hypercapnic respiratory failure  Severe emphysema  HO HTN, HLD, BPH, COPD, mood disorder  HO Ascending aortic aneurysm, measuring 4.4cm 10/2022  Former smoker    PLAN:    CNS: Avoid sedatives    HEENT: Oral care    PULMONARY: HOB @ 45 degrees. Aspiration precautions. Duonebs Q4H standing and PRN. Continue BiPAP 4hrs on and off and QHS. Start prednisone 40mg PO for 5 days. Symbicort 160-4.5mcg 2 puffs BID.     CARDIOVASCULAR: s/p Lasix in ED. Monitor urine output. Resume home BP medications. Target systolic -160 for 24hrs    GI: GI prophylaxis. Feeding: PO when off NIV. Bowel regimen. Lipase    RENAL: Follow up renal function and lytes. Correct as needed.    INFECTIOUS DISEASE: Monitor vital signs. Follow up cultures. RVP. MRSA nares. Procalcitonin Continue azithromycin 250mg IV QD.     HEMATOLOGICAL: DVT prophylaxis    ENDOCRINE: Follow up fingersticks. Insulin protocol if needed    MUSCULOSKELETAL: Bedrest    DISPO:  IMPRESSION:    Acute hypoxemic respiratory failure  COPD exacerbation  Hypertensive urgency  Pneumonia  Hypernatremia  HAGMA  Chronic hypercapnic respiratory failure  Severe emphysema  HO HTN, HLD, BPH, COPD, mood disorder  HO Ascending aortic aneurysm, measuring 4.4cm 10/2022  Former smoker    PLAN:    CNS: Avoid sedatives    HEENT: Oral care    PULMONARY: HOB @ 45 degrees. Aspiration precautions. Duonebs Q4H standing and PRN. Continue BiPAP 4hrs on and off and QHS; Minute ventilation adequate on the BIPAP. Solumedrol 40 mg IV every 8 hours. Continue with symbicort add spiriva. ABG with chronic compensated hypercapnic respiratory failure.     CARDIOVASCULAR: s/p Lasix in ED. Monitor urine output. Resume home BP medications. Target systolic -160 for 24hrs. BNP not markedly elevated. Keep equal.     GI: GI prophylaxis. Feeding: PO when off NIV. Bowel regimen.     RENAL: Follow up renal function and lytes. Correct as needed.    INFECTIOUS DISEASE: Monitor vital signs. Follow up cultures. RVP. MRSA nares. Procalcitonin. Switch Azithromycin to Levaquin.     HEMATOLOGICAL: Pharmacological DVT prophylaxis. Check duplex of the lower extremities.     ENDOCRINE: Follow up fingersticks. Insulin protocol if needed    MUSCULOSKELETAL: Bedrest    DISPO: Admit to SDU.

## 2022-10-27 NOTE — ED PROVIDER NOTE - NS ED ROS FT
Constitutional: No fevers, chills, or malaise.  HEENT: No headache, visual changes   Cardiac:  Reports SOB and leg edema. No chest pain or leg pain.   Respiratory:  No cough, respiratory distress, or hemoptysis.  GI:  No nausea, vomiting, diarrhea, or abdominal pain.  :  No dysuria, frequency, or urgency.  MS:  No myalgia, muscle weakness  Neuro:  No dizziness, LOC, paralysis, or N/T.  Skin:  No skin rash.   Endocrine: No polyuria, polyphagia, or polydipsia.

## 2022-10-27 NOTE — ED PROVIDER NOTE - OBJECTIVE STATEMENT
81 yo male w/ PMH of HTN, HLD, ascending aortic aneurysm 4.5 cm, COPD not on home O2 presents for SOB x 4 days.  Associated cough, leg swelling, feels like abdomen is bloated. No fevers, chest pain, hx of blood clots, recent travel/surgery/immobilization, leg pain, hemoptysis, abdominal pain, urinary sxs, N/V, diarrhea.

## 2022-10-27 NOTE — ED PROVIDER NOTE - PROGRESS NOTE DETAILS
AH - Patient CTA negative for dissection, showing emphysema.  Patient with wheezing, given albuterol nebs, remains on BiPAP for work of breathing.  VBG noted.  Unchanged.  Evaluated by ICU fellow and ICU attending Dr. Elan Moy, recommending stepdown admission.  Signed out to MAR

## 2022-10-27 NOTE — H&P ADULT - HISTORY OF PRESENT ILLNESS
ROS   Positive   Negative fever, chills, nausea, vomiting, chest pain, abdominal pain, sob, cough, constipation, diarrhea, incontinence    ED Vital Signs Last 24 Hrs  T(C): 37.4 (27 Oct 2022 08:40), Max: 37.4 (27 Oct 2022 08:40)  T(F): 99.3 (27 Oct 2022 08:40), Max: 99.3 (27 Oct 2022 08:40)  HR: 99 (27 Oct 2022 15:22) (83 - 100)  BP: 136/63 (27 Oct 2022 15:22) (136/63 - 201/78)  RR: 18 (27 Oct 2022 15:22) (18 - 24)  SpO2: 99% (27 Oct 2022 15:22) (96% - 100%)  O2 Parameters below as of 27 Oct 2022 15:22  Patient On (Oxygen Delivery Method): BiPAP/CPAP    Labs significant for Na 147 -    EKG noted RBBB - bifascicular block   CT angio noted for severe emphysema      83 yo male Greenlandic speaking w/ PMH of HTN, HLD, ascending aortic aneurysm 4.5 cm, COPD not on home O2 presents for SOB that worsened over the past 4 days. He complains of recurrent SOB with heavy exertion but for the past 4 days it has been constant. He denies any associated symptoms including fever, cough, execessive sputum production.   He is complaining of abdominal pain epigastric that started this morning associated with bloating. He denies any recent nausea/vomiting or constipation/diarrhea - he complains of poor oral intake   He also complains of reccurent LE swelling that is intermittent in nature and associated with medication intake  He is taking multiple medications and questionable compliance    ROS as HPI     ED Vital Signs Last 24 Hrs  T(C): 37.4 (27 Oct 2022 08:40), Max: 37.4 (27 Oct 2022 08:40)  T(F): 99.3 (27 Oct 2022 08:40), Max: 99.3 (27 Oct 2022 08:40)  HR: 99 (27 Oct 2022 15:22) (83 - 100)  BP: 136/63 (27 Oct 2022 15:22) (136/63 - 201/78)  RR: 18 (27 Oct 2022 15:22) (18 - 24)  SpO2: 99% (27 Oct 2022 15:22) (96% - 100%)  O2 Parameters below as of 27 Oct 2022 15:22  Patient On (Oxygen Delivery Method): BiPAP/CPAP    Labs significant for Na 147 -    VBG done x2 unremarkable for hypercapnia  EKG noted RBBB - bifascicular block   CT angio noted for severe emphysema - CTA/P non-remarkable   In the ED received solumedrol 125 + inhalers + azithromycin + lasix IV 40   Critical care c/s noted

## 2022-10-27 NOTE — ED PROVIDER NOTE - PHYSICAL EXAMINATION
GENERAL: Appears SOB   SKIN: warm, dry  HEAD: Normocephalic; atraumatic.  EYES: PERRLA, EOMI   CARD: S1, S2 normal; no murmurs, gallops, or rubs. Regular rate and rhythm.   RESP: Crackles noted on auscultation.  Increased WOB noted   ABD: soft, nontender, and nondistended  EXT: 2+ LE pitting edema   NEURO: Alert, oriented, grossly unremarkable  PSYCH: Cooperative, appropriate.

## 2022-10-27 NOTE — ED ADULT NURSE NOTE - NSIMPLEMENTINTERV_GEN_ALL_ED
Implemented All Fall with Harm Risk Interventions:  [place] to call system. Call bell, personal items and telephone within reach. Instruct patient to call for assistance. Room bathroom lighting operational. Non-slip footwear when patient is off stretcher. Physically safe environment: no spills, clutter or unnecessary equipment. Stretcher in lowest position, wheels locked, appropriate side rails in place. Provide visual cue, wrist band, yellow gown, etc. Monitor gait and stability. Monitor for mental status changes and reorient to person, place, and time. Review medications for side effects contributing to fall risk. Reinforce activity limits and safety measures with patient and family. Provide visual clues: red socks.

## 2022-10-27 NOTE — H&P ADULT - NSHPPHYSICALEXAM_GEN_ALL_CORE
LOS:     VITALS:   T(C): 37.4 (10-27-22 @ 08:40), Max: 37.4 (10-27-22 @ 08:40)  HR: 99 (10-27-22 @ 15:22) (83 - 100)  BP: 136/63 (10-27-22 @ 15:22) (136/63 - 201/78)  RR: 18 (10-27-22 @ 15:22) (18 - 24)  SpO2: 99% (10-27-22 @ 15:22) (96% - 100%)    GENERAL: NAD, lying in bed comfortably  NECK: Supple, No JVD  CHEST/LUNG: Bilateral decreased airway sounds with heavy apical wheezing appreciated  HEART: Regular rate and rhythm; No murmurs, rubs, or gallops  ABDOMEN: BSx4; Soft, nontender, distended but non-rigid with mild suprapubic tenderness   EXTREMITIES:  2+ Peripheral Pulses, brisk capillary refill. No clubbing, cyanosis, or edema  NERVOUS SYSTEM:  A&Ox3, no focal deficits   SKIN: No rashes or lesions

## 2022-10-27 NOTE — H&P ADULT - ASSESSMENT
81 yo male Slovenian speaking w/ PMH of HTN, HLD, ascending aortic aneurysm 4.5 cm, COPD not on home O2 presents for SOB that worsened over the past 4 days    #SOB   #Possible COPD exacerbation   #Severe emphysema with chronic hypercapnia   Labs significant for Na 147 -    VBG done x2 unremarkable for hypercapnia  EKG noted RBBB - bifascicular block   CT angio noted for severe emphysema - CTA/P non-remarkable   currently on BIPAP - wheezing bilateral   - wean and titrate O2 as needed   - c/w solumedrol 40 IV OD   - c/w inhalers  - azithromycin   - TTE   - PT consult     #CKD stage 3  #Renal cyst 3 cm on CT   No hydronephrosis on CT   - UA   - prot/crea ratio    #Hx ascending aortic aneurysm   - stable at 4.4cm on CT   #HLD/HTN/BPH/mood disorder  - c/w home meds   - at risk for polypharmacy need extensive review of medications    #DVT - heparin sq     #GI - pantoprazole     #Diet - NPO while on BIPAP - start feeding when weaned off     #Activity - IAT     #Code - Full code     #Disposition - IP  81 yo male Bulgarian speaking w/ PMH of HTN, HLD, ascending aortic aneurysm 4.5 cm, COPD not on home O2 presents for SOB that worsened over the past 4 days    #SOB   #Possible COPD exacerbation   #Severe emphysema with chronic hypercapnia   Labs significant for Na 147 -    VBG done x2 unremarkable for hypercapnia  EKG noted RBBB - bifascicular block   CT angio noted for severe emphysema - CTA/P non-remarkable   currently on BIPAP - wheezing bilateral   - wean and titrate O2 as needed   - c/w solumedrol 40 IV OD   - c/w inhalers  - azithromycin   - TTE   - PT consult     #CKD stage 3  #Renal cyst 3 cm on CT   No hydronephrosis on CT   - UA   - prot/crea ratio    #Hx ascending aortic aneurysm   - stable at 4.4cm on CT   #HLD/HTN/BPH/mood disorder  - c/w home meds   - at risk for polypharmacy need extensive review of medications  - a1c/lipid profile in AM     #DVT - heparin sq     #GI - pantoprazole     #Diet - NPO while on BIPAP - start feeding when weaned off     #Activity - IAT     #Code - Full code     #Disposition - IP  83 yo male Greek speaking w/ PMH of HTN, HLD, ascending aortic aneurysm 4.5 cm, COPD not on home O2 presents for SOB that worsened over the past 4 days    #SOB   #Possible COPD exacerbation   #Severe emphysema with chronic hypercapnia   Labs significant for Na 147 -    VBG done x2 unremarkable for hypercapnia  EKG noted RBBB - bifascicular block   CT angio noted for severe emphysema - CTA/P non-remarkable   currently on BIPAP - wheezing bilateral   - wean and titrate O2 as needed   - c/w solumedrol 40 IV OD   - c/w inhalers  - azithromycin   - TTE   - PT consult     #Abdominal bloating with pain central to epigastric  #mild GB distention on CT   CT A/P noted   Patient has not had any bowel movements in the past 3 days   - Lactulose PRN for constipation   - consider RUQ US - mild GB distention on CT     #CKD stage 3  #Renal cyst 3 cm on CT   No hydronephrosis on CT   - UA   - prot/crea ratio    #Hx ascending aortic aneurysm   - stable at 4.4cm on CT   #HLD/HTN/BPH/mood disorder  - c/w home meds   - at risk for polypharmacy need extensive review of medications  - a1c/lipid profile in AM     #DVT - heparin sq     #GI - pantoprazole     #Diet - NPO while on BIPAP - start feeding when weaned off     #Activity - IAT     #Code - Full code     #Disposition - IP

## 2022-10-27 NOTE — ED ADULT NURSE NOTE - OBJECTIVE STATEMENT
BIB son from home for SOB since Sunday, was sent in by MD for CXR. Pt. was found to be 87% on RA in triage, put on 3LNC now 99%.

## 2022-10-27 NOTE — CONSULT NOTE ADULT - SUBJECTIVE AND OBJECTIVE BOX
Patient is a 82y old  Male who presents with a chief complaint of     HPI:      PAST MEDICAL & SURGICAL HISTORY:  HTN (hypertension)      HLD (hyperlipidemia)      BPH (benign prostatic hyperplasia)      No significant past surgical history          Occupational hx:    Social hx:    FAMILY HISTORY:  No pertinent family history in first degree relatives    :  No known cardiovascular family history    ROS:  See HPI     Allergies    No Known Allergies    Intolerances          PHYSICAL EXAM    ICU Vital Signs Last 24 Hrs  T(C): 37.4 (27 Oct 2022 08:40), Max: 37.4 (27 Oct 2022 08:40)  T(F): 99.3 (27 Oct 2022 08:40), Max: 99.3 (27 Oct 2022 08:40)  HR: 83 (27 Oct 2022 10:11) (83 - 100)  BP: 159/72 (27 Oct 2022 10:11) (159/72 - 201/78)  BP(mean): --  ABP: --  ABP(mean): --  RR: 18 (27 Oct 2022 10:11) (18 - 24)  SpO2: 100% (27 Oct 2022 10:11) (96% - 100%)    O2 Parameters below as of 27 Oct 2022 10:11  Patient On (Oxygen Delivery Method): ventilator            CONSTITUTIONAL: No acute distress, well-developed, well-groomed, AAOx3  HEAD: Atraumatic, normocephalic  EYES: EOM intact, PERRLA, conjunctiva and sclera clear  ENT: Supple, no masses, no thyromegaly, no bruits, no JVD; moist mucous membranes  PULMONARY: Tachypneic. Mild wheezing bilaterally  CARDIOVASCULAR: Regular rate and rhythm; no murmurs, rubs, or gallops  GASTROINTESTINAL: Soft, non-tender, non-distended; bowel sounds present  MUSCULOSKELETAL: 2+ peripheral pulses; no clubbing, no cyanosis, no edema  NEUROLOGY: non-focal  SKIN: No rashes or lesions; warm and dry        LABS:                          12.4   9.44  )-----------( 194      ( 27 Oct 2022 09:50 )             35.9                                               10-27    147<H>  |  105  |  12  ----------------------------<  114<H>  3.5   |  26  |  1.3    Ca    8.1<L>      27 Oct 2022 09:50    TPro  6.1  /  Alb  3.4<L>  /  TBili  0.9  /  DBili  x   /  AST  30  /  ALT  25  /  AlkPhos  125<H>  10-27      PT/INR - ( 27 Oct 2022 09:50 )   PT: 12.20 sec;   INR: 1.07 ratio         PTT - ( 27 Oct 2022 09:50 )  PTT:32.8 sec                                           CARDIAC MARKERS ( 27 Oct 2022 09:50 )  x     / <0.01 ng/mL / x     / x     / x                                                LIVER FUNCTIONS - ( 27 Oct 2022 09:50 )  Alb: 3.4 g/dL / Pro: 6.1 g/dL / ALK PHOS: 125 U/L / ALT: 25 U/L / AST: 30 U/L / GGT: x                                                                                                                                       CXR reviewed by myself: possible RLL infiltrate, hyperinflated lungs    MEDICATIONS  (STANDING):    MEDICATIONS  (PRN):

## 2022-10-27 NOTE — H&P ADULT - ATTENDING COMMENTS
Patient seen and examined at bedside independently of the residents. I read the resident's note and agree with the plan with the additions and corrections as noted below.    REVIEW OF SYSTEMS:  CONSTITUTIONAL: No weakness, fevers or chills  EYES/ENT: No visual changes;  No vertigo or throat pain   NECK: No pain or stiffness  RESPIRATORY: No cough, wheezing, hemoptysis; No shortness of breath  CARDIOVASCULAR: No chest pain or palpitations  GASTROINTESTINAL: No abdominal or epigastric pain. No nausea, vomiting, or hematemesis; No diarrhea or constipation. No melena or hematochezia.  GENITOURINARY: No dysuria, frequency or hematuria  NEUROLOGICAL: No numbness or weakness  MSK: No pain. No weakness.   SKIN: No itching, rashes.     PMH:  HTN, HLD, AAA (4.5 cm), COPD/Emphysema    FHx: Reviewed. Not relevant.     Physical Exam:  GEN: No acute distress. A & O x 3.   Head: Atraumatic, Normocephalic.   Eye: PEERLA. No sclera icterus. EOMI.   ENT: Normal oropharynx, no thyromegaly.   LUNGS: Clear to auscultation bilaterally. No wheeze/rales/crackles.   HEART: Normal. S1/S2 present. RRR. No murmur/gallops.   ABD: Soft, non-tender, non-distended. Bowel sounds present.   EXT: No pitting edema.   Integumentary: No rash, No petechia.   NEURO: CN III-XII intact. Strength: 5/5 b/l ULE. Sensory intact b/l ULE.     Vital Signs Last 24 Hrs  T(C): 37.4 (27 Oct 2022 08:40), Max: 37.4 (27 Oct 2022 08:40)  T(F): 99.3 (27 Oct 2022 08:40), Max: 99.3 (27 Oct 2022 08:40)  HR: 99 (27 Oct 2022 15:22) (83 - 100)  BP: 136/63 (27 Oct 2022 15:22) (136/63 - 201/78)  BP(mean): --  RR: 18 (27 Oct 2022 15:22) (18 - 24)  SpO2: 100% (27 Oct 2022 20:27) (96% - 100%)    Parameters below as of 27 Oct 2022 15:22  Patient On (Oxygen Delivery Method): BiPAP/CPAP      Please see the above notes for Labs and radiology.     Assessment and Plan:     81 yo M with hx of HTN, HLD, AAA (4.5 cm), COPD/Emphysema presents to ED for 4 days hx of worsening SOB.     SOB likely 2/2 COPD exacerbation   - CTA chest: No aortic dissection. Severe emphysema. Small and large airways inflammation. Secretions in the right mainstem bronchus.  - c/w IV solumedrol 40mg TID   - albuterol q6h and q4h prn   - c/w azithromycin   - s/p IV lasix 40mg x 1 in ED. check TTE   - f/u Pulmonary     Hypertensive urgency (resolved)  - monitor BP closely.     Abdominal epigastric pain and bloating  - CTA abdomen shows distended gallbladder.   - check RUQ US  - bowel regimen for constipation.     Lung nodule?   - CT chest shows Lingular nodular opacity.  - need outpatient follow up with repeat CT in 3 month.     Ascending Aortic aneurysm - stable. follow up outpatient.     HTN/HLD - c/w home med  BPH - c/w flomax      DVT ppx: heparin SC   GI ppx: PPI  Diet: NPO while on BiPAP   Activity: as tolerated.     Date seen by the attending: 10/27/2022. Patient seen and examined at bedside independently of the residents. I read the resident's note and agree with the plan with the additions and corrections as noted below.    REVIEW OF SYSTEMS:  CONSTITUTIONAL: No weakness, fevers or chills  EYES/ENT: No visual changes;  No vertigo or throat pain   NECK: No pain or stiffness  RESPIRATORY: No cough, wheezing, hemoptysis; SOB.   CARDIOVASCULAR: No chest pain or palpitations  GASTROINTESTINAL: No abdominal or epigastric pain. No nausea, vomiting, or hematemesis; No diarrhea or constipation. No melena or hematochezia.  GENITOURINARY: No dysuria, frequency or hematuria  NEUROLOGICAL: No numbness or weakness  MSK: No pain. No weakness.   SKIN: No itching, rashes.     PMH:  HTN, HLD, AAA (4.5 cm), COPD/Emphysema    FHx: Reviewed. Not relevant.     Physical Exam:  GEN: No acute distress. A & O x 3.   Head: Atraumatic, Normocephalic.   Eye: PEERLA. No sclera icterus. EOMI.   ENT: Normal oropharynx, no thyromegaly.   LUNGS: wheezing b/l lung fields.   HEART: Normal. S1/S2 present. RRR. No murmur/gallops.   ABD: Soft, non-tender, non-distended. Bowel sounds present.   EXT: No pitting edema.   Integumentary: No rash, No petechia.   NEURO: CN III-XII intact. Strength: 5/5 b/l ULE. Sensory intact b/l ULE.     Vital Signs Last 24 Hrs  T(C): 37.4 (27 Oct 2022 08:40), Max: 37.4 (27 Oct 2022 08:40)  T(F): 99.3 (27 Oct 2022 08:40), Max: 99.3 (27 Oct 2022 08:40)  HR: 99 (27 Oct 2022 15:22) (83 - 100)  BP: 136/63 (27 Oct 2022 15:22) (136/63 - 201/78)  BP(mean): --  RR: 18 (27 Oct 2022 15:22) (18 - 24)  SpO2: 100% (27 Oct 2022 20:27) (96% - 100%)    Parameters below as of 27 Oct 2022 15:22  Patient On (Oxygen Delivery Method): BiPAP/CPAP      Please see the above notes for Labs and radiology.     Assessment and Plan:     83 yo M with hx of HTN, HLD, AAA (4.5 cm), COPD/Emphysema presents to ED for 4 days hx of worsening SOB.     SOB likely 2/2 COPD exacerbation   - CTA chest: No aortic dissection. Severe emphysema. Small and large airways inflammation. Secretions in the right mainstem bronchus.  - c/w IV solumedrol 40mg TID   - albuterol q6h and q4h prn   - c/w azithromycin   - s/p IV lasix 40mg x 1 in ED. check TTE   - f/u Pulmonary     Hypertensive urgency (resolved)  - monitor BP closely.     Abdominal epigastric pain and bloating  - CTA abdomen shows distended gallbladder.   - check RUQ US  - bowel regimen for constipation.     Lung nodule?   - CT chest shows Lingular nodular opacity.  - need outpatient follow up with repeat CT in 3 month.     Ascending Aortic aneurysm - stable. follow up outpatient.     HTN/HLD - c/w home med    DVT ppx: heparin SC   GI ppx: PPI  Diet: NPO while on BiPAP   Activity: as tolerated.     Date seen by the attending: 10/27/2022.

## 2022-10-27 NOTE — H&P ADULT - NSHPLABSRESULTS_GEN_ALL_CORE
12.4   9.44  )-----------( 194      ( 27 Oct 2022 09:50 )             35.9       10-27    147<H>  |  105  |  12  ----------------------------<  114<H>  3.5   |  26  |  1.3    Ca    8.1<L>      27 Oct 2022 09:50    TPro  6.1  /  Alb  3.4<L>  /  TBili  0.9  /  DBili  x   /  AST  30  /  ALT  25  /  AlkPhos  125<H>  10-27                  PT/INR - ( 27 Oct 2022 09:50 )   PT: 12.20 sec;   INR: 1.07 ratio         PTT - ( 27 Oct 2022 09:50 )  PTT:32.8 sec    Lactate Trend      CARDIAC MARKERS ( 27 Oct 2022 09:50 )  x     / <0.01 ng/mL / x     / x     / x            CAPILLARY BLOOD GLUCOSE

## 2022-10-28 LAB
A1C WITH ESTIMATED AVERAGE GLUCOSE RESULT: 5.9 % — HIGH (ref 4–5.6)
ALBUMIN SERPL ELPH-MCNC: 3.6 G/DL — SIGNIFICANT CHANGE UP (ref 3.5–5.2)
ALP SERPL-CCNC: 109 U/L — SIGNIFICANT CHANGE UP (ref 30–115)
ALT FLD-CCNC: 28 U/L — SIGNIFICANT CHANGE UP (ref 0–41)
ANION GAP SERPL CALC-SCNC: 11 MMOL/L — SIGNIFICANT CHANGE UP (ref 7–14)
APPEARANCE UR: CLEAR — SIGNIFICANT CHANGE UP
AST SERPL-CCNC: 39 U/L — SIGNIFICANT CHANGE UP (ref 0–41)
BASOPHILS # BLD AUTO: 0.01 K/UL — SIGNIFICANT CHANGE UP (ref 0–0.2)
BASOPHILS NFR BLD AUTO: 0.1 % — SIGNIFICANT CHANGE UP (ref 0–1)
BILIRUB SERPL-MCNC: 0.7 MG/DL — SIGNIFICANT CHANGE UP (ref 0.2–1.2)
BILIRUB UR-MCNC: NEGATIVE — SIGNIFICANT CHANGE UP
BUN SERPL-MCNC: 21 MG/DL — HIGH (ref 10–20)
CALCIUM SERPL-MCNC: 8.1 MG/DL — LOW (ref 8.4–10.5)
CHLORIDE SERPL-SCNC: 99 MMOL/L — SIGNIFICANT CHANGE UP (ref 98–110)
CHOLEST SERPL-MCNC: 122 MG/DL — SIGNIFICANT CHANGE UP
CO2 SERPL-SCNC: 28 MMOL/L — SIGNIFICANT CHANGE UP (ref 17–32)
COLOR SPEC: YELLOW — SIGNIFICANT CHANGE UP
CREAT ?TM UR-MCNC: 158 MG/DL — SIGNIFICANT CHANGE UP
CREAT SERPL-MCNC: 1.1 MG/DL — SIGNIFICANT CHANGE UP (ref 0.7–1.5)
DIFF PNL FLD: NEGATIVE — SIGNIFICANT CHANGE UP
EGFR: 67 ML/MIN/1.73M2 — SIGNIFICANT CHANGE UP
EOSINOPHIL # BLD AUTO: 0 K/UL — SIGNIFICANT CHANGE UP (ref 0–0.7)
EOSINOPHIL NFR BLD AUTO: 0 % — SIGNIFICANT CHANGE UP (ref 0–8)
ESTIMATED AVERAGE GLUCOSE: 123 MG/DL — HIGH (ref 68–114)
GLUCOSE SERPL-MCNC: 155 MG/DL — HIGH (ref 70–99)
GLUCOSE UR QL: NEGATIVE — SIGNIFICANT CHANGE UP
HCT VFR BLD CALC: 34.7 % — LOW (ref 42–52)
HDLC SERPL-MCNC: 28 MG/DL — LOW
HGB BLD-MCNC: 12.1 G/DL — LOW (ref 14–18)
IMM GRANULOCYTES NFR BLD AUTO: 0.7 % — HIGH (ref 0.1–0.3)
KETONES UR-MCNC: ABNORMAL
LEUKOCYTE ESTERASE UR-ACNC: NEGATIVE — SIGNIFICANT CHANGE UP
LIPID PNL WITH DIRECT LDL SERPL: 76 MG/DL — SIGNIFICANT CHANGE UP
LYMPHOCYTES # BLD AUTO: 0.78 K/UL — LOW (ref 1.2–3.4)
LYMPHOCYTES # BLD AUTO: 10.6 % — LOW (ref 20.5–51.1)
MCHC RBC-ENTMCNC: 31.6 PG — HIGH (ref 27–31)
MCHC RBC-ENTMCNC: 34.9 G/DL — SIGNIFICANT CHANGE UP (ref 32–37)
MCV RBC AUTO: 90.6 FL — SIGNIFICANT CHANGE UP (ref 80–94)
MONOCYTES # BLD AUTO: 0.3 K/UL — SIGNIFICANT CHANGE UP (ref 0.1–0.6)
MONOCYTES NFR BLD AUTO: 4.1 % — SIGNIFICANT CHANGE UP (ref 1.7–9.3)
MRSA PCR RESULT.: NEGATIVE — SIGNIFICANT CHANGE UP
NEUTROPHILS # BLD AUTO: 6.24 K/UL — SIGNIFICANT CHANGE UP (ref 1.4–6.5)
NEUTROPHILS NFR BLD AUTO: 84.5 % — HIGH (ref 42.2–75.2)
NITRITE UR-MCNC: NEGATIVE — SIGNIFICANT CHANGE UP
NON HDL CHOLESTEROL: 94 MG/DL — SIGNIFICANT CHANGE UP
NRBC # BLD: 0 /100 WBCS — SIGNIFICANT CHANGE UP (ref 0–0)
PH UR: 6 — SIGNIFICANT CHANGE UP (ref 5–8)
PLATELET # BLD AUTO: 216 K/UL — SIGNIFICANT CHANGE UP (ref 130–400)
POTASSIUM SERPL-MCNC: 3.9 MMOL/L — SIGNIFICANT CHANGE UP (ref 3.5–5)
POTASSIUM SERPL-SCNC: 3.9 MMOL/L — SIGNIFICANT CHANGE UP (ref 3.5–5)
PROT ?TM UR-MCNC: 23 MG/DLG/24H — SIGNIFICANT CHANGE UP
PROT SERPL-MCNC: 5.9 G/DL — LOW (ref 6–8)
PROT UR-MCNC: SIGNIFICANT CHANGE UP
PROT/CREAT UR-RTO: 0.1 RATIO — SIGNIFICANT CHANGE UP (ref 0–0.2)
RAPID RVP RESULT: SIGNIFICANT CHANGE UP
RBC # BLD: 3.83 M/UL — LOW (ref 4.7–6.1)
RBC # FLD: 12.3 % — SIGNIFICANT CHANGE UP (ref 11.5–14.5)
SARS-COV-2 RNA SPEC QL NAA+PROBE: SIGNIFICANT CHANGE UP
SODIUM SERPL-SCNC: 138 MMOL/L — SIGNIFICANT CHANGE UP (ref 135–146)
SP GR SPEC: 1.03 — SIGNIFICANT CHANGE UP (ref 1.01–1.03)
TRIGL SERPL-MCNC: 90 MG/DL — SIGNIFICANT CHANGE UP
UROBILINOGEN FLD QL: SIGNIFICANT CHANGE UP
WBC # BLD: 7.38 K/UL — SIGNIFICANT CHANGE UP (ref 4.8–10.8)
WBC # FLD AUTO: 7.38 K/UL — SIGNIFICANT CHANGE UP (ref 4.8–10.8)

## 2022-10-28 PROCEDURE — 71045 X-RAY EXAM CHEST 1 VIEW: CPT | Mod: 26

## 2022-10-28 PROCEDURE — 99233 SBSQ HOSP IP/OBS HIGH 50: CPT

## 2022-10-28 PROCEDURE — 76705 ECHO EXAM OF ABDOMEN: CPT | Mod: 26

## 2022-10-28 PROCEDURE — 74018 RADEX ABDOMEN 1 VIEW: CPT | Mod: 26

## 2022-10-28 RX ORDER — FLUTICASONE FUROATE AND VILANTEROL TRIFENATATE 100; 25 UG/1; UG/1
1 POWDER RESPIRATORY (INHALATION)
Qty: 0 | Refills: 0 | DISCHARGE

## 2022-10-28 RX ORDER — CHLORHEXIDINE GLUCONATE 213 G/1000ML
1 SOLUTION TOPICAL DAILY
Refills: 0 | Status: DISCONTINUED | OUTPATIENT
Start: 2022-10-28 | End: 2022-11-03

## 2022-10-28 RX ORDER — POLYETHYLENE GLYCOL 3350 17 G/17G
17 POWDER, FOR SOLUTION ORAL EVERY 12 HOURS
Refills: 0 | Status: DISCONTINUED | OUTPATIENT
Start: 2022-10-28 | End: 2022-10-31

## 2022-10-28 RX ORDER — FINASTERIDE 5 MG/1
1 TABLET, FILM COATED ORAL
Qty: 0 | Refills: 0 | DISCHARGE

## 2022-10-28 RX ORDER — METOPROLOL TARTRATE 50 MG
25 TABLET ORAL DAILY
Refills: 0 | Status: DISCONTINUED | OUTPATIENT
Start: 2022-10-28 | End: 2022-11-03

## 2022-10-28 RX ORDER — QUETIAPINE FUMARATE 200 MG/1
100 TABLET, FILM COATED ORAL AT BEDTIME
Refills: 0 | Status: DISCONTINUED | OUTPATIENT
Start: 2022-10-28 | End: 2022-11-03

## 2022-10-28 RX ORDER — TAMSULOSIN HYDROCHLORIDE 0.4 MG/1
0.4 CAPSULE ORAL AT BEDTIME
Refills: 0 | Status: DISCONTINUED | OUTPATIENT
Start: 2022-10-28 | End: 2022-11-03

## 2022-10-28 RX ORDER — LISINOPRIL 2.5 MG/1
20 TABLET ORAL DAILY
Refills: 0 | Status: DISCONTINUED | OUTPATIENT
Start: 2022-10-28 | End: 2022-11-03

## 2022-10-28 RX ORDER — MIRTAZAPINE 45 MG/1
1 TABLET, ORALLY DISINTEGRATING ORAL
Qty: 0 | Refills: 0 | DISCHARGE

## 2022-10-28 RX ORDER — MAGNESIUM OXIDE 400 MG ORAL TABLET 241.3 MG
400 TABLET ORAL DAILY
Refills: 0 | Status: DISCONTINUED | OUTPATIENT
Start: 2022-10-28 | End: 2022-11-03

## 2022-10-28 RX ORDER — FUROSEMIDE 40 MG
40 TABLET ORAL DAILY
Refills: 0 | Status: DISCONTINUED | OUTPATIENT
Start: 2022-10-28 | End: 2022-11-03

## 2022-10-28 RX ORDER — CEFTRIAXONE 500 MG/1
1000 INJECTION, POWDER, FOR SOLUTION INTRAMUSCULAR; INTRAVENOUS EVERY 24 HOURS
Refills: 0 | Status: DISCONTINUED | OUTPATIENT
Start: 2022-10-28 | End: 2022-10-30

## 2022-10-28 RX ORDER — MONTELUKAST 4 MG/1
10 TABLET, CHEWABLE ORAL DAILY
Refills: 0 | Status: DISCONTINUED | OUTPATIENT
Start: 2022-10-28 | End: 2022-11-03

## 2022-10-28 RX ORDER — OMEPRAZOLE 10 MG/1
1 CAPSULE, DELAYED RELEASE ORAL
Qty: 0 | Refills: 0 | DISCHARGE

## 2022-10-28 RX ORDER — SERTRALINE 25 MG/1
50 TABLET, FILM COATED ORAL DAILY
Refills: 0 | Status: DISCONTINUED | OUTPATIENT
Start: 2022-10-28 | End: 2022-11-03

## 2022-10-28 RX ORDER — ATORVASTATIN CALCIUM 80 MG/1
20 TABLET, FILM COATED ORAL AT BEDTIME
Refills: 0 | Status: DISCONTINUED | OUTPATIENT
Start: 2022-10-28 | End: 2022-11-03

## 2022-10-28 RX ORDER — ASPIRIN/CALCIUM CARB/MAGNESIUM 324 MG
1 TABLET ORAL
Qty: 0 | Refills: 0 | DISCHARGE

## 2022-10-28 RX ORDER — SPIRONOLACTONE 25 MG/1
25 TABLET, FILM COATED ORAL DAILY
Refills: 0 | Status: DISCONTINUED | OUTPATIENT
Start: 2022-10-28 | End: 2022-11-03

## 2022-10-28 RX ADMIN — Medication 25 MILLIGRAM(S): at 17:32

## 2022-10-28 RX ADMIN — CHLORHEXIDINE GLUCONATE 1 APPLICATION(S): 213 SOLUTION TOPICAL at 11:13

## 2022-10-28 RX ADMIN — SPIRONOLACTONE 25 MILLIGRAM(S): 25 TABLET, FILM COATED ORAL at 17:32

## 2022-10-28 RX ADMIN — POLYETHYLENE GLYCOL 3350 17 GRAM(S): 17 POWDER, FOR SOLUTION ORAL at 17:33

## 2022-10-28 RX ADMIN — Medication 40 MILLIGRAM(S): at 21:07

## 2022-10-28 RX ADMIN — Medication 3 MILLILITER(S): at 19:41

## 2022-10-28 RX ADMIN — HEPARIN SODIUM 5000 UNIT(S): 5000 INJECTION INTRAVENOUS; SUBCUTANEOUS at 17:33

## 2022-10-28 RX ADMIN — MAGNESIUM OXIDE 400 MG ORAL TABLET 400 MILLIGRAM(S): 241.3 TABLET ORAL at 17:32

## 2022-10-28 RX ADMIN — Medication 110 MILLIGRAM(S): at 17:31

## 2022-10-28 RX ADMIN — Medication 1 ENEMA: at 11:13

## 2022-10-28 RX ADMIN — TAMSULOSIN HYDROCHLORIDE 0.4 MILLIGRAM(S): 0.4 CAPSULE ORAL at 21:07

## 2022-10-28 RX ADMIN — QUETIAPINE FUMARATE 100 MILLIGRAM(S): 200 TABLET, FILM COATED ORAL at 21:07

## 2022-10-28 RX ADMIN — CEFTRIAXONE 100 MILLIGRAM(S): 500 INJECTION, POWDER, FOR SOLUTION INTRAMUSCULAR; INTRAVENOUS at 11:13

## 2022-10-28 RX ADMIN — PANTOPRAZOLE SODIUM 40 MILLIGRAM(S): 20 TABLET, DELAYED RELEASE ORAL at 06:43

## 2022-10-28 RX ADMIN — Medication 40 MILLIGRAM(S): at 17:31

## 2022-10-28 RX ADMIN — Medication 40 MILLIGRAM(S): at 14:32

## 2022-10-28 RX ADMIN — ATORVASTATIN CALCIUM 20 MILLIGRAM(S): 80 TABLET, FILM COATED ORAL at 21:07

## 2022-10-28 RX ADMIN — MONTELUKAST 10 MILLIGRAM(S): 4 TABLET, CHEWABLE ORAL at 17:32

## 2022-10-28 RX ADMIN — LISINOPRIL 20 MILLIGRAM(S): 2.5 TABLET ORAL at 17:32

## 2022-10-28 RX ADMIN — SERTRALINE 50 MILLIGRAM(S): 25 TABLET, FILM COATED ORAL at 17:32

## 2022-10-28 RX ADMIN — Medication 40 MILLIGRAM(S): at 06:43

## 2022-10-28 RX ADMIN — HEPARIN SODIUM 5000 UNIT(S): 5000 INJECTION INTRAVENOUS; SUBCUTANEOUS at 06:43

## 2022-10-28 RX ADMIN — Medication 5 MILLIGRAM(S): at 21:07

## 2022-10-28 RX ADMIN — Medication 600 MILLIGRAM(S): at 17:31

## 2022-10-28 NOTE — PROGRESS NOTE ADULT - SUBJECTIVE AND OBJECTIVE BOX
LATASHA WATTS  82y, Male  Allergy: No Known Allergies    Hospital Day: 1d    Patient seen and examined earlier today.  he stated that he feels better , He is hungry as he has been NPO for 3 days     PMH/PSH:  PAST MEDICAL & SURGICAL HISTORY:  HTN (hypertension)      HLD (hyperlipidemia)      BPH (benign prostatic hyperplasia)      No significant past surgical history          LAST 24-Hr EVENTS:    VITALS:  T(F): 96.4 (10-28-22 @ 11:47), Max: 96.5 (10-28-22 @ 00:02)  HR: 81 (10-28-22 @ 11:47)  BP: 140/62 (10-28-22 @ 11:47) (136/63 - 156/71)  RR: 18 (10-28-22 @ 11:47)  SpO2: 96% (10-28-22 @ 11:47)          TESTS & MEASUREMENTS:  Weight/BMI  72 (10-28-22 @ 00:02)  27.2 (10-28-22 @ 00:02)    10-27-22 @ 07:01  -  10-28-22 @ 07:00  --------------------------------------------------------  IN: 0 mL / OUT: 200 mL / NET: -200 mL    10-28-22 @ 07:01  -  10-28-22 @ 12:31  --------------------------------------------------------  IN: 0 mL / OUT: 300 mL / NET: -300 mL                            12.1   7.38  )-----------( 216      ( 28 Oct 2022 05:12 )             34.7     PT/INR - ( 27 Oct 2022 09:50 )   PT: 12.20 sec;   INR: 1.07 ratio         PTT - ( 27 Oct 2022 09:50 )  PTT:32.8 sec  INR: 1.07 ratio (10-27-22 @ 09:50)    10-28    138  |  99  |  21<H>  ----------------------------<  155<H>  3.9   |  28  |  1.1    Ca    8.1<L>      28 Oct 2022 05:12    TPro  5.9<L>  /  Alb  3.6  /  TBili  0.7  /  DBili  x   /  AST  39  /  ALT  28  /  AlkPhos  109  10-28    LIVER FUNCTIONS - ( 28 Oct 2022 05:12 )  Alb: 3.6 g/dL / Pro: 5.9 g/dL / ALK PHOS: 109 U/L / ALT: 28 U/L / AST: 39 U/L / GGT: x           CARDIAC MARKERS ( 27 Oct 2022 09:50 )  x     / <0.01 ng/mL / x     / x     / x            Urinalysis Basic - ( 28 Oct 2022 11:32 )    Color: Yellow / Appearance: Clear / S.029 / pH: x  Gluc: x / Ketone: Small  / Bili: Negative / Urobili: <2 mg/dL   Blood: x / Protein: Trace / Nitrite: Negative   Leuk Esterase: Negative / RBC: x / WBC x   Sq Epi: x / Non Sq Epi: x / Bacteria: x            Serum Pro-Brain Natriuretic Peptide: 607 pg/mL (10-27-22 @ 09:50)    COVID-19 PCR: NotDetec (10-27-22 @ 09:50)        A1C with Estimated Average Glucose Result: 5.9 % (10-28-22 @ 05:12)          RADIOLOGY, ECG, & ADDITIONAL TESTS:  12 Lead ECG:   Ventricular Rate 93 BPM    Atrial Rate 93 BPM    P-R Interval 194 ms    QRS Duration 132 ms    Q-T Interval 412 ms    QTC Calculation(Bazett) 512 ms    P Axis 68 degrees    R Axis -80 degrees    T Axis 70 degrees    Diagnosis Line Normal sinus rhythm  Right bundle branch block  Left anterior fascicular block  *** Bifascicular block ***  Abnormal ECG    Confirmed by Amauri Meredith ([id number]) on 10/27/2022 11:42:07 AM (10-27-22 @ 08:51)      RECENT DIAGNOSTIC ORDERS:  Hepatic Function Panel: 23:30 (10-28-22 @ 11:27)  Magnesium, Serum: 23:30 (10-28-22 @ 11:26)  Basic Metabolic Panel: 23:30 (10-28-22 @ 11:26)  Complete Blood Count + Automated Diff: 23:30 (10-28-22 @ 11:26)  Diet, DASH/TLC:   Sodium & Cholesterol Restricted (10-28-22 @ 09:08)  Culture - Blood: 11:00  Specimen Source: Blood (10-28-22 @ 08:33)  Culture - Urine: Routine  Specimen Source: Clean Catch (Midstream)  Addl Info: If indwelling urinary catheter > 14 days, obtain an order to remove and replace prior to c (10-28-22 @ 08:33)  Legionella pneumophila Antigen, Urine: Routine (10-28-22 @ 08:33)  MRSA/MSSA PCR: Routine (10-28-22 @ 08:33)  Culture - Sputum: Routine  Specimen Source: Sputum (10-28-22 @ 08:33)  Respiratory Viral Panel with COVID-19 by OMAR: Routine (10-28-22 @ 08:33)  Protein/Creatinine Ratio, Urine: Routine (10-27-22 @ 18:40)  TTE Echo Complete w/o Contrast w/ Doppler:   Transport: Stretcher-Crib  Monitor: w/o Monitor (10-27-22 @ 18:35)      MEDICATIONS:  MEDICATIONS  (STANDING):  albuterol/ipratropium for Nebulization 3 milliLiter(s) Nebulizer every 6 hours  bisacodyl 5 milliGRAM(s) Oral at bedtime  budesonide  80 MICROgram(s)/formoterol 4.5 MICROgram(s) Inhaler 2 Puff(s) Inhalation two times a day  cefTRIAXone   IVPB 1000 milliGRAM(s) IV Intermittent every 24 hours  chlorhexidine 2% Cloths 1 Application(s) Topical daily  doxycycline IVPB 100 milliGRAM(s) IV Intermittent every 12 hours  heparin   Injectable 5000 Unit(s) SubCutaneous every 12 hours  methylPREDNISolone sodium succinate Injectable 40 milliGRAM(s) IV Push every 8 hours  pantoprazole    Tablet 40 milliGRAM(s) Oral before breakfast  polyethylene glycol 3350 17 Gram(s) Oral every 12 hours    MEDICATIONS  (PRN):  acetaminophen     Tablet .. 650 milliGRAM(s) Oral every 6 hours PRN Temp greater or equal to 38C (100.4F), Mild Pain (1 - 3)  albuterol/ipratropium for Nebulization 3 milliLiter(s) Nebulizer every 4 hours PRN Shortness of Breath and/or Wheezing  aluminum hydroxide/magnesium hydroxide/simethicone Suspension 30 milliLiter(s) Oral every 4 hours PRN Dyspepsia  melatonin 3 milliGRAM(s) Oral at bedtime PRN Insomnia  ondansetron Injectable 4 milliGRAM(s) IV Push every 8 hours PRN Nausea and/or Vomiting      HOME MEDICATIONS:  Albuterol (Eqv-ProAir HFA) 90 mcg/inh inhalation aerosol (10-27)  aspirin 81 mg oral tablet (10-27)  atorvastatin 10 mg oral tablet (10-27)  Breo Ellipta 200 mcg-25 mcg/inh inhalation powder (10-27)  finasteride 5 mg oral tablet (10-27)  furosemide 20 mg oral tablet (10-27)  lisinopril 20 mg oral tablet (10-27)  metoprolol succinate 25 mg oral tablet, extended release (10-27)  mirtazapine 15 mg oral tablet (10-27)  montelukast 10 mg oral tablet (10-27)  omeprazole 20 mg oral delayed release capsule (10-27)  predniSONE 20 mg oral tablet (10-27)  SEROquel XR 50 mg oral tablet, extended release (10-27)  spironolactone 25 mg oral tablet (10-27)  Vascepa 1 g oral capsule (10-27)      PHYSICAL EXAM:  GENERAL: awake, alert, NAD   CHEST/LUNG:  decrease breath sound b/l   HEART:  regular rhythm   ABDOMEN:  soft, non tender, +distended    EXTREMITIES:  no edema

## 2022-10-28 NOTE — PROGRESS NOTE ADULT - SUBJECTIVE AND OBJECTIVE BOX
LATASHA WATTS 82y Male  MRN#: [id number]     Hospital Day: 1d    Pt is currently admitted with the primary diagnosis of  COPD EXACERBATION        SUBJECTIVE     Patient was seen this morning. only complaining of constipation. no chest pain.                                             ----------------------------------------------------------  OBJECTIVE  PAST MEDICAL & SURGICAL HISTORY  HTN (hypertension)    HLD (hyperlipidemia)    BPH (benign prostatic hyperplasia)    No significant past surgical history                                              -----------------------------------------------------------  ALLERGIES:  No Known Allergies                                            ------------------------------------------------------------    HOME MEDICATIONS  Home Medications:  Albuterol (Eqv-ProAir HFA) 90 mcg/inh inhalation aerosol: 2 puff(s) inhaled every 6 hours (27 Oct 2022 17:37)  aspirin 81 mg oral tablet: 1 tab(s) orally once a day (27 Oct 2022 17:37)  atorvastatin 10 mg oral tablet: 2 tab(s) orally once a day (27 Oct 2022 17:37)  Breo Ellipta 200 mcg-25 mcg/inh inhalation powder: 1 puff(s) inhaled once a day (27 Oct 2022 17:37)  finasteride 5 mg oral tablet: 1 tab(s) orally once a day (27 Oct 2022 17:37)  mirtazapine 15 mg oral tablet: 1 tab(s) orally once a day (at bedtime) (27 Oct 2022 17:37)  montelukast 10 mg oral tablet: 1 tab(s) orally once a day (27 Oct 2022 17:37)  omeprazole 20 mg oral delayed release capsule: 1 cap(s) orally once a day (27 Oct 2022 17:37)  predniSONE 20 mg oral tablet: 0.5 tab(s) orally once a day (27 Oct 2022 17:37)  SEROquel XR 50 mg oral tablet, extended release: 3 tab(s) orally once a day (in the evening) (27 Oct 2022 17:37)  spironolactone 25 mg oral tablet: 1 tab(s) orally once a day (27 Oct 2022 17:37)  Vascepa 1 g oral capsule: 2 cap(s) orally 2 times a day (27 Oct 2022 17:37)                           MEDICATIONS:  STANDING MEDICATIONS  albuterol/ipratropium for Nebulization 3 milliLiter(s) Nebulizer every 6 hours  bisacodyl 5 milliGRAM(s) Oral at bedtime  budesonide  80 MICROgram(s)/formoterol 4.5 MICROgram(s) Inhaler 2 Puff(s) Inhalation two times a day  cefTRIAXone   IVPB 1000 milliGRAM(s) IV Intermittent every 24 hours  chlorhexidine 2% Cloths 1 Application(s) Topical daily  doxycycline IVPB 100 milliGRAM(s) IV Intermittent every 12 hours  heparin   Injectable 5000 Unit(s) SubCutaneous every 12 hours  methylPREDNISolone sodium succinate Injectable 40 milliGRAM(s) IV Push every 8 hours  pantoprazole    Tablet 40 milliGRAM(s) Oral before breakfast  polyethylene glycol 3350 17 Gram(s) Oral every 12 hours    PRN MEDICATIONS  acetaminophen     Tablet .. 650 milliGRAM(s) Oral every 6 hours PRN  albuterol/ipratropium for Nebulization 3 milliLiter(s) Nebulizer every 4 hours PRN  aluminum hydroxide/magnesium hydroxide/simethicone Suspension 30 milliLiter(s) Oral every 4 hours PRN  melatonin 3 milliGRAM(s) Oral at bedtime PRN  ondansetron Injectable 4 milliGRAM(s) IV Push every 8 hours PRN                                            ------------------------------------------------------------  VITAL SIGNS: Last 24 Hours  T(C): 35.7 (28 Oct 2022 15:50), Max: 35.8 (28 Oct 2022 00:02)  T(F): 96.2 (28 Oct 2022 15:50), Max: 96.5 (28 Oct 2022 00:02)  HR: 77 (28 Oct 2022 15:50) (74 - 81)  BP: 148/65 (28 Oct 2022 15:50) (140/62 - 156/71)  BP(mean): 89 (28 Oct 2022 11:47) (89 - 95)  RR: 20 (28 Oct 2022 15:50) (18 - 20)  SpO2: 92% (28 Oct 2022 15:50) (92% - 100%)      10-27-22 @ 07:01  -  10-28-22 @ 07:00  --------------------------------------------------------  IN: 0 mL / OUT: 200 mL / NET: -200 mL    10-28-22 @ 07:01  -  10-28-22 @ 16:02  --------------------------------------------------------  IN: 0 mL / OUT: 300 mL / NET: -300 mL                                             --------------------------------------------------------------  LABS:                        12.1   7.38  )-----------( 216      ( 28 Oct 2022 05:12 )             34.7     10-28    138  |  99  |  21<H>  ----------------------------<  155<H>  3.9   |  28  |  1.1    Ca    8.1<L>      28 Oct 2022 05:12    TPro  5.9<L>  /  Alb  3.6  /  TBili  0.7  /  DBili  x   /  AST  39  /  ALT  28  /  AlkPhos  109  10    PT/INR - ( 27 Oct 2022 09:50 )   PT: 12.20 sec;   INR: 1.07 ratio         PTT - ( 27 Oct 2022 09:50 )  PTT:32.8 sec  Urinalysis Basic - ( 28 Oct 2022 11:32 )    Color: Yellow / Appearance: Clear / S.029 / pH: x  Gluc: x / Ketone: Small  / Bili: Negative / Urobili: <2 mg/dL   Blood: x / Protein: Trace / Nitrite: Negative   Leuk Esterase: Negative / RBC: x / WBC x   Sq Epi: x / Non Sq Epi: x / Bacteria: x                CARDIAC MARKERS ( 27 Oct 2022 09:50 )  x     / <0.01 ng/mL / x     / x     / x                                                  -------------------------------------------------------------  RADIOLOGY:                                            --------------------------------------------------------------    PHYSICAL EXAM:  GENERAL: NAD, lying in bed comfortably  NERVOUS SYSTEM:  Alert & Oriented X3   CHEST/LUNG: Clear  HEART: regular rate and rhythm; No murmurs heard  ABDOMEN: Soft, distended, Bowel sounds present  EXTREMITIES: No edema.                                          --------------------------------------------------------------

## 2022-10-28 NOTE — PROGRESS NOTE ADULT - ASSESSMENT
81 yo male Mongolian speaking w/ PMH of HTN, HLD, ascending aortic aneurysm 4.5 cm, COPD not on home O2 presents for SOB that worsened over the past 4 days    Acute hypoxemic respiratory failure liklkey secondary to COPD exacerbation/ Severe emphysema  Chronic hypercapnic respiratory failure  HO HTN, HLD, BPH, COPD, mood disorder  HO Ascending aortic aneurysm, measuring 4.4cm 10/2022  Former smoker  Distended gallbladder.     Solumedrol 40 mg IV every 8 hours.   NC during day  BiPAP 4hrs on and off and QHS  bladder scan 160 cc   Abd X ray noted   give feeding while off BiPAP  bowel regimen and monitor bowel movement   monitor I/O and electrolytes   will switch azithromycin to ceftriaxone and doxcy for now ( given abnormal EKGs ) would also avoid Levaquin given hx of Aortic aneurysm   check RVP, procalcitonin , MRSA , Sputum culture and legionella urine ag  resume his home meds for chronic medical conditions  CT noted with Severe emphysema. Small and large airways inflammation. Secretions in the right mainstem bronchus. Lingular nodular opacity for which 3 month   follow-up CT is recommended.     RUQ US   DVT and GI ppx     still acute

## 2022-10-28 NOTE — PROGRESS NOTE ADULT - ASSESSMENT
81 yo male Slovak speaking w/ PMH of HTN, HLD, ascending aortic aneurysm 4.5 cm, COPD not on home O2 presents for SOB that worsened over the past 4 days    #SOB   #Possible COPD exacerbation   #Severe emphysema with chronic hypercapnia   Labs significant for Na 147 -    VBG done x2 unremarkable for hypercapnia  EKG noted RBBB - bifascicular block   CT angio noted for severe emphysema - CTA/P non-remarkable   - wean and titrate O2 as needed   - c/w solumedrol 40 IV OD   - c/w inhalers  - azithromycin, switched to doxycyline on 10/28  - f/u echo    #Abdominal bloating with pain central to epigastric  -constipated for 3 days -> resolved, passed bowl movement on 10/28    #mild GB distention on CT   US Abdomen Upper Quadrant Right: Nondistended gallbladder. No ductal dilatation. Simple cyst within the right kidney.      #CKD stage 3??  -creatinine 1.1 today  #Renal cyst 3 cm on CT   No hydronephrosis on CT       #Hx ascending aortic aneurysm   - stable at 4.4cm on CT     #HLD/HTN/BPH/mood disorder  - c/w home meds   - at risk for polypharmacy need extensive review of medications  - a1c/lipid profile in AM     #DVT - heparin sq     #GI - pantoprazole     #Diet - regular    #Activity - IAT     #Code - Full code

## 2022-10-28 NOTE — PROGRESS NOTE ADULT - SUBJECTIVE AND OBJECTIVE BOX
Over Night Events: Events noted, on NIV overnight, afebrile    PHYSICAL EXAM    ICU Vital Signs Last 24 Hrs  T(C): 35.7 (28 Oct 2022 04:00), Max: 37.4 (27 Oct 2022 08:40)  T(F): 96.2 (28 Oct 2022 04:00), Max: 99.3 (27 Oct 2022 08:40)  HR: 77 (28 Oct 2022 04:00) (74 - 100)  BP: 146/65 (28 Oct 2022 04:00) (136/63 - 201/78)  RR: 18 (28 Oct 2022 04:00) (18 - 24)  SpO2: 98% (28 Oct 2022 04:00) (96% - 100%)    O2 Parameters below as of 28 Oct 2022 00:02  Patient On (Oxygen Delivery Method): BiPAP/CPAP            General: ill looking   Lungs: exp wheezing  Cardiovascular: Regular   Abdomen: Soft, Positive BS  Extremities: No clubbing   Skin: Warm  Neurological: Non focal       10-27-22 @ 07:01  -  10-28-22 @ 07:00  --------------------------------------------------------  IN:  Total IN: 0 mL    OUT:    Voided (mL): 200 mL  Total OUT: 200 mL    Total NET: -200 mL          LABS:                          12.4   9.44  )-----------( 194      ( 27 Oct 2022 09:50 )             35.9                                               10-28    138  |  99  |  21<H>  ----------------------------<  155<H>  3.9   |  28  |  1.1    Ca    8.1<L>      28 Oct 2022 05:12    TPro  5.9<L>  /  Alb  3.6  /  TBili  0.7  /  DBili  x   /  AST  39  /  ALT  28  /  AlkPhos  109  10-28      PT/INR - ( 27 Oct 2022 09:50 )   PT: 12.20 sec;   INR: 1.07 ratio         PTT - ( 27 Oct 2022 09:50 )  PTT:32.8 sec                                           CARDIAC MARKERS ( 27 Oct 2022 09:50 )  x     / <0.01 ng/mL / x     / x     / x                                                LIVER FUNCTIONS - ( 28 Oct 2022 05:12 )  Alb: 3.6 g/dL / Pro: 5.9 g/dL / ALK PHOS: 109 U/L / ALT: 28 U/L / AST: 39 U/L / GGT: x                                                                                                                                       MEDICATIONS  (STANDING):  albuterol/ipratropium for Nebulization 3 milliLiter(s) Nebulizer every 6 hours  azithromycin  IVPB 500 milliGRAM(s) IV Intermittent every 24 hours  bisacodyl 5 milliGRAM(s) Oral at bedtime  budesonide  80 MICROgram(s)/formoterol 4.5 MICROgram(s) Inhaler 2 Puff(s) Inhalation two times a day  heparin   Injectable 5000 Unit(s) SubCutaneous every 12 hours  methylPREDNISolone sodium succinate Injectable 40 milliGRAM(s) IV Push every 8 hours  pantoprazole    Tablet 40 milliGRAM(s) Oral before breakfast  polyethylene glycol 3350 17 Gram(s) Oral daily    MEDICATIONS  (PRN):  acetaminophen     Tablet .. 650 milliGRAM(s) Oral every 6 hours PRN Temp greater or equal to 38C (100.4F), Mild Pain (1 - 3)  albuterol/ipratropium for Nebulization 3 milliLiter(s) Nebulizer every 4 hours PRN Shortness of Breath and/or Wheezing  aluminum hydroxide/magnesium hydroxide/simethicone Suspension 30 milliLiter(s) Oral every 4 hours PRN Dyspepsia  melatonin 3 milliGRAM(s) Oral at bedtime PRN Insomnia  ondansetron Injectable 4 milliGRAM(s) IV Push every 8 hours PRN Nausea and/or Vomiting      chest ct reviewed

## 2022-10-28 NOTE — PATIENT PROFILE ADULT - FUNCTIONAL ASSESSMENT - BASIC MOBILITY SECTION LABEL
Medical Necessity Information: It is in your best interest to select a reason for this procedure from the list below. All of these items fulfill various CMS LCD requirements except the new and changing color options. Show Topical Anesthesia Variable?: Yes Detail Level: Detailed Duration Of Freeze Thaw-Cycle (Seconds): 15 Post-Care Instructions: I reviewed with the patient in detail post-care instructions. Patient is to wear sunprotection, and avoid picking at any of the treated lesions. Pt may apply Vaseline to crusted or scabbing areas. Render Post-Care Instructions In Note?: no Number Of Freeze-Thaw Cycles: 2 freeze-thaw cycles Spray Paint Text: The liquid nitrogen was applied to the skin utilizing a spray paint frosting technique. Medical Necessity Clause: This procedure was medically necessary because the lesions that were treated were: Consent: The patient's verbal consent was obtained including but not limited to risks of crusting, scabbing, blistering, scarring, darker or lighter pigmentary change, recurrence, incomplete removal and infection. .

## 2022-10-28 NOTE — PROGRESS NOTE ADULT - ASSESSMENT
IMPRESSION:    Acute hypoxemic respiratory failure  COPD exacerbation  Chronic hypercapnic respiratory failure  Severe emphysema  HO HTN, HLD, BPH, COPD, mood disorder  HO Ascending aortic aneurysm, measuring 4.4cm 10/2022  Former smoker    PLAN:    CNS: Avoid sedatives    HEENT: Oral care    PULMONARY: HOB @ 45 degrees. Aspiration precautions. Duonebs Q4H standing and PRN. Continue BiPAP 4hrs on and off and QHS; Minute ventilation adequate on the BIPAP. Solumedrol 40 mg IV every 8 hours. NC during day    CARDIOVASCULAR: s/p Lasix in ED. Monitor urine output. Resume home BP medications.     GI: GI prophylaxis. Feeding: PO when off NIV. Bowel regimen.     RENAL: Follow up renal function and lytes. Correct as needed.    INFECTIOUS DISEASE: Monitor vital signs. Follow up cultures. RVP. MRSA nares. Procalcitonin. Switch Azithromycin to Levaquin.     HEMATOLOGICAL: Pharmacological DVT prophylaxis. Check duplex of the lower extremities.     ENDOCRINE: Follow up fingersticks. Insulin protocol if needed    MUSCULOSKELETAL: AAT  DISPO: Admit to SDU.

## 2022-10-28 NOTE — PATIENT PROFILE ADULT - FALL HARM RISK - HARM RISK INTERVENTIONS
Assistance with ambulation/Assistance OOB with selected safe patient handling equipment/Communicate Risk of Fall with Harm to all staff/Discuss with provider need for PT consult/Monitor gait and stability/Provide patient with walking aids - walker, cane, crutches/Reinforce activity limits and safety measures with patient and family/Review medications for side effects contributing to fall risk/Sit up slowly, dangle for a short time, stand at bedside before walking/Tailored Fall Risk Interventions/Toileting schedule using arm’s reach rule for commode and bathroom/Visual Cue: Yellow wristband and red socks/Bed in lowest position, wheels locked, appropriate side rails in place/Call bell, personal items and telephone in reach/Instruct patient to call for assistance before getting out of bed or chair/Non-slip footwear when patient is out of bed/[place] to call system/Physically safe environment - no spills, clutter or unnecessary equipment/Purposeful Proactive Rounding/Room/bathroom lighting operational, light cord in reach

## 2022-10-28 NOTE — CHART NOTE - NSCHARTNOTEFT_GEN_A_CORE
Med Rec completed with the patient's pharmacy:   [id number] [facility], [id number], phone number: [phone]

## 2022-10-29 LAB
ALBUMIN SERPL ELPH-MCNC: 3.2 G/DL — LOW (ref 3.5–5.2)
ALP SERPL-CCNC: 98 U/L — SIGNIFICANT CHANGE UP (ref 30–115)
ALT FLD-CCNC: 44 U/L — HIGH (ref 0–41)
ANION GAP SERPL CALC-SCNC: 10 MMOL/L — SIGNIFICANT CHANGE UP (ref 7–14)
AST SERPL-CCNC: 61 U/L — HIGH (ref 0–41)
BASOPHILS # BLD AUTO: 0.01 K/UL — SIGNIFICANT CHANGE UP (ref 0–0.2)
BASOPHILS NFR BLD AUTO: 0.1 % — SIGNIFICANT CHANGE UP (ref 0–1)
BILIRUB DIRECT SERPL-MCNC: 0.2 MG/DL — SIGNIFICANT CHANGE UP (ref 0–0.3)
BILIRUB INDIRECT FLD-MCNC: 0.2 MG/DL — SIGNIFICANT CHANGE UP (ref 0.2–1.2)
BILIRUB SERPL-MCNC: 0.4 MG/DL — SIGNIFICANT CHANGE UP (ref 0.2–1.2)
BUN SERPL-MCNC: 33 MG/DL — HIGH (ref 10–20)
CALCIUM SERPL-MCNC: 8.6 MG/DL — SIGNIFICANT CHANGE UP (ref 8.4–10.5)
CHLORIDE SERPL-SCNC: 101 MMOL/L — SIGNIFICANT CHANGE UP (ref 98–110)
CHOLEST SERPL-MCNC: 126 MG/DL — SIGNIFICANT CHANGE UP
CO2 SERPL-SCNC: 29 MMOL/L — SIGNIFICANT CHANGE UP (ref 17–32)
CREAT SERPL-MCNC: 1.3 MG/DL — SIGNIFICANT CHANGE UP (ref 0.7–1.5)
CULTURE RESULTS: SIGNIFICANT CHANGE UP
EGFR: 55 ML/MIN/1.73M2 — LOW
EOSINOPHIL # BLD AUTO: 0 K/UL — SIGNIFICANT CHANGE UP (ref 0–0.7)
EOSINOPHIL NFR BLD AUTO: 0 % — SIGNIFICANT CHANGE UP (ref 0–8)
GLUCOSE SERPL-MCNC: 171 MG/DL — HIGH (ref 70–99)
HCT VFR BLD CALC: 34.3 % — LOW (ref 42–52)
HDLC SERPL-MCNC: 25 MG/DL — LOW
HGB BLD-MCNC: 11.8 G/DL — LOW (ref 14–18)
IMM GRANULOCYTES NFR BLD AUTO: 0.5 % — HIGH (ref 0.1–0.3)
LEGIONELLA AG UR QL: NEGATIVE — SIGNIFICANT CHANGE UP
LIPID PNL WITH DIRECT LDL SERPL: 80 MG/DL — SIGNIFICANT CHANGE UP
LYMPHOCYTES # BLD AUTO: 0.77 K/UL — LOW (ref 1.2–3.4)
LYMPHOCYTES # BLD AUTO: 7.1 % — LOW (ref 20.5–51.1)
MAGNESIUM SERPL-MCNC: 2.3 MG/DL — SIGNIFICANT CHANGE UP (ref 1.8–2.4)
MCHC RBC-ENTMCNC: 31.4 PG — HIGH (ref 27–31)
MCHC RBC-ENTMCNC: 34.4 G/DL — SIGNIFICANT CHANGE UP (ref 32–37)
MCV RBC AUTO: 91.2 FL — SIGNIFICANT CHANGE UP (ref 80–94)
MONOCYTES # BLD AUTO: 0.46 K/UL — SIGNIFICANT CHANGE UP (ref 0.1–0.6)
MONOCYTES NFR BLD AUTO: 4.2 % — SIGNIFICANT CHANGE UP (ref 1.7–9.3)
NEUTROPHILS # BLD AUTO: 9.58 K/UL — HIGH (ref 1.4–6.5)
NEUTROPHILS NFR BLD AUTO: 88.1 % — HIGH (ref 42.2–75.2)
NON HDL CHOLESTEROL: 101 MG/DL — SIGNIFICANT CHANGE UP
NRBC # BLD: 0 /100 WBCS — SIGNIFICANT CHANGE UP (ref 0–0)
PLATELET # BLD AUTO: 254 K/UL — SIGNIFICANT CHANGE UP (ref 130–400)
POTASSIUM SERPL-MCNC: 3.9 MMOL/L — SIGNIFICANT CHANGE UP (ref 3.5–5)
POTASSIUM SERPL-SCNC: 3.9 MMOL/L — SIGNIFICANT CHANGE UP (ref 3.5–5)
PROT SERPL-MCNC: 5.8 G/DL — LOW (ref 6–8)
RBC # BLD: 3.76 M/UL — LOW (ref 4.7–6.1)
RBC # FLD: 12.4 % — SIGNIFICANT CHANGE UP (ref 11.5–14.5)
SODIUM SERPL-SCNC: 140 MMOL/L — SIGNIFICANT CHANGE UP (ref 135–146)
SPECIMEN SOURCE: SIGNIFICANT CHANGE UP
TRIGL SERPL-MCNC: 105 MG/DL — SIGNIFICANT CHANGE UP
WBC # BLD: 10.87 K/UL — HIGH (ref 4.8–10.8)
WBC # FLD AUTO: 10.87 K/UL — HIGH (ref 4.8–10.8)

## 2022-10-29 PROCEDURE — 93306 TTE W/DOPPLER COMPLETE: CPT | Mod: 26

## 2022-10-29 PROCEDURE — 76770 US EXAM ABDO BACK WALL COMP: CPT | Mod: 26

## 2022-10-29 PROCEDURE — 74018 RADEX ABDOMEN 1 VIEW: CPT | Mod: 26

## 2022-10-29 PROCEDURE — 99233 SBSQ HOSP IP/OBS HIGH 50: CPT

## 2022-10-29 RX ADMIN — Medication 40 MILLIGRAM(S): at 05:05

## 2022-10-29 RX ADMIN — Medication 40 MILLIGRAM(S): at 05:04

## 2022-10-29 RX ADMIN — Medication 3 MILLILITER(S): at 08:01

## 2022-10-29 RX ADMIN — Medication 110 MILLIGRAM(S): at 05:04

## 2022-10-29 RX ADMIN — QUETIAPINE FUMARATE 100 MILLIGRAM(S): 200 TABLET, FILM COATED ORAL at 21:20

## 2022-10-29 RX ADMIN — TAMSULOSIN HYDROCHLORIDE 0.4 MILLIGRAM(S): 0.4 CAPSULE ORAL at 21:19

## 2022-10-29 RX ADMIN — HEPARIN SODIUM 5000 UNIT(S): 5000 INJECTION INTRAVENOUS; SUBCUTANEOUS at 05:04

## 2022-10-29 RX ADMIN — CEFTRIAXONE 100 MILLIGRAM(S): 500 INJECTION, POWDER, FOR SOLUTION INTRAMUSCULAR; INTRAVENOUS at 11:06

## 2022-10-29 RX ADMIN — Medication 3 MILLILITER(S): at 20:08

## 2022-10-29 RX ADMIN — Medication 25 MILLIGRAM(S): at 05:05

## 2022-10-29 RX ADMIN — PANTOPRAZOLE SODIUM 40 MILLIGRAM(S): 20 TABLET, DELAYED RELEASE ORAL at 05:06

## 2022-10-29 RX ADMIN — POLYETHYLENE GLYCOL 3350 17 GRAM(S): 17 POWDER, FOR SOLUTION ORAL at 05:05

## 2022-10-29 RX ADMIN — MONTELUKAST 10 MILLIGRAM(S): 4 TABLET, CHEWABLE ORAL at 11:07

## 2022-10-29 RX ADMIN — Medication 110 MILLIGRAM(S): at 17:07

## 2022-10-29 RX ADMIN — SPIRONOLACTONE 25 MILLIGRAM(S): 25 TABLET, FILM COATED ORAL at 05:06

## 2022-10-29 RX ADMIN — LISINOPRIL 20 MILLIGRAM(S): 2.5 TABLET ORAL at 05:05

## 2022-10-29 RX ADMIN — Medication 600 MILLIGRAM(S): at 17:07

## 2022-10-29 RX ADMIN — ATORVASTATIN CALCIUM 20 MILLIGRAM(S): 80 TABLET, FILM COATED ORAL at 21:20

## 2022-10-29 RX ADMIN — BUDESONIDE AND FORMOTEROL FUMARATE DIHYDRATE 2 PUFF(S): 160; 4.5 AEROSOL RESPIRATORY (INHALATION) at 21:42

## 2022-10-29 RX ADMIN — MAGNESIUM OXIDE 400 MG ORAL TABLET 400 MILLIGRAM(S): 241.3 TABLET ORAL at 11:07

## 2022-10-29 RX ADMIN — SERTRALINE 50 MILLIGRAM(S): 25 TABLET, FILM COATED ORAL at 11:06

## 2022-10-29 RX ADMIN — Medication 600 MILLIGRAM(S): at 05:04

## 2022-10-29 RX ADMIN — CHLORHEXIDINE GLUCONATE 1 APPLICATION(S): 213 SOLUTION TOPICAL at 11:07

## 2022-10-29 RX ADMIN — HEPARIN SODIUM 5000 UNIT(S): 5000 INJECTION INTRAVENOUS; SUBCUTANEOUS at 17:07

## 2022-10-29 RX ADMIN — POLYETHYLENE GLYCOL 3350 17 GRAM(S): 17 POWDER, FOR SOLUTION ORAL at 17:08

## 2022-10-29 RX ADMIN — Medication 3 MILLILITER(S): at 15:16

## 2022-10-29 NOTE — PROGRESS NOTE ADULT - ATTENDING COMMENTS
IMPRESSION:    Acute hypoxemic respiratory failure resolved   COPD exacerbation improved   Bladder cancer unknown staging  HO HTN, HLD, BPH, COPD, mood disorder  HO Ascending aortic aneurysm, measuring 4.4cm 10/2022  Former smoker    Plan as outlined above

## 2022-10-29 NOTE — PROGRESS NOTE ADULT - ASSESSMENT
IMPRESSION:    Acute hypoxemic respiratory failure- resolved  Possible PNA  COPD exacerbation  Chronic hypercapnic respiratory failure  Severe emphysema  Bladder cancer unknown staging  HO HTN, HLD, BPH, COPD, mood disorder  HO Ascending aortic aneurysm, measuring 4.4cm 10/2022  Former smoker    PLAN:    CNS: Avoid sedatives    HEENT: Oral care    PULMONARY: HOB @ 45 degrees. Aspiration precautions. Duonebs Q4H PRN. Minute ventilation adequate on the BIPAP. Solumedrol 40 mg IV every 8 hours. NC during day, BIPAP at night and as needed.    CARDIOVASCULAR: avoid volume overload, pending echo     GI: GI prophylaxis. Feeding, swallow eval    RENAL: Follow up renal function and lytes. Correct as needed., f/u in [place] for bladder cancer treatment    INFECTIOUS DISEASE: Follow up cultures.  procal , currently on rocephin and doxy    HEMATOLOGICAL: Pharmacological DVT prophylaxis. Check duplex of the lower extremities.     ENDOCRINE: Follow up fingersticks. Insulin protocol if needed    MUSCULOSKELETAL: AAT    DISPO: Possible downgrade  IMPRESSION:    Acute hypoxemic respiratory failure resolved   COPD exacerbation improved   Bladder cancer unknown staging  HO HTN, HLD, BPH, COPD, mood disorder  HO Ascending aortic aneurysm, measuring 4.4cm 10/2022  Former smoker    PLAN:    CNS: Avoid sedatives    HEENT: Oral care    PULMONARY: HOB @ 45 degrees. Aspiration precautions. Duonebs Q4H PRN. DC Solumedrol 40 mg.  Prednisone taper.      CARDIOVASCULAR: avoid volume overload, pending echo     GI: GI prophylaxis. Feeding, per Speech and swallow.     RENAL: Follow up renal function and lytes. Correct as needed., f/u in [place] for bladder cancer treatment    INFECTIOUS DISEASE: Follow up cultures.  procal , 5 days of ABX     HEMATOLOGICAL: Pharmacological DVT prophylaxis. Dimer     ENDOCRINE: Follow up fingersticks. Insulin protocol if needed    MUSCULOSKELETAL: AAT    DISPO: downgrade to med surg

## 2022-10-29 NOTE — PROGRESS NOTE ADULT - ASSESSMENT
83 yo male Kinyarw[place] speaking w/ PMH of HTN, HLD, ascending aortic aneurysm 4.5 cm, COPD not on home O2 presents for SOB that worsened over the past 4 days    Acute hypoxemic respiratory failure liklkey secondary to COPD exacerbation/ Severe emphysema  Chronic hypercapnic respiratory failure  HO HTN, HLD, BPH, COPD, mood disorder  HO Ascending aortic aneurysm, measuring 4.4cm 10/2022  Former smoker  Distended gallbladder.   Hx of bladder cancer ( unknown stage ) s/p surgery as per the patient daughter but no chemo or radiation      Solumedrol switched to prednisone   lasix po   NC during day  BiPAP 4hrs on and off and QHS  bladder scan 160 cc   Abd X ray noted -Repeat abd x ray   give feeding while off BiPAP  bowel regimen and monitor bowel movement   monitor I/O and electrolytes   will switch azithromycin to ceftriaxone and doxcy for now ( given abnormal EKGs ) would also avoid Levaquin given hx of Aortic aneurysm    RVP , MRSA and legionella urine ag are all negative   f/u procalcitonin - would consider d/c antibiotics if negative   c/w home meds for chronic medical conditions  CT noted with Severe emphysema. Small and large airways inflammation. Secretions in the right mainstem bronchus. Lingular nodular opacity for which 3 month   follow-up CT is recommended.   RUQ US - no acute findings   US  Bladder and kidney negative   Get collateral information regarding his bladder cancer - His Urologist is DR. GIFTY JAMISON ( [phone],  [phone])    SLP eval       DVT and GI ppx     still acute

## 2022-10-29 NOTE — PROGRESS NOTE ADULT - SUBJECTIVE AND OBJECTIVE BOX
T H I S   I S    N O  T   A    F I N A L I Z E D   N O T LATASHA PENNINGTON  82y, Male  Allergy: No Known Allergies    Hospital Day: 2d    Patient seen and examined earlier today.     PMH/PSH:  PAST MEDICAL & SURGICAL HISTORY:  HTN (hypertension)      HLD (hyperlipidemia)      BPH (benign prostatic hyperplasia)      No significant past surgical history          LAST 24-Hr EVENTS:    VITALS:  T(F): 97 (10-29-22 @ 11:10), Max: 98.1 (10-28-22 @ 23:02)  HR: 71 (10-29-22 @ 11:10)  BP: 113/52 (10-29-22 @ 11:10) (113/52 - 159/75)  RR: 18 (10-29-22 @ 11:10)  SpO2: 91% (10-29-22 @ 11:10)    Oxygen Delivery Therapy:   Oxygen Method: Nasal Cannula   LPM: 1   Targeted SpO2 Range (%): 88-97   O2 Titration Guideline: Device O2 Percent Range (%): 24-44%, Device O2 Flow Rate Range (LPM): 1-6LPM, O2 Titration Guideline: Increase/Decrease by 1LPM or 4%. Notify provider for any increase in oxygen therapy or inability to achieve targeted SpO2. (10-29-22 @ 13:13)        TESTS & MEASUREMENTS:  Weight/BMI  72 (10-28-22 @ 00:02)  27.2 (10-28-22 @ 00:02)    10-27-22 @ 07:01  -  10-28-22 @ 07:00  --------------------------------------------------------  IN: 0 mL / OUT: 200 mL / NET: -200 mL    10-28-22 @ 07:01  -  10-29-22 @ 07:00  --------------------------------------------------------  IN: 340 mL / OUT: 900 mL / NET: -560 mL                            11.8   10.87 )-----------( 254      ( 29 Oct 2022 00:36 )             34.3       INR: 1.07 ratio (10-27-22 @ 09:50)    10-29    140  |  101  |  33<H>  ----------------------------<  171<H>  3.9   |  29  |  1.3    Ca    8.6      29 Oct 2022 00:36  Mg     2.3     10-29    TPro  5.8<L>  /  Alb  3.2<L>  /  TBili  0.4  /  DBili  0.2  /  AST  61<H>  /  ALT  44<H>  /  AlkPhos  98  10-29    LIVER FUNCTIONS - ( 29 Oct 2022 00:36 )  Alb: 3.2 g/dL / Pro: 5.8 g/dL / ALK PHOS: 98 U/L / ALT: 44 U/L / AST: 61 U/L / GGT: x                 Urinalysis Basic - ( 28 Oct 2022 11:32 )    Color: Yellow / Appearance: Clear / S.029 / pH: x  Gluc: x / Ketone: Small  / Bili: Negative / Urobili: <2 mg/dL   Blood: x / Protein: Trace / Nitrite: Negative   Leuk Esterase: Negative / RBC: x / WBC x   Sq Epi: x / Non Sq Epi: x / Bacteria: x            Serum Pro-Brain Natriuretic Peptide: 607 pg/mL (10-27-22 @ 09:50)    COVID-19 PCR: NotDetec (10-27-22 @ 09:50)        A1C with Estimated Average Glucose Result: 5.9 % (10-28-22 @ 05:12)          RADIOLOGY, ECG, & ADDITIONAL TESTS:  12 Lead ECG:   Ventricular Rate 93 BPM    Atrial Rate 93 BPM    P-R Interval 194 ms    QRS Duration 132 ms    Q-T Interval 412 ms    QTC Calculation(Bazett) 512 ms    P Axis 68 degrees    R Axis -80 degrees    T Axis 70 degrees    Diagnosis Line Normal sinus rhythm  Right bundle branch block  Left anterior fascicular block  *** Bifascicular block ***  Abnormal ECG    Confirmed by Amauri Meredith ([id number]) on 10/27/2022 11:42:07 AM (10-27-22 @ 08:51)      RECENT DIAGNOSTIC ORDERS:  Xray Kidney Ureter Bladder: Urgent   Indication: r/o SBO  Transport: Stretcher-Crib (10-29-22 @ 13:25)   Kidney and Bladder: Urgent   Indication: r/o retention  Transport: Stretcher-Crib (10-29-22 @ 13:25)  Magnesium, Serum: 23:30 (10-29-22 @ 13:15)  [facility] Metabolic Panel: 23:30 (10-29-22 @ 13:15)  Complete Blood Count + Automated Diff: 23:30 (10-29-22 @ 13:15)  Procalcitonin, Serum: 16:00 (10-29-22 @ 11:19)  Procalcitonin, Serum: 23:30 (10-29-22 @ 11:17)      MEDICATIONS:  MEDICATIONS  (STANDING):  albuterol/ipratropium for Nebulization 3 milliLiter(s) Nebulizer every 6 hours  atorvastatin 20 milliGRAM(s) Oral at bedtime  bisacodyl 5 milliGRAM(s) Oral at bedtime  budesonide  80 MICROgram(s)/formoterol 4.5 MICROgram(s) Inhaler 2 Puff(s) Inhalation two times a day  cefTRIAXone   IVPB 1000 milliGRAM(s) IV Intermittent every 24 hours  chlorhexidine 2% Cloths 1 Application(s) Topical daily  doxycycline IVPB 100 milliGRAM(s) IV Intermittent every 12 hours  furosemide    Tablet 40 milliGRAM(s) Oral daily  guaiFENesin  milliGRAM(s) Oral every 12 hours  heparin   Injectable 5000 Unit(s) SubCutaneous every 12 hours  lisinopril 20 milliGRAM(s) Oral daily  magnesium oxide 400 milliGRAM(s) Oral daily  metoprolol succinate ER 25 milliGRAM(s) Oral daily  montelukast 10 milliGRAM(s) Oral daily  pantoprazole    Tablet 40 milliGRAM(s) Oral before breakfast  polyethylene glycol 3350 17 Gram(s) Oral every 12 hours  predniSONE   Tablet 40 milliGRAM(s) Oral daily  QUEtiapine 100 milliGRAM(s) Oral at bedtime  sertraline 50 milliGRAM(s) Oral daily  spironolactone 25 milliGRAM(s) Oral daily  tamsulosin 0.4 milliGRAM(s) Oral at bedtime    MEDICATIONS  (PRN):  acetaminophen     Tablet .. 650 milliGRAM(s) Oral every 6 hours PRN Temp greater or equal to 38C (100.4F), Mild Pain (1 - 3)  albuterol/ipratropium for Nebulization 3 milliLiter(s) Nebulizer every 4 hours PRN Shortness of Breath and/or Wheezing  aluminum hydroxide/magnesium hydroxide/simethicone Suspension 30 milliLiter(s) Oral every 4 hours PRN Dyspepsia  melatonin 3 milliGRAM(s) Oral at bedtime PRN Insomnia  ondansetron Injectable 4 milliGRAM(s) IV Push every 8 hours PRN Nausea and/or Vomiting      HOME MEDICATIONS:  Albuterol (Eqv-ProAir HFA) 90 mcg/inh inhalation aerosol (10-27)  atorvastatin 10 mg oral tablet (10-27)  esomeprazole 40 mg oral delayed release capsule (10-28)  famotidine 40 mg oral tablet (10-28)  furosemide 40 mg oral tablet (10-28)  lisinopril 20 mg oral tablet (10-27)  magnesium oxide 400 mg oral tablet (10-28)  metoprolol succinate 25 mg oral tablet, extended release (10-27)  montelukast 10 mg oral tablet (10-27)  Mucinex 600 mg oral tablet, extended release (10-28)  Nephplex Rx oral tablet (10-28)  SEROquel XR 50 mg oral tablet, extended release (10-27)  sertraline 50 mg oral tablet (10-28)  spironolactone 25 mg oral tablet (10-27)  tamsulosin 0.4 mg oral capsule (10-28)  Trelegy Ellipta 100 mcg-62.5 mcg-25 mcg/inh inhalation powder (10-28)  Vascepa 1 g oral capsule (10-27)  Vitamin D3 25 mcg (1000 intl units) oral capsule (10-28)      PHYSICAL EXAM:  GENERAL:   CHEST/LUNG:   HEART:   ABDOMEN:   EXTREMITIES:               LATASHA WATTS  82y, Male  Allergy: No Known Allergies    Hospital Day: 2d    Patient seen and examined earlier today. he stated that he feels better today , except for productive cough   his daughter and family at bedside     PMH/PSH:  PAST MEDICAL & SURGICAL HISTORY:  HTN (hypertension)      HLD (hyperlipidemia)      BPH (benign prostatic hyperplasia)      No significant past surgical history          LAST 24-Hr EVENTS:    VITALS:  T(F): 97 (10-29-22 @ 11:10), Max: 98.1 (10-28-22 @ 23:02)  HR: 71 (10-29-22 @ 11:10)  BP: 113/52 (10-29-22 @ 11:10) (113/52 - 159/75)  RR: 18 (10-29-22 @ 11:10)  SpO2: 91% (10-29-22 @ 11:10)    Oxygen Delivery Therapy:   Oxygen Method: Nasal Cannula   LPM: 1   Targeted SpO2 Range (%): 88-97   O2 Titration Guideline: Device O2 Percent Range (%): 24-44%, Device O2 Flow Rate Range (LPM): 1-6LPM, O2 Titration Guideline: Increase/Decrease by 1LPM or 4%. Notify provider for any increase in oxygen therapy or inability to achieve targeted SpO2. (10-29-22 @ 13:13)        TESTS & MEASUREMENTS:  Weight/BMI  72 (10-28-22 @ 00:02)  27.2 (10-28-22 @ 00:02)    10-27-22 @ 07:01  -  10-28-22 @ 07:00  --------------------------------------------------------  IN: 0 mL / OUT: 200 mL / NET: -200 mL    10-28-22 @ 07:01  -  10-29-22 @ 07:00  --------------------------------------------------------  IN: 340 mL / OUT: 900 mL / NET: -560 mL                            11.8   10.87 )-----------( 254      ( 29 Oct 2022 00:36 )             34.3       INR: 1.07 ratio (10-27-22 @ 09:50)    10-29    140  |  101  |  33<H>  ----------------------------<  171<H>  3.9   |  29  |  1.3    Ca    8.6      29 Oct 2022 00:36  Mg     2.3     10-29    TPro  5.8<L>  /  Alb  3.2<L>  /  TBili  0.4  /  DBili  0.2  /  AST  61<H>  /  ALT  44<H>  /  AlkPhos  98  10-29    LIVER FUNCTIONS - ( 29 Oct 2022 00:36 )  Alb: 3.2 g/dL / Pro: 5.8 g/dL / ALK PHOS: 98 U/L / ALT: 44 U/L / AST: 61 U/L / GGT: x                 Urinalysis Basic - ( 28 Oct 2022 11:32 )    Color: Yellow / Appearance: Clear / S.029 / pH: x  Gluc: x / Ketone: Small  / Bili: Negative / Urobili: <2 mg/dL   Blood: x / Protein: Trace / Nitrite: Negative   Leuk Esterase: Negative / RBC: x / WBC x   Sq Epi: x / Non Sq Epi: x / Bacteria: x            Serum Pro-Brain Natriuretic Peptide: 607 pg/mL (10-27-22 @ 09:50)    COVID-19 PCR: NotDetec (10-27-22 @ 09:50)        A1C with Estimated Average Glucose Result: 5.9 % (10-28-22 @ 05:12)          RADIOLOGY, ECG, & ADDITIONAL TESTS:  12 Lead ECG:   Ventricular Rate 93 BPM    Atrial Rate 93 BPM    P-R Interval 194 ms    QRS Duration 132 ms    Q-T Interval 412 ms    QTC Calculation(Bazett) 512 ms    P Axis 68 degrees    R Axis -80 degrees    T Axis 70 degrees    Diagnosis Line Normal sinus rhythm  Right bundle branch block  Left anterior fascicular block  *** Bifascicular block ***  Abnormal ECG    Confirmed by Amauri Meredith ([id number]) on 10/27/2022 11:42:07 AM (10-27-22 @ 08:51)      RECENT DIAGNOSTIC ORDERS:  Xray Kidney Ureter Bladder: Urgent   Indication: r/o SBO  Transport: Stretcher-Crib (10-29-22 @ 13:25)   Kidney and Bladder: Urgent   Indication: r/o retention  Transport: Stretcher-Crib (10-29-22 @ 13:25)  Magnesium, Serum: 23:30 (10-29-22 @ 13:15)  [facility] Metabolic Panel: 23:30 (10-29-22 @ 13:15)  Complete Blood Count + Automated Diff: 23:30 (10-29-22 @ 13:15)  Procalcitonin, Serum: 16:00 (10-29-22 @ 11:19)  Procalcitonin, Serum: 23:30 (10-29-22 @ 11:17)      MEDICATIONS:  MEDICATIONS  (STANDING):  albuterol/ipratropium for Nebulization 3 milliLiter(s) Nebulizer every 6 hours  atorvastatin 20 milliGRAM(s) Oral at bedtime  bisacodyl 5 milliGRAM(s) Oral at bedtime  budesonide  80 MICROgram(s)/formoterol 4.5 MICROgram(s) Inhaler 2 Puff(s) Inhalation two times a day  cefTRIAXone   IVPB 1000 milliGRAM(s) IV Intermittent every 24 hours  chlorhexidine 2% Cloths 1 Application(s) Topical daily  doxycycline IVPB 100 milliGRAM(s) IV Intermittent every 12 hours  furosemide    Tablet 40 milliGRAM(s) Oral daily  guaiFENesin  milliGRAM(s) Oral every 12 hours  heparin   Injectable 5000 Unit(s) SubCutaneous every 12 hours  lisinopril 20 milliGRAM(s) Oral daily  magnesium oxide 400 milliGRAM(s) Oral daily  metoprolol succinate ER 25 milliGRAM(s) Oral daily  montelukast 10 milliGRAM(s) Oral daily  pantoprazole    Tablet 40 milliGRAM(s) Oral before breakfast  polyethylene glycol 3350 17 Gram(s) Oral every 12 hours  predniSONE   Tablet 40 milliGRAM(s) Oral daily  QUEtiapine 100 milliGRAM(s) Oral at bedtime  sertraline 50 milliGRAM(s) Oral daily  spironolactone 25 milliGRAM(s) Oral daily  tamsulosin 0.4 milliGRAM(s) Oral at bedtime    MEDICATIONS  (PRN):  acetaminophen     Tablet .. 650 milliGRAM(s) Oral every 6 hours PRN Temp greater or equal to 38C (100.4F), Mild Pain (1 - 3)  albuterol/ipratropium for Nebulization 3 milliLiter(s) Nebulizer every 4 hours PRN Shortness of Breath and/or Wheezing  aluminum hydroxide/magnesium hydroxide/simethicone Suspension 30 milliLiter(s) Oral every 4 hours PRN Dyspepsia  melatonin 3 milliGRAM(s) Oral at bedtime PRN Insomnia  ondansetron Injectable 4 milliGRAM(s) IV Push every 8 hours PRN Nausea and/or Vomiting      HOME MEDICATIONS:  Albuterol (Eqv-ProAir HFA) 90 mcg/inh inhalation aerosol (10-27)  atorvastatin 10 mg oral tablet (10-27)  esomeprazole 40 mg oral delayed release capsule (10-28)  famotidine 40 mg oral tablet (10-28)  furosemide 40 mg oral tablet (10-28)  lisinopril 20 mg oral tablet (10-27)  magnesium oxide 400 mg oral tablet (10-28)  metoprolol succinate 25 mg oral tablet, extended release (10-27)  montelukast 10 mg oral tablet (10-27)  Mucinex 600 mg oral tablet, extended release (10-28)  Nephplex Rx oral tablet (10-28)  SEROquel XR 50 mg oral tablet, extended release (10-27)  sertraline 50 mg oral tablet (10-28)  spironolactone 25 mg oral tablet (10-27)  tamsulosin 0.4 mg oral capsule (10-28)  Trelegy Ellipta 100 mcg-62.5 mcg-25 mcg/inh inhalation powder (10-28)  Vascepa 1 g oral capsule (10-27)  Vitamin D3 25 mcg (1000 intl units) oral capsule (10-28)      PHYSICAL EXAM:  GENERAL: awake, alert, NAD   CHEST/LUNG:  decrease breath sound b/l   HEART:  regular rhythm   ABDOMEN:  soft, non tender, +distended    EXTREMITIES:  no edema

## 2022-10-29 NOTE — PROGRESS NOTE ADULT - ASSESSMENT
83 yo male [place] speaking w/ PMH of HTN, HLD, ascending aortic aneurysm 4.5 cm, COPD not on home O2 presents for SOB that worsened over the past 4 days    #SOB   #Possible COPD exacerbation   #Severe emphysema with chronic hypercapnia   Labs significant for Na 147 -    VBG done x2 unremarkable for hypercapnia  EKG noted RBBB - bifascicular block   CT angio noted for severe emphysema - CTA/P non-remarkable   - wean and titrate O2 as needed   - c/w solumedrol 40 IV OD   - c/w inhalers  - azithromycin, switched to doxycyline on 10/28  - f/u echo    #Abdominal bloating with pain central to epigastric  -constipated for 3 days -> resolved, passed bowl movement on 10/28    #mild GB distention on CT   US Abdomen Upper Quadrant Right: Nondistended gallbladder. No ductal dilatation. Simple cyst within the right kidney.    #lung nodule on CT  -Lingular nodular opacity for which 3 month   -follow-up CT is recommended.    < end of copied text >    #CKD stage 3??  -creatinine 1.3 today  #Renal cyst 3 cm on CT   No hydronephrosis on CT       #Hx ascending aortic aneurysm   - stable at 4.4cm on CT     #HLD/HTN/BPH/mood disorder  - c/w home meds   - at risk for polypharmacy need extensive review of medications      #DVT - heparin sq     #GI - pantoprazole     #Diet - regular    #Activity - IAT     #Code - Full code

## 2022-10-29 NOTE — PROGRESS NOTE ADULT - SUBJECTIVE AND OBJECTIVE BOX
LATASHA WATTS 82y Male  MRN#: [id number]     Hospital Day: 2d    Pt is currently admitted with the primary diagnosis of  COPD EXACERBATION        SUBJECTIVE     Patient was seen this morning. Denies any complaints.                                             ----------------------------------------------------------  OBJECTIVE  PAST MEDICAL & SURGICAL HISTORY  HTN (hypertension)    HLD (hyperlipidemia)    BPH (benign prostatic hyperplasia)    No significant past surgical history                                              -----------------------------------------------------------  ALLERGIES:  No Known Allergies                                            ------------------------------------------------------------    HOME MEDICATIONS  Home Medications:  Albuterol (Eqv-ProAir HFA) 90 mcg/inh inhalation aerosol: 2 puff(s) inhaled every 6 hours (27 Oct 2022 17:37)  atorvastatin 10 mg oral tablet: 2 tab(s) orally once a day (27 Oct 2022 17:37)  esomeprazole 40 mg oral delayed release capsule: 1 cap(s) orally once a day (28 Oct 2022 16:21)  famotidine 40 mg oral tablet: 1 tab(s) orally once a day (at bedtime) (28 Oct 2022 16:21)  furosemide 40 mg oral tablet: 1 tab(s) orally once a day (28 Oct 2022 16:23)  magnesium oxide 400 mg oral tablet: 1 tab(s) orally once a day (28 Oct 2022 16:24)  montelukast 10 mg oral tablet: 1 tab(s) orally once a day (27 Oct 2022 17:37)  Mucinex 600 mg oral tablet, extended release: 1 tab(s) orally every 12 hours (28 Oct 2022 16:26)  Nephplex Rx oral tablet: 1 tab(s) orally once a day (28 Oct 2022 16:25)  SEROquel XR 50 mg oral tablet, extended release: 3 tab(s) orally once a day (in the evening) (27 Oct 2022 17:37)  sertraline 50 mg oral tablet: 1 tab(s) orally once a day (28 Oct 2022 16:23)  spironolactone 25 mg oral tablet: 1 tab(s) orally once a day (27 Oct 2022 17:37)  tamsulosin 0.4 mg oral capsule: 1 cap(s) orally once a day (28 Oct 2022 16:23)  Trelegy Ellipta 100 mcg-62.5 mcg-25 mcg/inh inhalation powder: 1 puff(s) inhaled once a day (28 Oct 2022 16:24)  Vascepa 1 g oral capsule: 2 cap(s) orally 2 times a day (27 Oct 2022 17:37)  Vitamin D3 25 mcg (1000 intl units) oral capsule: 1 cap(s) orally once a day (28 Oct 2022 16:24)                           MEDICATIONS:  STANDING MEDICATIONS  albuterol/ipratropium for Nebulization 3 milliLiter(s) Nebulizer every 6 hours  atorvastatin 20 milliGRAM(s) Oral at bedtime  bisacodyl 5 milliGRAM(s) Oral at bedtime  budesonide  80 MICROgram(s)/formoterol 4.5 MICROgram(s) Inhaler 2 Puff(s) Inhalation two times a day  cefTRIAXone   IVPB 1000 milliGRAM(s) IV Intermittent every 24 hours  chlorhexidine 2% Cloths 1 Application(s) Topical daily  doxycycline IVPB 100 milliGRAM(s) IV Intermittent every 12 hours  furosemide    Tablet 40 milliGRAM(s) Oral daily  guaiFENesin  milliGRAM(s) Oral every 12 hours  heparin   Injectable 5000 Unit(s) SubCutaneous every 12 hours  lisinopril 20 milliGRAM(s) Oral daily  magnesium oxide 400 milliGRAM(s) Oral daily  metoprolol succinate ER 25 milliGRAM(s) Oral daily  montelukast 10 milliGRAM(s) Oral daily  pantoprazole    Tablet 40 milliGRAM(s) Oral before breakfast  polyethylene glycol 3350 17 Gram(s) Oral every 12 hours  predniSONE   Tablet 40 milliGRAM(s) Oral daily  QUEtiapine 100 milliGRAM(s) Oral at bedtime  sertraline 50 milliGRAM(s) Oral daily  spironolactone 25 milliGRAM(s) Oral daily  tamsulosin 0.4 milliGRAM(s) Oral at bedtime    PRN MEDICATIONS  acetaminophen     Tablet .. 650 milliGRAM(s) Oral every 6 hours PRN  albuterol/ipratropium for Nebulization 3 milliLiter(s) Nebulizer every 4 hours PRN  aluminum hydroxide/magnesium hydroxide/simethicone Suspension 30 milliLiter(s) Oral every 4 hours PRN  melatonin 3 milliGRAM(s) Oral at bedtime PRN  ondansetron Injectable 4 milliGRAM(s) IV Push every 8 hours PRN                                            ------------------------------------------------------------  VITAL SIGNS: Last 24 Hours  T(C): 36.8 (29 Oct 2022 16:00), Max: 36.8 (29 Oct 2022 16:00)  T(F): 98.3 (29 Oct 2022 16:00), Max: 98.3 (29 Oct 2022 16:00)  HR: 74 (29 Oct 2022 16:00) (65 - 74)  BP: 139/62 (29 Oct 2022 16:00) (113/52 - 159/75)  BP(mean): --  RR: 20 (29 Oct 2022 16:00) (18 - 20)  SpO2: 94% (29 Oct 2022 16:00) (91% - 98%)      10-28-22 @ 07:01  -  10-29-22 @ 07:00  --------------------------------------------------------  IN: 340 mL / OUT: 900 mL / NET: -560 mL                                             --------------------------------------------------------------  LABS:                        11.8   10.87 )-----------( 254      ( 29 Oct 2022 00:36 )             34.3     10    140  |  101  |  33<H>  ----------------------------<  171<H>  3.9   |  29  |  1.3    Ca    8.6      29 Oct 2022 00:36  Mg     2.3     10-29    TPro  5.8<L>  /  Alb  3.2<L>  /  TBili  0.4  /  DBili  0.2  /  AST  61<H>  /  ALT  44<H>  /  AlkPhos  98  10-      Urinalysis Basic - ( 28 Oct 2022 11:32 )    Color: Yellow / Appearance: Clear / S.029 / pH: x  Gluc: x / Ketone: Small  / Bili: Negative / Urobili: <2 mg/dL   Blood: x / Protein: Trace / Nitrite: Negative   Leuk Esterase: Negative / RBC: x / WBC x   Sq Epi: x / Non Sq Epi: x / Bacteria: x              Culture - Urine (collected 28 Oct 2022 11:32)  Source: Clean Catch Clean Catch (Midstream)  Final Report (29 Oct 2022 19:12):    <10,000 CFU/mL Normal Urogenital Chika                                                    -------------------------------------------------------------  RADIOLOGY:                                            --------------------------------------------------------------    PHYSICAL EXAM:  GENERAL: NAD, lying in bed comfortably  NERVOUS SYSTEM:  Alert & Oriented X3   CHEST/LUNG: Clear to auscultation bilaterally. No wheezes heard  HEART: regular rate and rhythm; No murmurs heard  ABDOMEN: Soft,distended, Bowel sounds present  EXTREMITIES: No edema. No clubbing. No cyanosis                                         --------------------------------------------------------------

## 2022-10-29 NOTE — PROGRESS NOTE ADULT - SUBJECTIVE AND OBJECTIVE BOX
Patient is a 82y old  Male who presents with a chief complaint of SOB (28 Oct 2022 06:59)        SUBJECTIVE:  no overnight event        REVIEW OF SYSTEMS:    All Pertinent ROS are negative except per HPI       PHYSICAL EXAM  Vital Signs Last 24 Hrs  T(C): 36.6 (29 Oct 2022 03:14), Max: 36.7 (28 Oct 2022 23:02)  T(F): 97.8 (29 Oct 2022 03:14), Max: 98.1 (28 Oct 2022 23:02)  HR: 68 (29 Oct 2022 03:14) (65 - 81)  BP: 116/58 (29 Oct 2022 03:14) (116/58 - 159/75)  BP(mean): 89 (28 Oct 2022 11:47) (89 - 89)  RR: 18 (29 Oct 2022 03:14) (18 - 20)  SpO2: 98% (29 Oct 2022 03:14) (92% - 98%)    Parameters below as of 29 Oct 2022 03:14  Patient On (Oxygen Delivery Method): nasal cannula        CONSTITUTIONAL:   NAD    ENT:   Airway patent,   No thrush    CARDIAC:   Normal rate,   regular rhythm.    no edema    RESPIRATORY:   no wheezing      GASTROINTESTINAL:      MUSCULOSKELETAL:   range of motion is not limited,  no clubbing, cyanosis    NEUROLOGICAL:   Alert and oriented   no motor or deficits.    SKIN:   Skin normal color for race,   warm, dry   No evidence of rash.      10-28-22 @ 07:01  -  10-29-22 @ 07:00  --------------------------------------------------------  IN:    IV PiggyBack: 100 mL    Oral Fluid: 240 mL  Total IN: 340 mL    OUT:    Voided (mL): 900 mL  Total OUT: 900 mL    Total NET: -560 mL          LABS:                          11.8   10.87 )-----------( 254      ( 29 Oct 2022 00:36 )             34.3                                               10-29    140  |  101  |  33<H>  ----------------------------<  171<H>  3.9   |  29  |  1.3    Ca    8.6      29 Oct 2022 00:36  Mg     2.3     10-29    TPro  5.8<L>  /  Alb  3.2<L>  /  TBili  0.4  /  DBili  0.2  /  AST  61<H>  /  ALT  44<H>  /  AlkPhos  98  10-29      PT/INR - ( 27 Oct 2022 09:50 )   PT: 12.20 sec;   INR: 1.07 ratio         PTT - ( 27 Oct 2022 09:50 )  PTT:32.8 sec                                       Urinalysis Basic - ( 28 Oct 2022 11:32 )    Color: Yellow / Appearance: Clear / S.029 / pH: x  Gluc: x / Ketone: Small  / Bili: Negative / Urobili: <2 mg/dL   Blood: x / Protein: Trace / Nitrite: Negative   Leuk Esterase: Negative / RBC: x / WBC x   Sq Epi: x / Non Sq Epi: x / Bacteria: x        CARDIAC MARKERS ( 27 Oct 2022 09:50 )  x     / <0.01 ng/mL / x     / x     / x                                                LIVER FUNCTIONS - ( 29 Oct 2022 00:36 )  Alb: 3.2 g/dL / Pro: 5.8 g/dL / ALK PHOS: 98 U/L / ALT: 44 U/L / AST: 61 U/L / GGT: x                                                                                                MEDICATIONS  (STANDING):  albuterol/ipratropium for Nebulization 3 milliLiter(s) Nebulizer every 6 hours  atorvastatin 20 milliGRAM(s) Oral at bedtime  bisacodyl 5 milliGRAM(s) Oral at bedtime  budesonide  80 MICROgram(s)/formoterol 4.5 MICROgram(s) Inhaler 2 Puff(s) Inhalation two times a day  cefTRIAXone   IVPB 1000 milliGRAM(s) IV Intermittent every 24 hours  chlorhexidine 2% Cloths 1 Application(s) Topical daily  doxycycline IVPB 100 milliGRAM(s) IV Intermittent every 12 hours  furosemide    Tablet 40 milliGRAM(s) Oral daily  guaiFENesin  milliGRAM(s) Oral every 12 hours  heparin   Injectable 5000 Unit(s) SubCutaneous every 12 hours  lisinopril 20 milliGRAM(s) Oral daily  magnesium oxide 400 milliGRAM(s) Oral daily  methylPREDNISolone sodium succinate Injectable 40 milliGRAM(s) IV Push every 8 hours  metoprolol succinate ER 25 milliGRAM(s) Oral daily  montelukast 10 milliGRAM(s) Oral daily  pantoprazole    Tablet 40 milliGRAM(s) Oral before breakfast  polyethylene glycol 3350 17 Gram(s) Oral every 12 hours  QUEtiapine 100 milliGRAM(s) Oral at bedtime  sertraline 50 milliGRAM(s) Oral daily  spironolactone 25 milliGRAM(s) Oral daily  tamsulosin 0.4 milliGRAM(s) Oral at bedtime    MEDICATIONS  (PRN):  acetaminophen     Tablet .. 650 milliGRAM(s) Oral every 6 hours PRN Temp greater or equal to 38C (100.4F), Mild Pain (1 - 3)  albuterol/ipratropium for Nebulization 3 milliLiter(s) Nebulizer every 4 hours PRN Shortness of Breath and/or Wheezing  aluminum hydroxide/magnesium hydroxide/simethicone Suspension 30 milliLiter(s) Oral every 4 hours PRN Dyspepsia  melatonin 3 milliGRAM(s) Oral at bedtime PRN Insomnia  ondansetron Injectable 4 milliGRAM(s) IV Push every 8 hours PRN Nausea and/or Vomiting       Patient is a 82y old  Male who presents with a chief complaint of SOB (28 Oct 2022 06:59)        SUBJECTIVE:  no overnight event        REVIEW OF SYSTEMS:    All Pertinent ROS are negative except per HPI       PHYSICAL EXAM  Vital Signs Last 24 Hrs  T(C): 36.6 (29 Oct 2022 03:14), Max: 36.7 (28 Oct 2022 23:02)  T(F): 97.8 (29 Oct 2022 03:14), Max: 98.1 (28 Oct 2022 23:02)  HR: 68 (29 Oct 2022 03:14) (65 - 81)  BP: 116/58 (29 Oct 2022 03:14) (116/58 - 159/75)  BP(mean): 89 (28 Oct 2022 11:47) (89 - 89)  RR: 18 (29 Oct 2022 03:14) (18 - 20)  SpO2: 98% (29 Oct 2022 03:14) (92% - 98%)    Parameters below as of 29 Oct 2022 03:14  Patient On (Oxygen Delivery Method): nasal cannula        CONSTITUTIONAL:   NAD    ENT:   Airway patent,   No thrush    CARDIAC:   Normal rate,   regular rhythm.    no edema    RESPIRATORY:   no wheezing      GASTROINTESTINAL:  Abdomen soft    MUSCULOSKELETAL:   range of motion is not limited,  no clubbing, cyanosis    NEUROLOGICAL:   Alert and oriented   no motor or deficits.    SKIN:   Skin normal color for race,   warm, dry   No evidence of rash.      10-28-22 @ 07:01  -  10-29-22 @ 07:00  --------------------------------------------------------  IN:    IV PiggyBack: 100 mL    Oral Fluid: 240 mL  Total IN: 340 mL    OUT:    Voided (mL): 900 mL  Total OUT: 900 mL    Total NET: -560 mL          LABS:                          11.8   10.87 )-----------( 254      ( 29 Oct 2022 00:36 )             34.3                                               10-29    140  |  101  |  33<H>  ----------------------------<  171<H>  3.9   |  29  |  1.3    Ca    8.6      29 Oct 2022 00:36  Mg     2.3     10-29    TPro  5.8<L>  /  Alb  3.2<L>  /  TBili  0.4  /  DBili  0.2  /  AST  61<H>  /  ALT  44<H>  /  AlkPhos  98  10-29      PT/INR - ( 27 Oct 2022 09:50 )   PT: 12.20 sec;   INR: 1.07 ratio         PTT - ( 27 Oct 2022 09:50 )  PTT:32.8 sec                                       Urinalysis Basic - ( 28 Oct 2022 11:32 )    Color: Yellow / Appearance: Clear / S.029 / pH: x  Gluc: x / Ketone: Small  / Bili: Negative / Urobili: <2 mg/dL   Blood: x / Protein: Trace / Nitrite: Negative   Leuk Esterase: Negative / RBC: x / WBC x   Sq Epi: x / Non Sq Epi: x / Bacteria: x        CARDIAC MARKERS ( 27 Oct 2022 09:50 )  x     / <0.01 ng/mL / x     / x     / x                                                LIVER FUNCTIONS - ( 29 Oct 2022 00:36 )  Alb: 3.2 g/dL / Pro: 5.8 g/dL / ALK PHOS: 98 U/L / ALT: 44 U/L / AST: 61 U/L / GGT: x                                                                                                MEDICATIONS  (STANDING):  albuterol/ipratropium for Nebulization 3 milliLiter(s) Nebulizer every 6 hours  atorvastatin 20 milliGRAM(s) Oral at bedtime  bisacodyl 5 milliGRAM(s) Oral at bedtime  budesonide  80 MICROgram(s)/formoterol 4.5 MICROgram(s) Inhaler 2 Puff(s) Inhalation two times a day  cefTRIAXone   IVPB 1000 milliGRAM(s) IV Intermittent every 24 hours  chlorhexidine 2% Cloths 1 Application(s) Topical daily  doxycycline IVPB 100 milliGRAM(s) IV Intermittent every 12 hours  furosemide    Tablet 40 milliGRAM(s) Oral daily  guaiFENesin  milliGRAM(s) Oral every 12 hours  heparin   Injectable 5000 Unit(s) SubCutaneous every 12 hours  lisinopril 20 milliGRAM(s) Oral daily  magnesium oxide 400 milliGRAM(s) Oral daily  methylPREDNISolone sodium succinate Injectable 40 milliGRAM(s) IV Push every 8 hours  metoprolol succinate ER 25 milliGRAM(s) Oral daily  montelukast 10 milliGRAM(s) Oral daily  pantoprazole    Tablet 40 milliGRAM(s) Oral before breakfast  polyethylene glycol 3350 17 Gram(s) Oral every 12 hours  QUEtiapine 100 milliGRAM(s) Oral at bedtime  sertraline 50 milliGRAM(s) Oral daily  spironolactone 25 milliGRAM(s) Oral daily  tamsulosin 0.4 milliGRAM(s) Oral at bedtime    MEDICATIONS  (PRN):  acetaminophen     Tablet .. 650 milliGRAM(s) Oral every 6 hours PRN Temp greater or equal to 38C (100.4F), Mild Pain (1 - 3)  albuterol/ipratropium for Nebulization 3 milliLiter(s) Nebulizer every 4 hours PRN Shortness of Breath and/or Wheezing  aluminum hydroxide/magnesium hydroxide/simethicone Suspension 30 milliLiter(s) Oral every 4 hours PRN Dyspepsia  melatonin 3 milliGRAM(s) Oral at bedtime PRN Insomnia  ondansetron Injectable 4 milliGRAM(s) IV Push every 8 hours PRN Nausea and/or Vomiting

## 2022-10-30 LAB
ALBUMIN SERPL ELPH-MCNC: 3.2 G/DL — LOW (ref 3.5–5.2)
ALP SERPL-CCNC: 82 U/L — SIGNIFICANT CHANGE UP (ref 30–115)
ALT FLD-CCNC: 61 U/L — HIGH (ref 0–41)
ANION GAP SERPL CALC-SCNC: 12 MMOL/L — SIGNIFICANT CHANGE UP (ref 7–14)
AST SERPL-CCNC: 65 U/L — HIGH (ref 0–41)
BASOPHILS # BLD AUTO: 0.01 K/UL — SIGNIFICANT CHANGE UP (ref 0–0.2)
BASOPHILS NFR BLD AUTO: 0.1 % — SIGNIFICANT CHANGE UP (ref 0–1)
BILIRUB SERPL-MCNC: 0.3 MG/DL — SIGNIFICANT CHANGE UP (ref 0.2–1.2)
BUN SERPL-MCNC: 31 MG/DL — HIGH (ref 10–20)
CALCIUM SERPL-MCNC: 8.2 MG/DL — LOW (ref 8.4–10.5)
CHLORIDE SERPL-SCNC: 102 MMOL/L — SIGNIFICANT CHANGE UP (ref 98–110)
CO2 SERPL-SCNC: 29 MMOL/L — SIGNIFICANT CHANGE UP (ref 17–32)
CREAT SERPL-MCNC: 1.3 MG/DL — SIGNIFICANT CHANGE UP (ref 0.7–1.5)
EGFR: 55 ML/MIN/1.73M2 — LOW
EOSINOPHIL # BLD AUTO: 0.06 K/UL — SIGNIFICANT CHANGE UP (ref 0–0.7)
EOSINOPHIL NFR BLD AUTO: 0.7 % — SIGNIFICANT CHANGE UP (ref 0–8)
GLUCOSE SERPL-MCNC: 122 MG/DL — HIGH (ref 70–99)
HCT VFR BLD CALC: 32.3 % — LOW (ref 42–52)
HGB BLD-MCNC: 10.9 G/DL — LOW (ref 14–18)
IMM GRANULOCYTES NFR BLD AUTO: 0.4 % — HIGH (ref 0.1–0.3)
LACTATE SERPL-SCNC: 1.9 MMOL/L — SIGNIFICANT CHANGE UP (ref 0.7–2)
LYMPHOCYTES # BLD AUTO: 2.03 K/UL — SIGNIFICANT CHANGE UP (ref 1.2–3.4)
LYMPHOCYTES # BLD AUTO: 22.3 % — SIGNIFICANT CHANGE UP (ref 20.5–51.1)
MAGNESIUM SERPL-MCNC: 2.1 MG/DL — SIGNIFICANT CHANGE UP (ref 1.8–2.4)
MCHC RBC-ENTMCNC: 30.7 PG — SIGNIFICANT CHANGE UP (ref 27–31)
MCHC RBC-ENTMCNC: 33.7 G/DL — SIGNIFICANT CHANGE UP (ref 32–37)
MCV RBC AUTO: 91 FL — SIGNIFICANT CHANGE UP (ref 80–94)
MONOCYTES # BLD AUTO: 0.83 K/UL — HIGH (ref 0.1–0.6)
MONOCYTES NFR BLD AUTO: 9.1 % — SIGNIFICANT CHANGE UP (ref 1.7–9.3)
NEUTROPHILS # BLD AUTO: 6.14 K/UL — SIGNIFICANT CHANGE UP (ref 1.4–6.5)
NEUTROPHILS NFR BLD AUTO: 67.4 % — SIGNIFICANT CHANGE UP (ref 42.2–75.2)
NRBC # BLD: 0 /100 WBCS — SIGNIFICANT CHANGE UP (ref 0–0)
PLATELET # BLD AUTO: 238 K/UL — SIGNIFICANT CHANGE UP (ref 130–400)
POTASSIUM SERPL-MCNC: 3.2 MMOL/L — LOW (ref 3.5–5)
POTASSIUM SERPL-SCNC: 3.2 MMOL/L — LOW (ref 3.5–5)
PROCALCITONIN SERPL-MCNC: 0.07 NG/ML — SIGNIFICANT CHANGE UP (ref 0.02–0.1)
PROT SERPL-MCNC: 5.5 G/DL — LOW (ref 6–8)
RBC # BLD: 3.55 M/UL — LOW (ref 4.7–6.1)
RBC # FLD: 12.3 % — SIGNIFICANT CHANGE UP (ref 11.5–14.5)
SODIUM SERPL-SCNC: 143 MMOL/L — SIGNIFICANT CHANGE UP (ref 135–146)
WBC # BLD: 9.11 K/UL — SIGNIFICANT CHANGE UP (ref 4.8–10.8)
WBC # FLD AUTO: 9.11 K/UL — SIGNIFICANT CHANGE UP (ref 4.8–10.8)

## 2022-10-30 PROCEDURE — 74177 CT ABD & PELVIS W/CONTRAST: CPT | Mod: 26

## 2022-10-30 PROCEDURE — 99223 1ST HOSP IP/OBS HIGH 75: CPT

## 2022-10-30 PROCEDURE — 99232 SBSQ HOSP IP/OBS MODERATE 35: CPT

## 2022-10-30 RX ORDER — IOHEXOL 300 MG/ML
30 INJECTION, SOLUTION INTRAVENOUS ONCE
Refills: 0 | Status: COMPLETED | OUTPATIENT
Start: 2022-10-30 | End: 2022-10-30

## 2022-10-30 RX ORDER — POTASSIUM CHLORIDE 20 MEQ
40 PACKET (EA) ORAL EVERY 4 HOURS
Refills: 0 | Status: DISCONTINUED | OUTPATIENT
Start: 2022-10-30 | End: 2022-10-30

## 2022-10-30 RX ORDER — POTASSIUM CHLORIDE 20 MEQ
40 PACKET (EA) ORAL EVERY 4 HOURS
Refills: 0 | Status: COMPLETED | OUTPATIENT
Start: 2022-10-30 | End: 2022-10-30

## 2022-10-30 RX ORDER — POTASSIUM CHLORIDE 20 MEQ
20 PACKET (EA) ORAL
Refills: 0 | Status: COMPLETED | OUTPATIENT
Start: 2022-10-30 | End: 2022-10-30

## 2022-10-30 RX ORDER — DIATRIZOATE MEGLUMINE 180 MG/ML
30 INJECTION, SOLUTION INTRAVESICAL ONCE
Refills: 0 | Status: COMPLETED | OUTPATIENT
Start: 2022-10-30 | End: 2022-10-30

## 2022-10-30 RX ADMIN — BUDESONIDE AND FORMOTEROL FUMARATE DIHYDRATE 2 PUFF(S): 160; 4.5 AEROSOL RESPIRATORY (INHALATION) at 11:19

## 2022-10-30 RX ADMIN — Medication 40 MILLIGRAM(S): at 05:08

## 2022-10-30 RX ADMIN — ATORVASTATIN CALCIUM 20 MILLIGRAM(S): 80 TABLET, FILM COATED ORAL at 21:07

## 2022-10-30 RX ADMIN — POLYETHYLENE GLYCOL 3350 17 GRAM(S): 17 POWDER, FOR SOLUTION ORAL at 05:08

## 2022-10-30 RX ADMIN — SERTRALINE 50 MILLIGRAM(S): 25 TABLET, FILM COATED ORAL at 11:54

## 2022-10-30 RX ADMIN — Medication 50 MILLIEQUIVALENT(S): at 08:56

## 2022-10-30 RX ADMIN — TAMSULOSIN HYDROCHLORIDE 0.4 MILLIGRAM(S): 0.4 CAPSULE ORAL at 21:07

## 2022-10-30 RX ADMIN — CHLORHEXIDINE GLUCONATE 1 APPLICATION(S): 213 SOLUTION TOPICAL at 11:03

## 2022-10-30 RX ADMIN — Medication 110 MILLIGRAM(S): at 05:11

## 2022-10-30 RX ADMIN — Medication 600 MILLIGRAM(S): at 17:58

## 2022-10-30 RX ADMIN — Medication 600 MILLIGRAM(S): at 05:07

## 2022-10-30 RX ADMIN — MAGNESIUM OXIDE 400 MG ORAL TABLET 400 MILLIGRAM(S): 241.3 TABLET ORAL at 11:35

## 2022-10-30 RX ADMIN — HEPARIN SODIUM 5000 UNIT(S): 5000 INJECTION INTRAVENOUS; SUBCUTANEOUS at 17:58

## 2022-10-30 RX ADMIN — LISINOPRIL 20 MILLIGRAM(S): 2.5 TABLET ORAL at 05:07

## 2022-10-30 RX ADMIN — PANTOPRAZOLE SODIUM 40 MILLIGRAM(S): 20 TABLET, DELAYED RELEASE ORAL at 05:07

## 2022-10-30 RX ADMIN — DIATRIZOATE MEGLUMINE 30 MILLILITER(S): 180 INJECTION, SOLUTION INTRAVESICAL at 11:45

## 2022-10-30 RX ADMIN — Medication 110 MILLIGRAM(S): at 17:58

## 2022-10-30 RX ADMIN — Medication 40 MILLIEQUIVALENT(S): at 17:58

## 2022-10-30 RX ADMIN — MONTELUKAST 10 MILLIGRAM(S): 4 TABLET, CHEWABLE ORAL at 11:54

## 2022-10-30 RX ADMIN — Medication 25 MILLIGRAM(S): at 05:07

## 2022-10-30 RX ADMIN — CEFTRIAXONE 100 MILLIGRAM(S): 500 INJECTION, POWDER, FOR SOLUTION INTRAMUSCULAR; INTRAVENOUS at 11:48

## 2022-10-30 RX ADMIN — SPIRONOLACTONE 25 MILLIGRAM(S): 25 TABLET, FILM COATED ORAL at 05:07

## 2022-10-30 RX ADMIN — QUETIAPINE FUMARATE 100 MILLIGRAM(S): 200 TABLET, FILM COATED ORAL at 21:07

## 2022-10-30 RX ADMIN — HEPARIN SODIUM 5000 UNIT(S): 5000 INJECTION INTRAVENOUS; SUBCUTANEOUS at 05:12

## 2022-10-30 RX ADMIN — Medication 40 MILLIEQUIVALENT(S): at 13:34

## 2022-10-30 NOTE — CONSULT NOTE ADULT - ATTENDING COMMENTS
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 Surgical consult placed for increasing abdominal distention. Patient not complaining of any abdominal pain, N/V. Tolerating PO intake day prior to evaluation without N/V. Last BM 2pm day prior to evaluation, loose in character and passing gas.   Had colonoscopy many years ago and was normal.    PE:  AAO x3  Chest: bilateral rales.  CV : rrr  Abdomen: +BS, mildly distended, nontender.    ASSESSMENT:  83 y/o male with Abdominal Distention.  COPD Exacerbation.    PLAN:  No abdominal pain.  Get CT Abd/Pelvis with po and iv contrast  Will follow up

## 2022-10-30 NOTE — PROGRESS NOTE ADULT - SUBJECTIVE AND OBJECTIVE BOX
T H I S   I S    N O  T   A    F I N A L I Z E D   N O T LATASHA PENNINGTON  82y, Male  Allergy: No Known Allergies    Hospital Day: 3d    Patient seen and examined earlier today.     PMH/PSH:  PAST MEDICAL & SURGICAL HISTORY:  HTN (hypertension)      HLD (hyperlipidemia)      BPH (benign prostatic hyperplasia)      No significant past surgical history          LAST 24-Hr EVENTS:    VITALS:  T(F): 97.4 (10-30-22 @ 06:24), Max: 98.3 (10-29-22 @ 16:00)  HR: 66 (10-30-22 @ 06:24)  BP: 157/68 (10-30-22 @ 02:50) (118/54 - 157/68)  RR: 19 (10-30-22 @ 02:50)  SpO2: 98% (10-30-22 @ 02:50)    Oxygen Delivery Therapy:   Oxygen Method: Nasal Cannula   LPM: 1   Targeted SpO2 Range (%): 88-97   O2 Titration Guideline: Device O2 Percent Range (%): 24-44%, Device O2 Flow Rate Range (LPM): 1-6LPM, O2 Titration Guideline: Increase/Decrease by 1LPM or 4%. Notify provider for any increase in oxygen therapy or inability to achieve targeted SpO2. (10-29-22 @ 13:13)        TESTS & MEASUREMENTS:  Weight/BMI  72 (10-28-22 @ 00:02)  27.2 (10-28-22 @ 00:02)    10-28-22 @ 07:01  -  10-29-22 @ 07:00  --------------------------------------------------------  IN: 340 mL / OUT: 900 mL / NET: -560 mL    10-29-22 @ 07:01  -  10-30-22 @ 07:00  --------------------------------------------------------  IN: 160 mL / OUT: 600 mL / NET: -440 mL                            10.9   9.11  )-----------( 238      ( 30 Oct 2022 01:20 )             32.3       INR: 1.07 ratio (10-27-22 @ 09:50)    10-30    143  |  102  |  31<H>  ----------------------------<  122<H>  3.2<L>   |  29  |  1.3    Ca    8.2<L>      30 Oct 2022 01:20  Mg     2.1     10-30    TPro  5.5<L>  /  Alb  3.2<L>  /  TBili  0.3  /  DBili  x   /  AST  65<H>  /  ALT  61<H>  /  AlkPhos  82  10-30    LIVER FUNCTIONS - ( 30 Oct 2022 01:20 )  Alb: 3.2 g/dL / Pro: 5.5 g/dL / ALK PHOS: 82 U/L / ALT: 61 U/L / AST: 65 U/L / GGT: x                 Culture - Blood (collected 10-28-22 @ 12:42)  Source: .Blood None  Preliminary Report (10-29-22 @ 23:02):    No growth to date.    Culture - Urine (collected 10-28-22 @ 11:32)  Source: Clean Catch Clean Catch (Midstream)  Final Report (10-29-22 @ 19:12):    <10,000 CFU/mL Normal Urogenital Chika        Procalcitonin, Serum: 0.07 ng/mL (10-29-22 @ 16:20)        Serum Pro-Brain Natriuretic Peptide: 607 pg/mL (10-27-22 @ 09:50)    COVID-19 PCR: NotDetec (10-27-22 @ 09:50)        A1C with Estimated Average Glucose Result: 5.9 % (10-28-22 @ 05:12)          RADIOLOGY, ECG, & ADDITIONAL TESTS:  12 Lead ECG:   Ventricular Rate 93 BPM    Atrial Rate 93 BPM    P-R Interval 194 ms    QRS Duration 132 ms    Q-T Interval 412 ms    QTC Calculation(Bazett) 512 ms    P Axis 68 degrees    R Axis -80 degrees    T Axis 70 degrees    Diagnosis Line Normal sinus rhythm  Right bundle branch block  Left anterior fascicular block  *** Bifascicular block ***  Abnormal ECG    Confirmed by Amauri Meredith ([id number]) on 10/27/2022 11:42:07 AM (10-27-22 @ 08:51)      RECENT DIAGNOSTIC ORDERS:  Lactate, Blood: 11:00 (10-30-22 @ 10:38)  Diet, NPO:   Except Medications (10-30-22 @ 10:37)  CT Abdomen and Pelvis w/ Oral Cont and w/ IV Cont: Urgent   Indication: abdominal distention  Transport: Stretcher-Crib  Addl Info: PLEASE WAIT 4 HOURS AFTER CONTRAST ADMINISTRATION FOR CTAP (10-30-22 @ 10:29)  Magnesium, Serum: 23:30 (10-29-22 @ 22:24)  Magnesium, Serum: 23:30 (10-29-22 @ 22:24)  [facility] Metabolic Panel: 23:30 (10-29-22 @ 22:24)  [facility] Metabolic Panel: 23:30 (10-29-22 @ 22:24)  Complete Blood Count + Automated Diff: 23:30 (10-29-22 @ 22:24)  Complete Blood Count + Automated Diff: 23:30 (10-29-22 @ 22:24)  Xray Kidney Ureter Bladder: Urgent   Indication: r/o SBO  Transport: Stretcher-Crib  Exam Completed (10-29-22 @ 13:25)      MEDICATIONS:  MEDICATIONS  (STANDING):  albuterol/ipratropium for Nebulization 3 milliLiter(s) Nebulizer every 6 hours  atorvastatin 20 milliGRAM(s) Oral at bedtime  budesonide  80 MICROgram(s)/formoterol 4.5 MICROgram(s) Inhaler 2 Puff(s) Inhalation two times a day  cefTRIAXone   IVPB 1000 milliGRAM(s) IV Intermittent every 24 hours  chlorhexidine 2% Cloths 1 Application(s) Topical daily  doxycycline IVPB 100 milliGRAM(s) IV Intermittent every 12 hours  furosemide    Tablet 40 milliGRAM(s) Oral daily  guaiFENesin  milliGRAM(s) Oral every 12 hours  heparin   Injectable 5000 Unit(s) SubCutaneous every 12 hours  lisinopril 20 milliGRAM(s) Oral daily  magnesium oxide 400 milliGRAM(s) Oral daily  metoprolol succinate ER 25 milliGRAM(s) Oral daily  montelukast 10 milliGRAM(s) Oral daily  pantoprazole    Tablet 40 milliGRAM(s) Oral before breakfast  polyethylene glycol 3350 17 Gram(s) Oral every 12 hours  potassium chloride    Tablet ER 40 milliEquivalent(s) Oral every 4 hours  predniSONE   Tablet 40 milliGRAM(s) Oral daily  QUEtiapine 100 milliGRAM(s) Oral at bedtime  sertraline 50 milliGRAM(s) Oral daily  spironolactone 25 milliGRAM(s) Oral daily  tamsulosin 0.4 milliGRAM(s) Oral at bedtime    MEDICATIONS  (PRN):  acetaminophen     Tablet .. 650 milliGRAM(s) Oral every 6 hours PRN Temp greater or equal to 38C (100.4F), Mild Pain (1 - 3)  albuterol/ipratropium for Nebulization 3 milliLiter(s) Nebulizer every 4 hours PRN Shortness of Breath and/or Wheezing  aluminum hydroxide/magnesium hydroxide/simethicone Suspension 30 milliLiter(s) Oral every 4 hours PRN Dyspepsia  melatonin 3 milliGRAM(s) Oral at bedtime PRN Insomnia  ondansetron Injectable 4 milliGRAM(s) IV Push every 8 hours PRN Nausea and/or Vomiting      HOME MEDICATIONS:  Albuterol (Eqv-ProAir HFA) 90 mcg/inh inhalation aerosol (10-27)  atorvastatin 10 mg oral tablet (10-27)  esomeprazole 40 mg oral delayed release capsule (10-28)  famotidine 40 mg oral tablet (10-28)  furosemide 40 mg oral tablet (10-28)  lisinopril 20 mg oral tablet (10-27)  magnesium oxide 400 mg oral tablet (10-28)  metoprolol succinate 25 mg oral tablet, extended release (10-27)  montelukast 10 mg oral tablet (10-27)  Mucinex 600 mg oral tablet, extended release (10-28)  Nephplex Rx oral tablet (10-28)  SEROquel XR 50 mg oral tablet, extended release (10-27)  sertraline 50 mg oral tablet (10-28)  spironolactone 25 mg oral tablet (10-27)  tamsulosin 0.4 mg oral capsule (10-28)  Trelegy Ellipta 100 mcg-62.5 mcg-25 mcg/inh inhalation powder (10-28)  Vascepa 1 g oral capsule (10-27)  Vitamin D3 25 mcg (1000 intl units) oral capsule (10-28)      PHYSICAL EXAM:  GENERAL:   CHEST/LUNG:   HEART:   ABDOMEN:   EXTREMITIES:             LATASHA WATTS  82y, Male  Allergy: No Known Allergies    Hospital Day: 3d    Patient seen and examined earlier today. he stated that he feels fine except the cough , denies any abd pain, vomiting passes gases     PMH/PSH:  PAST MEDICAL & SURGICAL HISTORY:  HTN (hypertension)      HLD (hyperlipidemia)      BPH (benign prostatic hyperplasia)      No significant past surgical history          LAST 24-Hr EVENTS:    VITALS:  T(F): 97.4 (10-30-22 @ 06:24), Max: 98.3 (10-29-22 @ 16:00)  HR: 66 (10-30-22 @ 06:24)  BP: 157/68 (10-30-22 @ 02:50) (118/54 - 157/68)  RR: 19 (10-30-22 @ 02:50)  SpO2: 98% (10-30-22 @ 02:50)    Oxygen Delivery Therapy:   Oxygen Method: Nasal Cannula   LPM: 1   Targeted SpO2 Range (%): 88-97   O2 Titration Guideline: Device O2 Percent Range (%): 24-44%, Device O2 Flow Rate Range (LPM): 1-6LPM, O2 Titration Guideline: Increase/Decrease by 1LPM or 4%. Notify provider for any increase in oxygen therapy or inability to achieve targeted SpO2. (10-29-22 @ 13:13)        TESTS & MEASUREMENTS:  Weight/BMI  72 (10-28-22 @ 00:02)  27.2 (10-28-22 @ 00:02)    10-28-22 @ 07:01  -  10-29-22 @ 07:00  --------------------------------------------------------  IN: 340 mL / OUT: 900 mL / NET: -560 mL    10-29-22 @ 07:01  -  10-30-22 @ 07:00  --------------------------------------------------------  IN: 160 mL / OUT: 600 mL / NET: -440 mL                            10.9   9.11  )-----------( 238      ( 30 Oct 2022 01:20 )             32.3       INR: 1.07 ratio (10-27-22 @ 09:50)    10-30    143  |  102  |  31<H>  ----------------------------<  122<H>  3.2<L>   |  29  |  1.3    Ca    8.2<L>      30 Oct 2022 01:20  Mg     2.1     10-30    TPro  5.5<L>  /  Alb  3.2<L>  /  TBili  0.3  /  DBili  x   /  AST  65<H>  /  ALT  61<H>  /  AlkPhos  82  10-30    LIVER FUNCTIONS - ( 30 Oct 2022 01:20 )  Alb: 3.2 g/dL / Pro: 5.5 g/dL / ALK PHOS: 82 U/L / ALT: 61 U/L / AST: 65 U/L / GGT: x                 Culture - Blood (collected 10-28-22 @ 12:42)  Source: .Blood None  Preliminary Report (10-29-22 @ 23:02):    No growth to date.    Culture - Urine (collected 10-28-22 @ 11:32)  Source: Clean Catch Clean Catch (Midstream)  Final Report (10-29-22 @ 19:12):    <10,000 CFU/mL Normal Urogenital Chika        Procalcitonin, Serum: 0.07 ng/mL (10-29-22 @ 16:20)        Serum Pro-Brain Natriuretic Peptide: 607 pg/mL (10-27-22 @ 09:50)    COVID-19 PCR: NotDetec (10-27-22 @ 09:50)        A1C with Estimated Average Glucose Result: 5.9 % (10-28-22 @ 05:12)          RADIOLOGY, ECG, & ADDITIONAL TESTS:  12 Lead ECG:   Ventricular Rate 93 BPM    Atrial Rate 93 BPM    P-R Interval 194 ms    QRS Duration 132 ms    Q-T Interval 412 ms    QTC Calculation(Bazett) 512 ms    P Axis 68 degrees    R Axis -80 degrees    T Axis 70 degrees    Diagnosis Line Normal sinus rhythm  Right bundle branch block  Left anterior fascicular block  *** Bifascicular block ***  Abnormal ECG    Confirmed by Amauri Meredith ([id number]) on 10/27/2022 11:42:07 AM (10-27-22 @ 08:51)      RECENT DIAGNOSTIC ORDERS:  Lactate, Blood: 11:00 (10-30-22 @ 10:38)  Diet, NPO:   Except Medications (10-30-22 @ 10:37)  CT Abdomen and Pelvis w/ Oral Cont and w/ IV Cont: Urgent   Indication: abdominal distention  Transport: Stretcher-Crib  Addl Info: PLEASE WAIT 4 HOURS AFTER CONTRAST ADMINISTRATION FOR CTAP (10-30-22 @ 10:29)  Magnesium, Serum: 23:30 (10-29-22 @ 22:24)  Magnesium, Serum: 23:30 (10-29-22 @ 22:24)  [facility] Metabolic Panel: 23:30 (10-29-22 @ 22:24)  [facility] Metabolic Panel: 23:30 (10-29-22 @ 22:24)  Complete Blood Count + Automated Diff: 23:30 (10-29-22 @ 22:24)  Complete Blood Count + Automated Diff: 23:30 (10-29-22 @ 22:24)  Xray Kidney Ureter Bladder: Urgent   Indication: r/o SBO  Transport: Stretcher-Crib  Exam Completed (10-29-22 @ 13:25)      MEDICATIONS:  MEDICATIONS  (STANDING):  albuterol/ipratropium for Nebulization 3 milliLiter(s) Nebulizer every 6 hours  atorvastatin 20 milliGRAM(s) Oral at bedtime  budesonide  80 MICROgram(s)/formoterol 4.5 MICROgram(s) Inhaler 2 Puff(s) Inhalation two times a day  cefTRIAXone   IVPB 1000 milliGRAM(s) IV Intermittent every 24 hours  chlorhexidine 2% Cloths 1 Application(s) Topical daily  doxycycline IVPB 100 milliGRAM(s) IV Intermittent every 12 hours  furosemide    Tablet 40 milliGRAM(s) Oral daily  guaiFENesin  milliGRAM(s) Oral every 12 hours  heparin   Injectable 5000 Unit(s) SubCutaneous every 12 hours  lisinopril 20 milliGRAM(s) Oral daily  magnesium oxide 400 milliGRAM(s) Oral daily  metoprolol succinate ER 25 milliGRAM(s) Oral daily  montelukast 10 milliGRAM(s) Oral daily  pantoprazole    Tablet 40 milliGRAM(s) Oral before breakfast  polyethylene glycol 3350 17 Gram(s) Oral every 12 hours  potassium chloride    Tablet ER 40 milliEquivalent(s) Oral every 4 hours  predniSONE   Tablet 40 milliGRAM(s) Oral daily  QUEtiapine 100 milliGRAM(s) Oral at bedtime  sertraline 50 milliGRAM(s) Oral daily  spironolactone 25 milliGRAM(s) Oral daily  tamsulosin 0.4 milliGRAM(s) Oral at bedtime    MEDICATIONS  (PRN):  acetaminophen     Tablet .. 650 milliGRAM(s) Oral every 6 hours PRN Temp greater or equal to 38C (100.4F), Mild Pain (1 - 3)  albuterol/ipratropium for Nebulization 3 milliLiter(s) Nebulizer every 4 hours PRN Shortness of Breath and/or Wheezing  aluminum hydroxide/magnesium hydroxide/simethicone Suspension 30 milliLiter(s) Oral every 4 hours PRN Dyspepsia  melatonin 3 milliGRAM(s) Oral at bedtime PRN Insomnia  ondansetron Injectable 4 milliGRAM(s) IV Push every 8 hours PRN Nausea and/or Vomiting      HOME MEDICATIONS:  Albuterol (Eqv-ProAir HFA) 90 mcg/inh inhalation aerosol (10-27)  atorvastatin 10 mg oral tablet (10-27)  esomeprazole 40 mg oral delayed release capsule (10-28)  famotidine 40 mg oral tablet (10-28)  furosemide 40 mg oral tablet (10-28)  lisinopril 20 mg oral tablet (10-27)  magnesium oxide 400 mg oral tablet (10-28)  metoprolol succinate 25 mg oral tablet, extended release (10-27)  montelukast 10 mg oral tablet (10-27)  Mucinex 600 mg oral tablet, extended release (10-28)  Nephplex Rx oral tablet (10-28)  SEROquel XR 50 mg oral tablet, extended release (10-27)  sertraline 50 mg oral tablet (10-28)  spironolactone 25 mg oral tablet (10-27)  tamsulosin 0.4 mg oral capsule (10-28)  Trelegy Ellipta 100 mcg-62.5 mcg-25 mcg/inh inhalation powder (10-28)  Vascepa 1 g oral capsule (10-27)  Vitamin D3 25 mcg (1000 intl units) oral capsule (10-28)      PHYSICAL EXAM:  GENERAL: awake, alert, NAD   CHEST/LUNG:  mild wheezing b/l   HEART:  regular rhythm   ABDOMEN:  soft, non tender, +distended    EXTREMITIES:  no edema

## 2022-10-30 NOTE — CONSULT NOTE ADULT - SUBJECTIVE AND OBJECTIVE BOX
LATASHA WATTS [id number]  82y Male  3d    [place]  ID# [id number]    Surgical consult  82M PMH of HTN, HLD, AAA (4.5CM; ascending), COPD (no home O2) admitted with 4 day history of worsening dyspnea secondary to COPD exacerbation currently being treated with Abx and steroids. Surgical consult placed for increasing abdominal distention. Patient not complaining of any abdominal pain, N/V. Tolerating PO intake day prior to evaluation without N/V. Last BM 2pm day prior to evaluation, loose in character and passing gas. No F/chills. Patient states that he has a history of abdominal distention/bloating in the past.    PAST MEDICAL & SURGICAL HISTORY:  HTN (hypertension)  HLD (hyperlipidemia)  BPH (benign prostatic hyperplasia)    No significant past surgical history    MEDICATIONS  (STANDING):  albuterol/ipratropium for Nebulization 3 milliLiter(s) Nebulizer every 6 hours  atorvastatin 20 milliGRAM(s) Oral at bedtime  budesonide  80 MICROgram(s)/formoterol 4.5 MICROgram(s) Inhaler 2 Puff(s) Inhalation two times a day  cefTRIAXone   IVPB 1000 milliGRAM(s) IV Intermittent every 24 hours  chlorhexidine 2% Cloths 1 Application(s) Topical daily  doxycycline IVPB 100 milliGRAM(s) IV Intermittent every 12 hours  furosemide    Tablet 40 milliGRAM(s) Oral daily  guaiFENesin  milliGRAM(s) Oral every 12 hours  heparin   Injectable 5000 Unit(s) SubCutaneous every 12 hours  lisinopril 20 milliGRAM(s) Oral daily  magnesium oxide 400 milliGRAM(s) Oral daily  metoprolol succinate ER 25 milliGRAM(s) Oral daily  montelukast 10 milliGRAM(s) Oral daily  pantoprazole    Tablet 40 milliGRAM(s) Oral before breakfast  polyethylene glycol 3350 17 Gram(s) Oral every 12 hours  potassium chloride    Tablet ER 40 milliEquivalent(s) Oral every 4 hours  predniSONE   Tablet 40 milliGRAM(s) Oral daily  QUEtiapine 100 milliGRAM(s) Oral at bedtime  sertraline 50 milliGRAM(s) Oral daily  spironolactone 25 milliGRAM(s) Oral daily  tamsulosin 0.4 milliGRAM(s) Oral at bedtime    MEDICATIONS  (PRN):  acetaminophen     Tablet .. 650 milliGRAM(s) Oral every 6 hours PRN Temp greater or equal to 38C (100.4F), Mild Pain (1 - 3)  albuterol/ipratropium for Nebulization 3 milliLiter(s) Nebulizer every 4 hours PRN Shortness of Breath and/or Wheezing  aluminum hydroxide/magnesium hydroxide/simethicone Suspension 30 milliLiter(s) Oral every 4 hours PRN Dyspepsia  melatonin 3 milliGRAM(s) Oral at bedtime PRN Insomnia  ondansetron Injectable 4 milliGRAM(s) IV Push every 8 hours PRN Nausea and/or Vomiting    Allergies  No Known Allergies  Intolerances    REVIEW OF SYSTEMS  [ x] A ten-point review of systems was otherwise negative except as noted.  [ ] Due to altered mental status/intubation, subjective information were not able to be obtained from the patient. History was obtained, to the extent possible, from review of the chart and collateral sources of information.    Vital Signs Last 24 Hrs  T(C): 36.3 (30 Oct 2022 06:24), Max: 36.8 (29 Oct 2022 16:00)  T(F): 97.4 (30 Oct 2022 06:24), Max: 98.3 (29 Oct 2022 16:00)  HR: 66 (30 Oct 2022 06:24) (65 - 74)  BP: 157/68 (30 Oct 2022 02:50) (113/52 - 157/68)  RR: 19 (30 Oct 2022 02:50) (18 - 20)  SpO2: 98% (30 Oct 2022 02:50) (91% - 98%)    Parameters below as of 30 Oct 2022 02:50  Patient On (Oxygen Delivery Method): nasal cannula  O2 Flow (L/min): 2    PHYSICAL EXAM:  GENERAL: NAD, well-appearing  CHEST/LUNG: Clear to auscultation bilaterally  HEART: Regular rate and rhythm  ABDOMEN: Soft, Nontender, minimally distended; no rebound or guarding, no palpable abdominal mass  EXTREMITIES:  No clubbing, cyanosis, or edema    Labs:                10.9   9.11  )-----------( 238      ( 30 Oct 2022 01:20 )             32.3       Auto Neutrophil %: 67.4 % (10-30-22 @ 01:20)  Auto Immature Granulocyte %: 0.4 % (10-30-22 @ 01:20)    10-30  143  |  102  |  31<H>  ----------------------------<  122<H>  3.2<L>   |  29  |  1.3    Calcium, Total Serum: 8.2 mg/dL (10-30-22 @ 01:20)    LFTs:       5.5  | 0.3  | 65       ------------------[82      ( 30 Oct 2022 01:20 )  3.2  | x    | 61          Lipase:x      Amylase:x         Blood Gas Venous - Lactate: 1.50 mmol/L (10-27-22 @ 12:37)  Coags:  Serum Pro-Brain Natriuretic Peptide: 607 pg/mL (10-27-22 @ 09:50)  Urinalysis Basic - ( 28 Oct 2022 11:32 )    Color: Yellow / Appearance: Clear / S.029 / pH: x  Gluc: x / Ketone: Small  / Bili: Negative / Urobili: <2 mg/dL   Blood: x / Protein: Trace / Nitrite: Negative   Leuk Esterase: Negative / RBC: x / WBC x   Sq Epi: x / Non Sq Epi: x / Bacteria: x    Culture - Blood (collected 28 Oct 2022 12:42)  Source: .Blood None  Preliminary Report (29 Oct 2022 23:02):    No growth to date.    Culture - Urine (collected 28 Oct 2022 11:32)  Source: Clean Catch Clean Catch (Midstream)  Final Report (29 Oct 2022 19:12):    <10,000 CFU/mL Normal Urogenital Chika    RADIOLOGY & ADDITIONAL STUDIES:  < from: CT Angio Chest Aorta w/wo IV Cont (10.27.22 @ 11:37) >  BOWEL/MESENTERY/PERITONEUM: No evidence of bowel obstruction. Normal   appendix.No free air or fluid. No areas of bowel thickening identified.    < end of copied text >  < from: Xray Kidney Ureter Bladder (10.28.22 @ 09:16) >  IMPRESSION:    Nonobstructive bowel gas pattern. No supine evidence of free   intraperitoneal air. Excreted contrast material is noted within the   urinary bladder. Degenerative changes of the spine.    < end of copied text >

## 2022-10-30 NOTE — SWALLOW BEDSIDE ASSESSMENT ADULT - SLP PERTINENT HISTORY OF CURRENT PROBLEM
83 yo male [place] speaking w/ PMH of HTN, HLD, ascending aortic aneurysm 4.5 cm, COPD not on home O2 presents for SOB that worsened over the past 4 days. He complains of recurrent SOB with heavy exertion but for the past 4 days it has been constant. He denies any associated symptoms including fever, cough, execessive sputum production.

## 2022-10-30 NOTE — CONSULT NOTE ADULT - ASSESSMENT
Assessment  82M PMH of HTN, HLD, AAA (4.5CM; ascending), COPD (no home O2) admitted with 4 day history of worsening dyspnea secondary to COPD exacerbation, surgical consult placed for abdominal distention without evidence of abdominal pain/tenderness, no N/V, having BM and passing gas likely secondary to ileus from COPD exacerbation    Plan  - f/u KUB read  - continue with miralax BID  - correct electrolytes, K>4, MG>2, PHOS>3  - avoid opiods  - advance diet as tolerated  - recall surgery PRN Assessment  82M PMH of HTN, HLD, AAA (4.5CM; ascending), COPD (no home O2) admitted with 4 day history of worsening dyspnea secondary to COPD exacerbation, surgical consult placed for abdominal distention without evidence of abdominal pain/tenderness, no N/V, having BM and passing gas likely secondary to ileus from COPD exacerbation    Plan  - f/u KUB read  - daily KUB  - recommend CTAP with PO and IV contrast  - continue with miralax BID  - correct electrolytes, K>4, MG>2, PHOS>3  - avoid opiods  - advance diet as tolerated  - d/w Dr. Greer

## 2022-10-31 LAB
ALBUMIN SERPL ELPH-MCNC: 3 G/DL — LOW (ref 3.5–5.2)
ALBUMIN SERPL ELPH-MCNC: 3.3 G/DL — LOW (ref 3.5–5.2)
ALP SERPL-CCNC: 85 U/L — SIGNIFICANT CHANGE UP (ref 30–115)
ALP SERPL-CCNC: 99 U/L — SIGNIFICANT CHANGE UP (ref 30–115)
ALT FLD-CCNC: 60 U/L — HIGH (ref 0–41)
ALT FLD-CCNC: 64 U/L — HIGH (ref 0–41)
ANION GAP SERPL CALC-SCNC: 7 MMOL/L — SIGNIFICANT CHANGE UP (ref 7–14)
ANION GAP SERPL CALC-SCNC: 9 MMOL/L — SIGNIFICANT CHANGE UP (ref 7–14)
AST SERPL-CCNC: 43 U/L — HIGH (ref 0–41)
AST SERPL-CCNC: 54 U/L — HIGH (ref 0–41)
BASOPHILS # BLD AUTO: 0.01 K/UL — SIGNIFICANT CHANGE UP (ref 0–0.2)
BASOPHILS # BLD AUTO: 0.01 K/UL — SIGNIFICANT CHANGE UP (ref 0–0.2)
BASOPHILS NFR BLD AUTO: 0.1 % — SIGNIFICANT CHANGE UP (ref 0–1)
BASOPHILS NFR BLD AUTO: 0.1 % — SIGNIFICANT CHANGE UP (ref 0–1)
BILIRUB SERPL-MCNC: 0.6 MG/DL — SIGNIFICANT CHANGE UP (ref 0.2–1.2)
BILIRUB SERPL-MCNC: 0.6 MG/DL — SIGNIFICANT CHANGE UP (ref 0.2–1.2)
BUN SERPL-MCNC: 20 MG/DL — SIGNIFICANT CHANGE UP (ref 10–20)
BUN SERPL-MCNC: 20 MG/DL — SIGNIFICANT CHANGE UP (ref 10–20)
CALCIUM SERPL-MCNC: 8.5 MG/DL — SIGNIFICANT CHANGE UP (ref 8.4–10.4)
CALCIUM SERPL-MCNC: 8.7 MG/DL — SIGNIFICANT CHANGE UP (ref 8.4–10.5)
CHLORIDE SERPL-SCNC: 103 MMOL/L — SIGNIFICANT CHANGE UP (ref 98–110)
CHLORIDE SERPL-SCNC: 104 MMOL/L — SIGNIFICANT CHANGE UP (ref 98–110)
CO2 SERPL-SCNC: 27 MMOL/L — SIGNIFICANT CHANGE UP (ref 17–32)
CO2 SERPL-SCNC: 29 MMOL/L — SIGNIFICANT CHANGE UP (ref 17–32)
CREAT SERPL-MCNC: 1.1 MG/DL — SIGNIFICANT CHANGE UP (ref 0.7–1.5)
CREAT SERPL-MCNC: 1.1 MG/DL — SIGNIFICANT CHANGE UP (ref 0.7–1.5)
EGFR: 67 ML/MIN/1.73M2 — SIGNIFICANT CHANGE UP
EGFR: 67 ML/MIN/1.73M2 — SIGNIFICANT CHANGE UP
EOSINOPHIL # BLD AUTO: 0.12 K/UL — SIGNIFICANT CHANGE UP (ref 0–0.7)
EOSINOPHIL # BLD AUTO: 0.2 K/UL — SIGNIFICANT CHANGE UP (ref 0–0.7)
EOSINOPHIL NFR BLD AUTO: 1.6 % — SIGNIFICANT CHANGE UP (ref 0–8)
EOSINOPHIL NFR BLD AUTO: 2.4 % — SIGNIFICANT CHANGE UP (ref 0–8)
GLUCOSE SERPL-MCNC: 106 MG/DL — HIGH (ref 70–99)
GLUCOSE SERPL-MCNC: 129 MG/DL — HIGH (ref 70–99)
HCT VFR BLD CALC: 33.6 % — LOW (ref 42–52)
HCT VFR BLD CALC: 37.8 % — LOW (ref 42–52)
HGB BLD-MCNC: 11.6 G/DL — LOW (ref 14–18)
HGB BLD-MCNC: 13 G/DL — LOW (ref 14–18)
IMM GRANULOCYTES NFR BLD AUTO: 0.4 % — HIGH (ref 0.1–0.3)
IMM GRANULOCYTES NFR BLD AUTO: 0.5 % — HIGH (ref 0.1–0.3)
LACTATE SERPL-SCNC: 1.7 MMOL/L — SIGNIFICANT CHANGE UP (ref 0.7–2)
LYMPHOCYTES # BLD AUTO: 1.17 K/UL — LOW (ref 1.2–3.4)
LYMPHOCYTES # BLD AUTO: 1.93 K/UL — SIGNIFICANT CHANGE UP (ref 1.2–3.4)
LYMPHOCYTES # BLD AUTO: 14.2 % — LOW (ref 20.5–51.1)
LYMPHOCYTES # BLD AUTO: 25.5 % — SIGNIFICANT CHANGE UP (ref 20.5–51.1)
MAGNESIUM SERPL-MCNC: 2.3 MG/DL — SIGNIFICANT CHANGE UP (ref 1.8–2.4)
MAGNESIUM SERPL-MCNC: 2.4 MG/DL — SIGNIFICANT CHANGE UP (ref 1.8–2.4)
MCHC RBC-ENTMCNC: 31.3 PG — HIGH (ref 27–31)
MCHC RBC-ENTMCNC: 31.5 PG — HIGH (ref 27–31)
MCHC RBC-ENTMCNC: 34.4 G/DL — SIGNIFICANT CHANGE UP (ref 32–37)
MCHC RBC-ENTMCNC: 34.5 G/DL — SIGNIFICANT CHANGE UP (ref 32–37)
MCV RBC AUTO: 90.9 FL — SIGNIFICANT CHANGE UP (ref 80–94)
MCV RBC AUTO: 91.3 FL — SIGNIFICANT CHANGE UP (ref 80–94)
MONOCYTES # BLD AUTO: 0.55 K/UL — SIGNIFICANT CHANGE UP (ref 0.1–0.6)
MONOCYTES # BLD AUTO: 0.76 K/UL — HIGH (ref 0.1–0.6)
MONOCYTES NFR BLD AUTO: 10 % — HIGH (ref 1.7–9.3)
MONOCYTES NFR BLD AUTO: 6.7 % — SIGNIFICANT CHANGE UP (ref 1.7–9.3)
NEUTROPHILS # BLD AUTO: 4.72 K/UL — SIGNIFICANT CHANGE UP (ref 1.4–6.5)
NEUTROPHILS # BLD AUTO: 6.29 K/UL — SIGNIFICANT CHANGE UP (ref 1.4–6.5)
NEUTROPHILS NFR BLD AUTO: 62.4 % — SIGNIFICANT CHANGE UP (ref 42.2–75.2)
NEUTROPHILS NFR BLD AUTO: 76.1 % — HIGH (ref 42.2–75.2)
NRBC # BLD: 0 /100 WBCS — SIGNIFICANT CHANGE UP (ref 0–0)
NRBC # BLD: 0 /100 WBCS — SIGNIFICANT CHANGE UP (ref 0–0)
PLATELET # BLD AUTO: 265 K/UL — SIGNIFICANT CHANGE UP (ref 130–400)
PLATELET # BLD AUTO: 293 K/UL — SIGNIFICANT CHANGE UP (ref 130–400)
POTASSIUM SERPL-MCNC: 4.2 MMOL/L — SIGNIFICANT CHANGE UP (ref 3.5–5)
POTASSIUM SERPL-MCNC: 4.4 MMOL/L — SIGNIFICANT CHANGE UP (ref 3.5–5)
POTASSIUM SERPL-SCNC: 4.2 MMOL/L — SIGNIFICANT CHANGE UP (ref 3.5–5)
POTASSIUM SERPL-SCNC: 4.4 MMOL/L — SIGNIFICANT CHANGE UP (ref 3.5–5)
PROCALCITONIN SERPL-MCNC: 0.05 NG/ML — SIGNIFICANT CHANGE UP (ref 0.02–0.1)
PROT SERPL-MCNC: 5.5 G/DL — LOW (ref 6–8)
PROT SERPL-MCNC: 5.7 G/DL — LOW (ref 6–8)
RBC # BLD: 3.68 M/UL — LOW (ref 4.7–6.1)
RBC # BLD: 4.16 M/UL — LOW (ref 4.7–6.1)
RBC # FLD: 12.3 % — SIGNIFICANT CHANGE UP (ref 11.5–14.5)
RBC # FLD: 12.3 % — SIGNIFICANT CHANGE UP (ref 11.5–14.5)
SODIUM SERPL-SCNC: 139 MMOL/L — SIGNIFICANT CHANGE UP (ref 135–146)
SODIUM SERPL-SCNC: 140 MMOL/L — SIGNIFICANT CHANGE UP (ref 135–146)
WBC # BLD: 7.57 K/UL — SIGNIFICANT CHANGE UP (ref 4.8–10.8)
WBC # BLD: 8.26 K/UL — SIGNIFICANT CHANGE UP (ref 4.8–10.8)
WBC # FLD AUTO: 7.57 K/UL — SIGNIFICANT CHANGE UP (ref 4.8–10.8)
WBC # FLD AUTO: 8.26 K/UL — SIGNIFICANT CHANGE UP (ref 4.8–10.8)

## 2022-10-31 PROCEDURE — 99232 SBSQ HOSP IP/OBS MODERATE 35: CPT

## 2022-10-31 PROCEDURE — 76770 US EXAM ABDO BACK WALL COMP: CPT | Mod: 26

## 2022-10-31 PROCEDURE — 99223 1ST HOSP IP/OBS HIGH 75: CPT

## 2022-10-31 PROCEDURE — 74018 RADEX ABDOMEN 1 VIEW: CPT | Mod: 26

## 2022-10-31 RX ADMIN — ATORVASTATIN CALCIUM 20 MILLIGRAM(S): 80 TABLET, FILM COATED ORAL at 21:45

## 2022-10-31 RX ADMIN — Medication 600 MILLIGRAM(S): at 05:18

## 2022-10-31 RX ADMIN — Medication 25 MILLIGRAM(S): at 05:19

## 2022-10-31 RX ADMIN — LISINOPRIL 20 MILLIGRAM(S): 2.5 TABLET ORAL at 05:18

## 2022-10-31 RX ADMIN — Medication 600 MILLIGRAM(S): at 18:02

## 2022-10-31 RX ADMIN — HEPARIN SODIUM 5000 UNIT(S): 5000 INJECTION INTRAVENOUS; SUBCUTANEOUS at 18:02

## 2022-10-31 RX ADMIN — QUETIAPINE FUMARATE 100 MILLIGRAM(S): 200 TABLET, FILM COATED ORAL at 21:45

## 2022-10-31 RX ADMIN — SERTRALINE 50 MILLIGRAM(S): 25 TABLET, FILM COATED ORAL at 14:17

## 2022-10-31 RX ADMIN — BUDESONIDE AND FORMOTEROL FUMARATE DIHYDRATE 2 PUFF(S): 160; 4.5 AEROSOL RESPIRATORY (INHALATION) at 14:18

## 2022-10-31 RX ADMIN — MAGNESIUM OXIDE 400 MG ORAL TABLET 400 MILLIGRAM(S): 241.3 TABLET ORAL at 14:18

## 2022-10-31 RX ADMIN — Medication 40 MILLIGRAM(S): at 05:18

## 2022-10-31 RX ADMIN — POLYETHYLENE GLYCOL 3350 17 GRAM(S): 17 POWDER, FOR SOLUTION ORAL at 05:19

## 2022-10-31 RX ADMIN — Medication 40 MILLIGRAM(S): at 05:19

## 2022-10-31 RX ADMIN — TAMSULOSIN HYDROCHLORIDE 0.4 MILLIGRAM(S): 0.4 CAPSULE ORAL at 21:45

## 2022-10-31 RX ADMIN — SPIRONOLACTONE 25 MILLIGRAM(S): 25 TABLET, FILM COATED ORAL at 05:19

## 2022-10-31 RX ADMIN — HEPARIN SODIUM 5000 UNIT(S): 5000 INJECTION INTRAVENOUS; SUBCUTANEOUS at 05:20

## 2022-10-31 RX ADMIN — PANTOPRAZOLE SODIUM 40 MILLIGRAM(S): 20 TABLET, DELAYED RELEASE ORAL at 06:33

## 2022-10-31 RX ADMIN — MONTELUKAST 10 MILLIGRAM(S): 4 TABLET, CHEWABLE ORAL at 14:17

## 2022-10-31 RX ADMIN — POLYETHYLENE GLYCOL 3350 17 GRAM(S): 17 POWDER, FOR SOLUTION ORAL at 18:00

## 2022-10-31 NOTE — CONSULT NOTE ADULT - ASSESSMENT
Assessment:  The patient is a 83 y/o Italian speaking male with PMHx of HTN, HLD, ascending aortic aneurysm 4.5 cm, COPD (not on home O2) who p/w worsening and constant SOB for 4 days, associated with heavy exertion. He denies any associated symptoms including fever, cough, excessive sputum production. On admission, he endorsed epigastric abdominal pain that started on the morning of admission, associated with bloating. He denies any recent nausea/vomiting or constipation/diarrhea, but he endorses poor oral intake. He is taking multiple medications and questionable compliance. GI team was consulted for abdominal distension, abdominal pain, and diarrhea.     Plan:  #Abdominal distention #Diarrhea  - Avoid opioid medications  - Avoid laxatives as patient endorses diarrhea  - If diarrhea persists after holding laxatives, please collect stool studies, C. diff, O&P studies  - Monitor electrolytes, and replete PRN  - Surgery also following    #Mild GB distention on CT  - RUQ U/S showed nondistended GB, no ductal dilation    **Please note that this note is currently incomplete.** Assessment:  The patient is a 83 y/o Spanish speaking male with PMHx of HTN, HLD, ascending aortic aneurysm 4.5 cm, COPD (not on home O2) who p/w worsening and constant SOB for 4 days, associated with heavy exertion. He denies any associated symptoms including fever, cough, excessive sputum production. On admission, he endorsed epigastric abdominal pain that started on the morning of admission, associated with bloating. He denies any recent nausea/vomiting or constipation/diarrhea, but he endorses poor oral intake. He is taking multiple medications and questionable compliance. GI team was consulted for abdominal distension, abdominal pain, and diarrhea.     Plan:  #Abdominal distention #Diarrhea  - CT A/P shows oral contrast reaching cecum/ascending colon, no evidence of bowel obstruction or pneumoperitoneum  - Avoid opioid medications  - Avoid laxatives as patient endorses diarrhea  - If diarrhea persists after holding laxatives, please collect stool studies, C. diff, O&P studies  - Monitor electrolytes, and replete PRN  - Surgery also following    #Mild GB distention on CT  - initial CT A/P showed mildly distended GB, with no radiopaque gallstones, no biliary ductal dilation  - follow up RUQ U/S after initial CT showed nondistended GB, no ductal dilation    **Please note that this note is currently incomplete.** Assessment:  The patient is a 81 y/o Romanian speaking male with PMHx of HTN, HLD, ascending aortic aneurysm 4.5 cm, COPD (not on home O2) who p/w worsening and constant SOB for 4 days, associated with heavy exertion. He denies any associated symptoms including fever, cough, excessive sputum production. On admission, he endorsed epigastric abdominal pain that started on the morning of admission, associated with bloating. He denies any recent nausea/vomiting or constipation/diarrhea, but he endorses poor oral intake. He is taking multiple medications and questionable compliance. GI team was consulted for abdominal distension, abdominal pain, and diarrhea.     Plan:  #Abdominal distention #Diarrhea  - CT A/P shows oral contrast reaching cecum/ascending colon, no evidence of bowel obstruction or pneumoperitoneum  - Avoid opioid medications  - Avoid laxatives as patient endorses diarrhea  - If diarrhea persists after holding laxatives, please collect stool studies, C. diff, O&P studies  - Monitor electrolytes, and replete PRN  - Surgery also following    #Mild GB distention on CT  - initial CT A/P showed mildly distended GB, with no radiopaque gallstones, no biliary ductal dilation  - follow up RUQ U/S after initial CT showed nondistended GB, no ductal dilation   Assessment:  The patient is a 83 y/o Portuguese speaking male with PMHx of HTN, HLD, ascending aortic aneurysm 4.5 cm, COPD (not on home O2) who p/w worsening and constant SOB for 4 days, associated with heavy exertion. He denies any associated symptoms including fever, cough, excessive sputum production. On admission, he endorsed epigastric abdominal pain that started on the morning of admission, associated with bloating. He denies any recent nausea/vomiting or constipation/diarrhea, but he endorses poor oral intake. He is taking multiple medications and questionable compliance. GI team was consulted for abdominal distension, abdominal pain, and diarrhea.     Plan:  #Abdominal distention #Diarrhea  - CT A/P shows oral contrast reaching cecum/ascending colon, no evidence of bowel obstruction or pneumoperitoneum  - Avoid opioid medications  - Avoid laxatives as patient endorses diarrhea  - If diarrhea persists after holding laxatives, please collect stool studies, C. diff, O&P studies  - Monitor electrolytes, and replete PRN  - Surgery also following    #Mild GB distention on CT, mild ALT elevation  - initial CT A/P showed mildly distended GB, with no radiopaque gallstones, no biliary ductal dilation  - follow up RUQ U/S after initial CT showed nondistended GB, no ductal dilation  - Check hepatitis serologies

## 2022-10-31 NOTE — PROGRESS NOTE ADULT - ATTENDING COMMENTS
My note supersedes all residents notes that I sign, My correction for their notes are in my notes   Patient seen and examined earlier today. he stated that he feels better , denies any abd pain, vomiting passes gases , later was reported to have loose stool   on exam   GENERAL: awake, alert, NAD   CHEST/LUNG:  no wheezing today   HEART:  regular rhythm   ABDOMEN:  soft, non tender, +distended    EXTREMITIES:  no edema   81 yo male Faroese speaking w/ PMH of HTN, HLD, ascending aortic aneurysm 4.5 cm, COPD not on home O2 presents for SOB that worsened over the past 4 days    Acute hypoxemic respiratory failure likely secondary to COPD exacerbation/ Severe emphysema  Chronic hypercapnic respiratory failure  HO HTN, HLD, BPH, COPD, mood disorder  HO Ascending aortic aneurysm, measuring 4.4cm 10/2022  Former smoker  Distended gallbladder.   Hx of bladder cancer ( unknown stage ) s/p surgery as per the patient daughter but no chemo or radiation      Solumedrol switched to prednisone   lasix po   NC during day  BiPAP 4hrs on and off and QHS  bladder scan 160 cc   Abd X ray noted -Repeat abd x ray   give feeding while off BiPAP  bowel regimen and monitor bowel movement   monitor I/O and electrolytes   will switch azithromycin to ceftriaxone and doxcy for now ( given abnormal EKGs ) would also avoid Levaquin given hx of Aortic aneurysm   RVP , MRSA and legionella urine ag are all negative   f/u procalcitonin - would consider d/c antibiotics if negative   c/w home meds for chronic medical conditions  CT noted with Severe emphysema. Small and large airways inflammation. Secretions in the right mainstem bronchus. Lingular nodular opacity for which 3 month   follow-up CT is recommended.   RUQ US - no acute findings   US  Bladder and kidney negative   Get collateral information regarding his bladder cancer - His Urologist is DR. GIFTY JAMISON ( [phone],  [phone])    SLP eval noted- regular /thins   CT and KUB noted   surgery input appreciated   GI input appreciated ( still waiting for final recc) -Pt today reported loose stool - hold laxatives and - If diarrhea persists after holding laxatives, please collect stool studies, C. diff, O&P studies  repeat KUB noted         DVT and GI ppx     Pendng , respiratory status, surgery and GI

## 2022-10-31 NOTE — PROGRESS NOTE ADULT - ASSESSMENT
· Assessment	  82M PMH of HTN, HLD, AAA (4.5CM; ascending), COPD (no home O2) admitted with 4 day history of worsening dyspnea secondary to COPD exacerbation, surgical consult placed for abdominal distention without evidence of abdominal pain/tenderness, no N/V, having BM and passing gas likely secondary to ileus from COPD exacerbation    Plan  - Daily KUB  - CTAP with PO and IV contrast: contrast in colon  - Encourage ambulation  - PT consult if needed  - Miralax BID  - Correct electrolytes PRN  - Avoid opioids  - Advance diet as tolerated  - Aspiration precautions  - Recall surgery as needed    Trauma/ACS [id number]     · Assessment	  82M PMH of HTN, HLD, AAA (4.5CM; ascending), COPD (no home O2) admitted with 4 day history of worsening dyspnea secondary to COPD exacerbation, surgical consult placed for abdominal distention without evidence of abdominal pain/tenderness, no N/V, having BM and passing gas likely secondary to ileus from COPD exacerbation    Plan  - CTAP with PO and IV contrast: contrast in colon  - Encourage ambulation  - PT consult if needed  - Miralax BID  - Correct electrolytes PRN  - Avoid opioids  - Advance diet as tolerated  - Aspiration precautions  - Recall surgery as needed    Trauma/ACS [id number]

## 2022-10-31 NOTE — PHYSICAL THERAPY INITIAL EVALUATION ADULT - GENERAL OBSERVATIONS, REHAB EVAL
Pt encountered in semi-oakley position in bed, A & O x 4 in NAD, no c/o pain and agreeable with PT. Pt is indep in bed mobility and ambulation. Pt ambulated 150 ft supervision without AD and stair negotiation 9 steps step-over pattern using 1HR(Rt down/Lt up). Noted mild SOB during ambulation, SPO2 90% RA(95% resting). Pt tolerated therapy session and left in bed as found as per pt request, spouse/son present at b/s, KRIS Thomas aware. Pt encountered in semi-oakley position in bed, A & O x 4 in NAD, no c/o pain and agreeable with PT. Pt is indep in bed mobility and transfers. Pt ambulated 150 ft supervision without AD and stair negotiation 9 steps step-over pattern using 1HR(Rt down/Lt up). Noted mild SOB during ambulation, SPO2 90% RA(95% resting). Pt tolerated therapy session and left in bed as found as per pt request, spouse/son present at b/s, KRIS Thomas aware.

## 2022-10-31 NOTE — PROGRESS NOTE ADULT - SUBJECTIVE AND OBJECTIVE BOX
GENERAL SURGERY PROGRESS NOTE    Patient: LATASHA WATTS , 82y ([birth year])Male   MRN: [id number]  Location: [address]  Visit: 10-27-22 Inpatient  Date: 10-31-22 @ 02:17    Hospital Day #: 2    Procedure/Dx/Injuries: Abdominal distension    Events of past 24 hours: Patient denied pain, nausea, vomiting, and ambulation. Endorsed voiding, minor flatus, and bowel movements. No TTP.    PAST MEDICAL & SURGICAL HISTORY:  HTN (hypertension)      HLD (hyperlipidemia)      BPH (benign prostatic hyperplasia)      No significant past surgical history          Vitals:   T(F): 97.5 (10-30-22 @ 20:54), Max: 98.2 (10-30-22 @ 13:32)  HR: 65 (10-30-22 @ 20:54)  BP: 150/67 (10-30-22 @ 20:54)  RR: 18 (10-30-22 @ 20:54)  SpO2: 98% (10-30-22 @ 13:32)      Diet, Clear Liquid      Fluids:     I & O's:    10-29-22 @ 07:01  -  10-30-22 @ 07:00  --------------------------------------------------------  IN:    IV PiggyBack: 100 mL    Oral Fluid: 60 mL  Total IN: 160 mL    OUT:    Voided (mL): 600 mL  Total OUT: 600 mL    Total NET: -440 mL    PHYSICAL EXAM:  GENERAL: NAD, well-appearing  CHEST/LUNG: Normal respiratory effort  HEART: S1, S2  ABDOMEN: Soft, Nontender, minimally distended; no rebound or guarding, no palpable abdominal mass  EXTREMITIES:  No clubbing, cyanosis, or edema    MEDICATIONS  (STANDING):  albuterol/ipratropium for Nebulization 3 milliLiter(s) Nebulizer every 6 hours  atorvastatin 20 milliGRAM(s) Oral at bedtime  budesonide  80 MICROgram(s)/formoterol 4.5 MICROgram(s) Inhaler 2 Puff(s) Inhalation two times a day  chlorhexidine 2% Cloths 1 Application(s) Topical daily  furosemide    Tablet 40 milliGRAM(s) Oral daily  guaiFENesin  milliGRAM(s) Oral every 12 hours  heparin   Injectable 5000 Unit(s) SubCutaneous every 12 hours  lisinopril 20 milliGRAM(s) Oral daily  magnesium oxide 400 milliGRAM(s) Oral daily  metoprolol succinate ER 25 milliGRAM(s) Oral daily  montelukast 10 milliGRAM(s) Oral daily  pantoprazole    Tablet 40 milliGRAM(s) Oral before breakfast  polyethylene glycol 3350 17 Gram(s) Oral every 12 hours  predniSONE   Tablet 40 milliGRAM(s) Oral daily  QUEtiapine 100 milliGRAM(s) Oral at bedtime  sertraline 50 milliGRAM(s) Oral daily  spironolactone 25 milliGRAM(s) Oral daily  tamsulosin 0.4 milliGRAM(s) Oral at bedtime    MEDICATIONS  (PRN):  acetaminophen     Tablet .. 650 milliGRAM(s) Oral every 6 hours PRN Temp greater or equal to 38C (100.4F), Mild Pain (1 - 3)  albuterol/ipratropium for Nebulization 3 milliLiter(s) Nebulizer every 4 hours PRN Shortness of Breath and/or Wheezing  aluminum hydroxide/magnesium hydroxide/simethicone Suspension 30 milliLiter(s) Oral every 4 hours PRN Dyspepsia  melatonin 3 milliGRAM(s) Oral at bedtime PRN Insomnia  ondansetron Injectable 4 milliGRAM(s) IV Push every 8 hours PRN Nausea and/or Vomiting      DVT PROPHYLAXIS: heparin   Injectable 5000 Unit(s) SubCutaneous every 12 hours    GI PROPHYLAXIS: pantoprazole    Tablet 40 milliGRAM(s) Oral before breakfast    ANTICOAGULATION:   ANTIBIOTICS:            LAB/STUDIES:  Labs:  CAPILLARY BLOOD GLUCOSE                              10.9   9.11  )-----------( 238      ( 30 Oct 2022 01:20 )             32.3         10-30    143  |  102  |  31<H>  ----------------------------<  122<H>  3.2<L>   |  29  |  1.3          LFTs:             5.5  | 0.3  | 65       ------------------[82      ( 30 Oct 2022 01:20 )  3.2  | x    | 61          Lipase:x      Amylase:x         Lactate, Blood: 1.9 mmol/L (10-30-22 @ 19:09)      Coags:        Serum Pro-Brain Natriuretic Peptide: 607 pg/mL (10-27-22 @ 09:50)          Culture - Blood (collected 28 Oct 2022 12:42)  Source: .Blood None  Preliminary Report (29 Oct 2022 23:02):    No growth to date.    Culture - Urine (collected 28 Oct 2022 11:32)  Source: Clean Catch Clean Catch (Midstream)  Final Report (29 Oct 2022 19:12):    <10,000 CFU/mL Normal Urogenital Chika              IMAGING:  < from: CT Abdomen and Pelvis w/ Oral Cont and w/ IV Cont (10.30.22 @ 14:19) >  Findings/  impression:  Oral contrast reaches the cecum/ascending colon. No evidence of bowel   obstruction or pneumoperitoneum. 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; ascending), COPD (no home O2) admitted with 4 day history of worsening dyspnea secondary to COPD exacerbation, surgical consult placed for abdominal distention without evidence of abdominal pain/tenderness, no N/V, having BM and passing gas likely secondary to ileus from COPD exacerbation    Plan  - Daily KUB  - CTAP with PO and IV contrast: Appreciated  - Miralax BID  - Correct electrolytes PRN  - Avoid opiods  - Advance diet as tolerated GENERAL SURGERY PROGRESS NOTE    Patient: LATASHA WATTS , 82y ([birth year])Male   MRN: [id number]  Location: [address]  Visit: 10-27-22 Inpatient  Date: 10-31-22 @ 02:17    Hospital Day #: 2    Procedure/Dx/Injuries: Abdominal distension    Events of past 24 hours: Patient denied pain, nausea, vomiting, and ambulation. Endorsed voiding, minor flatus, and bowel movements. No TTP.    PAST MEDICAL & SURGICAL HISTORY:  HTN (hypertension)      HLD (hyperlipidemia)      BPH (benign prostatic hyperplasia)      No significant past surgical history          Vitals:   T(F): 97.5 (10-30-22 @ 20:54), Max: 98.2 (10-30-22 @ 13:32)  HR: 65 (10-30-22 @ 20:54)  BP: 150/67 (10-30-22 @ 20:54)  RR: 18 (10-30-22 @ 20:54)  SpO2: 98% (10-30-22 @ 13:32)      Diet, Clear Liquid      Fluids:     I & O's:    10-29-22 @ 07:01  -  10-30-22 @ 07:00  --------------------------------------------------------  IN:    IV PiggyBack: 100 mL    Oral Fluid: 60 mL  Total IN: 160 mL    OUT:    Voided (mL): 600 mL  Total OUT: 600 mL    Total NET: -440 mL    PHYSICAL EXAM:  GENERAL: NAD, well-appearing  CHEST/LUNG: Normal respiratory effort  HEART: S1, S2  ABDOMEN: Soft, Nontender, minimally distended; no rebound or guarding, no palpable abdominal mass  EXTREMITIES:  No clubbing, cyanosis, or edema    MEDICATIONS  (STANDING):  albuterol/ipratropium for Nebulization 3 milliLiter(s) Nebulizer every 6 hours  atorvastatin 20 milliGRAM(s) Oral at bedtime  budesonide  80 MICROgram(s)/formoterol 4.5 MICROgram(s) Inhaler 2 Puff(s) Inhalation two times a day  chlorhexidine 2% Cloths 1 Application(s) Topical daily  furosemide    Tablet 40 milliGRAM(s) Oral daily  guaiFENesin  milliGRAM(s) Oral every 12 hours  heparin   Injectable 5000 Unit(s) SubCutaneous every 12 hours  lisinopril 20 milliGRAM(s) Oral daily  magnesium oxide 400 milliGRAM(s) Oral daily  metoprolol succinate ER 25 milliGRAM(s) Oral daily  montelukast 10 milliGRAM(s) Oral daily  pantoprazole    Tablet 40 milliGRAM(s) Oral before breakfast  polyethylene glycol 3350 17 Gram(s) Oral every 12 hours  predniSONE   Tablet 40 milliGRAM(s) Oral daily  QUEtiapine 100 milliGRAM(s) Oral at bedtime  sertraline 50 milliGRAM(s) Oral daily  spironolactone 25 milliGRAM(s) Oral daily  tamsulosin 0.4 milliGRAM(s) Oral at bedtime    MEDICATIONS  (PRN):  acetaminophen     Tablet .. 650 milliGRAM(s) Oral every 6 hours PRN Temp greater or equal to 38C (100.4F), Mild Pain (1 - 3)  albuterol/ipratropium for Nebulization 3 milliLiter(s) Nebulizer every 4 hours PRN Shortness of Breath and/or Wheezing  aluminum hydroxide/magnesium hydroxide/simethicone Suspension 30 milliLiter(s) Oral every 4 hours PRN Dyspepsia  melatonin 3 milliGRAM(s) Oral at bedtime PRN Insomnia  ondansetron Injectable 4 milliGRAM(s) IV Push every 8 hours PRN Nausea and/or Vomiting      DVT PROPHYLAXIS: heparin   Injectable 5000 Unit(s) SubCutaneous every 12 hours    GI PROPHYLAXIS: pantoprazole    Tablet 40 milliGRAM(s) Oral before breakfast    ANTICOAGULATION:   ANTIBIOTICS:            LAB/STUDIES:  Labs:  CAPILLARY BLOOD GLUCOSE                              10.9   9.11  )-----------( 238      ( 30 Oct 2022 01:20 )             32.3         10-30    143  |  102  |  31<H>  ----------------------------<  122<H>  3.2<L>   |  29  |  1.3          LFTs:             5.5  | 0.3  | 65       ------------------[82      ( 30 Oct 2022 01:20 )  3.2  | x    | 61          Lipase:x      Amylase:x         Lactate, Blood: 1.9 mmol/L (10-30-22 @ 19:09)      Coags:        Serum Pro-Brain Natriuretic Peptide: 607 pg/mL (10-27-22 @ 09:50)          Culture - Blood (collected 28 Oct 2022 12:42)  Source: .Blood None  Preliminary Report (29 Oct 2022 23:02):    No growth to date.    Culture - Urine (collected 28 Oct 2022 11:32)  Source: Clean Catch Clean Catch (Midstream)  Final Report (29 Oct 2022 19:12):    <10,000 CFU/mL Normal Urogenital Chika              IMAGING:  < from: CT Abdomen and Pelvis w/ Oral Cont and w/ IV Cont (10.30.22 @ 14:19) >  Findings/  impression:  Oral contrast reaches the cecum/ascending colon. No evidence of bowel   obstruction or pneumoperitoneum. Additional findings are without   significant change when compared to recent CT 10/27/2022    --- End of Report ---    < end of copied text >

## 2022-10-31 NOTE — CONSULT NOTE ADULT - SUBJECTIVE AND OBJECTIVE BOX
Gastroenterology Consultation:    Patient is a 82y old  Male who presents with a chief complaint of Worsening SOB (31 Oct 2022 08:11)    Admitted on: 10-27-22    HPI:  The patient is a 81 y/o Polish speaking male with PMHx of HTN, HLD, ascending aortic aneurysm 4.5 cm, COPD (not on home O2) p/w worsening, and constant SOB for the past 4 days, associated with heavy exertion. He denies any associated symptoms including fever, cough, excessive sputum production. On admission, he endorsed epigastric abdominal pain that started on the morning of admission, associated with bloating. He denies any recent nausea/vomiting or constipation/diarrhea, but complains of poor oral intake. He also complains of recurrent LE swelling that is intermittent in nature and associated with medication intake. He is taking multiple medications with questionable compliance.    GI History:  The patient was assessed at bedside this AM, appears to be doing well. He denies any abdominal pain today, but reports having loose stools for two days now. His last BM was this morning, which was loose and brown in color. He reports that his abdominal distension has started since 3 months ago. He denies additional symptoms of nausea, vomiting, constipation, and abdominal pain. He reports having a 50 year smoking history but has quit for 5 years now. He reports occasional social drinking. He denies family history of stomach and colon cancers and denies having prior EGD/Colonoscopy.    Polish : Hillary, ID #[id number]       ED Vital Signs Last 24 Hrs  T(C): 37.4 (27 Oct 2022 08:40), Max: 37.4 (27 Oct 2022 08:40)  T(F): 99.3 (27 Oct 2022 08:40), Max: 99.3 (27 Oct 2022 08:40)  HR: 99 (27 Oct 2022 15:22) (83 - 100)  BP: 136/63 (27 Oct 2022 15:22) (136/63 - 201/78)  RR: 18 (27 Oct 2022 15:22) (18 - 24)  SpO2: 99% (27 Oct 2022 15:22) (96% - 100%)  O2 Parameters below as of 27 Oct 2022 15:22  Patient On (Oxygen Delivery Method): BiPAP/CPAP    Labs significant for Na 147 -    VBG done x2 unremarkable for hypercapnia  EKG noted RBBB - bifascicular block   CT angio noted for severe emphysema - CTA/P non-remarkable   In the ED received solumedrol 125 + inhalers + azithromycin + lasix IV 40   Critical care c/s noted      (27 Oct 2022 16:03)      Prior EGD: None  Prior Colonoscopy: None    PAST MEDICAL & SURGICAL HISTORY:  HTN (hypertension)  HLD (hyperlipidemia)  BPH (benign prostatic hyperplasia)    No significant past surgical history.    FAMILY HISTORY:  Denies stomach and colon cancer in family.      Social History:  Tobacco: Reports 50 year smoking history, quit 5 years ago  Alcohol: Reports social drinking  Drugs: Denies    Home Medications:  Albuterol (Eqv-ProAir HFA) 90 mcg/inh inhalation aerosol: 2 puff(s) inhaled every 6 hours (27 Oct 2022 17:37)  atorvastatin 10 mg oral tablet: 2 tab(s) orally once a day (27 Oct 2022 17:37)  esomeprazole 40 mg oral delayed release capsule: 1 cap(s) orally once a day (28 Oct 2022 16:21)  famotidine 40 mg oral tablet: 1 tab(s) orally once a day (at bedtime) (28 Oct 2022 16:21)  furosemide 40 mg oral tablet: 1 tab(s) orally once a day (28 Oct 2022 16:23)  magnesium oxide 400 mg oral tablet: 1 tab(s) orally once a day (28 Oct 2022 16:24)  montelukast 10 mg oral tablet: 1 tab(s) orally once a day (27 Oct 2022 17:37)  Mucinex 600 mg oral tablet, extended release: 1 tab(s) orally every 12 hours (28 Oct 2022 16:26)  Nephplex Rx oral tablet: 1 tab(s) orally once a day (28 Oct 2022 16:25)  SEROquel XR 50 mg oral tablet, extended release: 3 tab(s) orally once a day (in the evening) (27 Oct 2022 17:37)  sertraline 50 mg oral tablet: 1 tab(s) orally once a day (28 Oct 2022 16:23)  spironolactone 25 mg oral tablet: 1 tab(s) orally once a day (27 Oct 2022 17:37)  tamsulosin 0.4 mg oral capsule: 1 cap(s) orally once a day (28 Oct 2022 16:23)  Trelegy Ellipta 100 mcg-62.5 mcg-25 mcg/inh inhalation powder: 1 puff(s) inhaled once a day (28 Oct 2022 16:24)  Vascepa 1 g oral capsule: 2 cap(s) orally 2 times a day (27 Oct 2022 17:37)  Vitamin D3 25 mcg (1000 intl units) oral capsule: 1 cap(s) orally once a day (28 Oct 2022 16:24)        MEDICATIONS  (STANDING):  albuterol/ipratropium for Nebulization 3 milliLiter(s) Nebulizer every 6 hours  atorvastatin 20 milliGRAM(s) Oral at bedtime  budesonide  80 MICROgram(s)/formoterol 4.5 MICROgram(s) Inhaler 2 Puff(s) Inhalation two times a day  chlorhexidine 2% Cloths 1 Application(s) Topical daily  furosemide    Tablet 40 milliGRAM(s) Oral daily  guaiFENesin  milliGRAM(s) Oral every 12 hours  heparin   Injectable 5000 Unit(s) SubCutaneous every 12 hours  lisinopril 20 milliGRAM(s) Oral daily  magnesium oxide 400 milliGRAM(s) Oral daily  metoprolol succinate ER 25 milliGRAM(s) Oral daily  montelukast 10 milliGRAM(s) Oral daily  pantoprazole    Tablet 40 milliGRAM(s) Oral before breakfast  polyethylene glycol 3350 17 Gram(s) Oral every 12 hours  predniSONE   Tablet 40 milliGRAM(s) Oral daily  QUEtiapine 100 milliGRAM(s) Oral at bedtime  sertraline 50 milliGRAM(s) Oral daily  spironolactone 25 milliGRAM(s) Oral daily  tamsulosin 0.4 milliGRAM(s) Oral at bedtime    MEDICATIONS  (PRN):  acetaminophen     Tablet .. 650 milliGRAM(s) Oral every 6 hours PRN Temp greater or equal to 38C (100.4F), Mild Pain (1 - 3)  albuterol/ipratropium for Nebulization 3 milliLiter(s) Nebulizer every 4 hours PRN Shortness of Breath and/or Wheezing  aluminum hydroxide/magnesium hydroxide/simethicone Suspension 30 milliLiter(s) Oral every 4 hours PRN Dyspepsia  melatonin 3 milliGRAM(s) Oral at bedtime PRN Insomnia  ondansetron Injectable 4 milliGRAM(s) IV Push every 8 hours PRN Nausea and/or Vomiting      Allergies  No Known Allergies      Review of Systems:   Constitutional:  No Fever, No Chills  ENT/Mouth:  No Hearing Changes,  No Difficulty Swallowing  Eyes:  No Eye Pain, No Vision Changes  Cardiovascular:  No Chest Pain, No Palpitations  Respiratory:  (+) Cough with sputum and congestion  Gastrointestinal: (+) Abdominal swelling for 3 months  Musculoskeletal:  No Joint Swelling, No Back Pain      Physical Examination:  T(C): 36.3 (10-31-22 @ 05:32), Max: 36.8 (10-30-22 @ 13:32)  HR: 65 (10-31-22 @ 05:32) (65 - 68)  BP: 131/61 (10-31-22 @ 05:32) (131/61 - 150/67)  RR: 18 (10-31-22 @ 05:32) (18 - 18)  SpO2: 98% (10-30-22 @ 13:32) (98% - 98%)      10-29-22 @ 07:01  -  10-30-22 @ 07:00  --------------------------------------------------------  IN: 160 mL / OUT: 600 mL / NET: -440 mL    10-31-22 @ 07:01  -  10-31-22 @ 11:24  --------------------------------------------------------  IN: 240 mL / OUT: 0 mL / NET: 240 mL          GENERAL: AAOx3, no acute distress.  HEAD:  Atraumatic, Normocephalic  EYES: conjunctiva and sclera clear  NECK: Supple, no JVD or thyromegaly  CHEST/LUNG: Clear to auscultation bilaterally; No wheeze, rhonchi, or rales  HEART: Regular rate and rhythm; normal S1, S2, No murmurs.  ABDOMEN: Soft, nontender, (+) distended; Bowel sounds present. (-) fluid wave  NEUROLOGY: No asterixis or tremor.   SKIN: Intact, no jaundice        Data:                        13.0   8.26  )-----------( 293      ( 31 Oct 2022 07:48 )             37.8     Hgb Trend:  13.0  10-31-22 @ 07:48  11.6  10-31-22 @ 01:26  10.9  10-30-22 @ 01:20  11.8  10-29-22 @ 00:36        10-31    140  |  104  |  20  ----------------------------<  129<H>  4.2   |  27  |  1.1    Ca    8.7      31 Oct 2022 07:48  Mg     2.4     10-31    TPro  5.7<L>  /  Alb  3.3<L>  /  TBili  0.6  /  DBili  x   /  AST  54<H>  /  ALT  64<H>  /  AlkPhos  99  10-31    Liver panel trend:  TBili 0.6   /   AST 54   /   ALT 64   /   AlkP 99   /   Tptn 5.7   /   Alb 3.3    /   DBili --      10-31  TBili 0.6   /   AST 43   /   ALT 60   /   AlkP 85   /   Tptn 5.5   /   Alb 3.0    /   DBili --      10-31  TBili 0.3   /   AST 65   /   ALT 61   /   AlkP 82   /   Tptn 5.5   /   Alb 3.2    /   DBili --      10-30  TBili 0.4   /   AST 61   /   ALT 44   /   AlkP 98   /   Tptn 5.8   /   Alb 3.2    /   DBili 0.2      10-29  TBili 0.7   /   AST 39   /   ALT 28   /   AlkP 109   /   Tptn 5.9   /   Alb 3.6    /   DBili --      10-28  TBili 0.9   /   AST 30   /   ALT 25   /   AlkP 125   /   Tptn 6.1   /   Alb 3.4    /   DBili --      10-27          Culture - Blood (collected 28 Oct 2022 12:42)  Source: .Blood None  Preliminary Report (29 Oct 2022 23:02):    No growth to date.    Culture - Urine (collected 28 Oct 2022 11:32)  Source: Clean Catch Clean Catch (Midstream)  Final Report (29 Oct 2022 19:12):    <10,000 CFU/mL Normal Urogenital Chika          Radiology:  CT Abdomen and Pelvis w/ Oral Cont and w/ IV Cont:   ACC: [id number] EXAM:  CT ABDOMEN AND PELVIS OC IC                          PROCEDURE DATE:  10/30/2022        INTERPRETATION:  CLINICAL STATEMENT: Abdominal pain.    TECHNIQUE: Contiguous axial CT images were obtained from the lower chest   to thepubic symphysis following administration of intravenous contrast.    Oral contrast was administered. Reformatted images in the coronal and   sagittal planes were acquired.    COMPARISON CT: 10/27/2022    Findings/  impression:  Oral contrast reaches the cecum/ascending colon. No evidence of bowel   obstruction or pneumoperitoneum. Additional findings are without   significant change when compared to recent CT 10/27/2022    --- End of Report ---            MARIE THAKUR MD; Attending Radiologist  This document has been electronically signed. Oct 30 2022 11:11PM (10-30-22 @ 14:19)    < from: CT Abdomen and Pelvis w/ Oral Cont and w/ IV Cont (10.30.22 @ 14:19) >  ACC: [id number] EXAM:  CT ABDOMEN AND PELVIS OC IC                          PROCEDURE DATE:  10/30/2022          INTERPRETATION:  CLINICAL STATEMENT: Abdominal pain.    TECHNIQUE: Contiguous axial CT images were obtained from the lower chest   to thepubic symphysis following administration of intravenous contrast.    Oral contrast was administered. Reformatted images in the coronal and   sagittal planes were acquired.    COMPARISON CT: 10/27/2022    Findings/  impression:  Oral contrast reaches the cecum/ascending colon. No evidence of bowel   obstruction or pneumoperitoneum. Additional findings are without   significant change when compared to recent CT 10/27/2022    --- End of Report ---            MARIE THAKUR MD; Attending Radiologist  This document has been electronically signed. Oct 30 2022 11:11PM    < end of copied text >  < from: US Abdomen Upper Quadrant Right (10.28.22 @ 14:58) >  ACC: [id number] EXAM:  US ABDOMEN RT UPR QUADRANT                          PROCEDURE DATE:  10/28/2022          INTERPRETATION:  CLINICAL INFORMATION: Distended gallbladder.    COMPARISON: CT scan from the prior day    TECHNIQUE: Sonography of the right upper quadrant.    FINDINGS:  Liver: Within normal limits.  Bile ducts: Normal caliber. Common bile duct measures 5 mm.  Gallbladder: The gallbladder is not distended.  Pancreas: Visualized portions are within normal limits.  Right kidney: 10.9 x 4.7 x 4.4 cm. No hydronephrosis. 2.7 x 1.8 x 1.5 cm   upper pole simple benign cyst.  Ascites: None.  IVC: Visualized portions are within normal limits.    IMPRESSION:  Nondistended gallbladder. No ductal dilatation.    Simple cyst within the right kidney.        --- End of Report ---            ELLA GOVEA MD; Attending Interventional Radiologist  This document has been electronically signed. Oct 28 2022  3:01PM    < end of copied text >  < from: CT Angio Abdomen and Pelvis w/ IV Cont (10.27.22 @ 11:37) >    ACC: [id number] EXAM:  CT ANGIO ABD PELV (W)AW IC                        ACC: [id number] EXAM:  CT ANGIO CHEST AORTA WAWI                          PROCEDURE DATE:  10/27/2022          INTERPRETATION:  CLINICAL STATEMENT: Shortness of breath. Thoracic aortic   aneurysm.    TECHNIQUE: Multislice helical sections were obtained from the thoracic   inlet to the upper abdomen before intravenous contrast administration.   CTA images were then obtained though the chest and abdomen following   administration of 100c Omnipaque 350 intravenous contrast. Sagittal and   coronal reformatted images were acquired, as well as MIP reconstructed   images.    COMPARISON CT: CTA chest abdomen pelvis dated 7/17/2021.      FINDINGS:    AORTA: No evidence of aortic dissection. Aneurysmal dilatation of the   ascending thoracic aorta measuring up to 4.4 cm is unchanged compared to   7/17/2021. There is no central pulmonary embolus. There is extensive   atherosclerotic disease.    LUNGS: Severe centrilobular emphysema. Lingular atelectasis, with a 1.5   cm nodular component. Bronchial wall thickening and extensive large   airways inflammation. There are secretions in the right mainstem   bronchus. Scattered small airways inflammation.    PLEURA/PERICARDIUM: Trace bilateral pleural effusions. No pneumothorax.    HEART: Heart size is within normal limits. There is no pericardial   effusion.    MEDIASTINUM/THORACIC NODES: A few prominent mediastinal lymph nodes are   similar to prior..    HEPATOBILIARY: The liver is unremarkable. The gallbladder is mildly   distended. There are no radiopaque gallstones. There is no biliary ductal   dilatation..    SPLEEN: Unremarkable.    PANCREAS: Unremarkable.    ADRENAL GLANDS: Unremarkable.    KIDNEYS: No hydronephrosisor perinephric stranding. 3 cm right renal   cyst.    ABDOMINAL NODES: No abdominal or pelvic lymphadenopathy.    PELVIC STRUCTURES: The urinary bladder is distended.    BOWEL/MESENTERY/PERITONEUM: No evidence of bowel obstruction. Normal   appendix.No free air or fluid. No areas of bowel thickening identified.    BONES/SOFT TISSUES: No acute abnormality seen.    IMPRESSION:  1.  No aortic dissection.  2.  Severe emphysema. Small and large airways inflammation. Secretions in   the right mainstembronchus. Lingular nodular opacity for which 3 month   follow-up CT is recommended.  3.  Distended gallbladder. Consider right upper quadrant ultrasound if   clinically warranted.  4.  Distended urinary bladder.    --- End of Report ---            VINCE VAUGHN MD; Attending Radiologist  This document has been electronically signed. Oct 27 2022 12:11PM    < end of copied text >     Gastroenterology Consultation:    Patient is a 82y old  Male who presents with a chief complaint of Worsening SOB (31 Oct 2022 08:11)    Admitted on: 10-27-22    HPI:  The patient is a 81 y/o French speaking male with PMHx of HTN, HLD, ascending aortic aneurysm 4.5 cm, COPD (not on home O2) p/w worsening, and constant SOB for the past 4 days, associated with heavy exertion. He denies any associated symptoms including fever, cough, excessive sputum production. On admission, he endorsed epigastric abdominal pain that started on the morning of admission, associated with bloating. He denies any recent nausea/vomiting or constipation/diarrhea, but complains of poor oral intake. He also complains of recurrent LE swelling that is intermittent in nature and associated with medication intake. He is taking multiple medications with questionable compliance.    GI History:  The patient was assessed at bedside this AM, appears to be doing well. He denies any abdominal pain today, but reports having loose stools for two days now. His last BM was this morning, which was loose and brown in color. He reports that his abdominal distension has started since 3 months ago. He denies additional symptoms of nausea, vomiting, constipation, and abdominal pain. He reports having a 50 year smoking history but has quit for 5 years now. He reports occasional social drinking. He denies family history of stomach and colon cancers and denies having prior EGD/Colonoscopy.    French : Hillary, ID #[id number]       ED Vital Signs Last 24 Hrs  T(C): 37.4 (27 Oct 2022 08:40), Max: 37.4 (27 Oct 2022 08:40)  T(F): 99.3 (27 Oct 2022 08:40), Max: 99.3 (27 Oct 2022 08:40)  HR: 99 (27 Oct 2022 15:22) (83 - 100)  BP: 136/63 (27 Oct 2022 15:22) (136/63 - 201/78)  RR: 18 (27 Oct 2022 15:22) (18 - 24)  SpO2: 99% (27 Oct 2022 15:22) (96% - 100%)  O2 Parameters below as of 27 Oct 2022 15:22  Patient On (Oxygen Delivery Method): BiPAP/CPAP    Labs significant for Na 147 -    VBG done x2 unremarkable for hypercapnia  EKG noted RBBB - bifascicular block   CT angio noted for severe emphysema - CTA/P non-remarkable   In the ED received solumedrol 125 + inhalers + azithromycin + lasix IV 40   Critical care c/s noted      (27 Oct 2022 16:03)      Prior EGD: None  Prior Colonoscopy: None    PAST MEDICAL & SURGICAL HISTORY:  HTN (hypertension)  HLD (hyperlipidemia)  BPH (benign prostatic hyperplasia)    No significant past surgical history.    FAMILY HISTORY:  Denies stomach and colon cancer in family.      Social History:  Tobacco: Reports 50 year smoking history, quit 5 years ago  Alcohol: Reports social drinking  Drugs: Denies    Home Medications:  Albuterol (Eqv-ProAir HFA) 90 mcg/inh inhalation aerosol: 2 puff(s) inhaled every 6 hours (27 Oct 2022 17:37)  atorvastatin 10 mg oral tablet: 2 tab(s) orally once a day (27 Oct 2022 17:37)  esomeprazole 40 mg oral delayed release capsule: 1 cap(s) orally once a day (28 Oct 2022 16:21)  famotidine 40 mg oral tablet: 1 tab(s) orally once a day (at bedtime) (28 Oct 2022 16:21)  furosemide 40 mg oral tablet: 1 tab(s) orally once a day (28 Oct 2022 16:23)  magnesium oxide 400 mg oral tablet: 1 tab(s) orally once a day (28 Oct 2022 16:24)  montelukast 10 mg oral tablet: 1 tab(s) orally once a day (27 Oct 2022 17:37)  Mucinex 600 mg oral tablet, extended release: 1 tab(s) orally every 12 hours (28 Oct 2022 16:26)  Nephplex Rx oral tablet: 1 tab(s) orally once a day (28 Oct 2022 16:25)  SEROquel XR 50 mg oral tablet, extended release: 3 tab(s) orally once a day (in the evening) (27 Oct 2022 17:37)  sertraline 50 mg oral tablet: 1 tab(s) orally once a day (28 Oct 2022 16:23)  spironolactone 25 mg oral tablet: 1 tab(s) orally once a day (27 Oct 2022 17:37)  tamsulosin 0.4 mg oral capsule: 1 cap(s) orally once a day (28 Oct 2022 16:23)  Trelegy Ellipta 100 mcg-62.5 mcg-25 mcg/inh inhalation powder: 1 puff(s) inhaled once a day (28 Oct 2022 16:24)  Vascepa 1 g oral capsule: 2 cap(s) orally 2 times a day (27 Oct 2022 17:37)  Vitamin D3 25 mcg (1000 intl units) oral capsule: 1 cap(s) orally once a day (28 Oct 2022 16:24)        MEDICATIONS  (STANDING):  albuterol/ipratropium for Nebulization 3 milliLiter(s) Nebulizer every 6 hours  atorvastatin 20 milliGRAM(s) Oral at bedtime  budesonide  80 MICROgram(s)/formoterol 4.5 MICROgram(s) Inhaler 2 Puff(s) Inhalation two times a day  chlorhexidine 2% Cloths 1 Application(s) Topical daily  furosemide    Tablet 40 milliGRAM(s) Oral daily  guaiFENesin  milliGRAM(s) Oral every 12 hours  heparin   Injectable 5000 Unit(s) SubCutaneous every 12 hours  lisinopril 20 milliGRAM(s) Oral daily  magnesium oxide 400 milliGRAM(s) Oral daily  metoprolol succinate ER 25 milliGRAM(s) Oral daily  montelukast 10 milliGRAM(s) Oral daily  pantoprazole    Tablet 40 milliGRAM(s) Oral before breakfast  polyethylene glycol 3350 17 Gram(s) Oral every 12 hours  predniSONE   Tablet 40 milliGRAM(s) Oral daily  QUEtiapine 100 milliGRAM(s) Oral at bedtime  sertraline 50 milliGRAM(s) Oral daily  spironolactone 25 milliGRAM(s) Oral daily  tamsulosin 0.4 milliGRAM(s) Oral at bedtime    MEDICATIONS  (PRN):  acetaminophen     Tablet .. 650 milliGRAM(s) Oral every 6 hours PRN Temp greater or equal to 38C (100.4F), Mild Pain (1 - 3)  albuterol/ipratropium for Nebulization 3 milliLiter(s) Nebulizer every 4 hours PRN Shortness of Breath and/or Wheezing  aluminum hydroxide/magnesium hydroxide/simethicone Suspension 30 milliLiter(s) Oral every 4 hours PRN Dyspepsia  melatonin 3 milliGRAM(s) Oral at bedtime PRN Insomnia  ondansetron Injectable 4 milliGRAM(s) IV Push every 8 hours PRN Nausea and/or Vomiting      Allergies  No Known Allergies      Review of Systems:   Constitutional:  No Fever, No Chills  ENT/Mouth:  No Hearing Changes,  No Difficulty Swallowing  Eyes:  No Eye Pain, No Vision Changes  Cardiovascular:  No Chest Pain, No Palpitations  Respiratory:  (+) Cough with sputum and congestion  Gastrointestinal: (+) Abdominal swelling for 3 months  Musculoskeletal:  No Joint Swelling, No Back Pain      Physical Examination:  T(C): 36.3 (10-31-22 @ 05:32), Max: 36.8 (10-30-22 @ 13:32)  HR: 65 (10-31-22 @ 05:32) (65 - 68)  BP: 131/61 (10-31-22 @ 05:32) (131/61 - 150/67)  RR: 18 (10-31-22 @ 05:32) (18 - 18)  SpO2: 98% (10-30-22 @ 13:32) (98% - 98%)      10-29-22 @ 07:01  -  10-30-22 @ 07:00  --------------------------------------------------------  IN: 160 mL / OUT: 600 mL / NET: -440 mL    10-31-22 @ 07:01  -  10-31-22 @ 11:24  --------------------------------------------------------  IN: 240 mL / OUT: 0 mL / NET: 240 mL          GENERAL: AAOx3, no acute distress.  HEAD:  Atraumatic, Normocephalic  EYES: conjunctiva and sclera clear  NECK: Supple, no JVD or thyromegaly  CHEST/LUNG: Clear to auscultation bilaterally; No wheeze, rhonchi, or rales  HEART: Regular rate and rhythm; normal S1, S2, No murmurs.  ABDOMEN: Soft, nontender, (+) distended; Bowel sounds present. (-) fluid wave, +tympanic  NEUROLOGY: No asterixis or tremor.   SKIN: Intact, no jaundice        Data:                        13.0   8.26  )-----------( 293      ( 31 Oct 2022 07:48 )             37.8     Hgb Trend:  13.0  10-31-22 @ 07:48  11.6  10-31-22 @ 01:26  10.9  10-30-22 @ 01:20  11.8  10-29-22 @ 00:36        10-31    140  |  104  |  20  ----------------------------<  129<H>  4.2   |  27  |  1.1    Ca    8.7      31 Oct 2022 07:48  Mg     2.4     10-31    TPro  5.7<L>  /  Alb  3.3<L>  /  TBili  0.6  /  DBili  x   /  AST  54<H>  /  ALT  64<H>  /  AlkPhos  99  10-31    Liver panel trend:  TBili 0.6   /   AST 54   /   ALT 64   /   AlkP 99   /   Tptn 5.7   /   Alb 3.3    /   DBili --      10-31  TBili 0.6   /   AST 43   /   ALT 60   /   AlkP 85   /   Tptn 5.5   /   Alb 3.0    /   DBili --      10-31  TBili 0.3   /   AST 65   /   ALT 61   /   AlkP 82   /   Tptn 5.5   /   Alb 3.2    /   DBili --      10-30  TBili 0.4   /   AST 61   /   ALT 44   /   AlkP 98   /   Tptn 5.8   /   Alb 3.2    /   DBili 0.2      10-29  TBili 0.7   /   AST 39   /   ALT 28   /   AlkP 109   /   Tptn 5.9   /   Alb 3.6    /   DBili --      10-28  TBili 0.9   /   AST 30   /   ALT 25   /   AlkP 125   /   Tptn 6.1   /   Alb 3.4    /   DBili --      10-27          Culture - Blood (collected 28 Oct 2022 12:42)  Source: .Blood None  Preliminary Report (29 Oct 2022 23:02):    No growth to date.    Culture - Urine (collected 28 Oct 2022 11:32)  Source: Clean Catch Clean Catch (Midstream)  Final Report (29 Oct 2022 19:12):    <10,000 CFU/mL Normal Urogenital Chika          Radiology:  CT Abdomen and Pelvis w/ Oral Cont and w/ IV Cont:   ACC: [id number] EXAM:  CT ABDOMEN AND PELVIS OC IC                          PROCEDURE DATE:  10/30/2022        INTERPRETATION:  CLINICAL STATEMENT: Abdominal pain.    TECHNIQUE: Contiguous axial CT images were obtained from the lower chest   to thepubic symphysis following administration of intravenous contrast.    Oral contrast was administered. Reformatted images in the coronal and   sagittal planes were acquired.    COMPARISON CT: 10/27/2022    Findings/  impression:  Oral contrast reaches the cecum/ascending colon. No evidence of bowel   obstruction or pneumoperitoneum. Additional findings are without   significant change when compared to recent CT 10/27/2022    --- End of Report ---            MARIE THAKUR MD; Attending Radiologist  This document has been electronically signed. Oct 30 2022 11:11PM (10-30-22 @ 14:19)    < from: CT Abdomen and Pelvis w/ Oral Cont and w/ IV Cont (10.30.22 @ 14:19) >  ACC: [id number] EXAM:  CT ABDOMEN AND PELVIS OC IC                          PROCEDURE DATE:  10/30/2022          INTERPRETATION:  CLINICAL STATEMENT: Abdominal pain.    TECHNIQUE: Contiguous axial CT images were obtained from the lower chest   to thepubic symphysis following administration of intravenous contrast.    Oral contrast was administered. Reformatted images in the coronal and   sagittal planes were acquired.    COMPARISON CT: 10/27/2022    Findings/  impression:  Oral contrast reaches the cecum/ascending colon. No evidence of bowel   obstruction or pneumoperitoneum. Additional findings are without   significant change when compared to recent CT 10/27/2022    --- End of Report ---            MARIE THAKUR MD; Attending Radiologist  This document has been electronically signed. Oct 30 2022 11:11PM    < end of copied text >  < from: US Abdomen Upper Quadrant Right (10.28.22 @ 14:58) >  ACC: [id number] EXAM:  US ABDOMEN RT UPR QUADRANT                          PROCEDURE DATE:  10/28/2022          INTERPRETATION:  CLINICAL INFORMATION: Distended gallbladder.    COMPARISON: CT scan from the prior day    TECHNIQUE: Sonography of the right upper quadrant.    FINDINGS:  Liver: Within normal limits.  Bile ducts: Normal caliber. Common bile duct measures 5 mm.  Gallbladder: The gallbladder is not distended.  Pancreas: Visualized portions are within normal limits.  Right kidney: 10.9 x 4.7 x 4.4 cm. No hydronephrosis. 2.7 x 1.8 x 1.5 cm   upper pole simple benign cyst.  Ascites: None.  IVC: Visualized portions are within normal limits.    IMPRESSION:  Nondistended gallbladder. No ductal dilatation.    Simple cyst within the right kidney.        --- End of Report ---            ELLA GOVEA MD; Attending Interventional Radiologist  This document has been electronically signed. Oct 28 2022  3:01PM    < end of copied text >  < from: CT Angio Abdomen and Pelvis w/ IV Cont (10.27.22 @ 11:37) >    ACC: [id number] EXAM:  CT ANGIO ABD PELV (W)AW IC                        ACC: [id number] EXAM:  CT ANGIO CHEST AORTA WAWIC                          PROCEDURE DATE:  10/27/2022          INTERPRETATION:  CLINICAL STATEMENT: Shortness of breath. Thoracic aortic   aneurysm.    TECHNIQUE: Multislice helical sections were obtained from the thoracic   inlet to the upper abdomen before intravenous contrast administration.   CTA images were then obtained though the chest and abdomen following   administration of 100c Omnipaque 350 intravenous contrast. Sagittal and   coronal reformatted images were acquired, as well as MIP reconstructed   images.    COMPARISON CT: CTA chest abdomen pelvis dated 7/17/2021.      FINDINGS:    AORTA: No evidence of aortic dissection. Aneurysmal dilatation of the   ascending thoracic aorta measuring up to 4.4 cm is unchanged compared to   7/17/2021. There is no central pulmonary embolus. There is extensive   atherosclerotic disease.    LUNGS: Severe centrilobular emphysema. Lingular atelectasis, with a 1.5   cm nodular component. Bronchial wall thickening and extensive large   airways inflammation. There are secretions in the right mainstem   bronchus. Scattered small airways inflammation.    PLEURA/PERICARDIUM: Trace bilateral pleural effusions. No pneumothorax.    HEART: Heart size is within normal limits. There is no pericardial   effusion.    MEDIASTINUM/THORACIC NODES: A few prominent mediastinal lymph nodes are   similar to prior..    HEPATOBILIARY: The liver is unremarkable. The gallbladder is mildly   distended. There are no radiopaque gallstones. There is no biliary ductal   dilatation..    SPLEEN: Unremarkable.    PANCREAS: Unremarkable.    ADRENAL GLANDS: Unremarkable.    KIDNEYS: No hydronephrosisor perinephric stranding. 3 cm right renal   cyst.    ABDOMINAL NODES: No abdominal or pelvic lymphadenopathy.    PELVIC STRUCTURES: The urinary bladder is distended.    BOWEL/MESENTERY/PERITONEUM: No evidence of bowel obstruction. Normal   appendix.No free air or fluid. No areas of bowel thickening identified.    BONES/SOFT TISSUES: No acute abnormality seen.    IMPRESSION:  1.  No aortic dissection.  2.  Severe emphysema. Small and large airways inflammation. Secretions in   the right mainstembronchus. Lingular nodular opacity for which 3 month   follow-up CT is recommended.  3.  Distended gallbladder. Consider right upper quadrant ultrasound if   clinically warranted.  4.  Distended urinary bladder.    --- End of Report ---            VINCE VAUGHN MD; Attending Radiologist  This document has been electronically signed. Oct 27 2022 12:11PM    < end of copied text >

## 2022-10-31 NOTE — PROGRESS NOTE ADULT - ATTENDING COMMENTS
Patient remains clinically stable.  CT from yesterday reviewed and is consistent with normal bowel gas pattern.    Assessment/Plan:  - continue regular diet  - bowel regiment  - avoid opioids  - needs OOB walking  Please recall Surgery as needed.

## 2022-10-31 NOTE — CONSULT NOTE ADULT - ATTENDING COMMENTS
Asked to evaluate for abdominal distension and loose stools. CT imaging reviewed showing ileus pattern, no evidence of obstruction. Abd softly distended, nontender, tympany noted. Recommend discontinue laxatives/miralax for now as likely contributing to loose stools. Continue supportive measures and monitor/replete electrolytes. Obtain RUQ US. If diarrhea persists after laxatives have been dc'd recommend obtain stool studies r/o infectious etiologies. Asked to evaluate for abdominal distension and loose stools. CT imaging reviewed showing ileus pattern, no evidence of obstruction. Abd softly distended, nontender, tympany noted. Recommend discontinue laxatives/miralax for now as likely contributing to loose stools. Continue supportive measures and monitor/replete electrolytes. Obtain RUQ US and check hepatitis serologies. If diarrhea persists after laxatives have been dc'd recommend obtain stool studies r/o infectious etiologies.

## 2022-10-31 NOTE — PHYSICAL THERAPY INITIAL EVALUATION ADULT - LEVEL OF INDEPENDENCE: SUPINE/SIT, REHAB EVAL
[facility] AMBULATORY ENCOUNTER  PAIN MANAGEMENT FOLLOW-UP    PROVIDERS:   PCP: Amparo Marcelo MD   Ortho: Dr. Biju Lei  PT: Corine Barker  Chiro: Dr. Restrepo (too sensitive to continue treatment per patient)    CHIEF COMPLAINT:  No chief complaint on file.     SUBJECTIVE:    Hannah Hayes is a 62 year old female who was seen today for a follow-up for right C8 cervical radiculopathy, well-relieved with MICHELLE (06/15/21).     Her history is somewhat complicated by right carpal tunnel syndrome as well with plans for right carpal tunnel surgery 12/13/22 with Dr. Serna.     Today, Hannah describes pain radiating from the right side of her neck into the right upper extremity through the to the right pinky finger with associated paresthesias.  She describes the discomfort as sharp, shooting, numb, tender, burning in character and rates it 4/10.      Socially, Hannah enjoys her 4 and 8 year old grandchildren, however, she feels very stressed regarding her 2 children, who she feels are trying to control her. ***    Patient reports numbness, tingling and weakness in the RUE. She denies bowel/bladder incontinence, saddle anesthesia, recent fever, infection, or antibiotic use. Patient does not have a history of cancer.       PAIN COURSE AND PREVIOUS INTERVENTIONS:  Medications: Flexeril, Gabapentin 600mg BID  Failed/prev: percocet, cymbalta    Interventions:   06/15/21 C7-T1 ILESI with 70% relief for 2+ weeks  02/01/18 right C4-5 TF ALICIA (severe pain); ([place])    Adjuvants:  PT not helpful and injections not helpful. Chiropractic care not recommended in the past    BLOOD THINNING MEDICATIONS:  ASA 81 mg    Orthopedic/Spine Surgical History:  -Dr. Biju Lei 7/13/18 C4-5 ACDF  -5/13/16 C5-6 ACDF    Mental Health/Substance Use History:   Anxiety and depression    Pertinent Comorbidities:  None    MEDICATIONS:    Current Outpatient Medications   Medication Sig Dispense Refill   • gabapentin (NEURONTIN) 600 MG tablet Take 1  tablet by mouth in the morning and 1 tablet in the evening. 180 tablet 1   • metformin (GLUCOPHAGE) 1000 MG tablet Take 1 tablet by mouth in the morning and 1 tablet in the evening. Take with meals. 180 tablet 3   • glimepiride (AMARYL) 4 MG tablet Take 1 tablet by mouth 2 times daily. 180 tablet 1   • cyclobenzaprine (FLEXERIL) 10 MG tablet TAKE 1 TABLET BY MOUTH EVERY NIGHT 90 tablet 1   • rosuvastatin (CRESTOR) 20 MG tablet Take 1 tablet by mouth nightly. 90 tablet 3   • Trulicity 4.5 MG/0.5ML Solution Pen-injector INJECT 4.5 MG UNDER THE SKIN EVERY 7 DAYS. DO NOT START BEFORE SEPTEMBER 27, 2021 6 mL 3   • albuterol 108 (90 Base) MCG/ACT inhaler Inhale 2 puffs into the lungs every 4 hours as needed for Shortness of Breath or Wheezing. 1 each 1   • omeprazole (PriLOSEC) 40 MG capsule Take 1 capsule by mouth daily. Take 1 right away and daily in the morning 30 capsule 0   • diclofenac (VOLTAREN) 1 % gel Apply 2 g topically 4 times daily as needed (pain). Apply to neck and  g 3   • meclizine (ANTIVERT) 25 MG tablet Take 1 tablet by mouth 3 times daily as needed for Dizziness. 30 tablet 1   • hydrochlorothiazide (HYDRODIURIL) 25 MG tablet Take 1 tablet by mouth daily. 90 tablet 3   • DULoxetine (CYMBALTA) 60 MG capsule Take 1 capsule by mouth daily. 90 capsule 1   • oxyCODONE-acetaminophen (PERCOCET) 5-325 MG per tablet Take 1-2 tablets by mouth daily as needed for Pain. Do not start before June 4, 2021. 45 tablet 0   • sertraline (ZOLOFT) 100 MG tablet TAKE 1 TABLET DAILY 90 tablet 3   • carvedilol (COREG) 25 MG tablet TAKE 1 TABLET TWICE A DAY WITH MEALS 180 tablet 3   • Blood Glucose Monitoring Suppl (FreeStyle Lite) Device Use to test blood sugar 1 time daily 1 each 0   • FREESTYLE LITE test strip Test blood sugar 1 times daily as directed. Diagnosis: E11.9. Meter: Freestyle lite 100 strip 3   • Lancets (freestyle) Misc Use to test blood sugar 1 time daily 90 each 3   • OneTouch Delica Lancets 30G Misc 1  each daily. Use to test blood sugars 1x daily. 100 each 3   • amLODIPine (NORVASC) 10 MG tablet TAKE 1 TABLET DAILY 90 tablet 4   • lisinopril (PRINIVIL,ZESTRIL) 40 MG tablet Take 1 tablet by mouth daily. (Patient taking differently: Take 40 mg by mouth nightly.) 90 tablet 3   • aspirin 81 MG tablet Take 81 mg by mouth daily.     • Cholecalciferol (VITAMIN D) 1000 UNITS capsule Take 10,000 Units by mouth daily.     • augmented betamethasone dipropionate (DIPROLENE) 0.05 % ointment Apply very thin layer to hands twice daily only when needed for flares 30 g 0   • vitamin E 1000 UNIT capsule Take 1 capsule by mouth daily.     • budesonide/formoterol (SYMBICORT) 80-4.5 MCG/ACT inhaler Inhale 2 puffs into the lungs 2 times daily. Be sure to rinse/gargle your mouth and throat thoroughly after using the inhaler (Patient taking differently: Inhale 2 puffs into the lungs 2 times daily as needed. Be sure to rinse/gargle your mouth and throat thoroughly after using the inhaler) 1 Inhaler 12     No current facility-administered medications for this visit.       PROBLEM LIST:    Patient Active Problem List   Diagnosis   • Essential hypertension   • Other and unspecified hyperlipidemia   • Dysthymic disorder   • Degeneration of cervical intervertebral disc   • Hypercalcemia   • History of hyperparathyroidism   • Lumbago   • Candidiasis of skin   • Unspecified vitamin D deficiency   • hand eczema    • Bilateral carpal tunnel syndrome   • Benign paroxysmal positional vertigo   • Bilateral bunions   • Mild obstructive sleep apnea   • Unspecified cord compression (CMS/HCC)   • Mild major depression (CMS/HCC)   • History of fatty infiltration of liver   • Uncontrolled type 2 diabetes mellitus with hyperglycemia (CMS/HCC)   • Hemorrhoids   • Cervical radiculopathy   • Neural foraminal stenosis of cervical spine   • Type 2 diabetes mellitus with morbid obesity (CMS/HCC)       HISTORIES:    ALLERGIES:   Allergen Reactions   • Bee Sting  ANAPHYLAXIS     previous bee sting pulmonary difficulty, swelling   • Nutritional Supplements ANAPHYLAXIS     previous bee sting pulmonary difficulty, swelling   • Cvs Melatonin Other (See Comments)     Night terrors    • Exenatide CONSTIPATION   • Adhesive   (Environmental) RASH     bandaid adhesive causes redness and irritation on kin   • Empagliflozin Other (See Comments)     Pain bilateral legs.  Yeast infection.  Lightheaded and dizzy   • Metal Topical   (Environmental) RASH       Past Medical History:   Diagnosis Date   • Allergic rhinitis due to other allergen    • Anxiety    • Bronchitis    • Carpal tunnel syndrome, bilateral    • Chronic pain    • Closed fracture of unspecified bone 12/1997    fell on the ice and fractured left elbow, radial head in 2 places   • Degeneration of cervical intervertebral disc    • Depression    • Extrinsic asthma, unspecified     Cold and exercise induced, infrequent   • Failed moderate sedation during procedure     patient states she requires \"more than average\" amounts of medication for sleep   • Fasciitis, unspecified 10/2000    Dr. Burgos   • Hemorrhoids 06/10/2021    per colonoscopy   • Hx of colonoscopy 05/16/2011    10 yr recall colonocopy external hemorrhoids Dr Dyson    • Hx of colonoscopy 06/10/2021    10yr recall colonoscopy colon cancer screening, Hemorrhoids Dr Dyson   • Hypercalcemia 10/25/2010   • Hyperparathyroidism, unspecified (CMS/HCC) 10/25/2010   • Mild obstructive sleep apnea 11/16/2019    CPAP   • Motion sickness    • Other and unspecified hyperlipidemia 10/23/2006   • Pinched nerve in neck     pinch nerve in C8   • Thyroid condition     surgery for thyroid    • Type II or unspecified type diabetes mellitus without mention of complication, not stated as uncontrolled 02/24/2009   • Unspecified essential hypertension 02/02/2006        Past Surgical History:   Procedure Laterality Date   • Amputation finger/thumb  10/1997    1-2 mm off the tip  of the left ring finger while cutting onions   • Arthrodesis anterior anterbody cervical below c2  05/13/2016    Anterior cervical diskectomy, osteophyte resection and interbody fusion C5-6,Anterior cervical plate C5-6/[facility] Dr Lei/ 5/13/16   • Colonoscopy diagnostic  05/16/2011    colon in 10 yrs,normal,Dr. Dee.    • Colonoscopy diagnostic  06/10/2021    Colon 10 yrs,hemorroids,.   • Esophagogastroduodenoscopy transoral flex w/bx single or mult  05/16/2011    Esophagitis,neg hpylori,Dr Monet Dee   • Explore parathyroid glands  10/26/2010    Minimal incision left neck exploration and superior parathyroidectomy, NIMS intraop nerve stimulation;Dr. Russell Michaels   • Myocardl perf rest stress  09/2010    Myocardial Perfusion, Exercise, Normal.   • Stress echo  01/07/2017    Normal        Social History     Socioeconomic History   • Marital status:      Spouse name: Ayush   • Number of children: 2   • Years of education: Not on file   • Highest education level: Not on file   Occupational History   • Occupation: [employer]    Tobacco Use   • Smoking status: Never Smoker   • Smokeless tobacco: Never Used   Vaping Use   • Vaping Use: never used   Substance and Sexual Activity   • Alcohol use: Yes     Alcohol/week: 0.0 standard drinks     Comment: sporatic   • Drug use: No   • Sexual activity: Not Currently     Partners: Male     Birth control/protection: Surgical     Comment:  passed away   Other Topics Concern   • Not on file   Social History Narrative    3/30/16    Coffee/caffeine:2-3 cups daily     Social Determinants of Health     Financial Resource Strain: Not on file   Food Insecurity: Not on file   Transportation Needs: Not on file   Physical Activity: Not on file   Stress: Not on file   Social Connections: Not on file   Intimate Partner Violence: Not on file       Family History   Problem Relation Age of Onset   • Neurological Disorder Mother         history of anxiety    • Diabetes Mother    • Hypertension Mother    • Stroke Mother          from complications of a stroke age 79   • Alcohol Abuse Father         alcoholic   • Heart Father          suddenly of MI at age 75   • Hypertension Father    • Diabetes Maternal Grandmother    • Other Sister         congenital rubella causing deafness   • Blood Disorder Brother    • Cancer Brother         unknown type    • Other Brother         back problems   • Anxiety disorder Brother        I have reviewed the family history and social history as listed in the medical record as obtained by my nursing staff and support staff and agree with their documentation.    I have reviewed the patient's pertinent medical records.    REVIEW OF SYSTEMS:   See RN notes and my HPI above      OBJECTIVE:    PHYSICAL EXAMINATION:   Vitals:   Visit Vitals  LMP 2007 Comment: no spotting       Constitutional: Well-developed, well-nourished female in no acute distress. Gait is with antalgia, without ataxia.   Head: normocephalic, atraumatic  ENT: Conjunctiva non-injected  Skin: Warm, dry, intact without rash or lesion.  Psych:  The patient appears depressed. She became tearful during interview. She appears to demonstrate several pain behaviors.  Cardiovascular: well-perfused extremities, no peripheral edema  Pulmonary:  Non-labored respirations, no stridor noted  Abdomen: Non-distended  Neurologic: Coordination intact, Facial nerves intact.  Musculoskeletal:   ***  CERVICAL SPINE:    Tender to palpation of cervical paraspinal muscles and overlying spinous processes    RANGE OF MOTION ACTIVE   Flexion chin to chest   Extension 20°     Rotation:  70% bilaterally and painful  Pain with flexion: negative  Pain with extension: negative    TESTS PERFORMED:   Spurling maneuver: negative bilaterally  Lhermitte test: negative    MOTOR EXAMINATION:  UPPER EXTREMITY      LEFT RIGHT   Deltoid 5/5 5/5   Biceps 5/5 5/5   Triceps 5/5 5/5   Brachioradialis 5/5 5/5    Wrist Flexors 5/5 5/5   Wrist Extensors 5/5 5/5   Intrinsic Hand 5/5 5/5    5/5 5/5     No abnormal muscle tone, movements, or tremors noted. No muscular atrophy.    Sensation  Upper Extremity       Right Left   C5 Lateral arm intact intact   C6 Lateral forearm, thumb, index, 1/2 middle finger intact intact   C7 Middle finger intact intact   C8 Ring and little fingers, medial forearm decr pinky intact   T1 Medial arm decr intact      Right Deep tendon reflexes:   2/4 Brachioradialis  2/4 Biceps    2/4 Triceps      Left Deep tendon reflexes:    2/4 Brachioradialis  2/4 Biceps    2/4 Triceps        LABORATORY DATA:    WBC (K/mcL)   Date Value   09/19/2022 5.4     HGB (g/dL)   Date Value   09/19/2022 13.1     HCT (%)   Date Value   09/19/2022 39.2     PLT (K/mcL)   Date Value   09/19/2022 344        Creatinine (mg/dL)   Date Value   09/19/2022 0.90     BUN (mg/dL)   Date Value   09/19/2022 9     GFR Estimate,  (no units)   Date Value   11/14/2019 >90     Glomerular Filtration Rate (no units)   Date Value   09/19/2022 73     GOT/AST (Units/L)   Date Value   09/19/2022 23     GPT/ALT (Units/L)   Date Value   09/19/2022 24     Alkaline Phosphatase (Units/L)   Date Value   09/19/2022 67     INR (no units)   Date Value   09/19/2022 1.0     Hemoglobin A1C (%)   Date Value   04/01/2021 7.2 (H)       IMAGING STUDIES:      C MRI 5/17/21:    IMPRESSION:    1.  Multilevel degenerative changes, as described, slightly progressed from 2018.  2.  At C4-C5, there is mild to moderate spinal canal stenosis.  3.  At C7-T1, a right subarticular disc protrusion result in moderate right lateral recess narrowing.  4.  Varying degrees of neural foraminal narrowing, moderate to severe on the right at C4-C5 and C6-C7.    MRI Cervical Spine 01/06/18   IMPRESSION: Degenerative change and degenerative disc disease as above.  Stenosis at the C5-C6 level is decreased in the interval. The remainder of the exam appears similar to  the prior study.       XR CERVICAL SPINE 2-3 VW 12/06/18   IMPRESSION:   1. Intact hardware fusion from C4 to C6, with similar appearance to the prior exam       XR L-Spine 1/23/19:  FINDINGS / IMPRESSION:    1.  Five lumbar-type vertebral bodies.  2.  No acute osseous abnormality.  3.  Mild levoconvex curvature centered at L1-L2.  4.  Mild multilevel degenerative disc changes predominantly involving the mid to lower lumbar spine.  Mild to moderate facet arthropathy at L4-S1.    5.  Minimal scattered calcific atherosclerosis.     I personally reviewed the images pertinent to this patient's visit.     EMG/NCS 05/24/21  IMPRESSION:   1. Right carpal tunnel syndrome, of moderate severity.   2. Active and chronic right C7/C8 radiculopathy.     ASSESSMENT:    No diagnosis found.   Hannah Hayes is a 62 year old female with right C8 radiculopathy in the setting of C7-T1 disc protrusion with lateral recess stenosis and history of C4-6 ACDF.      PLAN:  RECOMMENDATIONS:   1) Medical Modalities:    -Continue gabapentin 600mg BID  -Cymbalta 60 mg nightly  -Continue Flexeril as needed.    2) Interventional Modalities:  ***Repeat C7-T1 ILESI, slight right of midline, stop 81 mg preventative aspirin.  3) Behavioral Medicine Modalities:  Referral to psychology did discuss family issues***  4) Other Modalities:  Continue home exercise therapy  5) Imaging/Labs: none  6) Consults: none      No orders of the defined types were placed in this encounter.      Instructions provided as documented in the after visit summary.    The patient indicated understanding of the diagnosis and agreed with the plan of care.      Etta Solorzano PA-C  Supervising physician:  Nerissa Lange MD  [facility]       independent

## 2022-10-31 NOTE — PHYSICAL THERAPY INITIAL EVALUATION ADULT - PERTINENT HX OF CURRENT PROBLEM, REHAB EVAL
81 yo male Tajik speaking w/ PMH of HTN, HLD, ascending aortic aneurysm 4.5 cm, COPD not on home O2 presents for SOB that worsened over the past 4 days.

## 2022-10-31 NOTE — PROGRESS NOTE ADULT - SUBJECTIVE AND OBJECTIVE BOX
SUMMARY: 81 y/o Occitan-speaking male w/ PMHx of HTN, HLD, ascending aortic aneurysm 4.5 cm, and COPD not on home O2, admitted for 4 days of worsening SOB, possibly due to COPD exacerbation, with asymptomatic abdominal distention with BM and flatus, likely secondary to ileus 2/2 COPD exacerbation.    SUBJECTIVE:  24HR: No acute overnight events. Patient seen and examined at bedside.    OBJECTIVE:  Vital Signs Last 24 Hrs  T(C): 36.3 (31 Oct 2022 05:32), Max: 36.8 (30 Oct 2022 13:32)  T(F): 97.4 (31 Oct 2022 05:32), Max: 98.2 (30 Oct 2022 13:32)  HR: 65 (31 Oct 2022 05:32) (65 - 68)  BP: 131/61 (31 Oct 2022 05:32) (131/61 - 150/67)  BP(mean): --  RR: 18 (31 Oct 2022 05:32) (18 - 18)  SpO2: 98% (30 Oct 2022 13:32) (98% - 98%)    Parameters below as of 30 Oct 2022 13:32  Patient On (Oxygen Delivery Method): nasal cannula  O2 Flow (L/min): 2   SUMMARY: 81 y/o Luxembourgish-speaking male w/ PMHx of HTN, HLD, ascending aortic aneurysm 4.5 cm, and COPD not on home O2, admitted for 4 days of worsening SOB, possibly due to COPD exacerbation, with asymptomatic abdominal distention with BM and flatus, with abdominal imaging negative for bowel obstruction or pneumoperitoneum, likely due to ileus 2/2 COPD exacerbation.    SUBJECTIVE:  24HR: No acute overnight events. Patient seen and examined at bedside.    OBJECTIVE:  Vital Signs Last 24 Hrs  T(C): 36.3 (31 Oct 2022 05:32), Max: 36.8 (30 Oct 2022 13:32)  T(F): 97.4 (31 Oct 2022 05:32), Max: 98.2 (30 Oct 2022 13:32)  HR: 65 (31 Oct 2022 05:32) (65 - 68)  BP: 131/61 (31 Oct 2022 05:32) (131/61 - 150/67)  BP(mean): --  RR: 18 (31 Oct 2022 05:32) (18 - 18)  SpO2: 98% (30 Oct 2022 13:32) (98% - 98%)    Parameters below as of 30 Oct 2022 13:32  Patient On (Oxygen Delivery Method): nasal cannula  O2 Flow (L/min): 2    PHYSICAL EXAM:  CONSTITUTIONAL: Well-appearing and in no apparent distress.    EYES: PERRLA and symmetric, EOMI, No conjunctival injection or pallor. Sclera anicteric.    ENMT: Moist mucous membranes. No external nasal lesions. No gross hearing impairment noted.  	NECK: Supple, symmetric and without tracheal deviation.     RESPIRATORY: No respiratory distress. No obvious use of accessory muscles. Lungs CTAB with no crackling, wheezing, rhonchi or rubs.    CARDIOVASCULAR: Regular rate and rhythm. Normal S1 and S2. No murmurs, rubs or gallops. Dorsalis pedis pulses 2+.    GASTROINTESTINAL: Diffusely distended. Soft, non-tender to palpation in all quadrants. No palpable masses. No appreciable hernias.    MUSCULOSKELETAL: Examination of all four extremities without obvious misalignment, normal muscle strength/tone.    NEUROLOGIC: Moves all four extremities spontaneously.    PSYCHIATRIC: Appropriate insight/judgment; A+O x 3. Mood and affect appropriate. Recent/remote memory intact.    .  LABS:                         11.6   7.57  )-----------( 265      ( 31 Oct 2022 01:26 )             33.6     10-31    139  |  103  |  20  ----------------------------<  106<H>  4.4   |  29  |  1.1    Ca    8.5      31 Oct 2022 01:26  Mg     2.3     10-31    TPro  5.5<L>  /  Alb  3.0<L>  /  TBili  0.6  /  DBili  x   /  AST  43<H>  /  ALT  60<H>  /  AlkPhos  85  10-31      RADIOLOGY, EKG & ADDITIONAL TESTS: Reviewed.   ACC: [id number] EXAM: CT ABDOMEN AND PELVIS OC IC  PROCEDURE DATE: 10/30/2022  TECHNIQUE: Contiguous axial CT images were obtained from the lower chest to the pubic symphysis following administration of intravenous contrast. Oral contrast was administered. Reformatted images in the coronal and sagittal planes were acquired.  COMPARISON CT: 10/27/2022  Findings/impression:  Oral contrast reaches the cecum/ascending colon. No evidence of bowel obstruction or pneumoperitoneum. Additional findings are without significant change when compared to recent CT 10/27/2022.    MEDICATIONS  (STANDING):  albuterol/ipratropium for Nebulization 3 milliLiter(s) Nebulizer every 6 hours  atorvastatin 20 milliGRAM(s) Oral at bedtime  budesonide  80 MICROgram(s)/formoterol 4.5 MICROgram(s) Inhaler 2 Puff(s) Inhalation two times a day  chlorhexidine 2% Cloths 1 Application(s) Topical daily  furosemide    Tablet 40 milliGRAM(s) Oral daily  guaiFENesin  milliGRAM(s) Oral every 12 hours  heparin   Injectable 5000 Unit(s) SubCutaneous every 12 hours  lisinopril 20 milliGRAM(s) Oral daily  magnesium oxide 400 milliGRAM(s) Oral daily  metoprolol succinate ER 25 milliGRAM(s) Oral daily  montelukast 10 milliGRAM(s) Oral daily  pantoprazole    Tablet 40 milliGRAM(s) Oral before breakfast  polyethylene glycol 3350 17 Gram(s) Oral every 12 hours  predniSONE   Tablet 40 milliGRAM(s) Oral daily  QUEtiapine 100 milliGRAM(s) Oral at bedtime  sertraline 50 milliGRAM(s) Oral daily  spironolactone 25 milliGRAM(s) Oral daily  tamsulosin 0.4 milliGRAM(s) Oral at bedtime    MEDICATIONS  (PRN):  acetaminophen     Tablet .. 650 milliGRAM(s) Oral every 6 hours PRN Temp greater or equal to 38C (100.4F), Mild Pain (1 - 3)  albuterol/ipratropium for Nebulization 3 milliLiter(s) Nebulizer every 4 hours PRN Shortness of Breath and/or Wheezing  aluminum hydroxide/magnesium hydroxide/simethicone Suspension 30 milliLiter(s) Oral every 4 hours PRN Dyspepsia  melatonin 3 milliGRAM(s) Oral at bedtime PRN Insomnia  ondansetron Injectable 4 milliGRAM(s) IV Push every 8 hours PRN Nausea and/or Vomiting

## 2022-10-31 NOTE — PROGRESS NOTE ADULT - ASSESSMENT
ASSESSMENT: 83 y/o Telugu-speaking male w/ PMHx of HTN, HLD, ascending aortic aneurysm 4.5 cm, and COPD not on home O2, admitted for 4 days of worsening SOB, possibly due to COPD exacerbation, with asymptomatic abdominal distention with BM and flatus, with abdominal imaging negative for bowel obstruction or pneumoperitoneum, likely due to ileus 2/2 COPD exacerbation.    Plans: ASSESSMENT: 83 y/o Icelandic-speaking male w/ PMHx of HTN, HLD, ascending aortic aneurysm 4.5 cm, and COPD not on home O2, admitted for 4 days of worsening SOB, possibly due to COPD exacerbation, currently on 2L NC, with asymptomatic abdominal distention with BM and flatus, with abdominal imaging negative for bowel obstruction or pneumoperitoneum, likely due to ileus 2/2 COPD exacerbation.    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  - VBG x2 both unremarkable for hypercapnia.  -  on admission.  - CT A/P unremarkable.  - CT angio revealing for severe emphysema.  - No wheezing on exam.  - Echo shows EF 67% with G2DD.  - Continue solumedrol 40mg IV QD.  - Continue home inhalers.  - Wean O2 as tolerated.  - s/p doxycycline and azithromycin.    # Abdominal distension - likely ileus 2/2 COPD  # Mild gallbladder distension on CT  - Constipated for 3 days -> had BM on 10/28.  - Surgery following.  - CT abdomen 10/27 shoes mild gallbladder distension.  - US RUQ shows nondistended gallbladder, no ductal dilatation and simple cyst in right kidney.  - CT abdomen 10/30 shows no evidence of bowel obstruction or pneumoperitoneum.     # Lingular lung nodule on CT  - f/u with repeat CT in 3 months.    # ?CKD stage 3  # Renal cyst 3 cm on CT   - No hydronephrosis on imaging.  - Serum Cr normalized.    #Hx ascending aortic aneurysm   - Stable at 4.4cm on CT chest.    # HLD  # HTN  # BPH  # Mood disorder  - Continue home medications.  - Be aware of polypharmacy risk.    #DVT  - Heparin subQ.    # GI PPX  - Protonix.    # Diet  - Regular.    # Activity  - Increase as tolerated.    # Code Status  - FULL CODE.   ASSESSMENT: 83 y/o Divehi-speaking male w/ PMHx of HTN, HLD, ascending aortic aneurysm 4.5 cm, and COPD not on home O2, admitted for 4 days of worsening SOB, possibly due to COPD exacerbation, currently on 2L NC, with asymptomatic abdominal distention with BM and flatus, with abdominal imaging negative for bowel obstruction or pneumoperitoneum, likely due to ileus 2/2 COPD exacerbation.    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  - VBG x2 both unremarkable for hypercapnia.  -  on admission.  - CT A/P unremarkable.  - CT angio revealing for severe emphysema.  - No wheezing on exam.  - Echo shows EF 67% with G2DD.  - Continue solumedrol 40mg IV QD.  - Continue home inhalers.  - Wean O2 as tolerated.  - s/p doxycycline and azithromycin.    # Abdominal distension - likely ileus 2/2 COPD  # Mild gallbladder distension on CT  - Constipated for 3 days -> had BM on 10/28.  - Surgery following.  - CT abdomen 10/27 shoes mild gallbladder distension.  - US RUQ shows nondistended gallbladder, no ductal dilatation and simple cyst in right kidney.  - CT abdomen 10/30 shows no evidence of bowel obstruction or pneumoperitoneum.   - f/u daily KUB per surgery recs.    # Lingular lung nodule on CT  - f/u with repeat CT in 3 months.    # ?CKD stage 3  # Renal cyst 3 cm on CT   - No hydronephrosis on imaging.  - Serum Cr normalized.    #Hx ascending aortic aneurysm   - Stable at 4.4cm on CT chest.    # HLD  # HTN  # BPH  # Mood disorder  - Continue home medications.  - Be aware of polypharmacy risk.    #DVT  - Heparin subQ.    # GI PPX  - Protonix.    # Diet  - Regular.    # Activity  - Increase as tolerated.    # Code Status  - FULL CODE.   ASSESSMENT: 83 y/o Macedonian-speaking male w/ PMHx of HTN, HLD, ascending aortic aneurysm 4.5 cm, and COPD not on home O2, admitted for 4 days of worsening SOB, possibly due to COPD exacerbation, currently on 2L NC, with asymptomatic abdominal distention with BM and flatus, with abdominal imaging negative for bowel obstruction or pneumoperitoneum, likely due to ileus 2/2 COPD exacerbation.    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  - VBG x2 both unremarkable for hypercapnia.  -  on admission.  - CT A/P unremarkable.  - CT angio revealing for severe emphysema.  - No wheezing on exam.  - Echo shows EF 67% with G2DD.  - Continue solumedrol 40mg IV QD.  - Continue home inhalers.  - Wean O2 as tolerated.  - s/p doxycycline and azithromycin.    # Abdominal distension - likely ileus 2/2 COPD  # Mild gallbladder distension on CT  - Constipated for 3 days -> had BM on 10/28.  - Surgery following.  - CT abdomen 10/27 shoes mild gallbladder distension.  - US RUQ shows nondistended gallbladder, no ductal dilatation and simple cyst in right kidney.  - CT abdomen 10/30 shows no evidence of bowel obstruction or pneumoperitoneum.   - f/u daily KUB ultrasound per surgery recs.    # Lingular lung nodule on CT  - f/u with repeat CT in 3 months.    # ?CKD stage 3  # Renal cyst 3 cm on CT   - No hydronephrosis on imaging.  - Serum Cr normalized.    #Hx ascending aortic aneurysm   - Stable at 4.4cm on CT chest.    # HLD  # HTN  # BPH  # Mood disorder  - Continue home medications.  - Be aware of polypharmacy risk.    #DVT  - Heparin subQ.    # GI PPX  - Protonix.    # Diet  - Regular.    # Activity  - Increase as tolerated.    # Code Status  - FULL CODE.   ASSESSMENT: 81 y/o Maori-speaking male w/ PMHx of HTN, HLD, ascending aortic aneurysm 4.5 cm, and COPD not on home O2, admitted for 4 days of worsening SOB, possibly due to COPD exacerbation, currently on 2L NC, with asymptomatic abdominal distention with BM and flatus, with abdominal imaging negative for bowel obstruction or pneumoperitoneum, likely due to ileus 2/2 COPD exacerbation.    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  - VBG x2 both unremarkable for hypercapnia.  -  on admission.  - CT A/P unremarkable.  - CT angio revealing for severe emphysema.  - No wheezing on exam.  - Echo shows EF 67% with G2DD.  - Continue prednisone 40mg PO x 6 days starting on 10/29.  - Continue home inhalers.  - Wean O2 as tolerated.  - s/p doxycycline and azithromycin.    # Abdominal distension - likely ileus 2/2 COPD  # Mild gallbladder distension on CT  - Constipated for 3 days -> had BM on 10/28.  - Surgery following.  - CT abdomen 10/27 shoes mild gallbladder distension.  - US RUQ shows nondistended gallbladder, no ductal dilatation and simple cyst in right kidney.  - CT abdomen 10/30 shows no evidence of bowel obstruction or pneumoperitoneum.   - f/u daily KUB ultrasound per surgery recs.    # Lingular lung nodule on CT  - f/u with repeat CT in 3 months.    # ?CKD stage 3  # Renal cyst 3 cm on CT   - No hydronephrosis on imaging.  - Serum Cr normalized.    #Hx ascending aortic aneurysm   - Stable at 4.4cm on CT chest.    # HLD  # HTN  # BPH  # Mood disorder  - Continue home medications.  - Be aware of polypharmacy risk.    #DVT  - Heparin subQ.    # GI PPX  - Protonix.    # Diet  - Regular.    # Activity  - Increase as tolerated.    # Code Status  - FULL CODE.   ASSESSMENT: 83 y/o Ukrainian-speaking male w/ PMHx of HTN, HLD, ascending aortic aneurysm 4.5 cm, and COPD not on home O2, admitted for 4 days of worsening SOB, possibly due to COPD exacerbation, currently on 2L NC, with asymptomatic abdominal distention with BM and flatus, with abdominal imaging negative for bowel obstruction or pneumoperitoneum, likely due to ileus 2/2 COPD exacerbation.    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  - VBG x2 both unremarkable for hypercapnia.  -  on admission.  - CT A/P unremarkable.  - CT angio revealing for severe emphysema.  - No wheezing on exam.  - Echo shows EF 67% with G2DD.  - Continue prednisone 40mg PO QD x 6 days starting on 10/29.  - Continue home inhalers.  - Wean O2 as tolerated.  - s/p doxycycline and azithromycin.    # Abdominal distension - likely ileus 2/2 COPD  # Mild gallbladder distension on CT  - Constipated for 3 days -> had BM on 10/28.  - Surgery following.  - CT abdomen 10/27 shoes mild gallbladder distension.  - US RUQ shows nondistended gallbladder, no ductal dilatation and simple cyst in right kidney.  - CT abdomen 10/30 shows no evidence of bowel obstruction or pneumoperitoneum.   - f/u daily KUB ultrasound per surgery recs.    # Lingular lung nodule on CT  - f/u with repeat CT in 3 months.    # ?CKD stage 3  # Renal cyst 3 cm on CT   - No hydronephrosis on imaging.  - Serum Cr normalized.    #Hx ascending aortic aneurysm   - Stable at 4.4cm on CT chest.    # HLD  # HTN  # BPH  # Mood disorder  - Continue home medications.  - Be aware of polypharmacy risk.    #DVT  - Heparin subQ.    # GI PPX  - Protonix.    # Diet  - Regular.    # Activity  - Increase as tolerated.    # Code Status  - FULL CODE.   ASSESSMENT: 83 y/o Yoruba-speaking male w/ PMHx of HTN, HLD, ascending aortic aneurysm 4.5 cm, and COPD not on home O2, admitted for 4 days of worsening SOB, possibly due to COPD exacerbation, currently on 2L NC, with asymptomatic abdominal distention with BM and flatus, with abdominal imaging negative for bowel obstruction or pneumoperitoneum, likely due to ileus 2/2 COPD exacerbation.    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  - VBG x2 both unremarkable for hypercapnia.  -  on admission.  - CT A/P unremarkable.  - CT angio revealing for severe emphysema.  - No wheezing on exam.  - Echo shows EF 67% with G2DD.  - Continue prednisone 40mg PO QD x 6 days starting on 10/29.  - Continue home inhalers.  - Wean O2 as tolerated.  - s/p doxycycline and azithromycin.    # Abdominal distension - likely ileus 2/2 COPD  # Mild gallbladder distension on CT  - Constipated for 3 days -> had BM on 10/28.  - Surgery following.  - CT abdomen 10/27 shoes mild gallbladder distension.  - US RUQ shows nondistended gallbladder, no ductal dilatation and simple cyst in right kidney.  - CT abdomen 10/30 shows no evidence of bowel obstruction or pneumoperitoneum.   - f/u daily KUB per surgery recs.    # Lingular lung nodule on CT  - f/u with repeat CT in 3 months.    # ?CKD stage 3  # Renal cyst 3 cm on CT   - No hydronephrosis on imaging.  - Serum Cr normalized.    #Hx ascending aortic aneurysm   - Stable at 4.4cm on CT chest.    # HLD  # HTN  # BPH  # Mood disorder  - Continue home medications.  - Be aware of polypharmacy risk.    #DVT  - Heparin subQ.    # GI PPX  - Protonix.    # Diet  - Regular.    # Activity  - Increase as tolerated.    # Code Status  - FULL CODE.

## 2022-11-01 LAB
ALBUMIN SERPL ELPH-MCNC: 3 G/DL — LOW (ref 3.5–5.2)
ALBUMIN SERPL ELPH-MCNC: 3.1 G/DL — LOW (ref 3.5–5.2)
ALP SERPL-CCNC: 84 U/L — SIGNIFICANT CHANGE UP (ref 30–115)
ALP SERPL-CCNC: 85 U/L — SIGNIFICANT CHANGE UP (ref 30–115)
ALT FLD-CCNC: 56 U/L — HIGH (ref 0–41)
ALT FLD-CCNC: 59 U/L — HIGH (ref 0–41)
ANION GAP SERPL CALC-SCNC: 5 MMOL/L — LOW (ref 7–14)
ANION GAP SERPL CALC-SCNC: 9 MMOL/L — SIGNIFICANT CHANGE UP (ref 7–14)
AST SERPL-CCNC: 32 U/L — SIGNIFICANT CHANGE UP (ref 0–41)
AST SERPL-CCNC: 39 U/L — SIGNIFICANT CHANGE UP (ref 0–41)
BASOPHILS # BLD AUTO: 0.02 K/UL — SIGNIFICANT CHANGE UP (ref 0–0.2)
BASOPHILS # BLD AUTO: 0.02 K/UL — SIGNIFICANT CHANGE UP (ref 0–0.2)
BASOPHILS NFR BLD AUTO: 0.3 % — SIGNIFICANT CHANGE UP (ref 0–1)
BASOPHILS NFR BLD AUTO: 0.3 % — SIGNIFICANT CHANGE UP (ref 0–1)
BILIRUB SERPL-MCNC: 0.5 MG/DL — SIGNIFICANT CHANGE UP (ref 0.2–1.2)
BILIRUB SERPL-MCNC: 0.5 MG/DL — SIGNIFICANT CHANGE UP (ref 0.2–1.2)
BUN SERPL-MCNC: 20 MG/DL — SIGNIFICANT CHANGE UP (ref 10–20)
BUN SERPL-MCNC: 20 MG/DL — SIGNIFICANT CHANGE UP (ref 10–20)
CALCIUM SERPL-MCNC: 8.8 MG/DL — SIGNIFICANT CHANGE UP (ref 8.4–10.4)
CALCIUM SERPL-MCNC: 9.2 MG/DL — SIGNIFICANT CHANGE UP (ref 8.4–10.4)
CHLORIDE SERPL-SCNC: 104 MMOL/L — SIGNIFICANT CHANGE UP (ref 98–110)
CHLORIDE SERPL-SCNC: 105 MMOL/L — SIGNIFICANT CHANGE UP (ref 98–110)
CO2 SERPL-SCNC: 28 MMOL/L — SIGNIFICANT CHANGE UP (ref 17–32)
CO2 SERPL-SCNC: 32 MMOL/L — SIGNIFICANT CHANGE UP (ref 17–32)
CREAT SERPL-MCNC: 1.2 MG/DL — SIGNIFICANT CHANGE UP (ref 0.7–1.5)
CREAT SERPL-MCNC: 1.2 MG/DL — SIGNIFICANT CHANGE UP (ref 0.7–1.5)
EGFR: 61 ML/MIN/1.73M2 — SIGNIFICANT CHANGE UP
EGFR: 61 ML/MIN/1.73M2 — SIGNIFICANT CHANGE UP
EOSINOPHIL # BLD AUTO: 0.22 K/UL — SIGNIFICANT CHANGE UP (ref 0–0.7)
EOSINOPHIL # BLD AUTO: 0.24 K/UL — SIGNIFICANT CHANGE UP (ref 0–0.7)
EOSINOPHIL NFR BLD AUTO: 2.8 % — SIGNIFICANT CHANGE UP (ref 0–8)
EOSINOPHIL NFR BLD AUTO: 3.5 % — SIGNIFICANT CHANGE UP (ref 0–8)
GLUCOSE SERPL-MCNC: 122 MG/DL — HIGH (ref 70–99)
GLUCOSE SERPL-MCNC: 126 MG/DL — HIGH (ref 70–99)
HCT VFR BLD CALC: 34.8 % — LOW (ref 42–52)
HCT VFR BLD CALC: 37 % — LOW (ref 42–52)
HGB BLD-MCNC: 12 G/DL — LOW (ref 14–18)
HGB BLD-MCNC: 12.4 G/DL — LOW (ref 14–18)
IMM GRANULOCYTES NFR BLD AUTO: 0.6 % — HIGH (ref 0.1–0.3)
IMM GRANULOCYTES NFR BLD AUTO: 0.8 % — HIGH (ref 0.1–0.3)
LYMPHOCYTES # BLD AUTO: 1.88 K/UL — SIGNIFICANT CHANGE UP (ref 1.2–3.4)
LYMPHOCYTES # BLD AUTO: 2.18 K/UL — SIGNIFICANT CHANGE UP (ref 1.2–3.4)
LYMPHOCYTES # BLD AUTO: 27.1 % — SIGNIFICANT CHANGE UP (ref 20.5–51.1)
LYMPHOCYTES # BLD AUTO: 27.9 % — SIGNIFICANT CHANGE UP (ref 20.5–51.1)
MAGNESIUM SERPL-MCNC: 2.1 MG/DL — SIGNIFICANT CHANGE UP (ref 1.8–2.4)
MAGNESIUM SERPL-MCNC: 2.2 MG/DL — SIGNIFICANT CHANGE UP (ref 1.8–2.4)
MCHC RBC-ENTMCNC: 30.8 PG — SIGNIFICANT CHANGE UP (ref 27–31)
MCHC RBC-ENTMCNC: 31.3 PG — HIGH (ref 27–31)
MCHC RBC-ENTMCNC: 33.5 G/DL — SIGNIFICANT CHANGE UP (ref 32–37)
MCHC RBC-ENTMCNC: 34.5 G/DL — SIGNIFICANT CHANGE UP (ref 32–37)
MCV RBC AUTO: 90.9 FL — SIGNIFICANT CHANGE UP (ref 80–94)
MCV RBC AUTO: 91.8 FL — SIGNIFICANT CHANGE UP (ref 80–94)
MONOCYTES # BLD AUTO: 0.48 K/UL — SIGNIFICANT CHANGE UP (ref 0.1–0.6)
MONOCYTES # BLD AUTO: 0.68 K/UL — HIGH (ref 0.1–0.6)
MONOCYTES NFR BLD AUTO: 6.9 % — SIGNIFICANT CHANGE UP (ref 1.7–9.3)
MONOCYTES NFR BLD AUTO: 8.7 % — SIGNIFICANT CHANGE UP (ref 1.7–9.3)
NEUTROPHILS # BLD AUTO: 4.27 K/UL — SIGNIFICANT CHANGE UP (ref 1.4–6.5)
NEUTROPHILS # BLD AUTO: 4.64 K/UL — SIGNIFICANT CHANGE UP (ref 1.4–6.5)
NEUTROPHILS NFR BLD AUTO: 59.5 % — SIGNIFICANT CHANGE UP (ref 42.2–75.2)
NEUTROPHILS NFR BLD AUTO: 61.6 % — SIGNIFICANT CHANGE UP (ref 42.2–75.2)
NRBC # BLD: 0 /100 WBCS — SIGNIFICANT CHANGE UP (ref 0–0)
NRBC # BLD: 0 /100 WBCS — SIGNIFICANT CHANGE UP (ref 0–0)
PLATELET # BLD AUTO: 291 K/UL — SIGNIFICANT CHANGE UP (ref 130–400)
PLATELET # BLD AUTO: 313 K/UL — SIGNIFICANT CHANGE UP (ref 130–400)
POTASSIUM SERPL-MCNC: 3.9 MMOL/L — SIGNIFICANT CHANGE UP (ref 3.5–5)
POTASSIUM SERPL-MCNC: 4.2 MMOL/L — SIGNIFICANT CHANGE UP (ref 3.5–5)
POTASSIUM SERPL-SCNC: 3.9 MMOL/L — SIGNIFICANT CHANGE UP (ref 3.5–5)
POTASSIUM SERPL-SCNC: 4.2 MMOL/L — SIGNIFICANT CHANGE UP (ref 3.5–5)
PROT SERPL-MCNC: 5.4 G/DL — LOW (ref 6–8)
PROT SERPL-MCNC: 5.5 G/DL — LOW (ref 6–8)
RBC # BLD: 3.83 M/UL — LOW (ref 4.7–6.1)
RBC # BLD: 4.03 M/UL — LOW (ref 4.7–6.1)
RBC # FLD: 12.2 % — SIGNIFICANT CHANGE UP (ref 11.5–14.5)
RBC # FLD: 12.2 % — SIGNIFICANT CHANGE UP (ref 11.5–14.5)
SODIUM SERPL-SCNC: 141 MMOL/L — SIGNIFICANT CHANGE UP (ref 135–146)
SODIUM SERPL-SCNC: 142 MMOL/L — SIGNIFICANT CHANGE UP (ref 135–146)
WBC # BLD: 6.93 K/UL — SIGNIFICANT CHANGE UP (ref 4.8–10.8)
WBC # BLD: 7.8 K/UL — SIGNIFICANT CHANGE UP (ref 4.8–10.8)
WBC # FLD AUTO: 6.93 K/UL — SIGNIFICANT CHANGE UP (ref 4.8–10.8)
WBC # FLD AUTO: 7.8 K/UL — SIGNIFICANT CHANGE UP (ref 4.8–10.8)

## 2022-11-01 PROCEDURE — 99233 SBSQ HOSP IP/OBS HIGH 50: CPT

## 2022-11-01 PROCEDURE — 74018 RADEX ABDOMEN 1 VIEW: CPT | Mod: 26

## 2022-11-01 RX ADMIN — SERTRALINE 50 MILLIGRAM(S): 25 TABLET, FILM COATED ORAL at 12:50

## 2022-11-01 RX ADMIN — LISINOPRIL 20 MILLIGRAM(S): 2.5 TABLET ORAL at 05:36

## 2022-11-01 RX ADMIN — MONTELUKAST 10 MILLIGRAM(S): 4 TABLET, CHEWABLE ORAL at 12:50

## 2022-11-01 RX ADMIN — TAMSULOSIN HYDROCHLORIDE 0.4 MILLIGRAM(S): 0.4 CAPSULE ORAL at 21:32

## 2022-11-01 RX ADMIN — Medication 25 MILLIGRAM(S): at 05:36

## 2022-11-01 RX ADMIN — MAGNESIUM OXIDE 400 MG ORAL TABLET 400 MILLIGRAM(S): 241.3 TABLET ORAL at 12:50

## 2022-11-01 RX ADMIN — SPIRONOLACTONE 25 MILLIGRAM(S): 25 TABLET, FILM COATED ORAL at 05:40

## 2022-11-01 RX ADMIN — Medication 600 MILLIGRAM(S): at 17:00

## 2022-11-01 RX ADMIN — HEPARIN SODIUM 5000 UNIT(S): 5000 INJECTION INTRAVENOUS; SUBCUTANEOUS at 05:37

## 2022-11-01 RX ADMIN — Medication 40 MILLIGRAM(S): at 05:37

## 2022-11-01 RX ADMIN — PANTOPRAZOLE SODIUM 40 MILLIGRAM(S): 20 TABLET, DELAYED RELEASE ORAL at 06:24

## 2022-11-01 RX ADMIN — ATORVASTATIN CALCIUM 20 MILLIGRAM(S): 80 TABLET, FILM COATED ORAL at 21:33

## 2022-11-01 RX ADMIN — BUDESONIDE AND FORMOTEROL FUMARATE DIHYDRATE 2 PUFF(S): 160; 4.5 AEROSOL RESPIRATORY (INHALATION) at 08:23

## 2022-11-01 RX ADMIN — HEPARIN SODIUM 5000 UNIT(S): 5000 INJECTION INTRAVENOUS; SUBCUTANEOUS at 17:00

## 2022-11-01 RX ADMIN — Medication 600 MILLIGRAM(S): at 05:37

## 2022-11-01 RX ADMIN — CHLORHEXIDINE GLUCONATE 1 APPLICATION(S): 213 SOLUTION TOPICAL at 12:29

## 2022-11-01 RX ADMIN — QUETIAPINE FUMARATE 100 MILLIGRAM(S): 200 TABLET, FILM COATED ORAL at 21:32

## 2022-11-01 NOTE — PROGRESS NOTE ADULT - ATTENDING COMMENTS
My note supersedes all residents notes that I sign, My correction for their notes are in my notes -  the patient seen and examined earlier, he stated that he feels ok, no diarrhea reported today     on exam   GENERAL: awake, alert, NAD   CHEST/LUNG:  no wheezing today   HEART:  regular rhythm   ABDOMEN:  soft, non tender, +distended    EXTREMITIES:  no edema   81 yo male Syriac speaking w/ PMH of HTN, HLD, ascending aortic aneurysm 4.5 cm, COPD not on home O2 presents for SOB that worsened over the past 4 days My note supersedes all residents notes that I sign, My correction for their notes are in my notes -  (unable to find Yakut  multiple times today but the resident speeks Azeri)    the patient seen and examined earlier, he stated that he feels ok, no diarrhea reported today     on exam   GENERAL: awake, alert, NAD   CHEST/LUNG:  no wheezing today   HEART:  regular rhythm   ABDOMEN:  soft, non tender, +distended    EXTREMITIES:  no edema     83 yo male Yakut speaking w/ PMH of HTN, HLD, ascending aortic aneurysm 4.5 cm, COPD not on home O2 presents for SOB that worsened over the past 4 days    Acute hypoxemic respiratory failure likely secondary to COPD exacerbation/ Severe emphysema  Chronic hypercapnic respiratory failure  HO HTN, HLD, BPH, COPD, mood disorder  HO Ascending aortic aneurysm, measuring 4.4cm 10/2022  Former smoker  Distended gallbladder.   Hx of bladder cancer ( unknown stage ) s/p surgery as per the patient daughter but no chemo or radiation      c/w prednisone for more 3 days   lasix po   NC during day  BiPAP 4hrs on and off and QHS  bladder scan 160 cc   Abd X ray noted -Repeat abd x ray   give feeding while off BiPAP  bowel regimen and monitor bowel movement   monitor I/O and electrolytes   will switch azithromycin to ceftriaxone and doxcy for now ( given abnormal EKGs ) would also avoid Levaquin given hx of Aortic aneurysm   RVP , MRSA and legionella urine ag are all negative   f/u procalcitonin - would consider d/c antibiotics if negative   c/w home meds for chronic medical conditions  CT noted with Severe emphysema. Small and large airways inflammation. Secretions in the right mainstem bronchus. Lingular nodular opacity for which 3 month   follow-up CT is recommended.   RUQ US - no acute findings   US  Bladder and kidney negative   - His Urologist is DR. GIFTY JAMISON ( [phone],  [phone])    SLP eval noted- regular /thins   CT and KUB noted   surgery input appreciated   GI input appreciated ( still waiting for final recc) -Pt today reported loose stool - hold laxatives and - If diarrhea persists after holding laxatives, please collect stool studies, C. diff, O&P studies  repeat KUB noted         DVT and GI ppx     Pending , respiratory status, GI   possible d/c 24-48 hours

## 2022-11-01 NOTE — PROGRESS NOTE ADULT - SUBJECTIVE AND OBJECTIVE BOX
Gastroenterology Consultation:    Patient is a 82y old  Male who presents with a chief complaint of worsening SOB (01 Nov 2022 07:09)    Admitted on: 10-27-22    HPI:  The patient is a 83 y/o Kinyarwanda speaking male with PMHx of HTN, HLD, ascending aortic aneurysm 4.5 cm, COPD (not on home O2) who p/w worsening and constant SOB for 4 days, associated with heavy exertion. He denies any associated symptoms including fever, cough, excessive sputum production. On admission, he endorsed epigastric abdominal pain that started on the morning of admission, associated with bloating. He denies any recent nausea/vomiting or constipation/diarrhea, but he endorses poor oral intake. He is taking multiple medications and questionable compliance. GI team was consulted for abdominal distension, abdominal pain, and diarrhea.     GI Progress note:  The patient was assessed at bedside this AM, appears to be doing well. He endorses that he is still having watery, loose brown stools. He reports having 5 watery bowel movements yesterday. He denies abdominal pain, nausea and vomiting. He reports that he feels that his abdominal distention has improved, but only mildly. Patient is eating well on a DASH/TLC diet.     Kinyarwanda : Hillary, ID #[id number]        ED Vital Signs Last 24 Hrs  T(C): 37.4 (27 Oct 2022 08:40), Max: 37.4 (27 Oct 2022 08:40)  T(F): 99.3 (27 Oct 2022 08:40), Max: 99.3 (27 Oct 2022 08:40)  HR: 99 (27 Oct 2022 15:22) (83 - 100)  BP: 136/63 (27 Oct 2022 15:22) (136/63 - 201/78)  RR: 18 (27 Oct 2022 15:22) (18 - 24)  SpO2: 99% (27 Oct 2022 15:22) (96% - 100%)  O2 Parameters below as of 27 Oct 2022 15:22  Patient On (Oxygen Delivery Method): BiPAP/CPAP    Labs significant for Na 147 -    VBG done x2 unremarkable for hypercapnia  EKG noted RBBB - bifascicular block   CT angio noted for severe emphysema - CTA/P non-remarkable   In the ED received solumedrol 125 + inhalers + azithromycin + lasix IV 40   Critical care c/s noted      (27 Oct 2022 16:03)    Prior EGD: None    Prior Colonoscopy: None      PAST MEDICAL & SURGICAL HISTORY:  HTN (hypertension)  HLD (hyperlipidemia)  BPH (benign prostatic hyperplasia)  No significant past surgical history    FAMILY HISTORY:  No pertinent family history in first degree relatives    Social History:  Tobacco: Reports a 50 year smoking history, but has quit for 5 years  Alcohol: Reports social drinking only  Drugs: Denies    Home Medications:  Albuterol (Eqv-ProAir HFA) 90 mcg/inh inhalation aerosol: 2 puff(s) inhaled every 6 hours (27 Oct 2022 17:37)  atorvastatin 10 mg oral tablet: 2 tab(s) orally once a day (27 Oct 2022 17:37)  esomeprazole 40 mg oral delayed release capsule: 1 cap(s) orally once a day (28 Oct 2022 16:21)  famotidine 40 mg oral tablet: 1 tab(s) orally once a day (at bedtime) (28 Oct 2022 16:21)  furosemide 40 mg oral tablet: 1 tab(s) orally once a day (28 Oct 2022 16:23)  magnesium oxide 400 mg oral tablet: 1 tab(s) orally once a day (28 Oct 2022 16:24)  montelukast 10 mg oral tablet: 1 tab(s) orally once a day (27 Oct 2022 17:37)  Mucinex 600 mg oral tablet, extended release: 1 tab(s) orally every 12 hours (28 Oct 2022 16:26)  Nephplex Rx oral tablet: 1 tab(s) orally once a day (28 Oct 2022 16:25)  SEROquel XR 50 mg oral tablet, extended release: 3 tab(s) orally once a day (in the evening) (27 Oct 2022 17:37)  sertraline 50 mg oral tablet: 1 tab(s) orally once a day (28 Oct 2022 16:23)  spironolactone 25 mg oral tablet: 1 tab(s) orally once a day (27 Oct 2022 17:37)  tamsulosin 0.4 mg oral capsule: 1 cap(s) orally once a day (28 Oct 2022 16:23)  Trelegy Ellipta 100 mcg-62.5 mcg-25 mcg/inh inhalation powder: 1 puff(s) inhaled once a day (28 Oct 2022 16:24)  Vascepa 1 g oral capsule: 2 cap(s) orally 2 times a day (27 Oct 2022 17:37)  Vitamin D3 25 mcg (1000 intl units) oral capsule: 1 cap(s) orally once a day (28 Oct 2022 16:24)        MEDICATIONS  (STANDING):  albuterol/ipratropium for Nebulization 3 milliLiter(s) Nebulizer every 6 hours  atorvastatin 20 milliGRAM(s) Oral at bedtime  budesonide  80 MICROgram(s)/formoterol 4.5 MICROgram(s) Inhaler 2 Puff(s) Inhalation two times a day  chlorhexidine 2% Cloths 1 Application(s) Topical daily  furosemide    Tablet 40 milliGRAM(s) Oral daily  guaiFENesin  milliGRAM(s) Oral every 12 hours  heparin   Injectable 5000 Unit(s) SubCutaneous every 12 hours  lisinopril 20 milliGRAM(s) Oral daily  magnesium oxide 400 milliGRAM(s) Oral daily  metoprolol succinate ER 25 milliGRAM(s) Oral daily  montelukast 10 milliGRAM(s) Oral daily  pantoprazole    Tablet 40 milliGRAM(s) Oral before breakfast  predniSONE   Tablet 40 milliGRAM(s) Oral daily  QUEtiapine 100 milliGRAM(s) Oral at bedtime  sertraline 50 milliGRAM(s) Oral daily  spironolactone 25 milliGRAM(s) Oral daily  tamsulosin 0.4 milliGRAM(s) Oral at bedtime    MEDICATIONS  (PRN):  acetaminophen     Tablet .. 650 milliGRAM(s) Oral every 6 hours PRN Temp greater or equal to 38C (100.4F), Mild Pain (1 - 3)  albuterol/ipratropium for Nebulization 3 milliLiter(s) Nebulizer every 4 hours PRN Shortness of Breath and/or Wheezing  aluminum hydroxide/magnesium hydroxide/simethicone Suspension 30 milliLiter(s) Oral every 4 hours PRN Dyspepsia  melatonin 3 milliGRAM(s) Oral at bedtime PRN Insomnia  ondansetron Injectable 4 milliGRAM(s) IV Push every 8 hours PRN Nausea and/or Vomiting      Allergies  No Known Allergies      Review of Systems:   Constitutional:  No Fever, No Chills  ENT/Mouth:  No Hearing Changes,  No Difficulty Swallowing  Eyes:  No Eye Pain, No Vision Changes  Cardiovascular:  No Chest Pain, No Palpitations  Respiratory: (+) cough with sputum.  Gastrointestinal:  (+) abdominal distention for 3 months.   Musculoskeletal:  No Joint Swelling, No Back Pain      Physical Examination:  T(C): 35.8 (11-01-22 @ 11:33), Max: 36.5 (11-01-22 @ 05:04)  HR: 74 (11-01-22 @ 11:33) (61 - 74)  BP: 148/68 (11-01-22 @ 11:33) (121/56 - 148/68)  RR: 18 (11-01-22 @ 11:33) (18 - 18)  SpO2: --      10-31-22 @ 07:01  -  11-01-22 @ 07:00  --------------------------------------------------------  IN: 240 mL / OUT: 0 mL / NET: 240 mL          GENERAL: AAOx3, no acute distress.  HEAD:  Atraumatic, Normocephalic  EYES: conjunctiva and sclera clear  NECK: Supple, no JVD or thyromegaly  CHEST/LUNG: Clear to auscultation bilaterally; No wheeze, rhonchi, or rales  HEART: Regular rate and rhythm; normal S1, S2, No murmurs.  ABDOMEN: Soft, nontender, (+) distended; Bowel sounds present  NEUROLOGY: No asterixis or tremor.   SKIN: Intact, no jaundice      Data:                        12.4   6.93  )-----------( 313      ( 01 Nov 2022 07:25 )             37.0     Hgb Trend:  12.4  11-01-22 @ 07:25  12.0  11-01-22 @ 02:03  13.0  10-31-22 @ 07:48  11.6  10-31-22 @ 01:26  10.9  10-30-22 @ 01:20        11-01    141  |  104  |  20  ----------------------------<  126<H>  3.9   |  28  |  1.2    Ca    8.8      01 Nov 2022 07:25  Mg     2.1     11-01    TPro  5.5<L>  /  Alb  3.0<L>  /  TBili  0.5  /  DBili  x   /  AST  32  /  ALT  56<H>  /  AlkPhos  84  11-01    Liver panel trend:  TBili 0.5   /   AST 32   /   ALT 56   /   AlkP 84   /   Tptn 5.5   /   Alb 3.0    /   DBili --      11-01  TBili 0.5   /   AST 39   /   ALT 59   /   AlkP 85   /   Tptn 5.4   /   Alb 3.1    /   DBili --      11-01  TBili 0.6   /   AST 54   /   ALT 64   /   AlkP 99   /   Tptn 5.7   /   Alb 3.3    /   DBili --      10-31  TBili 0.6   /   AST 43   /   ALT 60   /   AlkP 85   /   Tptn 5.5   /   Alb 3.0    /   DBili --      10-31  TBili 0.3   /   AST 65   /   ALT 61   /   AlkP 82   /   Tptn 5.5   /   Alb 3.2    /   DBili --      10-30  TBili 0.4   /   AST 61   /   ALT 44   /   AlkP 98   /   Tptn 5.8   /   Alb 3.2    /   DBili 0.2      10-29  TBili 0.7   /   AST 39   /   ALT 28   /   AlkP 109   /   Tptn 5.9   /   Alb 3.6    /   DBili --      10-28  TBili 0.9   /   AST 30   /   ALT 25   /   AlkP 125   /   Tptn 6.1   /   Alb 3.4    /   DBili --      10-27      Radiology:    < from: Xray Kidney Ureter Bladder (11.01.22 @ 08:37) >  ACC: [id number] EXAM:  XR KUB 1 VIEW                          PROCEDURE DATE:  11/01/2022          INTERPRETATION:  CLINICAL HISTORY: Rule out obstruction.    COMPARISON: 10/31/2022. Correlation made with CT abdomen pelvis on   10/30/2022.    VIEWS: 10/31/2022.    FINDINGS/  IMPRESSION:    Nonobstructive bowel gas pattern. No further follow-up by plain film is   necessary. Follow-up as clinically indicated.    --- End of Report ---            TOM WARD MD; Attending Radiologist  This document has been electronically signed. Nov 1 2022  9:09AM    < end of copied text >  < from: Xray Kidney Ureter Bladder (10.31.22 @ 12:10) >  ACC: [id number] EXAM:  XR KUB 1 VIEW                          PROCEDURE DATE:  10/31/2022          INTERPRETATION:  CLINICAL HISTORY / REASON FOR EXAM: Ileus.    TECHNIQUE: Frontal views of the abdomen    COMPARISON: 10/29/2022. Correlation with CT 10/30/2022    FINDINGS/  IMPRESSION:    Nonspecific nonobstructive bowel gas pattern. Stable bones.    --- End of Report ---            MIGUE ORELLANA MD; Attending Radiologist  This document has been electronically signed. Oct 31 2022  1:47PM    < end of copied text >  < from: CT Abdomen and Pelvis w/ Oral Cont and w/ IV Cont (10.30.22 @ 14:19) >  ACC: [id number] EXAM:  CT ABDOMEN AND PELVIS OC IC                          PROCEDURE DATE:  10/30/2022          INTERPRETATION:  CLINICAL STATEMENT: Abdominal pain.    TECHNIQUE: Contiguous axial CT images were obtained from the lower chest   to thepubic symphysis following administration of intravenous contrast.    Oral contrast was administered. Reformatted images in the coronal and   sagittal planes were acquired.    COMPARISON CT: 10/27/2022    Findings/  impression:  Oral contrast reaches the cecum/ascending colon. No evidence of bowel   obstruction or pneumoperitoneum. Additional findings are without   significant change when compared to recent CT 10/27/2022    --- End of Report ---            MARIE THAKUR MD; Attending Radiologist  This document has been electronically signed. Oct 30 2022 11:11PM    < end of copied text >  < from: US Abdomen Upper Quadrant Right (10.28.22 @ 14:58) >    ACC: [id number] EXAM:  US ABDOMEN RT UPR QUADRANT                          PROCEDURE DATE:  10/28/2022          INTERPRETATION:  CLINICAL INFORMATION: Distended gallbladder.    COMPARISON: CT scan from the prior day    TECHNIQUE: Sonography of the right upper quadrant.    FINDINGS:  Liver: Within normal limits.  Bile ducts: Normal caliber. Common bile duct measures 5 mm.  Gallbladder: The gallbladder is not distended.  Pancreas: Visualized portions are within normal limits.  Right kidney: 10.9 x 4.7 x 4.4 cm. No hydronephrosis. 2.7 x 1.8 x 1.5 cm   upper pole simple benign cyst.  Ascites: None.  IVC: Visualized portions are within normal limits.    IMPRESSION:  Nondistended gallbladder. No ductal dilatation.    Simple cyst within the right kidney.        --- End of Report ---            ELLA GOVEA MD; Attending Interventional Radiologist  This document has been electronically signed. Oct 28 2022  3:01PM    < end of copied text >

## 2022-11-01 NOTE — PROGRESS NOTE ADULT - SUBJECTIVE AND OBJECTIVE BOX
SUMMARY: 81 y/o Greek-speaking male w/ PMHx of HTN, HLD, ascending aortic aneurysm 4.5 cm, and COPD not on home O2, admitted for 4 days of worsening SOB, possibly due to COPD exacerbation, with asymptomatic abdominal distention with BM and flatus, with abdominal imaging negative for bowel obstruction or pneumoperitoneum, likely due to ileus 2/2 COPD exacerbation.    SUBJECTIVE:  24HR: No acute overnight events. Patient seen and examined at bedside.    OBJECTIVE:  Vital Signs Last 24 Hrs  T(C): 36.5 (01 Nov 2022 05:04), Max: 36.5 (01 Nov 2022 05:04)  T(F): 97.7 (01 Nov 2022 05:04), Max: 97.7 (01 Nov 2022 05:04)  HR: 61 (01 Nov 2022 05:04) (61 - 71)  BP: 138/61 (01 Nov 2022 05:04) (121/56 - 138/61)  BP(mean): --  RR: 18 (01 Nov 2022 05:04) (18 - 18)  SpO2: --    PHYSICAL EXAM:  CONSTITUTIONAL: Well-appearing and in no apparent distress.    EYES: PERRLA and symmetric, EOMI, No conjunctival injection or pallor. Sclera anicteric.    ENMT: Moist mucous membranes. No external nasal lesions. No gross hearing impairment noted.  	NECK: Supple, symmetric and without tracheal deviation.     RESPIRATORY: No respiratory distress. No obvious use of accessory muscles. Lungs CTAB with no crackling, wheezing, rhonchi or rubs.    CARDIOVASCULAR: Regular rate and rhythm. Normal S1 and S2. No murmurs, rubs or gallops. Dorsalis pedis pulses 2+.    GASTROINTESTINAL: Diffusely distended. Soft, non-tender to palpation in all quadrants. No palpable masses. No appreciable hernias.    MUSCULOSKELETAL: Examination of all four extremities without obvious misalignment, normal muscle strength/tone.    NEUROLOGIC: Moves all four extremities spontaneously.    PSYCHIATRIC: Appropriate insight/judgment; A+O x 3. Mood and affect appropriate. Recent/remote memory intact.    .  LABS:                         12.0   7.80  )-----------( 291      ( 01 Nov 2022 02:03 )             34.8     11-01    142  |  105  |  20  ----------------------------<  122<H>  4.2   |  32  |  1.2    Ca    9.2      01 Nov 2022 02:03  Mg     2.2     11-01    TPro  5.4<L>  /  Alb  3.1<L>  /  TBili  0.5  /  DBili  x   /  AST  39  /  ALT  59<H>  /  AlkPhos  85  11-01        RADIOLOGY, EKG & ADDITIONAL TESTS: Reviewed.   ACC: [id number] EXAM: CT ABDOMEN AND PELVIS OC IC  PROCEDURE DATE: 10/30/2022  TECHNIQUE: Contiguous axial CT images were obtained from the lower chest to the pubic symphysis following administration of intravenous contrast. Oral contrast was administered. Reformatted images in the coronal and sagittal planes were acquired.  COMPARISON CT: 10/27/2022  Findings/impression:  Oral contrast reaches the cecum/ascending colon. No evidence of bowel obstruction or pneumoperitoneum. Additional findings are without significant change when compared to recent CT 10/27/2022.    ACC: [id number] EXAM: XR KUB 1 VIEW  PROCEDURE DATE: 10/31/2022  INTERPRETATION: CLINICAL HISTORY / REASON FOR EXAM: Ileus.  FINDINGS/IMPRESSION:  Nonspecific nonobstructive bowel gas pattern. Stable bones.  MIGUE ORELLANA MD; Attending Radiologist  This document has been electronically signed. Oct 31 2022 1:47PM      MEDICATIONS  (STANDING):  albuterol/ipratropium for Nebulization 3 milliLiter(s) Nebulizer every 6 hours  atorvastatin 20 milliGRAM(s) Oral at bedtime  budesonide  80 MICROgram(s)/formoterol 4.5 MICROgram(s) Inhaler 2 Puff(s) Inhalation two times a day  chlorhexidine 2% Cloths 1 Application(s) Topical daily  furosemide    Tablet 40 milliGRAM(s) Oral daily  guaiFENesin  milliGRAM(s) Oral every 12 hours  heparin   Injectable 5000 Unit(s) SubCutaneous every 12 hours  lisinopril 20 milliGRAM(s) Oral daily  magnesium oxide 400 milliGRAM(s) Oral daily  metoprolol succinate ER 25 milliGRAM(s) Oral daily  montelukast 10 milliGRAM(s) Oral daily  pantoprazole    Tablet 40 milliGRAM(s) Oral before breakfast  predniSONE   Tablet 40 milliGRAM(s) Oral daily  QUEtiapine 100 milliGRAM(s) Oral at bedtime  sertraline 50 milliGRAM(s) Oral daily  spironolactone 25 milliGRAM(s) Oral daily  tamsulosin 0.4 milliGRAM(s) Oral at bedtime    MEDICATIONS  (PRN):  acetaminophen     Tablet .. 650 milliGRAM(s) Oral every 6 hours PRN Temp greater or equal to 38C (100.4F), Mild Pain (1 - 3)  albuterol/ipratropium for Nebulization 3 milliLiter(s) Nebulizer every 4 hours PRN Shortness of Breath and/or Wheezing  aluminum hydroxide/magnesium hydroxide/simethicone Suspension 30 milliLiter(s) Oral every 4 hours PRN Dyspepsia  melatonin 3 milliGRAM(s) Oral at bedtime PRN Insomnia  ondansetron Injectable 4 milliGRAM(s) IV Push every 8 hours PRN Nausea and/or Vomiting

## 2022-11-01 NOTE — PROGRESS NOTE ADULT - ASSESSMENT
ASSESSMENT: 83 y/o Turkish-speaking male w/ PMHx of HTN, HLD, ascending aortic aneurysm 4.5 cm, and COPD not on home O2, admitted for 4 days of worsening SOB, possibly due to COPD exacerbation, currently on 2L NC, with asymptomatic abdominal distention with BM and flatus, with abdominal imaging negative for bowel obstruction or pneumoperitoneum, likely due to ileus 2/2 COPD exacerbation.    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  - VBG x2 both unremarkable for hypercapnia.  -  on admission.  - CT A/P unremarkable.  - CT angio revealing for severe emphysema.  - No wheezing on exam.  - Echo shows EF 67% with G2DD.  - Continue prednisone 40mg PO QD x 6 days starting on 10/29.  - Continue home inhalers.  - Wean O2 as tolerated.  - s/p doxycycline and azithromycin.    # Abdominal distension - likely ileus 2/2 COPD  # Mild gallbladder distension on CT  # Diarrhea  - Constipated for 3 days -> had BM on 10/28.  - Surgery following.  - CT abdomen 10/27 shoes mild gallbladder distension.  - US RUQ shows nondistended gallbladder, no ductal dilatation and simple cyst in right kidney.  - CT abdomen 10/30 shows no evidence of bowel obstruction or pneumoperitoneum.   - f/u daily KUB per surgery recs. KUB 10/31 showed nonspecific nonobstructive bowel gas pattern.  - On 10/31 PM patient reported diarrhea, which he attributed to his clear liquid diet. Diet advanced to DASH/TLC. Miralax held. Monitor stool consistency. If still loose, collect C. diff PCR, GI PCR and O&P.    # Lingular lung nodule on CT  - f/u with repeat CT in 3 months.    # ?CKD stage 3  # Renal cyst 3 cm on CT   - No hydronephrosis on imaging.  - Serum Cr normalized.    #Hx ascending aortic aneurysm   - Stable at 4.4cm on CT chest.    # HLD  # HTN  # BPH  # Mood disorder  - Continue home medications.  - Be aware of polypharmacy risk.    #DVT  - Heparin subQ.    # GI PPX  - Protonix.    # Diet  - Regular.    # Activity  - Increase as tolerated.    # Code Status  - FULL CODE.

## 2022-11-01 NOTE — PROGRESS NOTE ADULT - ASSESSMENT
Assessment:  The patient is a 83 y/o German speaking male with PMHx of HTN, HLD, ascending aortic aneurysm 4.5 cm, COPD (not on home O2) who p/w worsening and constant SOB for 4 days, associated with heavy exertion. He denies any associated symptoms including fever, cough, excessive sputum production. On admission, he endorsed epigastric abdominal pain that started on the morning of admission, associated with bloating. He denies any recent nausea/vomiting or constipation/diarrhea, but he endorses poor oral intake. He is taking multiple medications and questionable compliance. GI team was consulted for abdominal distension, abdominal pain, and diarrhea.     Plan:  #Abdominal distention #Diarrhea  - CT A/P shows oral contrast reaching cecum/ascending colon, no evidence of bowel obstruction or pneumoperitoneum  - Yesterday's and today's KUB scans showing nonspecific nonobstructive bowel gas pattern  - Miralax was last given at 6PM yesterday, now held  - Avoid opioid medications  - Avoid laxatives as patient endorses diarrhea  - If diarrhea persists after holding laxatives, please collect stool studies, C. diff, O&P studies  - Monitor electrolytes, and replete PRN  - Surgery also following    #Mild GB distention on CT  - initial CT A/P showed mildly distended GB, with no radiopaque gallstones, no biliary ductal dilation  - follow up RUQ U/S after initial CT showed nondistended GB, no ductal dilation

## 2022-11-02 LAB
ALBUMIN SERPL ELPH-MCNC: 3.4 G/DL — LOW (ref 3.5–5.2)
ALP SERPL-CCNC: 90 U/L — SIGNIFICANT CHANGE UP (ref 30–115)
ALT FLD-CCNC: 60 U/L — HIGH (ref 0–41)
ANION GAP SERPL CALC-SCNC: 10 MMOL/L — SIGNIFICANT CHANGE UP (ref 7–14)
AST SERPL-CCNC: 33 U/L — SIGNIFICANT CHANGE UP (ref 0–41)
BASOPHILS # BLD AUTO: 0.03 K/UL — SIGNIFICANT CHANGE UP (ref 0–0.2)
BASOPHILS NFR BLD AUTO: 0.4 % — SIGNIFICANT CHANGE UP (ref 0–1)
BILIRUB SERPL-MCNC: 0.6 MG/DL — SIGNIFICANT CHANGE UP (ref 0.2–1.2)
BUN SERPL-MCNC: 26 MG/DL — HIGH (ref 10–20)
CALCIUM SERPL-MCNC: 9 MG/DL — SIGNIFICANT CHANGE UP (ref 8.4–10.5)
CHLORIDE SERPL-SCNC: 102 MMOL/L — SIGNIFICANT CHANGE UP (ref 98–110)
CO2 SERPL-SCNC: 30 MMOL/L — SIGNIFICANT CHANGE UP (ref 17–32)
CREAT SERPL-MCNC: 1.3 MG/DL — SIGNIFICANT CHANGE UP (ref 0.7–1.5)
CULTURE RESULTS: SIGNIFICANT CHANGE UP
EGFR: 55 ML/MIN/1.73M2 — LOW
EOSINOPHIL # BLD AUTO: 0.21 K/UL — SIGNIFICANT CHANGE UP (ref 0–0.7)
EOSINOPHIL NFR BLD AUTO: 3 % — SIGNIFICANT CHANGE UP (ref 0–8)
GLUCOSE SERPL-MCNC: 125 MG/DL — HIGH (ref 70–99)
HCT VFR BLD CALC: 41.2 % — LOW (ref 42–52)
HGB BLD-MCNC: 13.9 G/DL — LOW (ref 14–18)
IMM GRANULOCYTES NFR BLD AUTO: 0.9 % — HIGH (ref 0.1–0.3)
LYMPHOCYTES # BLD AUTO: 1.58 K/UL — SIGNIFICANT CHANGE UP (ref 1.2–3.4)
LYMPHOCYTES # BLD AUTO: 22.4 % — SIGNIFICANT CHANGE UP (ref 20.5–51.1)
MAGNESIUM SERPL-MCNC: 2.2 MG/DL — SIGNIFICANT CHANGE UP (ref 1.8–2.4)
MCHC RBC-ENTMCNC: 31.1 PG — HIGH (ref 27–31)
MCHC RBC-ENTMCNC: 33.7 G/DL — SIGNIFICANT CHANGE UP (ref 32–37)
MCV RBC AUTO: 92.2 FL — SIGNIFICANT CHANGE UP (ref 80–94)
MONOCYTES # BLD AUTO: 0.53 K/UL — SIGNIFICANT CHANGE UP (ref 0.1–0.6)
MONOCYTES NFR BLD AUTO: 7.5 % — SIGNIFICANT CHANGE UP (ref 1.7–9.3)
NEUTROPHILS # BLD AUTO: 4.64 K/UL — SIGNIFICANT CHANGE UP (ref 1.4–6.5)
NEUTROPHILS NFR BLD AUTO: 65.8 % — SIGNIFICANT CHANGE UP (ref 42.2–75.2)
NRBC # BLD: 0 /100 WBCS — SIGNIFICANT CHANGE UP (ref 0–0)
PLATELET # BLD AUTO: 363 K/UL — SIGNIFICANT CHANGE UP (ref 130–400)
POTASSIUM SERPL-MCNC: 4.1 MMOL/L — SIGNIFICANT CHANGE UP (ref 3.5–5)
POTASSIUM SERPL-SCNC: 4.1 MMOL/L — SIGNIFICANT CHANGE UP (ref 3.5–5)
PROT SERPL-MCNC: 6 G/DL — SIGNIFICANT CHANGE UP (ref 6–8)
RBC # BLD: 4.47 M/UL — LOW (ref 4.7–6.1)
RBC # FLD: 12.4 % — SIGNIFICANT CHANGE UP (ref 11.5–14.5)
SODIUM SERPL-SCNC: 142 MMOL/L — SIGNIFICANT CHANGE UP (ref 135–146)
SPECIMEN SOURCE: SIGNIFICANT CHANGE UP
WBC # BLD: 7.05 K/UL — SIGNIFICANT CHANGE UP (ref 4.8–10.8)
WBC # FLD AUTO: 7.05 K/UL — SIGNIFICANT CHANGE UP (ref 4.8–10.8)

## 2022-11-02 PROCEDURE — 99233 SBSQ HOSP IP/OBS HIGH 50: CPT

## 2022-11-02 RX ADMIN — Medication 600 MILLIGRAM(S): at 05:57

## 2022-11-02 RX ADMIN — CHLORHEXIDINE GLUCONATE 1 APPLICATION(S): 213 SOLUTION TOPICAL at 11:49

## 2022-11-02 RX ADMIN — BUDESONIDE AND FORMOTEROL FUMARATE DIHYDRATE 2 PUFF(S): 160; 4.5 AEROSOL RESPIRATORY (INHALATION) at 21:13

## 2022-11-02 RX ADMIN — BUDESONIDE AND FORMOTEROL FUMARATE DIHYDRATE 2 PUFF(S): 160; 4.5 AEROSOL RESPIRATORY (INHALATION) at 08:00

## 2022-11-02 RX ADMIN — Medication 3 MILLILITER(S): at 11:49

## 2022-11-02 RX ADMIN — Medication 40 MILLIGRAM(S): at 06:01

## 2022-11-02 RX ADMIN — MONTELUKAST 10 MILLIGRAM(S): 4 TABLET, CHEWABLE ORAL at 11:34

## 2022-11-02 RX ADMIN — SERTRALINE 50 MILLIGRAM(S): 25 TABLET, FILM COATED ORAL at 11:34

## 2022-11-02 RX ADMIN — PANTOPRAZOLE SODIUM 40 MILLIGRAM(S): 20 TABLET, DELAYED RELEASE ORAL at 06:04

## 2022-11-02 RX ADMIN — MAGNESIUM OXIDE 400 MG ORAL TABLET 400 MILLIGRAM(S): 241.3 TABLET ORAL at 11:34

## 2022-11-02 RX ADMIN — SPIRONOLACTONE 25 MILLIGRAM(S): 25 TABLET, FILM COATED ORAL at 06:01

## 2022-11-02 RX ADMIN — HEPARIN SODIUM 5000 UNIT(S): 5000 INJECTION INTRAVENOUS; SUBCUTANEOUS at 05:55

## 2022-11-02 RX ADMIN — HEPARIN SODIUM 5000 UNIT(S): 5000 INJECTION INTRAVENOUS; SUBCUTANEOUS at 17:26

## 2022-11-02 RX ADMIN — ATORVASTATIN CALCIUM 20 MILLIGRAM(S): 80 TABLET, FILM COATED ORAL at 21:14

## 2022-11-02 RX ADMIN — Medication 25 MILLIGRAM(S): at 05:57

## 2022-11-02 RX ADMIN — TAMSULOSIN HYDROCHLORIDE 0.4 MILLIGRAM(S): 0.4 CAPSULE ORAL at 21:13

## 2022-11-02 RX ADMIN — LISINOPRIL 20 MILLIGRAM(S): 2.5 TABLET ORAL at 05:57

## 2022-11-02 RX ADMIN — Medication 40 MILLIGRAM(S): at 05:57

## 2022-11-02 RX ADMIN — QUETIAPINE FUMARATE 100 MILLIGRAM(S): 200 TABLET, FILM COATED ORAL at 21:14

## 2022-11-02 RX ADMIN — Medication 600 MILLIGRAM(S): at 17:26

## 2022-11-02 NOTE — PROGRESS NOTE ADULT - ASSESSMENT
ASSESSMENT: 83 y/o Pashto-speaking male w/ PMHx of HTN, HLD, ascending aortic aneurysm 4.5 cm, and COPD not on home O2, admitted for 4 days of worsening SOB, possibly due to COPD exacerbation, currently on 2L NC, with asymptomatic abdominal distention with BM and flatus, with abdominal imaging negative for bowel obstruction or pneumoperitoneum, likely due to ileus 2/2 COPD exacerbation. Still reports ~5 watery bowel movements despite being advanced from clear liquid to DASH/TLC diet on 10/31 and all laxatives held.    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  - VBG x2 both unremarkable for hypercapnia.  -  on admission.  - CT A/P unremarkable.  - CT angio revealing for severe emphysema.  - No wheezing on exam.  - Echo shows EF 67% with G2DD.  - Continue prednisone 40mg PO QD x 6 days starting on 10/29.  - Continue home inhalers.  - Wean O2 as tolerated.  - s/p doxycycline and azithromycin.    # Abdominal distension - likely ileus 2/2 COPD  # Mild gallbladder distension on CT  # Diarrhea  - Constipated for 3 days -> had BM on 10/28.  - Surgery following.  - CT abdomen 10/27 shoes mild gallbladder distension.  - US RUQ shows nondistended gallbladder, no ductal dilatation and simple cyst in right kidney.  - CT abdomen 10/30 shows no evidence of bowel obstruction or pneumoperitoneum.   - KUB 10/31, 11/1 showed nonspecific nonobstructive bowel gas pattern.  - On 10/31 PM patient reported diarrhea, which he attributed to his clear liquid diet. Diet advanced to DASH/TLC that day. All laxatives held.    GI recs 11/1:  - Avoid opioid medications  - Avoid laxatives as patient endorses diarrhea  - If diarrhea persists after holding laxatives, please collect stool studies, C. diff, O&P studies  - Monitor electrolytes, and replete PRN  - follow up RUQ U/S after initial CT showed nondistended GB, no ductal dilation    # Lingular lung nodule on CT  - f/u with repeat CT in 3 months.    # ?CKD stage 3  # Renal cyst 3 cm on CT   - No hydronephrosis on imaging.  - Serum Cr normalized.    #Hx ascending aortic aneurysm   - Stable at 4.4cm on CT chest.    # HLD  # HTN  # BPH  # Mood disorder  - Continue home medications.  - Be aware of polypharmacy risk.    #DVT  - Heparin subQ.    # GI PPX  - Protonix.    # Diet  - DASH/TLC.     # Activity  - Increase as tolerated.    # Code Status  - FULL CODE.

## 2022-11-02 NOTE — PROGRESS NOTE ADULT - SUBJECTIVE AND OBJECTIVE BOX
SUMMARY: 83 y/o Welsh-speaking male w/ PMHx of HTN, HLD, ascending aortic aneurysm 4.5 cm, and COPD not on home O2, admitted for 4 days of worsening SOB, possibly due to COPD exacerbation, with asymptomatic abdominal distention with BM and flatus, with abdominal imaging negative for bowel obstruction or pneumoperitoneum, likely due to ileus 2/2 COPD exacerbation. Still reports ~5 watery bowel movements despite being advanced from clear liquid to DASH/TLC diet on 10/31.    SUBJECTIVE:  24HR: No acute overnight events. Patient seen and examined at bedside. Reports ~5 watery bowel movements in the past 24 hours.     OBJECTIVE:  Vital Signs Last 24 Hrs  T(C): 35.8 (02 Nov 2022 06:07), Max: 35.9 (01 Nov 2022 21:10)  T(F): 96.4 (02 Nov 2022 06:07), Max: 96.7 (01 Nov 2022 21:10)  HR: 69 (02 Nov 2022 06:07) (69 - 74)  BP: 135/60 (02 Nov 2022 06:07) (135/60 - 165/67)  BP(mean): --  RR: 18 (02 Nov 2022 06:07) (18 - 18)  SpO2: --    PHYSICAL EXAM:  CONSTITUTIONAL: Well-appearing and in no apparent distress.    EYES: PERRLA and symmetric, EOMI, No conjunctival injection or pallor. Sclera anicteric.    ENMT: Moist mucous membranes. No external nasal lesions. No gross hearing impairment noted.  	NECK: Supple, symmetric and without tracheal deviation.     RESPIRATORY: No respiratory distress. No obvious use of accessory muscles. Lungs CTAB with no crackling, wheezing, rhonchi or rubs.    CARDIOVASCULAR: Regular rate and rhythm. Normal S1 and S2. No murmurs, rubs or gallops. Dorsalis pedis pulses 2+.    GASTROINTESTINAL: Diffusely distended. Soft, non-tender to palpation in all quadrants. No palpable masses. No appreciable hernias.    MUSCULOSKELETAL: Examination of all four extremities without obvious misalignment, normal muscle strength/tone.    NEUROLOGIC: Moves all four extremities spontaneously.    PSYCHIATRIC: Appropriate insight/judgment; A+O x 3. Mood and affect appropriate. Recent/remote memory intact.    .  LABS:                         12.4   6.93  )-----------( 313      ( 01 Nov 2022 07:25 )             37.0     11-01    141  |  104  |  20  ----------------------------<  126<H>  3.9   |  28  |  1.2    Ca    8.8      01 Nov 2022 07:25  Mg     2.1     11-01    TPro  5.5<L>  /  Alb  3.0<L>  /  TBili  0.5  /  DBili  x   /  AST  32  /  ALT  56<H>  /  AlkPhos  84  11-01      RADIOLOGY, EKG & ADDITIONAL TESTS: Reviewed.       RADIOLOGY, EKG & ADDITIONAL TESTS: Reviewed.   ACC: [id number] EXAM: CT ABDOMEN AND PELVIS OC IC  PROCEDURE DATE: 10/30/2022  TECHNIQUE: Contiguous axial CT images were obtained from the lower chest to the pubic symphysis following administration of intravenous contrast. Oral contrast was administered. Reformatted images in the coronal and sagittal planes were acquired.  COMPARISON CT: 10/27/2022  Findings/impression:  Oral contrast reaches the cecum/ascending colon. No evidence of bowel obstruction or pneumoperitoneum. Additional findings are without significant change when compared to recent CT 10/27/2022.    ACC: [id number] EXAM: XR KUB 1 VIEW  PROCEDURE DATE: 10/31/2022  INTERPRETATION: CLINICAL HISTORY / REASON FOR EXAM: Ileus.  FINDINGS/IMPRESSION:  Nonspecific nonobstructive bowel gas pattern. Stable bones.  MIGUE ORELLANA MD; Attending Radiologist  This document has been electronically signed. Oct 31 2022 1:47PM    ACC: [id number] EXAM: XR KUB 1 VIEW  PROCEDURE DATE: 11/01/2022  FINDINGS/IMPRESSION:  Nonobstructive bowel gas pattern. No further follow-up by plain film is necessary. Follow-up as clinically indicated.  TOM WARD MD; Attending Radiologist  This document has been electronically signed. Nov 1 2022 9:09AM    MEDICATIONS  (STANDING):  albuterol/ipratropium for Nebulization 3 milliLiter(s) Nebulizer every 6 hours  atorvastatin 20 milliGRAM(s) Oral at bedtime  budesonide  80 MICROgram(s)/formoterol 4.5 MICROgram(s) Inhaler 2 Puff(s) Inhalation two times a day  chlorhexidine 2% Cloths 1 Application(s) Topical daily  furosemide    Tablet 40 milliGRAM(s) Oral daily  guaiFENesin  milliGRAM(s) Oral every 12 hours  heparin   Injectable 5000 Unit(s) SubCutaneous every 12 hours  lisinopril 20 milliGRAM(s) Oral daily  magnesium oxide 400 milliGRAM(s) Oral daily  metoprolol succinate ER 25 milliGRAM(s) Oral daily  montelukast 10 milliGRAM(s) Oral daily  pantoprazole    Tablet 40 milliGRAM(s) Oral before breakfast  predniSONE   Tablet 40 milliGRAM(s) Oral daily  QUEtiapine 100 milliGRAM(s) Oral at bedtime  sertraline 50 milliGRAM(s) Oral daily  spironolactone 25 milliGRAM(s) Oral daily  tamsulosin 0.4 milliGRAM(s) Oral at bedtime    MEDICATIONS  (PRN):  acetaminophen     Tablet .. 650 milliGRAM(s) Oral every 6 hours PRN Temp greater or equal to 38C (100.4F), Mild Pain (1 - 3)  albuterol/ipratropium for Nebulization 3 milliLiter(s) Nebulizer every 4 hours PRN Shortness of Breath and/or Wheezing  aluminum hydroxide/magnesium hydroxide/simethicone Suspension 30 milliLiter(s) Oral every 4 hours PRN Dyspepsia  melatonin 3 milliGRAM(s) Oral at bedtime PRN Insomnia  ondansetron Injectable 4 milliGRAM(s) IV Push every 8 hours PRN Nausea and/or Vomiting         SUMMARY: 83 y/o Chinese-speaking male w/ PMHx of HTN, HLD, ascending aortic aneurysm 4.5 cm, and COPD not on home O2, admitted for 4 days of worsening SOB, possibly due to COPD exacerbation, with asymptomatic abdominal distention with BM and flatus, with abdominal imaging negative for bowel obstruction or pneumoperitoneum, likely due to ileus 2/2 COPD exacerbation. Still reports ~5 watery bowel movements despite being advanced from clear liquid to DASH/TLC diet on 10/31 and all laxatives held.    SUBJECTIVE:  24HR: No acute overnight events. Patient seen and examined at bedside. Reports ~5 watery bowel movements in the past 24 hours.     OBJECTIVE:  Vital Signs Last 24 Hrs  T(C): 35.8 (02 Nov 2022 06:07), Max: 35.9 (01 Nov 2022 21:10)  T(F): 96.4 (02 Nov 2022 06:07), Max: 96.7 (01 Nov 2022 21:10)  HR: 69 (02 Nov 2022 06:07) (69 - 74)  BP: 135/60 (02 Nov 2022 06:07) (135/60 - 165/67)  BP(mean): --  RR: 18 (02 Nov 2022 06:07) (18 - 18)  SpO2: --    PHYSICAL EXAM:  CONSTITUTIONAL: Well-appearing and in no apparent distress.    EYES: PERRLA and symmetric, EOMI, No conjunctival injection or pallor. Sclera anicteric.    ENMT: Moist mucous membranes. No external nasal lesions. No gross hearing impairment noted.  	NECK: Supple, symmetric and without tracheal deviation.     RESPIRATORY: No respiratory distress. No obvious use of accessory muscles. Lungs CTAB with no crackling, wheezing, rhonchi or rubs.    CARDIOVASCULAR: Regular rate and rhythm. Normal S1 and S2. No murmurs, rubs or gallops. Dorsalis pedis pulses 2+.    GASTROINTESTINAL: Diffusely distended. Soft, non-tender to palpation in all quadrants. No palpable masses. No appreciable hernias.    MUSCULOSKELETAL: Examination of all four extremities without obvious misalignment, normal muscle strength/tone.    NEUROLOGIC: Moves all four extremities spontaneously.    PSYCHIATRIC: Appropriate insight/judgment; A+O x 3. Mood and affect appropriate. Recent/remote memory intact.    .  LABS:                         12.4   6.93  )-----------( 313      ( 01 Nov 2022 07:25 )             37.0     11-01    141  |  104  |  20  ----------------------------<  126<H>  3.9   |  28  |  1.2    Ca    8.8      01 Nov 2022 07:25  Mg     2.1     11-01    TPro  5.5<L>  /  Alb  3.0<L>  /  TBili  0.5  /  DBili  x   /  AST  32  /  ALT  56<H>  /  AlkPhos  84  11-01      RADIOLOGY, EKG & ADDITIONAL TESTS: Reviewed.       RADIOLOGY, EKG & ADDITIONAL TESTS: Reviewed.   ACC: [id number] EXAM: CT ABDOMEN AND PELVIS OC IC  PROCEDURE DATE: 10/30/2022  TECHNIQUE: Contiguous axial CT images were obtained from the lower chest to the pubic symphysis following administration of intravenous contrast. Oral contrast was administered. Reformatted images in the coronal and sagittal planes were acquired.  COMPARISON CT: 10/27/2022  Findings/impression:  Oral contrast reaches the cecum/ascending colon. No evidence of bowel obstruction or pneumoperitoneum. Additional findings are without significant change when compared to recent CT 10/27/2022.    ACC: [id number] EXAM: XR KUB 1 VIEW  PROCEDURE DATE: 10/31/2022  INTERPRETATION: CLINICAL HISTORY / REASON FOR EXAM: Ileus.  FINDINGS/IMPRESSION:  Nonspecific nonobstructive bowel gas pattern. Stable bones.  MIGUE ORELLANA MD; Attending Radiologist  This document has been electronically signed. Oct 31 2022 1:47PM    ACC: [id number] EXAM: XR KUB 1 VIEW  PROCEDURE DATE: 11/01/2022  FINDINGS/IMPRESSION:  Nonobstructive bowel gas pattern. No further follow-up by plain film is necessary. Follow-up as clinically indicated.  TOM WARD MD; Attending Radiologist  This document has been electronically signed. Nov 1 2022 9:09AM    MEDICATIONS  (STANDING):  albuterol/ipratropium for Nebulization 3 milliLiter(s) Nebulizer every 6 hours  atorvastatin 20 milliGRAM(s) Oral at bedtime  budesonide  80 MICROgram(s)/formoterol 4.5 MICROgram(s) Inhaler 2 Puff(s) Inhalation two times a day  chlorhexidine 2% Cloths 1 Application(s) Topical daily  furosemide    Tablet 40 milliGRAM(s) Oral daily  guaiFENesin  milliGRAM(s) Oral every 12 hours  heparin   Injectable 5000 Unit(s) SubCutaneous every 12 hours  lisinopril 20 milliGRAM(s) Oral daily  magnesium oxide 400 milliGRAM(s) Oral daily  metoprolol succinate ER 25 milliGRAM(s) Oral daily  montelukast 10 milliGRAM(s) Oral daily  pantoprazole    Tablet 40 milliGRAM(s) Oral before breakfast  predniSONE   Tablet 40 milliGRAM(s) Oral daily  QUEtiapine 100 milliGRAM(s) Oral at bedtime  sertraline 50 milliGRAM(s) Oral daily  spironolactone 25 milliGRAM(s) Oral daily  tamsulosin 0.4 milliGRAM(s) Oral at bedtime    MEDICATIONS  (PRN):  acetaminophen     Tablet .. 650 milliGRAM(s) Oral every 6 hours PRN Temp greater or equal to 38C (100.4F), Mild Pain (1 - 3)  albuterol/ipratropium for Nebulization 3 milliLiter(s) Nebulizer every 4 hours PRN Shortness of Breath and/or Wheezing  aluminum hydroxide/magnesium hydroxide/simethicone Suspension 30 milliLiter(s) Oral every 4 hours PRN Dyspepsia  melatonin 3 milliGRAM(s) Oral at bedtime PRN Insomnia  ondansetron Injectable 4 milliGRAM(s) IV Push every 8 hours PRN Nausea and/or Vomiting

## 2022-11-02 NOTE — PROGRESS NOTE ADULT - ATTENDING COMMENTS
83 y/o Wolof-speaking male w/ PMHx of HTN, HLD, ascending aortic aneurysm 4.5 cm, and COPD not on home O2, admitted for 4 days of worsening SOB, due to COPD exacerbation.     # COPD exacerbation  - CT angio revealing for severe emphysema.  - VBG x2 both unremarkable for hypercapnia.  -  on admission.  - Echo shows EF 67% with G2DD.  Plan:  - Continue prednisone 40mg PO QD x 6 days starting on 10/29.  - Continue home inhalers and duonebs prn   - s/p doxycycline and azithromycin.    # Abdominal distension - likely ileus  # Mild gallbladder distension on CT  # Diarrhea  - CT abdomen 10/27 w/ mild gallbladder distension.  - US RUQ shows nondistended gallbladder, no ductal dilatation and simple cyst in right kidney.  - CT abdomen 10/30 shows no evidence of bowel obstruction or pneumoperitoneum.   - KUB 10/31, 11/1 showed nonspecific nonobstructive bowel gas pattern.  - On 10/31 PM patient reported diarrhea, which he attributed to his clear liquid diet. Diet advanced to DASH/TLC that day. All laxatives held.  - Only one episode of diarrhea this morning 11/2, if stable by tomorrow will dc home.     # Lingular lung nodule on CT  - f/u with repeat CT in 3 months.    # CKD stage 3  # Renal cyst 3 cm on CT   - OP f/u     #Hx ascending aortic aneurysm   - Stable at 4.4cm on CT chest.    # HLD  # HTN  # BPH  # Mood disorder  - Continue home medications.    Dispo: if bowel movements stable and no longer having diarrhea will dc tomorrow.     Total time spent to complete patient's bedside assessment, review medical chart, discuss medical plan of care with covering medical team was more than 35 minutes  with >50% of time spent face to face with patient, discussion with patient/family and/or coordination of care    Nyasia Avery DO

## 2022-11-03 ENCOUNTER — TRANSCRIPTION ENCOUNTER (OUTPATIENT)
Age: 82
End: 2022-11-03

## 2022-11-03 VITALS
RESPIRATION RATE: 18 BRPM | HEART RATE: 71 BPM | SYSTOLIC BLOOD PRESSURE: 124 MMHG | TEMPERATURE: 97 F | DIASTOLIC BLOOD PRESSURE: 57 MMHG

## 2022-11-03 LAB
ALBUMIN SERPL ELPH-MCNC: 3.6 G/DL — SIGNIFICANT CHANGE UP (ref 3.5–5.2)
ALP SERPL-CCNC: 93 U/L — SIGNIFICANT CHANGE UP (ref 30–115)
ALT FLD-CCNC: 73 U/L — HIGH (ref 0–41)
ANION GAP SERPL CALC-SCNC: 13 MMOL/L — SIGNIFICANT CHANGE UP (ref 7–14)
AST SERPL-CCNC: 51 U/L — HIGH (ref 0–41)
BASOPHILS # BLD AUTO: 0.03 K/UL — SIGNIFICANT CHANGE UP (ref 0–0.2)
BASOPHILS NFR BLD AUTO: 0.3 % — SIGNIFICANT CHANGE UP (ref 0–1)
BILIRUB SERPL-MCNC: 0.4 MG/DL — SIGNIFICANT CHANGE UP (ref 0.2–1.2)
BUN SERPL-MCNC: 28 MG/DL — HIGH (ref 10–20)
CALCIUM SERPL-MCNC: 8.7 MG/DL — SIGNIFICANT CHANGE UP (ref 8.4–10.5)
CHLORIDE SERPL-SCNC: 102 MMOL/L — SIGNIFICANT CHANGE UP (ref 98–110)
CO2 SERPL-SCNC: 28 MMOL/L — SIGNIFICANT CHANGE UP (ref 17–32)
CREAT SERPL-MCNC: 1.4 MG/DL — SIGNIFICANT CHANGE UP (ref 0.7–1.5)
EGFR: 50 ML/MIN/1.73M2 — LOW
EOSINOPHIL # BLD AUTO: 0.15 K/UL — SIGNIFICANT CHANGE UP (ref 0–0.7)
EOSINOPHIL NFR BLD AUTO: 1.5 % — SIGNIFICANT CHANGE UP (ref 0–8)
GLUCOSE SERPL-MCNC: 224 MG/DL — HIGH (ref 70–99)
HCT VFR BLD CALC: 38.5 % — LOW (ref 42–52)
HGB BLD-MCNC: 13.1 G/DL — LOW (ref 14–18)
IMM GRANULOCYTES NFR BLD AUTO: 0.6 % — HIGH (ref 0.1–0.3)
LYMPHOCYTES # BLD AUTO: 1.8 K/UL — SIGNIFICANT CHANGE UP (ref 1.2–3.4)
LYMPHOCYTES # BLD AUTO: 18.4 % — LOW (ref 20.5–51.1)
MAGNESIUM SERPL-MCNC: 2.3 MG/DL — SIGNIFICANT CHANGE UP (ref 1.8–2.4)
MCHC RBC-ENTMCNC: 31.3 PG — HIGH (ref 27–31)
MCHC RBC-ENTMCNC: 34 G/DL — SIGNIFICANT CHANGE UP (ref 32–37)
MCV RBC AUTO: 92.1 FL — SIGNIFICANT CHANGE UP (ref 80–94)
MONOCYTES # BLD AUTO: 0.57 K/UL — SIGNIFICANT CHANGE UP (ref 0.1–0.6)
MONOCYTES NFR BLD AUTO: 5.8 % — SIGNIFICANT CHANGE UP (ref 1.7–9.3)
NEUTROPHILS # BLD AUTO: 7.16 K/UL — HIGH (ref 1.4–6.5)
NEUTROPHILS NFR BLD AUTO: 73.4 % — SIGNIFICANT CHANGE UP (ref 42.2–75.2)
NRBC # BLD: 0 /100 WBCS — SIGNIFICANT CHANGE UP (ref 0–0)
PLATELET # BLD AUTO: 348 K/UL — SIGNIFICANT CHANGE UP (ref 130–400)
POTASSIUM SERPL-MCNC: 3.9 MMOL/L — SIGNIFICANT CHANGE UP (ref 3.5–5)
POTASSIUM SERPL-SCNC: 3.9 MMOL/L — SIGNIFICANT CHANGE UP (ref 3.5–5)
PROT SERPL-MCNC: 6 G/DL — SIGNIFICANT CHANGE UP (ref 6–8)
RBC # BLD: 4.18 M/UL — LOW (ref 4.7–6.1)
RBC # FLD: 12.9 % — SIGNIFICANT CHANGE UP (ref 11.5–14.5)
SODIUM SERPL-SCNC: 143 MMOL/L — SIGNIFICANT CHANGE UP (ref 135–146)
WBC # BLD: 9.77 K/UL — SIGNIFICANT CHANGE UP (ref 4.8–10.8)
WBC # FLD AUTO: 9.77 K/UL — SIGNIFICANT CHANGE UP (ref 4.8–10.8)

## 2022-11-03 PROCEDURE — 99239 HOSP IP/OBS DSCHRG MGMT >30: CPT

## 2022-11-03 RX ADMIN — LISINOPRIL 20 MILLIGRAM(S): 2.5 TABLET ORAL at 05:39

## 2022-11-03 RX ADMIN — Medication 40 MILLIGRAM(S): at 05:38

## 2022-11-03 RX ADMIN — PANTOPRAZOLE SODIUM 40 MILLIGRAM(S): 20 TABLET, DELAYED RELEASE ORAL at 06:15

## 2022-11-03 RX ADMIN — Medication 25 MILLIGRAM(S): at 05:39

## 2022-11-03 RX ADMIN — Medication 40 MILLIGRAM(S): at 05:39

## 2022-11-03 RX ADMIN — HEPARIN SODIUM 5000 UNIT(S): 5000 INJECTION INTRAVENOUS; SUBCUTANEOUS at 05:38

## 2022-11-03 RX ADMIN — SPIRONOLACTONE 25 MILLIGRAM(S): 25 TABLET, FILM COATED ORAL at 05:39

## 2022-11-03 RX ADMIN — BUDESONIDE AND FORMOTEROL FUMARATE DIHYDRATE 2 PUFF(S): 160; 4.5 AEROSOL RESPIRATORY (INHALATION) at 08:01

## 2022-11-03 RX ADMIN — Medication 600 MILLIGRAM(S): at 05:39

## 2022-11-03 NOTE — DISCHARGE NOTE PROVIDER - ATTENDING DISCHARGE PHYSICAL EXAMINATION:
CONSTITUTIONAL: Well-appearing and in no apparent distress.  EYES: PERRLA and symmetric, EOMI, No conjunctival injection or pallor. Sclera anicteric.  ENMT: Moist mucous membranes. No external nasal lesions. No gross hearing impairment noted.  NECK: Supple, symmetric and without tracheal deviation.   RESPIRATORY: No respiratory distress. No obvious use of accessory muscles. Mild RUL wheezing, otherwise ronchi rales   CARDIOVASCULAR: Regular rate and rhythm. Normal S1 and S2. No murmurs, rubs or gallops. Dorsalis pedis pulses 2+.  GASTROINTESTINAL: Soft, non-tender to palpation in all quadrants. No palpable masses. No appreciable hernias.  NEUROLOGIC: Moves all four extremities spontaneously.  PSYCHIATRIC: Appropriate insight/judgment; A+O x 3. Mood and affect appropriate. Recent/remote memory intact.

## 2022-11-03 NOTE — DISCHARGE NOTE PROVIDER - NSDCCPCAREPLAN_GEN_ALL_CORE_FT
PRINCIPAL DISCHARGE DIAGNOSIS  Diagnosis: COPD exacerbation  Assessment and Plan of Treatment: You were admitted to the hospital because of worsening shortness of breath. You were admitted to medicine and seen by pulmonary/critical care medicine. Your home medications and inhalers were continued. You were given therapy with Duonebs (inhaler) as needed. You were given a course of steroids. You were noted to be medically stable for discharge with resumption of your CDPAP services (4 hours each, 5 times per week) and close follow-up with pulmonology.  Please follow up with your primary care physician and pulmonologist (lung specialist). Please take all medications as scheduled.       PRINCIPAL DISCHARGE DIAGNOSIS  Diagnosis: COPD exacerbation  Assessment and Plan of Treatment: You were admitted to the hospital because of worsening shortness of breath. You were admitted to medicine and seen by pulmonary/critical care medicine. Chest X-ray did not show any acute/new disease. Your home medications and inhalers were continued. You were given therapy with Duonebs (inhaler) as needed. You were given a course of steroids. You were noted to be medically stable for discharge with resumption of your CDPAP services (4 hours each, 5 times per week) and close follow-up with pulmonology.  Please follow up with your primary care physician and pulmonologist (lung specialist). Please take all medications as scheduled.      SECONDARY DISCHARGE DIAGNOSES  Diagnosis: Watery diarrhea  Assessment and Plan of Treatment: You were noted to have persistent watery diarrhea on admission to the hospital. This is possibly due to decreased bowel motion, which could have been due to your COPD exacerbation. All laxative medications were held. X-ray of your abdomen showed no obstruction or other serious disease. Your diarrhea was noted to have improved.   Please follow up closely with your primary care physician and use caution when taking any laxatives.

## 2022-11-03 NOTE — DISCHARGE NOTE PROVIDER - POSTFACE STATEMENT FOR MINUTES SPENT
EP Pre-procedure History and Physical    DOS: 11/5/2019    Chief complaint/Reason for consult: PAF    Interval History:  Patient is a 66 yo F. She has a history of anxiety and palpitations starting a few months ago. Outpatient ambulatory monitoring showed short runs of AT and nonsustained AF. She then presented to the ED with sustained AF with RVR. Highly symptomatic with palpitations and fatigue. She was started on low dose BB but this made her tired and she reduced the dose to Toprol 12.5. She has had breakthrough on this and today feels irregularity in her HR. She was referred to EP for AF management. Here for AF ablation.    ROS (+ highlighted in red):  Constitutional: Fevers/chills/fatigue/weightloss  HEENT: Blurry vision/eye pain/sore throat/hearing loss  Respiratory: Shortness of breath/cough  Cardiovascular: Chest pain/palpitations/edema/orthopnea/syncope  GI: Nausea/vomitting/diarrhea  MSK: Arthralgias/myagias/muscle weakness  Skin: Rash/sores  Neurological: Numbness/tremors/vertigo  Endocrine: Excessive thirst/polyuria/cold intolerance/heat intolerance  Psych: Depression/anxiety    Past Medical History:   Diagnosis Date   • Anxiety    • Arrhythmia     afib   • ASTHMA    • Bowel habit changes     constipation   • Depression    • GERD (gastroesophageal reflux disease)    • Hiatal hernia with GERD 11/5/2019   • History of esophageal stricture 11/5/2019   • Indigestion    • Osteoporosis    • Palpitations    • Premature atrial contractions        Past Surgical History:   Procedure Laterality Date   • COLONOSCOPY WITH POLYP  June 2008    benign polyp  Dr Paul    • CHOLECYSTECTOMY  January 2008    laparoscopic   • EGD WITH ASP/BX  September 2006    inflammation only ---Dr Paul   • CHOLECYSTECTOMY     • SINUSOTOMIES         Social History     Socioeconomic History   • Marital status:      Spouse name: Not on file   • Number of children: Not on file   • Years of education: Not on file   • Highest  education level: Not on file   Occupational History   • Occupation: retired   Social Needs   • Financial resource strain: Not on file   • Food insecurity:     Worry: Not on file     Inability: Not on file   • Transportation needs:     Medical: Not on file     Non-medical: Not on file   Tobacco Use   • Smoking status: Never Smoker   • Smokeless tobacco: Never Used   Substance and Sexual Activity   • Alcohol use: No     Alcohol/week: 0.0 oz   • Drug use: No   • Sexual activity: Yes     Partners: Male     Birth control/protection: Post-Menopausal   Lifestyle   • Physical activity:     Days per week: Not on file     Minutes per session: Not on file   • Stress: Not on file   Relationships   • Social connections:     Talks on phone: Not on file     Gets together: Not on file     Attends [employer] service: Not on file     Active member of club or organization: Not on file     Attends meetings of clubs or organizations: Not on file     Relationship status: Not on file   • Intimate partner violence:     Fear of current or ex partner: Not on file     Emotionally abused: Not on file     Physically abused: Not on file     Forced sexual activity: Not on file   Other Topics Concern   • Not on file   Social History Narrative   • Not on file       Family History   Problem Relation Age of Onset   • Cancer Father 70        cancer of unknown primary   • Heart Disease Mother    • Heart Disease Son 42        MI and coronary stent placement   • Diabetes Son    • Hypertension Son    • Diabetes Sister        Allergies   Allergen Reactions   • Amitriptyline      Excessive sedation --even at 10 mg dose   • Bactrim [Sulfamethoxazole W-Trimethoprim]      Shaking   • Levaquin      Nausea    • Lovastatin      Muscle aches  Muscle aches     • Trazodone      Irregular pulse       Current Facility-Administered Medications   Medication Dose Route Frequency Provider Last Rate Last Dose   • MIDAZOLAM HCL 2 MG/2ML INJ SOLN            • FENTANYL CITRATE  "(PF) 0.05 MG/ML INJ SOLN            • LIDOCAINE HCL 4 % MT SOLN            • HEPARIN 1000 UNITS/ML OR USE ONLY            • HEPARIN (PORCINE) IN NACL 2000-0.9 UNIT/L-% IV SOLN            • HEPARIN (PORCINE) IN NACL 2000-0.9 UNIT/L-% IV SOLN            • HEPARIN (PORCINE) IN NACL 2000-0.9 UNIT/L-% IV SOLN                Physical Exam:  Vitals:    11/04/19 1111 11/05/19 0642   BP:  143/55   Pulse:  (!) 51   Resp:  14   Temp:  37.1 °C (98.8 °F)   TempSrc:  Temporal   SpO2:  99%   Weight: 65.4 kg (144 lb 2.9 oz) 65.2 kg (143 lb 11.8 oz)   Height: 1.702 m (5' 7\") 1.702 m (5' 7\")     General appearance: NAD, conversant   Eyes: anicteric sclerae, moist conjunctivae; no lid-lag; PERRLA  HENT: Atraumatic; oropharynx clear with moist mucous membranes and no mucosal ulcerations; normal hard and soft palate  Neck: Trachea midline; FROM, supple, no thyromegaly or lymphadenopathy  Lungs: CTA, with normal respiratory effort and no intercostal retractions  CV: RRR, no MRGs, no JVD  Abdomen: Soft, non-tender; no masses or HSM  Extremities: No peripheral edema or extremity lymphadenopathy  Skin: Normal temperature, turgor and texture; no rash, ulcers or subcutaneous nodules  Psych: Appropriate affect, alert and oriented to person, place and time    Data:  Labs reviewed    Prior echo/stress reviewed:  LVEF 60%    EKG interpreted by me:  Sinus robert    Impression/Plan:  1. Paroxysmal atrial fibrillation (HCC)  CL-EP ABLATION ATRIAL FIBRILLATION    CL-EP ABLATION ATRIAL FIBRILLATION    EC-[facility] W/ CONT    EC-[facility] W/ CONT     -Risks/benefits/alternatives discussed  -All questions answered  -Proceed with ELIZABETH and PVI    Josue Moody MD    " minutes on the discharge service.

## 2022-11-03 NOTE — CHART NOTE - NSCHARTNOTEFT_GEN_A_CORE
Patient's son Gael Washington contacted at [phone], updated, and informed of pending discharge today. All questions answered. Informed of need for close follow-up with PCP and pulmonologist. Stated it would take 1.5-2 hours to arrive at hospital, as he is currently at work.

## 2022-11-03 NOTE — DISCHARGE NOTE PROVIDER - NSDCMRMEDTOKEN_GEN_ALL_CORE_FT
Albuterol (Eqv-ProAir HFA) 90 mcg/inh inhalation aerosol: 2 puff(s) inhaled every 6 hours  atorvastatin 10 mg oral tablet: 2 tab(s) orally once a day  esomeprazole 40 mg oral delayed release capsule: 1 cap(s) orally once a day  famotidine 40 mg oral tablet: 1 tab(s) orally once a day (at bedtime)  furosemide 40 mg oral tablet: 1 tab(s) orally once a day  lisinopril 20 mg oral tablet: 1 tab(s) orally once a day  magnesium oxide 400 mg oral tablet: 1 tab(s) orally once a day  metoprolol succinate 25 mg oral tablet, extended release: 1 tab(s) orally once a day  montelukast 10 mg oral tablet: 1 tab(s) orally once a day  Mucinex 600 mg oral tablet, extended release: 1 tab(s) orally every 12 hours  Nephplex Rx oral tablet: 1 tab(s) orally once a day  SEROquel XR 50 mg oral tablet, extended release: 3 tab(s) orally once a day (in the evening)  sertraline 50 mg oral tablet: 1 tab(s) orally once a day  spironolactone 25 mg oral tablet: 1 tab(s) orally once a day  tamsulosin 0.4 mg oral capsule: 1 cap(s) orally once a day  Trelegy Ellipta 100 mcg-62.5 mcg-25 mcg/inh inhalation powder: 1 puff(s) inhaled once a day  Vascepa 1 g oral capsule: 2 cap(s) orally 2 times a day  Vitamin D3 25 mcg (1000 intl units) oral capsule: 1 cap(s) orally once a day

## 2022-11-03 NOTE — PROGRESS NOTE ADULT - ASSESSMENT
ASSESSMENT: SUMMARY: 83 y/o Kyrgyz-speaking male w/ PMHx of HTN, HLD, ascending aortic aneurysm 4.5 cm, and COPD not on home O2, admitted for 4 days of worsening SOB, possibly due to COPD exacerbation, with asymptomatic abdominal distention with BM and flatus, with abdominal imaging negative for bowel obstruction or pneumoperitoneum, likely due to ileus 2/2 COPD exacerbation. Advanced from clear liquid to DASH/TLC diet on 10/31. All laxatives held due to persistent watery bowel movements.     Plans:  # Worsening SOB - likely COPD exacerbation  - EKG shows NSR with RBBB bifasicular block.  - VBG x2 both unremarkable for hypercapnia.  -  on admission.  - CT A/P unremarkable.  - CT angio revealing for severe emphysema.  - No wheezing on exam.  - Echo shows EF 67% with G2DD.  - Continue prednisone 40mg PO QD x 6 days starting on 10/29.  - Continue home inhalers.  - Wean O2 as tolerated.  - s/p doxycycline and azithromycin.    # Abdominal distension - likely ileus 2/2 COPD  # Mild gallbladder distension on CT  # Diarrhea  - Constipated for 3 days -> had BM on 10/28.  - Surgery following.  - CT abdomen 10/27 shoes mild gallbladder distension.  - US RUQ shows nondistended gallbladder, no ductal dilatation and simple cyst in right kidney.  - CT abdomen 10/30 shows no evidence of bowel obstruction or pneumoperitoneum.   - KUB 10/31, 11/1 showed nonspecific nonobstructive bowel gas pattern.  - On 10/31 PM patient reported diarrhea, which he attributed to his clear liquid diet. Diet advanced to DASH/TLC that day. All laxatives held.    GI recs 11/1:  - Avoid opioid medications  - Avoid laxatives as patient endorses diarrhea  - If diarrhea persists after holding laxatives, please collect stool studies, C. diff, O&P studies  - Monitor electrolytes, and replete PRN  - follow up RUQ U/S after initial CT showed nondistended GB, no ductal dilation    # Lingular lung nodule on CT  - f/u with repeat CT in 3 months.    # ?CKD stage 3  # Renal cyst 3 cm on CT   - No hydronephrosis on imaging.  - Serum Cr normalized.    #Hx ascending aortic aneurysm   - Stable at 4.4cm on CT chest.    # HLD  # HTN  # BPH  # Mood disorder  - Continue home medications.  - Be aware of polypharmacy risk.    #DVT  - Heparin subQ.    # GI PPX  - Protonix.    # Diet  - DASH/TLC.     # Activity  - Increase as tolerated.    # Code Status  - FULL CODE.

## 2022-11-03 NOTE — PROGRESS NOTE ADULT - PROVIDER SPECIALTY LIST ADULT
Gastroenterology
Hospitalist
Internal Medicine
Hospitalist
Internal Medicine
Pulmonology
Hospitalist
Surgery
Pulmonology

## 2022-11-03 NOTE — DISCHARGE NOTE NURSING/CASE MANAGEMENT/SOCIAL WORK - NSDCPEFALRISK_GEN_ALL_CORE
For information on Fall & Injury Prevention, visit: [email or web address] OR  [email or web address] OR  https://www.cdc.gov/steadi/patient.html

## 2022-11-03 NOTE — PROGRESS NOTE ADULT - SUBJECTIVE AND OBJECTIVE BOX
SUMMARY: 81 y/o Bulgarian-speaking male w/ PMHx of HTN, HLD, ascending aortic aneurysm 4.5 cm, and COPD not on home O2, admitted for 4 days of worsening SOB, possibly due to COPD exacerbation, with asymptomatic abdominal distention with BM and flatus, with abdominal imaging negative for bowel obstruction or pneumoperitoneum, likely due to ileus 2/2 COPD exacerbation. Advanced from clear liquid to DASH/TLC diet on 10/31. All laxatives held due to persistent watery bowel movements.    SUBJECTIVE:  24HR: No acute overnight events. Patient seen and examined at bedside. Reports 1 large watery bowel movement in the past 24 hours.     OBJECTIVE:  Vital Signs Last 24 Hrs  T(C): 36.4 (03 Nov 2022 05:36), Max: 36.6 (02 Nov 2022 11:48)  T(F): 97.6 (03 Nov 2022 05:36), Max: 97.8 (02 Nov 2022 11:48)  HR: 86 (03 Nov 2022 05:36) (68 - 86)  BP: 128/77 (03 Nov 2022 05:36) (124/58 - 128/77)  BP(mean): --  RR: 18 (03 Nov 2022 05:36) (17 - 18)  SpO2: 91% (02 Nov 2022 11:40) (91% - 92%)    Parameters below as of 02 Nov 2022 11:40  Patient On (Oxygen Delivery Method): room air    PHYSICAL EXAM:  CONSTITUTIONAL: Well-appearing and in no apparent distress.    EYES: PERRLA and symmetric, EOMI, No conjunctival injection or pallor. Sclera anicteric.    ENMT: Moist mucous membranes. No external nasal lesions. No gross hearing impairment noted.  	NECK: Supple, symmetric and without tracheal deviation.     RESPIRATORY: No respiratory distress. No obvious use of accessory muscles. Lungs CTAB with no crackling, wheezing, rhonchi or rubs.    CARDIOVASCULAR: Regular rate and rhythm. Normal S1 and S2. No murmurs, rubs or gallops. Dorsalis pedis pulses 2+.    GASTROINTESTINAL: Diffusely distended. Soft, non-tender to palpation in all quadrants. No palpable masses. No appreciable hernias.    MUSCULOSKELETAL: Examination of all four extremities without obvious misalignment, normal muscle strength/tone.    NEUROLOGIC: Moves all four extremities spontaneously.    PSYCHIATRIC: Appropriate insight/judgment; A+O x 3. Mood and affect appropriate. Recent/remote memory intact.    .  LABS:                         13.9   7.05  )-----------( 363      ( 02 Nov 2022 08:33 )             41.2     11-02    142  |  102  |  26<H>  ----------------------------<  125<H>  4.1   |  30  |  1.3    Ca    9.0      02 Nov 2022 08:33  Mg     2.2     11-02    TPro  6.0  /  Alb  3.4<L>  /  TBili  0.6  /  DBili  x   /  AST  33  /  ALT  60<H>  /  AlkPhos  90  11-02      RADIOLOGY, EKG & ADDITIONAL TESTS: Reviewed.   ACC: [id number] EXAM: CT ABDOMEN AND PELVIS OC IC  PROCEDURE DATE: 10/30/2022  TECHNIQUE: Contiguous axial CT images were obtained from the lower chest to the pubic symphysis following administration of intravenous contrast. Oral contrast was administered. Reformatted images in the coronal and sagittal planes were acquired.  COMPARISON CT: 10/27/2022  Findings/impression:  Oral contrast reaches the cecum/ascending colon. No evidence of bowel obstruction or pneumoperitoneum. Additional findings are without significant change when compared to recent CT 10/27/2022.    ACC: [id number] EXAM: XR KUB 1 VIEW  PROCEDURE DATE: 10/31/2022  INTERPRETATION: CLINICAL HISTORY / REASON FOR EXAM: Ileus.  FINDINGS/IMPRESSION:  Nonspecific nonobstructive bowel gas pattern. Stable bones.  MIGUE ORELLANA MD; Attending Radiologist  This document has been electronically signed. Oct 31 2022 1:47PM    ACC: [id number] EXAM: XR KUB 1 VIEW  PROCEDURE DATE: 11/01/2022  FINDINGS/IMPRESSION:  Nonobstructive bowel gas pattern. No further follow-up by plain film is necessary. Follow-up as clinically indicated.  TOM WARD MD; Attending Radiologist  This document has been electronically signed. Nov 1 2022 9:09AM    MEDICATIONS  (STANDING):  albuterol/ipratropium for Nebulization 3 milliLiter(s) Nebulizer every 6 hours  atorvastatin 20 milliGRAM(s) Oral at bedtime  budesonide  80 MICROgram(s)/formoterol 4.5 MICROgram(s) Inhaler 2 Puff(s) Inhalation two times a day  chlorhexidine 2% Cloths 1 Application(s) Topical daily  furosemide    Tablet 40 milliGRAM(s) Oral daily  guaiFENesin  milliGRAM(s) Oral every 12 hours  heparin   Injectable 5000 Unit(s) SubCutaneous every 12 hours  lisinopril 20 milliGRAM(s) Oral daily  magnesium oxide 400 milliGRAM(s) Oral daily  metoprolol succinate ER 25 milliGRAM(s) Oral daily  montelukast 10 milliGRAM(s) Oral daily  pantoprazole    Tablet 40 milliGRAM(s) Oral before breakfast  predniSONE   Tablet 40 milliGRAM(s) Oral daily  QUEtiapine 100 milliGRAM(s) Oral at bedtime  sertraline 50 milliGRAM(s) Oral daily  spironolactone 25 milliGRAM(s) Oral daily  tamsulosin 0.4 milliGRAM(s) Oral at bedtime    MEDICATIONS  (PRN):  acetaminophen     Tablet .. 650 milliGRAM(s) Oral every 6 hours PRN Temp greater or equal to 38C (100.4F), Mild Pain (1 - 3)  albuterol/ipratropium for Nebulization 3 milliLiter(s) Nebulizer every 4 hours PRN Shortness of Breath and/or Wheezing  aluminum hydroxide/magnesium hydroxide/simethicone Suspension 30 milliLiter(s) Oral every 4 hours PRN Dyspepsia  melatonin 3 milliGRAM(s) Oral at bedtime PRN Insomnia  ondansetron Injectable 4 milliGRAM(s) IV Push every 8 hours PRN Nausea and/or Vomiting

## 2022-11-03 NOTE — DISCHARGE NOTE PROVIDER - CARE PROVIDER_API CALL
Gama Landry)  Critical Care Medicine; Internal Medicine; Pulmonary Disease; Sleep Medicine  [address] [place] [address]  Phone: [phone]  Fax: [phone]  Established Patient  Follow Up Time: 1 week

## 2022-11-03 NOTE — DISCHARGE NOTE PROVIDER - HOSPITAL COURSE
HPI:  83 yo male French speaking w/ PMH of HTN, HLD, ascending aortic aneurysm 4.5 cm, COPD not on home O2 presents for SOB that worsened over the past 4 days. He complains of recurrent SOB with heavy exertion but for the past 4 days it has been constant. He denies any associated symptoms including fever, cough, execessive sputum production.   He is complaining of abdominal pain epigastric that started this morning associated with bloating. He denies any recent nausea/vomiting or constipation/diarrhea - he complains of poor oral intake   He also complains of reccurent LE swelling that is intermittent in nature and associated with medication intake  He is taking multiple medications and questionable compliance    ROS as HPI     ED Vital Signs Last 24 Hrs  T(C): 37.4 (27 Oct 2022 08:40), Max: 37.4 (27 Oct 2022 08:40)  T(F): 99.3 (27 Oct 2022 08:40), Max: 99.3 (27 Oct 2022 08:40)  HR: 99 (27 Oct 2022 15:22) (83 - 100)  BP: 136/63 (27 Oct 2022 15:22) (136/63 - 201/78)  RR: 18 (27 Oct 2022 15:22) (18 - 24)  SpO2: 99% (27 Oct 2022 15:22) (96% - 100%)  O2 Parameters below as of 27 Oct 2022 15:22  Patient On (Oxygen Delivery Method): BiPAP/CPAP    Labs significant for Na 147 -    VBG done x2 unremarkable for hypercapnia  EKG noted RBBB - bifascicular block   CT angio noted for severe emphysema - CTA/P non-remarkable   In the ED received solumedrol 125 + inhalers + azithromycin + lasix IV 40   Critical care c/s noted     Hospital Course:  Admitted to medicine. Home medications and inhalers continued. Given a course of oral prednisone. SOB and cough, likely due to COPD exacerbation, improved on therapy. KUB scans showing nonspecific nonobstructive bowel gas pattern, likely due to ileus secondary to COPD exacerbation. Advanced from clear liquid to DASH/TLC diet with good tolerance. All laxatives held due to persistent watery bowel movements, which subsequently improved. Patient deemed medically stable for discharge with close follow-up with pulmonology. HPI:  83 yo male Occitan speaking w/ PMH of HTN, HLD, ascending aortic aneurysm 4.5 cm, COPD not on home O2 presents for SOB that worsened over the past 4 days. He complains of recurrent SOB with heavy exertion but for the past 4 days it has been constant. He denies any associated symptoms including fever, cough, execessive sputum production.   He is complaining of abdominal pain epigastric that started this morning associated with bloating. He denies any recent nausea/vomiting or constipation/diarrhea - he complains of poor oral intake   He also complains of reccurent LE swelling that is intermittent in nature and associated with medication intake  He is taking multiple medications and questionable compliance    ROS as HPI     ED Vital Signs Last 24 Hrs  T(C): 37.4 (27 Oct 2022 08:40), Max: 37.4 (27 Oct 2022 08:40)  T(F): 99.3 (27 Oct 2022 08:40), Max: 99.3 (27 Oct 2022 08:40)  HR: 99 (27 Oct 2022 15:22) (83 - 100)  BP: 136/63 (27 Oct 2022 15:22) (136/63 - 201/78)  RR: 18 (27 Oct 2022 15:22) (18 - 24)  SpO2: 99% (27 Oct 2022 15:22) (96% - 100%)  O2 Parameters below as of 27 Oct 2022 15:22  Patient On (Oxygen Delivery Method): BiPAP/CPAP    Labs significant for Na 147 -    VBG done x2 unremarkable for hypercapnia  EKG noted RBBB - bifascicular block   CT angio noted for severe emphysema - CTA/P non-remarkable   In the ED received solumedrol 125 + inhalers + azithromycin + lasix IV 40   Critical care c/s noted     Hospital Course:  Admitted to medicine. Home medications and inhalers continued. Seen by pulmononary/critical care and GI. Given Duonebs therapy. Given solumedrol, then a course of oral prednisone. SOB and cough, likely due to COPD exacerbation, improved on therapy. KUB scans showing nonspecific nonobstructive bowel gas pattern, likely due to ileus secondary to COPD exacerbation. Advanced from clear liquid to DASH/TLC diet with good tolerance. All laxatives held due to persistent watery bowel movements, which subsequently improved. Patient deemed medically stable for discharge with close follow-up with pulmonology. HPI:  81 yo male Khmer speaking w/ PMH of HTN, HLD, ascending aortic aneurysm 4.5 cm, COPD not on home O2 presents for SOB that worsened over the past 4 days. He complains of recurrent SOB with heavy exertion but for the past 4 days it has been constant. He denies any associated symptoms including fever, cough, execessive sputum production.   He is complaining of abdominal pain epigastric that started this morning associated with bloating. He denies any recent nausea/vomiting or constipation/diarrhea - he complains of poor oral intake   He also complains of reccurent LE swelling that is intermittent in nature and associated with medication intake  He is taking multiple medications and questionable compliance    ROS as HPI     ED Vital Signs Last 24 Hrs  T(C): 37.4 (27 Oct 2022 08:40), Max: 37.4 (27 Oct 2022 08:40)  T(F): 99.3 (27 Oct 2022 08:40), Max: 99.3 (27 Oct 2022 08:40)  HR: 99 (27 Oct 2022 15:22) (83 - 100)  BP: 136/63 (27 Oct 2022 15:22) (136/63 - 201/78)  RR: 18 (27 Oct 2022 15:22) (18 - 24)  SpO2: 99% (27 Oct 2022 15:22) (96% - 100%)  O2 Parameters below as of 27 Oct 2022 15:22  Patient On (Oxygen Delivery Method): BiPAP/CPAP    Labs significant for Na 147 -    VBG done x2 unremarkable for hypercapnia  EKG noted RBBB - bifascicular block   CT angio noted for severe emphysema - CTA/P non-remarkable   In the ED received solumedrol 125 + inhalers + azithromycin + lasix IV 40   Critical care c/s noted     Hospital Course:  Admitted to medicine. Home medications and inhalers continued. Seen by pulmononary/critical care and GI. CXR negative for acute pathology. Given Duonebs therapy. Given solumedrol, then a course of oral prednisone. SOB and cough, likely due to COPD exacerbation, improved on therapy. KUB scans showing nonspecific nonobstructive bowel gas pattern, likely due to ileus secondary to COPD exacerbation. Advanced from clear liquid to DASH/TLC diet with good tolerance. All laxatives held due to persistent watery bowel movements, which subsequently improved. Patient deemed medically stable for discharge with resumption of your CDPAP services (4 hours each, 5 times per week, per case management) and close follow-up with pulmonology.

## 2022-11-03 NOTE — DISCHARGE NOTE NURSING/CASE MANAGEMENT/SOCIAL WORK - PATIENT PORTAL LINK FT
You can access the FollowMyHealth Patient Portal offered by [facility] by registering at the following website: [email or web address]. By joining [facility]’s FollowMyHealth portal, you will also be able to view your health information using other applications (apps) compatible with our system.

## 2022-11-10 DIAGNOSIS — C67.9 MALIGNANT NEOPLASM OF BLADDER, UNSPECIFIED: ICD-10-CM

## 2022-11-10 DIAGNOSIS — Z79.82 LONG TERM (CURRENT) USE OF ASPIRIN: ICD-10-CM

## 2022-11-10 DIAGNOSIS — Z79.899 OTHER LONG TERM (CURRENT) DRUG THERAPY: ICD-10-CM

## 2022-11-10 DIAGNOSIS — J96.01 ACUTE RESPIRATORY FAILURE WITH HYPOXIA: ICD-10-CM

## 2022-11-10 DIAGNOSIS — I12.9 HYPERTENSIVE CHRONIC KIDNEY DISEASE WITH STAGE 1 THROUGH STAGE 4 CHRONIC KIDNEY DISEASE, OR UNSPECIFIED CHRONIC KIDNEY DISEASE: ICD-10-CM

## 2022-11-10 DIAGNOSIS — I71.21 ANEURYSM OF THE ASCENDING AORTA, WITHOUT RUPTURE: ICD-10-CM

## 2022-11-10 DIAGNOSIS — I45.2 BIFASCICULAR BLOCK: ICD-10-CM

## 2022-11-10 DIAGNOSIS — J96.12 CHRONIC RESPIRATORY FAILURE WITH HYPERCAPNIA: ICD-10-CM

## 2022-11-10 DIAGNOSIS — R19.7 DIARRHEA, UNSPECIFIED: ICD-10-CM

## 2022-11-10 DIAGNOSIS — Z79.52 LONG TERM (CURRENT) USE OF SYSTEMIC STEROIDS: ICD-10-CM

## 2022-11-10 DIAGNOSIS — E87.6 HYPOKALEMIA: ICD-10-CM

## 2022-11-10 DIAGNOSIS — E78.5 HYPERLIPIDEMIA, UNSPECIFIED: ICD-10-CM

## 2022-11-10 DIAGNOSIS — N40.0 BENIGN PROSTATIC HYPERPLASIA WITHOUT LOWER URINARY TRACT SYMPTOMS: ICD-10-CM

## 2022-11-10 DIAGNOSIS — J43.9 EMPHYSEMA, UNSPECIFIED: ICD-10-CM

## 2022-11-10 DIAGNOSIS — K82.8 OTHER SPECIFIED DISEASES OF GALLBLADDER: ICD-10-CM

## 2022-11-10 DIAGNOSIS — E87.20 ACIDOSIS, UNSPECIFIED: ICD-10-CM

## 2022-11-10 DIAGNOSIS — K56.7 ILEUS, UNSPECIFIED: ICD-10-CM

## 2022-11-10 DIAGNOSIS — I16.0 HYPERTENSIVE URGENCY: ICD-10-CM

## 2022-11-10 DIAGNOSIS — Z87.891 PERSONAL HISTORY OF NICOTINE DEPENDENCE: ICD-10-CM

## 2022-11-10 DIAGNOSIS — N18.30 CHRONIC KIDNEY DISEASE, STAGE 3 UNSPECIFIED: ICD-10-CM

## 2022-11-10 DIAGNOSIS — F39 UNSPECIFIED MOOD [AFFECTIVE] DISORDER: ICD-10-CM

## 2022-11-10 DIAGNOSIS — N28.1 CYST OF KIDNEY, ACQUIRED: ICD-10-CM

## 2022-11-10 DIAGNOSIS — R91.1 SOLITARY PULMONARY NODULE: ICD-10-CM

## 2023-01-24 NOTE — DISCHARGE NOTE NURSING/CASE MANAGEMENT/SOCIAL WORK - NSDCPETBCESMAN_GEN_ALL_CORE
If you are a smoker, it is important for your health to stop smoking. Please be aware that second hand smoke is also harmful. right

## 2023-03-03 ENCOUNTER — INPATIENT (INPATIENT)
Facility: HOSPITAL | Age: 83
LOS: 2 days | Discharge: HOME HEALTH | DRG: 291 | End: 2023-03-06
Attending: INTERNAL MEDICINE | Admitting: INTERNAL MEDICINE
Payer: MEDICARE

## 2023-03-03 VITALS
RESPIRATION RATE: 20 BRPM | DIASTOLIC BLOOD PRESSURE: 63 MMHG | OXYGEN SATURATION: 92 % | HEART RATE: 135 BPM | SYSTOLIC BLOOD PRESSURE: 140 MMHG | TEMPERATURE: 99 F

## 2023-03-03 DIAGNOSIS — J44.1 CHRONIC OBSTRUCTIVE PULMONARY DISEASE WITH (ACUTE) EXACERBATION: ICD-10-CM

## 2023-03-03 LAB
ALBUMIN SERPL ELPH-MCNC: 3.6 G/DL — SIGNIFICANT CHANGE UP (ref 3.5–5.2)
ALP SERPL-CCNC: 98 U/L — SIGNIFICANT CHANGE UP (ref 30–115)
ALT FLD-CCNC: 25 U/L — SIGNIFICANT CHANGE UP (ref 0–41)
ANION GAP SERPL CALC-SCNC: 11 MMOL/L — SIGNIFICANT CHANGE UP (ref 7–14)
AST SERPL-CCNC: 40 U/L — SIGNIFICANT CHANGE UP (ref 0–41)
BASE EXCESS BLDV CALC-SCNC: 0.5 MMOL/L — SIGNIFICANT CHANGE UP (ref -2–3)
BASOPHILS # BLD AUTO: 0.03 K/UL — SIGNIFICANT CHANGE UP (ref 0–0.2)
BASOPHILS NFR BLD AUTO: 0.4 % — SIGNIFICANT CHANGE UP (ref 0–1)
BILIRUB SERPL-MCNC: 0.6 MG/DL — SIGNIFICANT CHANGE UP (ref 0.2–1.2)
BUN SERPL-MCNC: 11 MG/DL — SIGNIFICANT CHANGE UP (ref 10–20)
CA-I SERPL-SCNC: 1.22 MMOL/L — SIGNIFICANT CHANGE UP (ref 1.15–1.33)
CALCIUM SERPL-MCNC: 8.6 MG/DL — SIGNIFICANT CHANGE UP (ref 8.4–10.5)
CHLORIDE SERPL-SCNC: 107 MMOL/L — SIGNIFICANT CHANGE UP (ref 98–110)
CO2 SERPL-SCNC: 23 MMOL/L — SIGNIFICANT CHANGE UP (ref 17–32)
CREAT SERPL-MCNC: 1.2 MG/DL — SIGNIFICANT CHANGE UP (ref 0.7–1.5)
EGFR: 61 ML/MIN/1.73M2 — SIGNIFICANT CHANGE UP
EOSINOPHIL # BLD AUTO: 0.06 K/UL — SIGNIFICANT CHANGE UP (ref 0–0.7)
EOSINOPHIL NFR BLD AUTO: 0.9 % — SIGNIFICANT CHANGE UP (ref 0–8)
FLUAV AG NPH QL: SIGNIFICANT CHANGE UP
FLUBV AG NPH QL: SIGNIFICANT CHANGE UP
GAS PNL BLDV: 138 MMOL/L — SIGNIFICANT CHANGE UP (ref 136–145)
GAS PNL BLDV: SIGNIFICANT CHANGE UP
GAS PNL BLDV: SIGNIFICANT CHANGE UP
GLUCOSE SERPL-MCNC: 136 MG/DL — HIGH (ref 70–99)
HCO3 BLDV-SCNC: 26 MMOL/L — SIGNIFICANT CHANGE UP (ref 22–29)
HCT VFR BLD CALC: 33.1 % — LOW (ref 42–52)
HCT VFR BLDA CALC: 33 % — LOW (ref 39–51)
HGB BLD CALC-MCNC: 10.9 G/DL — LOW (ref 12.6–17.4)
HGB BLD-MCNC: 12.2 G/DL — LOW (ref 14–18)
IMM GRANULOCYTES NFR BLD AUTO: 0.4 % — HIGH (ref 0.1–0.3)
LACTATE BLDV-MCNC: 1.3 MMOL/L — SIGNIFICANT CHANGE UP (ref 0.5–2)
LACTATE SERPL-SCNC: 1.3 MMOL/L — SIGNIFICANT CHANGE UP (ref 0.7–2)
LYMPHOCYTES # BLD AUTO: 0.41 K/UL — LOW (ref 1.2–3.4)
LYMPHOCYTES # BLD AUTO: 5.9 % — LOW (ref 20.5–51.1)
MCHC RBC-ENTMCNC: 36.9 G/DL — SIGNIFICANT CHANGE UP (ref 32–37)
MCHC RBC-ENTMCNC: 37.4 PG — HIGH (ref 27–31)
MCV RBC AUTO: 101.5 FL — HIGH (ref 80–94)
MONOCYTES # BLD AUTO: 0.27 K/UL — SIGNIFICANT CHANGE UP (ref 0.1–0.6)
MONOCYTES NFR BLD AUTO: 3.9 % — SIGNIFICANT CHANGE UP (ref 1.7–9.3)
NEUTROPHILS # BLD AUTO: 6.16 K/UL — SIGNIFICANT CHANGE UP (ref 1.4–6.5)
NEUTROPHILS NFR BLD AUTO: 88.5 % — HIGH (ref 42.2–75.2)
NRBC # BLD: 0 /100 WBCS — SIGNIFICANT CHANGE UP (ref 0–0)
NT-PROBNP SERPL-SCNC: 2132 PG/ML — HIGH (ref 0–300)
PCO2 BLDV: 48 MMHG — SIGNIFICANT CHANGE UP (ref 42–55)
PH BLDV: 7.35 — SIGNIFICANT CHANGE UP (ref 7.32–7.43)
PLATELET # BLD AUTO: 217 K/UL — SIGNIFICANT CHANGE UP (ref 130–400)
PO2 BLDV: 28 MMHG — SIGNIFICANT CHANGE UP
POTASSIUM BLDV-SCNC: 5.7 MMOL/L — HIGH (ref 3.5–5.1)
POTASSIUM SERPL-MCNC: 5 MMOL/L — SIGNIFICANT CHANGE UP (ref 3.5–5)
POTASSIUM SERPL-SCNC: 5 MMOL/L — SIGNIFICANT CHANGE UP (ref 3.5–5)
PROT SERPL-MCNC: 6 G/DL — SIGNIFICANT CHANGE UP (ref 6–8)
RBC # BLD: 3.26 M/UL — LOW (ref 4.7–6.1)
RBC # FLD: 18.4 % — HIGH (ref 11.5–14.5)
RSV RNA NPH QL NAA+NON-PROBE: SIGNIFICANT CHANGE UP
SAO2 % BLDV: 37.2 % — SIGNIFICANT CHANGE UP
SARS-COV-2 RNA SPEC QL NAA+PROBE: SIGNIFICANT CHANGE UP
SODIUM SERPL-SCNC: 141 MMOL/L — SIGNIFICANT CHANGE UP (ref 135–146)
TROPONIN T SERPL-MCNC: <0.01 NG/ML — SIGNIFICANT CHANGE UP
WBC # BLD: 6.96 K/UL — SIGNIFICANT CHANGE UP (ref 4.8–10.8)
WBC # FLD AUTO: 6.96 K/UL — SIGNIFICANT CHANGE UP (ref 4.8–10.8)

## 2023-03-03 PROCEDURE — 99223 1ST HOSP IP/OBS HIGH 75: CPT

## 2023-03-03 PROCEDURE — 84145 PROCALCITONIN (PCT): CPT

## 2023-03-03 PROCEDURE — 82607 VITAMIN B-12: CPT

## 2023-03-03 PROCEDURE — 99222 1ST HOSP IP/OBS MODERATE 55: CPT | Mod: GC

## 2023-03-03 PROCEDURE — 84443 ASSAY THYROID STIM HORMONE: CPT

## 2023-03-03 PROCEDURE — 92610 EVALUATE SWALLOWING FUNCTION: CPT | Mod: GN

## 2023-03-03 PROCEDURE — 93010 ELECTROCARDIOGRAM REPORT: CPT

## 2023-03-03 PROCEDURE — 80053 COMPREHEN METABOLIC PANEL: CPT

## 2023-03-03 PROCEDURE — 36415 COLL VENOUS BLD VENIPUNCTURE: CPT

## 2023-03-03 PROCEDURE — 84100 ASSAY OF PHOSPHORUS: CPT

## 2023-03-03 PROCEDURE — 94640 AIRWAY INHALATION TREATMENT: CPT

## 2023-03-03 PROCEDURE — 93306 TTE W/DOPPLER COMPLETE: CPT

## 2023-03-03 PROCEDURE — 85379 FIBRIN DEGRADATION QUANT: CPT

## 2023-03-03 PROCEDURE — 71045 X-RAY EXAM CHEST 1 VIEW: CPT

## 2023-03-03 PROCEDURE — 87449 NOS EACH ORGANISM AG IA: CPT

## 2023-03-03 PROCEDURE — 85025 COMPLETE CBC W/AUTO DIFF WBC: CPT

## 2023-03-03 PROCEDURE — 87899 AGENT NOS ASSAY W/OPTIC: CPT

## 2023-03-03 PROCEDURE — 82746 ASSAY OF FOLIC ACID SERUM: CPT

## 2023-03-03 PROCEDURE — 93970 EXTREMITY STUDY: CPT

## 2023-03-03 PROCEDURE — 93970 EXTREMITY STUDY: CPT | Mod: 26

## 2023-03-03 PROCEDURE — 83735 ASSAY OF MAGNESIUM: CPT

## 2023-03-03 PROCEDURE — 71045 X-RAY EXAM CHEST 1 VIEW: CPT | Mod: 26

## 2023-03-03 PROCEDURE — 99285 EMERGENCY DEPT VISIT HI MDM: CPT

## 2023-03-03 RX ORDER — AZITHROMYCIN 500 MG/1
250 TABLET, FILM COATED ORAL DAILY
Refills: 0 | Status: DISCONTINUED | OUTPATIENT
Start: 2023-03-04 | End: 2023-03-05

## 2023-03-03 RX ORDER — FUROSEMIDE 40 MG
40 TABLET ORAL ONCE
Refills: 0 | Status: COMPLETED | OUTPATIENT
Start: 2023-03-03 | End: 2023-03-03

## 2023-03-03 RX ORDER — HEPARIN SODIUM 5000 [USP'U]/ML
5000 INJECTION INTRAVENOUS; SUBCUTANEOUS EVERY 12 HOURS
Refills: 0 | Status: DISCONTINUED | OUTPATIENT
Start: 2023-03-03 | End: 2023-03-06

## 2023-03-03 RX ORDER — LISINOPRIL 2.5 MG/1
20 TABLET ORAL DAILY
Refills: 0 | Status: DISCONTINUED | OUTPATIENT
Start: 2023-03-03 | End: 2023-03-06

## 2023-03-03 RX ORDER — CHLORHEXIDINE GLUCONATE 213 G/1000ML
1 SOLUTION TOPICAL
Refills: 0 | Status: DISCONTINUED | OUTPATIENT
Start: 2023-03-03 | End: 2023-03-06

## 2023-03-03 RX ORDER — CHOLECALCIFEROL (VITAMIN D3) 125 MCG
1000 CAPSULE ORAL DAILY
Refills: 0 | Status: DISCONTINUED | OUTPATIENT
Start: 2023-03-03 | End: 2023-03-06

## 2023-03-03 RX ORDER — ALBUTEROL 90 UG/1
2 AEROSOL, METERED ORAL EVERY 6 HOURS
Refills: 0 | Status: DISCONTINUED | OUTPATIENT
Start: 2023-03-03 | End: 2023-03-03

## 2023-03-03 RX ORDER — MONTELUKAST 4 MG/1
10 TABLET, CHEWABLE ORAL DAILY
Refills: 0 | Status: DISCONTINUED | OUTPATIENT
Start: 2023-03-03 | End: 2023-03-06

## 2023-03-03 RX ORDER — BUDESONIDE AND FORMOTEROL FUMARATE DIHYDRATE 160; 4.5 UG/1; UG/1
2 AEROSOL RESPIRATORY (INHALATION)
Refills: 0 | Status: DISCONTINUED | OUTPATIENT
Start: 2023-03-03 | End: 2023-03-06

## 2023-03-03 RX ORDER — QUETIAPINE FUMARATE 200 MG/1
3 TABLET, FILM COATED ORAL
Qty: 0 | Refills: 0 | DISCHARGE

## 2023-03-03 RX ORDER — METOPROLOL TARTRATE 50 MG
25 TABLET ORAL DAILY
Refills: 0 | Status: DISCONTINUED | OUTPATIENT
Start: 2023-03-03 | End: 2023-03-06

## 2023-03-03 RX ORDER — FUROSEMIDE 40 MG
40 TABLET ORAL
Refills: 0 | Status: DISCONTINUED | OUTPATIENT
Start: 2023-03-03 | End: 2023-03-03

## 2023-03-03 RX ORDER — FUROSEMIDE 40 MG
40 TABLET ORAL DAILY
Refills: 0 | Status: DISCONTINUED | OUTPATIENT
Start: 2023-03-03 | End: 2023-03-05

## 2023-03-03 RX ORDER — FINASTERIDE 5 MG/1
5 TABLET, FILM COATED ORAL DAILY
Refills: 0 | Status: DISCONTINUED | OUTPATIENT
Start: 2023-03-03 | End: 2023-03-06

## 2023-03-03 RX ORDER — CEFTRIAXONE 500 MG/1
1000 INJECTION, POWDER, FOR SOLUTION INTRAMUSCULAR; INTRAVENOUS EVERY 24 HOURS
Refills: 0 | Status: DISCONTINUED | OUTPATIENT
Start: 2023-03-04 | End: 2023-03-05

## 2023-03-03 RX ORDER — FLUTICASONE FUROATE, UMECLIDINIUM BROMIDE AND VILANTEROL TRIFENATATE 200; 62.5; 25 UG/1; UG/1; UG/1
1 POWDER RESPIRATORY (INHALATION)
Qty: 0 | Refills: 0 | DISCHARGE

## 2023-03-03 RX ORDER — TAMSULOSIN HYDROCHLORIDE 0.4 MG/1
0.4 CAPSULE ORAL AT BEDTIME
Refills: 0 | Status: DISCONTINUED | OUTPATIENT
Start: 2023-03-03 | End: 2023-03-06

## 2023-03-03 RX ORDER — IPRATROPIUM/ALBUTEROL SULFATE 18-103MCG
3 AEROSOL WITH ADAPTER (GRAM) INHALATION EVERY 6 HOURS
Refills: 0 | Status: DISCONTINUED | OUTPATIENT
Start: 2023-03-03 | End: 2023-03-06

## 2023-03-03 RX ORDER — PANTOPRAZOLE SODIUM 20 MG/1
40 TABLET, DELAYED RELEASE ORAL
Refills: 0 | Status: DISCONTINUED | OUTPATIENT
Start: 2023-03-03 | End: 2023-03-06

## 2023-03-03 RX ORDER — IPRATROPIUM/ALBUTEROL SULFATE 18-103MCG
3 AEROSOL WITH ADAPTER (GRAM) INHALATION ONCE
Refills: 0 | Status: COMPLETED | OUTPATIENT
Start: 2023-03-03 | End: 2023-03-03

## 2023-03-03 RX ORDER — AZITHROMYCIN 500 MG/1
500 TABLET, FILM COATED ORAL ONCE
Refills: 0 | Status: COMPLETED | OUTPATIENT
Start: 2023-03-03 | End: 2023-03-03

## 2023-03-03 RX ORDER — IPRATROPIUM/ALBUTEROL SULFATE 18-103MCG
3 AEROSOL WITH ADAPTER (GRAM) INHALATION EVERY 6 HOURS
Refills: 0 | Status: DISCONTINUED | OUTPATIENT
Start: 2023-03-03 | End: 2023-03-03

## 2023-03-03 RX ORDER — IPRATROPIUM/ALBUTEROL SULFATE 18-103MCG
3 AEROSOL WITH ADAPTER (GRAM) INHALATION EVERY 4 HOURS
Refills: 0 | Status: DISCONTINUED | OUTPATIENT
Start: 2023-03-03 | End: 2023-03-06

## 2023-03-03 RX ORDER — QUETIAPINE FUMARATE 200 MG/1
100 TABLET, FILM COATED ORAL AT BEDTIME
Refills: 0 | Status: DISCONTINUED | OUTPATIENT
Start: 2023-03-03 | End: 2023-03-06

## 2023-03-03 RX ORDER — ALBUTEROL 90 UG/1
2.5 AEROSOL, METERED ORAL ONCE
Refills: 0 | Status: COMPLETED | OUTPATIENT
Start: 2023-03-03 | End: 2023-03-03

## 2023-03-03 RX ORDER — FAMOTIDINE 10 MG/ML
40 INJECTION INTRAVENOUS AT BEDTIME
Refills: 0 | Status: DISCONTINUED | OUTPATIENT
Start: 2023-03-03 | End: 2023-03-05

## 2023-03-03 RX ORDER — ATORVASTATIN CALCIUM 80 MG/1
20 TABLET, FILM COATED ORAL AT BEDTIME
Refills: 0 | Status: DISCONTINUED | OUTPATIENT
Start: 2023-03-03 | End: 2023-03-06

## 2023-03-03 RX ORDER — SERTRALINE 25 MG/1
50 TABLET, FILM COATED ORAL DAILY
Refills: 0 | Status: DISCONTINUED | OUTPATIENT
Start: 2023-03-03 | End: 2023-03-06

## 2023-03-03 RX ORDER — MAGNESIUM OXIDE 400 MG ORAL TABLET 241.3 MG
400 TABLET ORAL DAILY
Refills: 0 | Status: DISCONTINUED | OUTPATIENT
Start: 2023-03-03 | End: 2023-03-06

## 2023-03-03 RX ORDER — CEFTRIAXONE 500 MG/1
1000 INJECTION, POWDER, FOR SOLUTION INTRAMUSCULAR; INTRAVENOUS ONCE
Refills: 0 | Status: COMPLETED | OUTPATIENT
Start: 2023-03-03 | End: 2023-03-03

## 2023-03-03 RX ADMIN — FAMOTIDINE 40 MILLIGRAM(S): 10 INJECTION INTRAVENOUS at 21:30

## 2023-03-03 RX ADMIN — CEFTRIAXONE 100 MILLIGRAM(S): 500 INJECTION, POWDER, FOR SOLUTION INTRAMUSCULAR; INTRAVENOUS at 11:18

## 2023-03-03 RX ADMIN — QUETIAPINE FUMARATE 100 MILLIGRAM(S): 200 TABLET, FILM COATED ORAL at 21:30

## 2023-03-03 RX ADMIN — HEPARIN SODIUM 5000 UNIT(S): 5000 INJECTION INTRAVENOUS; SUBCUTANEOUS at 21:30

## 2023-03-03 RX ADMIN — ATORVASTATIN CALCIUM 20 MILLIGRAM(S): 80 TABLET, FILM COATED ORAL at 21:30

## 2023-03-03 RX ADMIN — Medication 3 MILLILITER(S): at 08:54

## 2023-03-03 RX ADMIN — Medication 125 MILLIGRAM(S): at 08:54

## 2023-03-03 RX ADMIN — TAMSULOSIN HYDROCHLORIDE 0.4 MILLIGRAM(S): 0.4 CAPSULE ORAL at 21:30

## 2023-03-03 RX ADMIN — AZITHROMYCIN 255 MILLIGRAM(S): 500 TABLET, FILM COATED ORAL at 12:07

## 2023-03-03 RX ADMIN — Medication 40 MILLIGRAM(S): at 11:17

## 2023-03-03 NOTE — H&P ADULT - ATTENDING COMMENTS
Patient seen and examined at bedside independently of the residents. I read the resident's note and agree with the plan with the additions and corrections as noted below. My note supersedes the resident's note.     REVIEW OF SYSTEMS:  CONSTITUTIONAL: No weakness, fevers or chills  EYES/ENT: No visual changes;  No vertigo or throat pain   NECK: No pain or stiffness  RESPIRATORY: No cough, wheezing, hemoptysis; No shortness of breath  CARDIOVASCULAR: No chest pain or palpitations  GASTROINTESTINAL: No abdominal or epigastric pain. No nausea, vomiting, or hematemesis; No diarrhea or constipation. No melena or hematochezia.  GENITOURINARY: No dysuria, frequency or hematuria  NEUROLOGICAL: No numbness or weakness  MSK: No pain. No weakness.   SKIN: No itching, rashes.     PMH: COPD/asthma, HFpEF, HTN, HLD, hx AAA, bladder cancer, BPH    FHx: Reviewed. No fhx of asthma/copd, No fhx of liver and pulmonary disease. No fhx of hematological disorder.     Physical Exam:  GEN: No acute distress. Awake, Alert and oriented x 3.   Head: Atraumatic, Normocephalic.   Eye: PEERLA. No sclera icterus. EOMI.   ENT: Normal oropharynx, no thyromegaly, no mass, no lymphadenopathy.   LUNGS: Clear to auscultation bilaterally. No wheeze/rales/crackles.   HEART: Normal. S1/S2 present. RRR. No murmur/gallops.   ABD: Soft, non-tender, non-distended. Bowel sounds present.   EXT: No pitting edema. No erythema. No tenderness.  Integumentary: No rash, No sore, No petechia.   NEURO: CN III-XII intact. Strength: 5/5 b/l ULE. Sensory intact b/l ULE.     Vital Signs Last 24 Hrs  T(C): 36.8 (03 Mar 2023 19:39), Max: 37 (03 Mar 2023 08:02)  T(F): 98.2 (03 Mar 2023 19:39), Max: 98.6 (03 Mar 2023 08:02)  HR: 115 (03 Mar 2023 19:39) (78 - 135)  BP: 126/60 (03 Mar 2023 19:39) (126/60 - 150/60)  BP(mean): --  RR: 18 (03 Mar 2023 19:39) (18 - 20)  SpO2: 96% (03 Mar 2023 19:18) (92% - 100%)    Parameters below as of 03 Mar 2023 19:18  Patient On (Oxygen Delivery Method): nasal cannula  O2 Flow (L/min): 3L      Please see the above notes for Labs and radiology.     Assessment and Plan:     83 yo M with hx of COPD/asthma, HFpEF, HTN, HLD, hx AAA, bladder cancer, BPH, BIBEMS to ED for shortness of breath x 7d that worsened this morning.     AHRF   - Ddx: COPD exacerbation 2/2 PNA vs. acute HFpEF exacerbation   - CXR shows increasing b/l basilar opacity.   - proBNP 2132  - s/p IV lasix 40mg x 1. c/w IV lasix qd for now.   - check TTE. Monitor Daily weight, Strict I & O, Low Na diet fluid restriction.   - s/p IV solumedrol 125mg x 1 in ED. c/w IV solumedrol 40mg qd  - albuterol q6h and q4h prn.   - c/w azithro and ceftriaxone for now. Check procalcitonin and MRSA nares. If neg, may dc abx.   - supportive care.     HTN/HLD - c/w home med  Hx of AAA - follow up outpatient.   Bladder cancer - follow up outpatient.   BPH - flomax     DVT ppx: Heparin SC  GI ppx: PPI while on steroid  Diet: DASH diet with fluid striction.   Activity: as tolerated.     Date seen by the attendin2023  Total time spent: 75 minutes. Patient seen and examined at bedside independently of the residents. I read the resident's note and agree with the plan with the additions and corrections as noted below. My note supersedes the resident's note.     REVIEW OF SYSTEMS:  CONSTITUTIONAL: No weakness, fevers or chills  EYES/ENT: No visual changes;  No vertigo or throat pain   NECK: No pain or stiffness  RESPIRATORY: No cough, wheezing, hemoptysis; + shortness of breath  CARDIOVASCULAR: No chest pain or palpitations  GASTROINTESTINAL: No abdominal or epigastric pain. No nausea, vomiting, or hematemesis; No diarrhea or constipation. No melena or hematochezia.  GENITOURINARY: No dysuria, frequency or hematuria  NEUROLOGICAL: No numbness or weakness  MSK: No pain. No weakness.   SKIN: No itching, rashes.     PMH: COPD/asthma, HFpEF, HTN, HLD, hx AAA, bladder cancer, BPH    FHx: Reviewed. No fhx of asthma/copd, No fhx of liver and pulmonary disease. No fhx of hematological disorder.     Physical Exam:  GEN: No acute distress. Awake, Alert and oriented x 3.   Head: Atraumatic, Normocephalic.   Eye: PEERLA. No sclera icterus. EOMI.   ENT: Normal oropharynx, no thyromegaly, no mass, no lymphadenopathy.   LUNGS: Clear to auscultation bilaterally. No wheeze/rales/crackles.   HEART: Normal. S1/S2 present. RRR. No murmur/gallops.   ABD: Soft, non-tender, non-distended. Bowel sounds present.   EXT: 1+ pitting edema. No erythema. No tenderness.  Integumentary: No rash, No sore, No petechia.   NEURO: CN III-XII intact. Strength: 5/5 b/l ULE. Sensory intact b/l ULE.     Vital Signs Last 24 Hrs  T(C): 36.8 (03 Mar 2023 19:39), Max: 37 (03 Mar 2023 08:02)  T(F): 98.2 (03 Mar 2023 19:39), Max: 98.6 (03 Mar 2023 08:02)  HR: 115 (03 Mar 2023 19:39) (78 - 135)  BP: 126/60 (03 Mar 2023 19:39) (126/60 - 150/60)  BP(mean): --  RR: 18 (03 Mar 2023 19:39) (18 - 20)  SpO2: 96% (03 Mar 2023 19:18) (92% - 100%)    Parameters below as of 03 Mar 2023 19:18  Patient On (Oxygen Delivery Method): nasal cannula  O2 Flow (L/min): 3L      Please see the above notes for Labs and radiology.     Assessment and Plan:     81 yo M with hx of COPD/asthma, HFpEF (LVEF 67%, G2DD in Oct 2022), HTN, HLD, hx AAA, bladder cancer, BPH, BIBEMS to ED for shortness of breath x 7d that worsened this morning.     AHRF   - Ddx: COPD exacerbation 2/2 PNA vs. acute HFpEF exacerbation   - CXR shows increasing b/l basilar opacity.   - proBNP   - s/p IV lasix 40mg x 1. c/w IV lasix qd for now.   - check TTE. Monitor Daily weight, Strict I & O, Low Na diet fluid restriction.   - s/p IV solumedrol 125mg x 1 in ED. c/w IV solumedrol 40mg qd  - albuterol q6h and q4h prn.   - c/w azithro and ceftriaxone for now. Check procalcitonin and MRSA nares. If neg, may dc abx.   - supportive care.     HTN/HLD - c/w home med  Hx of AAA - follow up outpatient.   Bladder cancer - follow up outpatient.   BPH - flomax     DVT ppx: Heparin SC  GI ppx: PPI while on steroid  Diet: DASH diet with fluid striction.   Activity: as tolerated.     Date seen by the attendin2023  Total time spent: 75 minutes. Patient seen and examined at bedside independently of the residents. I read the resident's note and agree with the plan with the additions and corrections as noted below. My note supersedes the resident's note.     REVIEW OF SYSTEMS:  CONSTITUTIONAL: No weakness, fevers or chills  EYES/ENT: No visual changes;  No vertigo or throat pain   NECK: No pain or stiffness  RESPIRATORY: No cough, wheezing, hemoptysis; + shortness of breath  CARDIOVASCULAR: No chest pain or palpitations  GASTROINTESTINAL: No abdominal or epigastric pain. No nausea, vomiting, or hematemesis; No diarrhea or constipation. No melena or hematochezia.  GENITOURINARY: No dysuria, frequency or hematuria  NEUROLOGICAL: No numbness or weakness  MSK: No pain. No weakness.   SKIN: No itching, rashes.     PMH: COPD/asthma, HFpEF, HTN, HLD, hx AAA, bladder cancer, BPH    FHx: Reviewed. No fhx of asthma/copd, No fhx of liver and pulmonary disease. No fhx of hematological disorder.     Physical Exam:  GEN: No acute distress. Awake, Alert and oriented x 3.   Head: Atraumatic, Normocephalic.   Eye: PEERLA. No sclera icterus. EOMI.   ENT: Normal oropharynx, no thyromegaly, no mass, no lymphadenopathy.   LUNGS: + mild wheezing and crackles b/l lung fields.  HEART: Normal. S1/S2 present. RRR. No murmur/gallops.   ABD: Soft, non-tender, non-distended. Bowel sounds present.   EXT: 1+ pitting edema. No erythema. No tenderness.  Integumentary: No rash, No sore, No petechia.   NEURO: CN III-XII intact. Strength: 5/5 b/l ULE. Sensory intact b/l ULE.     Vital Signs Last 24 Hrs  T(C): 36.8 (03 Mar 2023 19:39), Max: 37 (03 Mar 2023 08:02)  T(F): 98.2 (03 Mar 2023 19:39), Max: 98.6 (03 Mar 2023 08:02)  HR: 115 (03 Mar 2023 19:39) (78 - 135)  BP: 126/60 (03 Mar 2023 19:39) (126/60 - 150/60)  BP(mean): --  RR: 18 (03 Mar 2023 19:39) (18 - 20)  SpO2: 96% (03 Mar 2023 19:18) (92% - 100%)    Parameters below as of 03 Mar 2023 19:18  Patient On (Oxygen Delivery Method): nasal cannula  O2 Flow (L/min): 3L      Please see the above notes for Labs and radiology.     Assessment and Plan:     81 yo M with hx of COPD/asthma, HFpEF (LVEF 67%, G2DD in Oct 2022), HTN, HLD, hx AAA, bladder cancer, BPH, BIBEMS to ED for shortness of breath x 7d that worsened this morning.     AHRF   - Ddx: COPD exacerbation 2/2 PNA vs. acute HFpEF exacerbation   - CXR shows increasing b/l basilar opacity.   - proBNP   - s/p IV lasix 40mg x 1. c/w IV lasix qd for now.   - check TTE. Monitor Daily weight, Strict I & O, Low Na diet fluid restriction.   - s/p IV solumedrol 125mg x 1 in ED. c/w IV solumedrol 40mg qd  - albuterol q6h and q4h prn.   - c/w azithro and ceftriaxone for now. Check procalcitonin and MRSA nares. If neg, may dc abx.   - supportive care.     HTN/HLD - c/w home med  Hx of AAA - follow up outpatient.   Bladder cancer - follow up outpatient.   BPH - flomax     DVT ppx: Heparin SC  GI ppx: PPI while on steroid  Diet: DASH diet with fluid striction.   Activity: as tolerated.     Date seen by the attendin2023  Total time spent: 75 minutes. Patient seen and examined at bedside independently of the residents. I read the resident's note and agree with the plan with the additions and corrections as noted below. My note supersedes the resident's note.     REVIEW OF SYSTEMS:  CONSTITUTIONAL: No weakness, fevers or chills  EYES/ENT: No visual changes;  No vertigo or throat pain   NECK: No pain or stiffness  RESPIRATORY: No cough, wheezing, hemoptysis; + shortness of breath  CARDIOVASCULAR: No chest pain or palpitations  GASTROINTESTINAL: No abdominal or epigastric pain. No nausea, vomiting, or hematemesis; No diarrhea or constipation. No melena or hematochezia.  GENITOURINARY: No dysuria, frequency or hematuria  NEUROLOGICAL: No numbness or weakness  MSK: No pain. No weakness.   SKIN: No itching, rashes.     PMH: COPD/asthma, HFpEF, HTN, HLD, hx AAA, bladder cancer, BPH    FHx: Reviewed. No fhx of asthma/copd, No fhx of liver and pulmonary disease. No fhx of hematological disorder.     Physical Exam:  GEN: No acute distress. Awake, Alert and oriented x 3.   Head: Atraumatic, Normocephalic.   Eye: PEERLA. No sclera icterus. EOMI.   ENT: Normal oropharynx, no thyromegaly, no mass, no lymphadenopathy.   LUNGS: + mild crackles b/l lung fields.  HEART: Normal. S1/S2 present. RRR. No murmur/gallops.   ABD: Soft, non-tender, non-distended. Bowel sounds present.   EXT: 1+ pitting edema b/l LE. No erythema. No tenderness.  Integumentary: No rash, No sore, No petechia.   NEURO: CN III-XII intact. Strength: 5/5 b/l ULE. Sensory intact b/l ULE.     Vital Signs Last 24 Hrs  T(C): 36.8 (03 Mar 2023 19:39), Max: 37 (03 Mar 2023 08:02)  T(F): 98.2 (03 Mar 2023 19:39), Max: 98.6 (03 Mar 2023 08:02)  HR: 115 (03 Mar 2023 19:39) (78 - 135)  BP: 126/60 (03 Mar 2023 19:39) (126/60 - 150/60)  BP(mean): --  RR: 18 (03 Mar 2023 19:39) (18 - 20)  SpO2: 96% (03 Mar 2023 19:18) (92% - 100%)    Parameters below as of 03 Mar 2023 19:18  Patient On (Oxygen Delivery Method): nasal cannula  O2 Flow (L/min): 3L      Please see the above notes for Labs and radiology.     Assessment and Plan:     83 yo M with hx of COPD/asthma, HFpEF (LVEF 67%, G2DD in Oct 2022), HTN, HLD, hx AAA, bladder cancer, BPH, BIBEMS to ED for shortness of breath x 7d that worsened this morning.     AHRF   - Ddx: COPD exacerbation 2/2 PNA vs. acute HFpEF exacerbation   - CXR shows increasing b/l basilar opacity.   - proBNP   - s/p IV lasix 40mg x 1. c/w IV lasix qd for now.   - check TTE. Monitor Daily weight, Strict I & O, Low Na diet fluid restriction.   - s/p IV solumedrol 125mg x 1 in ED. c/w IV solumedrol 40mg qd  - albuterol q6h and q4h prn.   - c/w azithro and ceftriaxone for now. Check procalcitonin and MRSA nares. If neg, may dc abx.   - supportive care.     HTN/HLD - c/w home med  Hx of AAA - follow up outpatient.   Bladder cancer - follow up outpatient.   BPH - flomax     DVT ppx: Heparin SC  GI ppx: PPI while on steroid  Diet: DASH diet with fluid striction.   Activity: as tolerated.     Date seen by the attendin2023  Total time spent: 75 minutes.

## 2023-03-03 NOTE — ED PROVIDER NOTE - OBJECTIVE STATEMENT
82M PMHx COPD/asthma not on home O2 (poor medication compliance), HTN, HLD, hx AAA, BIBEMS to ED for shortness of breath x 7d that worsened this morning. EMS gave duonebs, decadron, magnesium in scene. Did not take any meds or use any inhalers. Denies fever, chills, chest pain, abd pain, n/v/d. Endorses chronic nonproductive cough. Denies leg pain/swelling.  Lao  used: ID# [id number] (Ry)

## 2023-03-03 NOTE — PATIENT PROFILE ADULT - FUNCTIONAL ASSESSMENT - BASIC MOBILITY 6.
3-calculated by average/Not able to assess (calculate score using [facility] averaging method)

## 2023-03-03 NOTE — PATIENT PROFILE ADULT - NSPROHMSYMPCOND_GEN_A_NUR
[FreeTextEntry1] : Mr. Shay has moderate to severe tricuspid regurgitation with mild aortic insufficiency and moderate mitral regurgitation with large ASD. He has right heart dysfunction and is not a candidate for open surgery. At this time he will need evaluation for ASD closure percutaneously. \par \par \par I thank you for the opportunity to participate in the care of your patients. Please do not hesitate to contact me should you have any questions.\par 
behavioral health/cardiovascular/respiratory

## 2023-03-03 NOTE — H&P ADULT - HISTORY OF PRESENT ILLNESS
82M PMHx COPD/asthma, HFpEF, HTN, HLD, hx AAA, bladder cancer, BPH, BIBEMS to ED for shortness of breath x 7d that worsened this morning. Pt stated he was close to passing out this AM so decided to call ambulance. EMS gave duonebs, decadron, magnesium in scene. Did not take any meds or use any inhalers. Denies fever, chills, chest pain, abd pain, n/v/d. Endorses chronic nonproductive cough. Denies leg pain/swelling.    IN ED, pt O2 sat 92% on RA. Pt noted tachypneic/tachycardic and was placed on BiPAP. CXR revealed bibasilar opacities as well as hazy opacity overlying RLL. EKG rate 79 with old bifascicular block. Lact 1.3, BNP 2132, Trop <0.01, ph wnl. Pt received IV lasix for LE edema + crackles, IV solumedrol for significant wheezing, ceftriaxone/azithro for opacities on CXR, as well as albuterol. At time of exam pt resting comfortably on 3L NC without significant tachypnea

## 2023-03-03 NOTE — H&P ADULT - ASSESSMENT
82M PMHx COPD/asthma, HFpEF, HTN, HLD, hx AAA, bladder cancer, BPH, BIBEMS to ED for shortness of breath x 7d that worsened this morning    #Acute hypoxemic respiratory failure  #Community Acquired Pneumonia vs aspiration  #COPD exacerbation  #Former smoker  -92% sat on RA with significant tachypnea relieved with IV steroids, IV diuretics, inhalers, and BiPAP  -currently resting comfortably on 3L NC  -c/w symbicort, duonebs q4  -c/w IV solu 40mg qd for now  -c/w zpack/ceftriaxone  -f/u CXR  -f/u Cx  -f/u urine strep/legionella  -f/u procal, MRSA nares    #Volume overload  #Hx mild diastolic CHF likely in exacerbation  #Essential Hypertension  #Hyperlipidemia  #Hx AAA  -bilateral opacities, minimal LE edema, likely significant bowel edema  -c/w lasix 40 IV BID, re-eval 3/4/23 PM  -c/w home med otherwise  -f/u repeat CXR  -f/u d-dimer/ LE duplex  -f/u repeat Echo    #DVT ppx  -heparin    #GI ppx  -home meds with therapeutic interchange    #Diet  -DASH/TLC  -SLP eval    #Acitivty  -IAT    #Code  -full code    #Dispo  -acute, AHRF resolution, CHF eval, CAP resolution

## 2023-03-03 NOTE — ED PROVIDER NOTE - CONSIDERATION OF ADMISSION OBSERVATION
Patient required Bipap - improved and was taken off bipap; needs admission for COPD exacerbation and pneumonia. Consideration of Admission/Observation

## 2023-03-03 NOTE — ED PROVIDER NOTE - PROGRESS NOTE DETAILS
Harpreet: Pt sitting up, tolerating BIPAP well, states he is feeling improved after meds and starting BIPAP. Vital signs stable, HR 76, 138/69, satting 100%.  BIPAP settings: 14/10, rate 10, FIO2 40% Harpreet: pt discussed with ICU fellow Dr. Agustin - no need for SDU/ICU at this time, pt can be admitted to floor.

## 2023-03-03 NOTE — CONSULT NOTE ADULT - ASSESSMENT
IMPRESSION:    COPD Exacerbation  Probable RLL CAP/Aspiration   LLL 0.9 Pulmonary Nodule (was new on CT Chest from 10/2022)   HO Bladder Cancer  HO Ascending aortic aneurysm, measuring 4.4cm 10/2022  Former smoker    PLAN:    CNS: Avoid sedatives    HEENT: Oral care    PULMONARY: HOB @ 45 degrees. Aspiration precautions. Duonebs Q4H PRN. Solumedrol 40 mg Q24H. Symbicort 160mcg BID. Target SaO2 88-92%. Encourage incentive spirometry     CARDIOVASCULAR: avoid volume overload, troponin negative     GI: GI prophylaxis. feeding per Speech and swallow. bowel regimen     RENAL: Follow up renal function and lytes. Correct as needed, follows up at [facility] for bladder cancer    INFECTIOUS DISEASE: Follow up cultures. Ceftriaxone and Azithromycin, check procalcitonin, urine legionella and strep antigen, nasal MRSA      HEMATOLOGICAL: Pharmacological DVT prophylaxis. Dimer     ENDOCRINE: Follow up fingersticks. Insulin protocol if needed    MUSCULOSKELETAL: AAT    Patient does not need MICU/SDU monitoring at this time.    IMPRESSION:    COPD Exacerbation  Probable RLL CAP/Aspiration   LLL 0.9 Pulmonary Nodule (was new on CT Chest from 10/2022)   HO Bladder Cancer  HO Ascending aortic aneurysm, measuring 4.4cm 10/2022  Former smoker    PLAN:    CNS: Avoid sedatives    HEENT: Oral care    PULMONARY: HOB @ 45 degrees. Aspiration precautions. Duonebs Q4H PRN. Solumedrol 40 mg Q24H. Symbicort 160mcg BID. Target SaO2 88-92%. Encourage incentive spirometry     CARDIOVASCULAR: avoid volume overload, troponin negative     GI: GI prophylaxis. feeding per Speech and swallow. bowel regimen     RENAL: Follow up renal function and lytes. Correct as needed, follows up at [facility] for bladder cancer    INFECTIOUS DISEASE: Follow up cultures. Ceftriaxone and Azithromycin, check procalcitonin, urine legionella and strep antigen, nasal MRSA      HEMATOLOGICAL: Pharmacological DVT prophylaxis.    ENDOCRINE: Follow up fingersticks. Insulin protocol if needed    MUSCULOSKELETAL: AAT, LE duplex    Patient does not need MICU/SDU monitoring at this time.

## 2023-03-03 NOTE — H&P ADULT - NSHPPHYSICALEXAM_GEN_ALL_CORE
GENERAL: AAOx3. Well-nourished, laying in bed appearing in no acute distress  HEENT: No FNDs, atraumatic, normocephalic, moist mucus membranes  LUNGS: Bibasilar crackles and diffuse insp/exp wheezing.  HEART: S1/S2. No heaves or thrills  ABD: Distended without fluid wave. Soft, non-tender, non-tympanic. Bowel sounds present in all quadrants.  EXT/NEURO: 5/5 strength in all extremity joints. Sensation and ROM grossly intact.  SKIN: 1+ non pitting edema up to knees. No breaks, erythema

## 2023-03-03 NOTE — ED PROVIDER NOTE - ATTENDING CONTRIBUTION TO CARE
See MDM.     CONSTITUTIONAL: Well-developed; well-nourished; +moderate respiratory distress   SKIN: warm, dry  HEAD: Normocephalic; atraumatic.  EYES: no conjunctival injection. PERRLA. EOMI.   ENT: No nasal discharge; airway clear.  NECK: Supple; non tender.  CARD: S1, S2 normal; no murmurs, gallops, or rubs.   RESP: +bl wheezing. +tachypneic.   ABD: soft ntnd.   EXT: Normal ROM.  No clubbing, cyanosis or edema.   LYMPH: No acute cervical adenopathy.  NEURO: Alert, oriented, grossly unremarkable.  PSYCH: Cooperative, appropriate.

## 2023-03-03 NOTE — SWALLOW BEDSIDE ASSESSMENT ADULT - SLP PERTINENT HISTORY OF CURRENT PROBLEM
82M PMHx COPD/asthma, HFpEF, HTN, HLD, hx AAA, bladder cancer, BPH, BIBEMS to ED for shortness of breath x 7d that worsened this morning. Pt stated he was close to passing out this AM so decided to call ambulance. EMS gave duonebs, decadron, magnesium in scene. Did not take any meds or use any inhalers. Denies fever, chills, chest pain, abd pain, n/v/d. Endorses chronic nonproductive cough. Denies leg pain/swelling.

## 2023-03-03 NOTE — CONSULT NOTE ADULT - ATTENDING COMMENTS
Mr Padmini was seen & examined at bedside, he is a gentleman who presented with respiratory distress requiring BiPAP support, and at the time of my exam is wheezing bilaterally.  Patient has an extensive history of tobacco abuse and carries a diagnosis of COPD, and on chest x-ray today was found to have a right lower lobe infiltrate.  While the patient does not have other clinical indicators of sepsis, I agree with empiric antibiotics while work-up continues.  Overall, his respiratory status has improved significantly from his initial presentation, does not require admission to ICU nor SDU.  I agree with the fellow note, with the exceptions listed in my attestation above.  The remainder of impression and plan per fellow note.    At the time of my exam, the patient does not require intensive care or step-down level of care. They do not exhibit any signs of active airway compromise, hemodynamic instability, hourly nursing needs, or signs of imminent decompensation.   Please do not hesitate to call back the fellow for reevaluation should the patient's clinical status deteriorate.

## 2023-03-03 NOTE — CONSULT NOTE ADULT - SUBJECTIVE AND OBJECTIVE BOX
Patient is a 82y old  Male who presents with a chief complaint of SOB    HPI: 83 yo Irish-speaking male with PMHx of HTN, HLD, ascending aortic aneurysm 4.5 cm, and COPD not on home O2, presented with complaint of SOB for the past week, states he has been noncompliant with inhalers.      PAST MEDICAL & SURGICAL HISTORY:  HTN (hypertension)      HLD (hyperlipidemia)      BPH (benign prostatic hyperplasia)      No significant past surgical history          SOCIAL HX:   Smoking   quit 3 years ago,  1ppd x 45 years                       ETOH     denies                       Other denies    FAMILY HISTORY:  No pertinent family history in first degree relatives    :  No known cardiovascular family history     Review Of Systems:     All ROS are negative except per HPI       Allergies    No Known Allergies    Intolerances          PHYSICAL EXAM    ICU Vital Signs Last 24 Hrs  T(C): 37 (03 Mar 2023 08:02), Max: 37 (03 Mar 2023 08:02)  T(F): 98.6 (03 Mar 2023 08:02), Max: 98.6 (03 Mar 2023 08:02)  HR: 78 (03 Mar 2023 08:29) (78 - 135)  BP: 150/60 (03 Mar 2023 08:29) (140/63 - 150/60)  BP(mean): --  ABP: --  ABP(mean): --  RR: 19 (03 Mar 2023 08:29) (18 - 20)  SpO2: 99% (03 Mar 2023 08:29) (92% - 100%)    O2 Parameters below as of 03 Mar 2023 08:29  Patient On (Oxygen Delivery Method): BiPAP/CPAP            CONSTITUTIONAL:  NAD    ENT:   Airway patent,   Mouth with normal mucosa.   No thrush      CARDIAC:   Normal rate,   Regular rhythm.          RESPIRATORY:   Decreased bilateral air entry   No wheezing  Bilateral BS   Not tachypneic,  No use of accessory muscles    GASTROINTESTINAL:  Abdomen soft,   Non-tender,   No guarding,   + BS      NEUROLOGICAL:   Alert and oriented       SKIN:   No evidence of rash.                    LABS:                          12.2   6.96  )-----------( 217      ( 03 Mar 2023 09:27 )             33.1                                                                                                  CARDIAC MARKERS ( 03 Mar 2023 09:27 )  x     / <0.01 ng/mL / x     / x     / x                                                                                          MEDICATIONS  (STANDING):  azithromycin  IVPB 500 milliGRAM(s) IV Intermittent once  cefTRIAXone   IVPB 1000 milliGRAM(s) IV Intermittent once    MEDICATIONS  (PRN):        CXR reviewed    Patient is a 82y old  Male who presents with a chief complaint of SOB    HPI: 83 yo Polish-speaking male with PMHx of HTN, HLD, ascending aortic aneurysm 4.5 cm, and COPD not on home O2, presented with complaint of SOB for the past week, states he has been noncompliant with inhalers.      PAST MEDICAL & SURGICAL HISTORY:  HTN (hypertension)      HLD (hyperlipidemia)      BPH (benign prostatic hyperplasia)      No significant past surgical history          SOCIAL HX:   Smoking   quit 3 years ago,  1ppd x 45 years                       ETOH     denies                       Other denies    FAMILY HISTORY:  No pertinent family history in first degree relatives    :  No known cardiovascular family history     Review Of Systems:     All ROS are negative except per HPI       Allergies    No Known Allergies    Intolerances          PHYSICAL EXAM    ICU Vital Signs Last 24 Hrs  T(C): 37 (03 Mar 2023 08:02), Max: 37 (03 Mar 2023 08:02)  T(F): 98.6 (03 Mar 2023 08:02), Max: 98.6 (03 Mar 2023 08:02)  HR: 78 (03 Mar 2023 08:29) (78 - 135)  BP: 150/60 (03 Mar 2023 08:29) (140/63 - 150/60)  BP(mean): --  ABP: --  ABP(mean): --  RR: 19 (03 Mar 2023 08:29) (18 - 20)  SpO2: 99% (03 Mar 2023 08:29) (92% - 100%)    O2 Parameters below as of 03 Mar 2023 08:29  Patient On (Oxygen Delivery Method): BiPAP/CPAP            CONSTITUTIONAL:  NAD    ENT:   Airway patent,   Mouth with normal mucosa.   No thrush      CARDIAC:   Normal rate,   Regular rhythm.          RESPIRATORY:   Mild right sided expiratory wheezing   Decreased bilateral air entry   No wheezing  Bilateral BS   Not tachypneic,  No use of accessory muscles    GASTROINTESTINAL:  Abdomen soft,   Non-tender,   No guarding,   + BS      NEUROLOGICAL:   Alert and oriented       SKIN:   No evidence of rash.                    LABS:                          12.2   6.96  )-----------( 217      ( 03 Mar 2023 09:27 )             33.1                                                                                                  CARDIAC MARKERS ( 03 Mar 2023 09:27 )  x     / <0.01 ng/mL / x     / x     / x                                                                                          MEDICATIONS  (STANDING):  azithromycin  IVPB 500 milliGRAM(s) IV Intermittent once  cefTRIAXone   IVPB 1000 milliGRAM(s) IV Intermittent once    MEDICATIONS  (PRN):        CXR reviewed

## 2023-03-03 NOTE — ED PROVIDER NOTE - PHYSICAL EXAMINATION
VITAL SIGNS: I have reviewed nursing notes and confirm.  CONSTITUTIONAL: tachypneic, anxious male sitting up in stretcher.  SKIN: Warm dry, normal skin turgor  HEAD: NCAT  EYES: EOMI, PERRL, no scleral icterus  ENT: Moist mucous membranes, normal pharynx with no erythema or exudates  NECK: Supple; non tender. Full ROM. No cervical LAD  CARD: RRR, no murmurs, rubs or gallops  RESP: tachypneic, +bl wheezing, saturating 99% on NRB.  ABD: soft, non-tender, non-distended, no rebound or guarding. No CVA tenderness  EXT: Full ROM, no bony tenderness, no pedal edema, no calf tenderness  NEURO: awake, alert, oriented. no focal deficits.  PSYCH: Cooperative, appropriate.

## 2023-03-03 NOTE — ED PROVIDER NOTE - CLINICAL SUMMARY MEDICAL DECISION MAKING FREE TEXT BOX
82 year old male with a history of COPD/asthma preesnting for sob x 7 days which acutely worsened today; patient started on bipap for symptoms likely secondary to COPD exacerbation with clinical improvement. Sent labs, CXR, gave nebs in ED; CXR showing worsening opacities- ordered abx to cover for pneumonia. Patient clinically improved after bipap and was taken off and placed on NC. Patient evaluated by ICU - recommend medical floor admission.

## 2023-03-03 NOTE — PATIENT PROFILE ADULT - FALL HARM RISK - HARM RISK INTERVENTIONS
Assistance with ambulation/Assistance OOB with selected safe patient handling equipment/Communicate Risk of Fall with Harm to all staff/Discuss with provider need for PT consult/Monitor gait and stability/Provide patient with walking aids - walker, cane, crutches/Reinforce activity limits and safety measures with patient and family/Review medications for side effects contributing to fall risk/Sit up slowly, dangle for a short time, stand at bedside before walking/Tailored Fall Risk Interventions/Toileting schedule using arm’s reach rule for commode and bathroom/Visual Cue: Yellow wristband and red socks/Bed in lowest position, wheels locked, appropriate side rails in place/Call bell, personal items and telephone in reach/Instruct patient to call for assistance before getting out of bed or chair/Non-slip footwear when patient is out of bed/[place] to call system/Physically safe environment - no spills, clutter or unnecessary equipment/Purposeful Proactive Rounding/Room/bathroom lighting operational, light cord in reach

## 2023-03-03 NOTE — H&P ADULT - NSHPLABSRESULTS_GEN_ALL_CORE
Vital Signs Last 24 Hrs  T(C): 37 (03 Mar 2023 08:02), Max: 37 (03 Mar 2023 08:02)  T(F): 98.6 (03 Mar 2023 08:02), Max: 98.6 (03 Mar 2023 08:02)  HR: 78 (03 Mar 2023 08:29) (78 - 135)  BP: 150/60 (03 Mar 2023 08:29) (140/63 - 150/60)  BP(mean): --  RR: 18 (03 Mar 2023 11:30) (18 - 20)  SpO2: 98% (03 Mar 2023 11:30) (92% - 100%)    Parameters below as of 03 Mar 2023 11:30  Patient On (Oxygen Delivery Method): nasal cannula  O2 Flow (L/min): 3    CBC Full  -  ( 03 Mar 2023 09:27 )  WBC Count : 6.96 K/uL  RBC Count : 3.26 M/uL  Hemoglobin : 12.2 g/dL  Hematocrit : 33.1 %  Platelet Count - Automated : 217 K/uL  Mean Cell Volume : 101.5 fL  Mean Cell Hemoglobin : 37.4 pg  Mean Cell Hemoglobin Concentration : 36.9 g/dL  Auto Neutrophil # : 6.16 K/uL  Auto Lymphocyte # : 0.41 K/uL  Auto Monocyte # : 0.27 K/uL  Auto Eosinophil # : 0.06 K/uL  Auto Basophil # : 0.03 K/uL  Auto Neutrophil % : 88.5 %  Auto Lymphocyte % : 5.9 %  Auto Monocyte % : 3.9 %  Auto Eosinophil % : 0.9 %  Auto Basophil % : 0.4 %    03-03    141  |  107  |  11  ----------------------------<  136<H>  5.0   |  23  |  1.2    Ca    8.6      03 Mar 2023 09:27    TPro  6.0  /  Alb  3.6  /  TBili  0.6  /  DBili  x   /  AST  40  /  ALT  25  /  AlkPhos  98  03-03    LIVER FUNCTIONS - ( 03 Mar 2023 09:27 )  Alb: 3.6 g/dL / Pro: 6.0 g/dL / ALK PHOS: 98 U/L / ALT: 25 U/L / AST: 40 U/L / GGT: x

## 2023-03-03 NOTE — ED ADULT NURSE NOTE - OBJECTIVE STATEMENT
BIBA c.o sob. o2 sat 91% on RA. Received on NRB and transferred to BIPAP via respiratory. Hx of copd and not on home oxygen.

## 2023-03-04 LAB
ALBUMIN SERPL ELPH-MCNC: 3.4 G/DL — LOW (ref 3.5–5.2)
ALP SERPL-CCNC: 87 U/L — SIGNIFICANT CHANGE UP (ref 30–115)
ALT FLD-CCNC: 19 U/L — SIGNIFICANT CHANGE UP (ref 0–41)
ANION GAP SERPL CALC-SCNC: 11 MMOL/L — SIGNIFICANT CHANGE UP (ref 7–14)
AST SERPL-CCNC: 21 U/L — SIGNIFICANT CHANGE UP (ref 0–41)
BASOPHILS # BLD AUTO: 0.01 K/UL — SIGNIFICANT CHANGE UP (ref 0–0.2)
BASOPHILS NFR BLD AUTO: 0.1 % — SIGNIFICANT CHANGE UP (ref 0–1)
BILIRUB SERPL-MCNC: 0.4 MG/DL — SIGNIFICANT CHANGE UP (ref 0.2–1.2)
BUN SERPL-MCNC: 21 MG/DL — HIGH (ref 10–20)
CALCIUM SERPL-MCNC: 9 MG/DL — SIGNIFICANT CHANGE UP (ref 8.4–10.5)
CHLORIDE SERPL-SCNC: 101 MMOL/L — SIGNIFICANT CHANGE UP (ref 98–110)
CO2 SERPL-SCNC: 25 MMOL/L — SIGNIFICANT CHANGE UP (ref 17–32)
CREAT SERPL-MCNC: 1.4 MG/DL — SIGNIFICANT CHANGE UP (ref 0.7–1.5)
D DIMER BLD IA.RAPID-MCNC: 440 NG/ML DDU — HIGH
EGFR: 50 ML/MIN/1.73M2 — LOW
EOSINOPHIL # BLD AUTO: 0 K/UL — SIGNIFICANT CHANGE UP (ref 0–0.7)
EOSINOPHIL NFR BLD AUTO: 0 % — SIGNIFICANT CHANGE UP (ref 0–8)
FOLATE SERPL-MCNC: >20 NG/ML — SIGNIFICANT CHANGE UP
GLUCOSE SERPL-MCNC: 181 MG/DL — HIGH (ref 70–99)
HCT VFR BLD CALC: 33.4 % — LOW (ref 42–52)
HGB BLD-MCNC: 12.2 G/DL — LOW (ref 14–18)
IMM GRANULOCYTES NFR BLD AUTO: 0.4 % — HIGH (ref 0.1–0.3)
LEGIONELLA AG UR QL: NEGATIVE — SIGNIFICANT CHANGE UP
LYMPHOCYTES # BLD AUTO: 0.58 K/UL — LOW (ref 1.2–3.4)
LYMPHOCYTES # BLD AUTO: 7.3 % — LOW (ref 20.5–51.1)
MAGNESIUM SERPL-MCNC: 2.3 MG/DL — SIGNIFICANT CHANGE UP (ref 1.8–2.4)
MCHC RBC-ENTMCNC: 36.5 G/DL — SIGNIFICANT CHANGE UP (ref 32–37)
MCHC RBC-ENTMCNC: 36.6 PG — HIGH (ref 27–31)
MCV RBC AUTO: 100.3 FL — HIGH (ref 80–94)
MONOCYTES # BLD AUTO: 0.42 K/UL — SIGNIFICANT CHANGE UP (ref 0.1–0.6)
MONOCYTES NFR BLD AUTO: 5.3 % — SIGNIFICANT CHANGE UP (ref 1.7–9.3)
NEUTROPHILS # BLD AUTO: 6.91 K/UL — HIGH (ref 1.4–6.5)
NEUTROPHILS NFR BLD AUTO: 86.9 % — HIGH (ref 42.2–75.2)
NRBC # BLD: 0 /100 WBCS — SIGNIFICANT CHANGE UP (ref 0–0)
PHOSPHATE SERPL-MCNC: 3 MG/DL — SIGNIFICANT CHANGE UP (ref 2.1–4.9)
PLATELET # BLD AUTO: 227 K/UL — SIGNIFICANT CHANGE UP (ref 130–400)
POTASSIUM SERPL-MCNC: 4.8 MMOL/L — SIGNIFICANT CHANGE UP (ref 3.5–5)
POTASSIUM SERPL-SCNC: 4.8 MMOL/L — SIGNIFICANT CHANGE UP (ref 3.5–5)
PROCALCITONIN SERPL-MCNC: 0.05 NG/ML — SIGNIFICANT CHANGE UP (ref 0.02–0.1)
PROT SERPL-MCNC: 5.9 G/DL — LOW (ref 6–8)
RBC # BLD: 3.33 M/UL — LOW (ref 4.7–6.1)
RBC # FLD: 18.4 % — HIGH (ref 11.5–14.5)
SODIUM SERPL-SCNC: 137 MMOL/L — SIGNIFICANT CHANGE UP (ref 135–146)
VIT B12 SERPL-MCNC: 360 PG/ML — SIGNIFICANT CHANGE UP (ref 232–1245)
WBC # BLD: 7.95 K/UL — SIGNIFICANT CHANGE UP (ref 4.8–10.8)
WBC # FLD AUTO: 7.95 K/UL — SIGNIFICANT CHANGE UP (ref 4.8–10.8)

## 2023-03-04 PROCEDURE — 71045 X-RAY EXAM CHEST 1 VIEW: CPT | Mod: 26

## 2023-03-04 PROCEDURE — 99233 SBSQ HOSP IP/OBS HIGH 50: CPT

## 2023-03-04 RX ORDER — POLYETHYLENE GLYCOL 3350 17 G/17G
17 POWDER, FOR SOLUTION ORAL DAILY
Refills: 0 | Status: DISCONTINUED | OUTPATIENT
Start: 2023-03-04 | End: 2023-03-05

## 2023-03-04 RX ORDER — SENNA PLUS 8.6 MG/1
2 TABLET ORAL AT BEDTIME
Refills: 0 | Status: DISCONTINUED | OUTPATIENT
Start: 2023-03-04 | End: 2023-03-06

## 2023-03-04 RX ADMIN — SERTRALINE 50 MILLIGRAM(S): 25 TABLET, FILM COATED ORAL at 11:02

## 2023-03-04 RX ADMIN — AZITHROMYCIN 250 MILLIGRAM(S): 500 TABLET, FILM COATED ORAL at 11:03

## 2023-03-04 RX ADMIN — HEPARIN SODIUM 5000 UNIT(S): 5000 INJECTION INTRAVENOUS; SUBCUTANEOUS at 17:06

## 2023-03-04 RX ADMIN — POLYETHYLENE GLYCOL 3350 17 GRAM(S): 17 POWDER, FOR SOLUTION ORAL at 11:02

## 2023-03-04 RX ADMIN — Medication 40 MILLIGRAM(S): at 05:33

## 2023-03-04 RX ADMIN — QUETIAPINE FUMARATE 100 MILLIGRAM(S): 200 TABLET, FILM COATED ORAL at 21:08

## 2023-03-04 RX ADMIN — PANTOPRAZOLE SODIUM 40 MILLIGRAM(S): 20 TABLET, DELAYED RELEASE ORAL at 05:33

## 2023-03-04 RX ADMIN — Medication 40 MILLIGRAM(S): at 21:07

## 2023-03-04 RX ADMIN — BUDESONIDE AND FORMOTEROL FUMARATE DIHYDRATE 2 PUFF(S): 160; 4.5 AEROSOL RESPIRATORY (INHALATION) at 11:59

## 2023-03-04 RX ADMIN — CHLORHEXIDINE GLUCONATE 1 APPLICATION(S): 213 SOLUTION TOPICAL at 05:30

## 2023-03-04 RX ADMIN — HEPARIN SODIUM 5000 UNIT(S): 5000 INJECTION INTRAVENOUS; SUBCUTANEOUS at 05:33

## 2023-03-04 RX ADMIN — CEFTRIAXONE 100 MILLIGRAM(S): 500 INJECTION, POWDER, FOR SOLUTION INTRAMUSCULAR; INTRAVENOUS at 05:55

## 2023-03-04 RX ADMIN — BUDESONIDE AND FORMOTEROL FUMARATE DIHYDRATE 2 PUFF(S): 160; 4.5 AEROSOL RESPIRATORY (INHALATION) at 21:09

## 2023-03-04 RX ADMIN — Medication 1000 UNIT(S): at 11:03

## 2023-03-04 RX ADMIN — FINASTERIDE 5 MILLIGRAM(S): 5 TABLET, FILM COATED ORAL at 11:02

## 2023-03-04 RX ADMIN — MONTELUKAST 10 MILLIGRAM(S): 4 TABLET, CHEWABLE ORAL at 11:02

## 2023-03-04 RX ADMIN — Medication 3 MILLILITER(S): at 20:37

## 2023-03-04 RX ADMIN — Medication 3 MILLILITER(S): at 15:34

## 2023-03-04 RX ADMIN — ATORVASTATIN CALCIUM 20 MILLIGRAM(S): 80 TABLET, FILM COATED ORAL at 21:08

## 2023-03-04 RX ADMIN — TAMSULOSIN HYDROCHLORIDE 0.4 MILLIGRAM(S): 0.4 CAPSULE ORAL at 21:08

## 2023-03-04 RX ADMIN — SENNA PLUS 2 TABLET(S): 8.6 TABLET ORAL at 21:08

## 2023-03-04 RX ADMIN — Medication 40 MILLIGRAM(S): at 13:02

## 2023-03-04 RX ADMIN — Medication 3 MILLILITER(S): at 10:22

## 2023-03-04 RX ADMIN — Medication 25 MILLIGRAM(S): at 05:32

## 2023-03-04 RX ADMIN — MAGNESIUM OXIDE 400 MG ORAL TABLET 400 MILLIGRAM(S): 241.3 TABLET ORAL at 11:03

## 2023-03-04 RX ADMIN — FAMOTIDINE 40 MILLIGRAM(S): 10 INJECTION INTRAVENOUS at 21:08

## 2023-03-04 NOTE — PROGRESS NOTE ADULT - SUBJECTIVE AND OBJECTIVE BOX
LATASHA WATTS  82y  [facility] 3B 009 A      Patient is a 82y old  Male who presents with a chief complaint of SOB (03 Mar 2023 13:51)      INTERVAL HPI/OVERNIGHT EVENTS:        REVIEW OF SYSTEMS:        FAMILY HISTORY:  No pertinent family history in first degree relatives      T(C): 37 (03-04-23 @ 04:21), Max: 37 (03-04-23 @ 04:21)  HR: 74 (03-04-23 @ 04:21) (74 - 115)  BP: 123/55 (03-04-23 @ 04:21) (123/55 - 134/71)  RR: 18 (03-04-23 @ 04:21) (18 - 18)  SpO2: 93% (03-04-23 @ 07:28) (93% - 98%)  Wt(kg): --Vital Signs Last 24 Hrs  T(C): 37 (04 Mar 2023 04:21), Max: 37 (04 Mar 2023 04:21)  T(F): 98.6 (04 Mar 2023 04:21), Max: 98.6 (04 Mar 2023 04:21)  HR: 74 (04 Mar 2023 04:21) (74 - 115)  BP: 123/55 (04 Mar 2023 04:21) (123/55 - 134/71)  BP(mean): --  RR: 18 (04 Mar 2023 04:21) (18 - 18)  SpO2: 93% (04 Mar 2023 07:28) (93% - 98%)    Parameters below as of 04 Mar 2023 07:28  Patient On (Oxygen Delivery Method): nasal cannula  O2 Flow (L/min): 2      PHYSICAL EXAM:  GENERAL: NAD, well-groomed, well-developed  HEAD:  Atraumatic, Normocephalic  EYES: EOMI, PERRLA, conjunctiva and sclera clear  ENMT: No tonsillar erythema, exudates, or enlargement; Moist mucous membranes, Good dentition, No lesions  NECK: Supple, No JVD, Normal thyroid  NERVOUS SYSTEM:  Alert & Oriented X3, Good concentration; Motor Strength 5/5 B/L upper and lower extremities; DTRs 2+ intact and symmetric  PULM: Clear to auscultation bilaterally  CARDIAC: Regular rate and rhythm; No murmurs, rubs, or gallops  GI: Soft, Nontender, Nondistended; Bowel sounds present  EXTREMITIES:  2+ Peripheral Pulses, No clubbing, cyanosis, or edema  LYMPH: No lymphadenopathy noted  SKIN: No rashes or lesions    Consultant(s) Notes Reviewed:  [x ] YES  [ ] NO  Care Discussed with Consultants/Other Providers [ x] YES  [ ] NO    LABS:                            12.2   7.95  )-----------( 227      ( 04 Mar 2023 08:33 )             33.4   03-04    137  |  101  |  21<H>  ----------------------------<  181<H>  4.8   |  25  |  1.4    Ca    9.0      04 Mar 2023 08:33  Phos  3.0     03-04  Mg     2.3     03-04    TPro  5.9<L>  /  Alb  3.4<L>  /  TBili  0.4  /  DBili  x   /  AST  21  /  ALT  19  /  AlkPhos  87  03-04            albuterol/ipratropium for Nebulization 3 milliLiter(s) Nebulizer every 6 hours  albuterol/ipratropium for Nebulization 3 milliLiter(s) Nebulizer every 4 hours PRN  atorvastatin 20 milliGRAM(s) Oral at bedtime  azithromycin   Tablet 250 milliGRAM(s) Oral daily  budesonide 160 MICROgram(s)/formoterol 4.5 MICROgram(s) Inhaler 2 Puff(s) Inhalation two times a day  cefTRIAXone   IVPB 1000 milliGRAM(s) IV Intermittent every 24 hours  chlorhexidine 4% Liquid 1 Application(s) Topical <User Schedule>  cholecalciferol 1000 Unit(s) Oral daily  famotidine    Tablet 40 milliGRAM(s) Oral at bedtime  finasteride 5 milliGRAM(s) Oral daily  furosemide   Injectable 40 milliGRAM(s) IV Push daily  heparin   Injectable 5000 Unit(s) SubCutaneous every 12 hours  lisinopril 20 milliGRAM(s) Oral daily  magnesium oxide 400 milliGRAM(s) Oral daily  methylPREDNISolone sodium succinate Injectable 40 milliGRAM(s) IV Push daily  metoprolol succinate ER 25 milliGRAM(s) Oral daily  montelukast 10 milliGRAM(s) Oral daily  pantoprazole    Tablet 40 milliGRAM(s) Oral before breakfast  QUEtiapine 100 milliGRAM(s) Oral at bedtime  sertraline 50 milliGRAM(s) Oral daily  tamsulosin 0.4 milliGRAM(s) Oral at bedtime            82M PMHx COPD/asthma, HFpEF, HTN, HLD, hx AAA, bladder cancer, BPH, BIBEMS to ED for shortness of breath x 7d that worsened this morning    1. Acute hypoxemic respiratory failure likely multifactorial due to Community Acquired Pneumonia, acute dCHF and acute COPD exacerbation. Former smoker  *-92% sat on RA with significant tachypnea relieved with IV steroids, IV diuretics, inhalers, and BiPAP  - Admit to medicine            - CXR:        - D-dimer:   - Cont inhalers   - Cont Solumedrol 40mg IV daily   - Cont Rocephin and azithromycin   - Sputum cx:pending              - Urine leg:pending              - Urine strep:pending   - Procalcitonin:  - Cont lasix 40mg IV bid   - Echoardio:       2. Essential Hypertension/ Hyperlipidemia/ Hx AAA  - Cont home meds     #DVT ppx  -heparin    #GI ppx  -home meds with therapeutic interchange    #Diet  -DASH/TLC  -SLP eval    #Acitivty  -IAT    #Code  -full code    #Dispo  -acute, AHRF resolution, CHF eval, CAP resolution       LATASHA WATTS  82y  [facility] 3[facility] 009 A      Patient is a 82y old  Male who presents with a chief complaint of SOB (03 Mar 2023 13:51)      INTERVAL HPI/OVERNIGHT EVENTS:  Patient still not feeling better   still with active wheezing. no other complains.   no overnight events               FAMILY HISTORY:  No pertinent family history in first degree relatives      T(C): 37 (03-04-23 @ 04:21), Max: 37 (03-04-23 @ 04:21)  HR: 74 (03-04-23 @ 04:21) (74 - 115)  BP: 123/55 (03-04-23 @ 04:21) (123/55 - 134/71)  RR: 18 (03-04-23 @ 04:21) (18 - 18)  SpO2: 93% (03-04-23 @ 07:28) (93% - 98%)  Wt(kg): --Vital Signs Last 24 Hrs  T(C): 37 (04 Mar 2023 04:21), Max: 37 (04 Mar 2023 04:21)  T(F): 98.6 (04 Mar 2023 04:21), Max: 98.6 (04 Mar 2023 04:21)  HR: 74 (04 Mar 2023 04:21) (74 - 115)  BP: 123/55 (04 Mar 2023 04:21) (123/55 - 134/71)  BP(mean): --  RR: 18 (04 Mar 2023 04:21) (18 - 18)  SpO2: 93% (04 Mar 2023 07:28) (93% - 98%)    Parameters below as of 04 Mar 2023 07:28  Patient On (Oxygen Delivery Method): nasal cannula  O2 Flow (L/min): 2      PHYSICAL EXAM:  GENERAL: NAD, well-groomed, well-developed  NERVOUS SYSTEM:  Alert & Oriented X3, Good concentration  PULM: Diffuse wheezing   CARDIAC: Regular rate and rhythm;   GI: Soft, Nontender, Nondistended; Bowel sounds present  EXTREMITIES:  2+ Peripheral Pulses,    Consultant(s) Notes Reviewed:  [x ] YES  [ ] NO  Care Discussed with Consultants/Other Providers [ x] YES  [ ] NO    LABS:                            12.2   7.95  )-----------( 227      ( 04 Mar 2023 08:33 )             33.4   03-04    137  |  101  |  21<H>  ----------------------------<  181<H>  4.8   |  25  |  1.4    Ca    9.0      04 Mar 2023 08:33  Phos  3.0     03-04  Mg     2.3     03-04    TPro  5.9<L>  /  Alb  3.4<L>  /  TBili  0.4  /  DBili  x   /  AST  21  /  ALT  19  /  AlkPhos  87  03-04            albuterol/ipratropium for Nebulization 3 milliLiter(s) Nebulizer every 6 hours  albuterol/ipratropium for Nebulization 3 milliLiter(s) Nebulizer every 4 hours PRN  atorvastatin 20 milliGRAM(s) Oral at bedtime  azithromycin   Tablet 250 milliGRAM(s) Oral daily  budesonide 160 MICROgram(s)/formoterol 4.5 MICROgram(s) Inhaler 2 Puff(s) Inhalation two times a day  cefTRIAXone   IVPB 1000 milliGRAM(s) IV Intermittent every 24 hours  chlorhexidine 4% Liquid 1 Application(s) Topical <User Schedule>  cholecalciferol 1000 Unit(s) Oral daily  famotidine    Tablet 40 milliGRAM(s) Oral at bedtime  finasteride 5 milliGRAM(s) Oral daily  furosemide   Injectable 40 milliGRAM(s) IV Push daily  heparin   Injectable 5000 Unit(s) SubCutaneous every 12 hours  lisinopril 20 milliGRAM(s) Oral daily  magnesium oxide 400 milliGRAM(s) Oral daily  methylPREDNISolone sodium succinate Injectable 40 milliGRAM(s) IV Push daily  metoprolol succinate ER 25 milliGRAM(s) Oral daily  montelukast 10 milliGRAM(s) Oral daily  pantoprazole    Tablet 40 milliGRAM(s) Oral before breakfast  QUEtiapine 100 milliGRAM(s) Oral at bedtime  sertraline 50 milliGRAM(s) Oral daily  tamsulosin 0.4 milliGRAM(s) Oral at bedtime            82M PMHx COPD/asthma, HFpEF, HTN, HLD, hx AAA, bladder cancer, BPH, BIBEMS to ED for shortness of breath x 7d that worsened this morning    1. Acute hypoxemic respiratory failure likely multifactorial due to Community Acquired Pneumonia, acute dCHF and acute COPD exacerbation. Former smoker  *-92% sat on RA with significant tachypnea relieved with IV steroids, IV diuretics, inhalers, and BiPAP  - Admit to medicine            - CXR:Interstitial infiltrate with elevated BNP         - D-dimer:440 negative for age    - Cont inhalers   - Cont Solumedrol 40mg IV q8hr  (higher dose)   - Cont Rocephin and azithromycin   - Sputum cx:pending              - Urine leg:pending              - Urine strep:pending   - Procalcitonin:pending  - Cont lasix 40mg IV daily   - Echocardio:pending      2. Essential Hypertension/ Hyperlipidemia/ Hx AAA  - Cont home meds     3. DVT/GI px     #Diet  -DASH/TLC  -SLP eval    #Acitivty  -IAT    #Code  -full code

## 2023-03-05 LAB
ALBUMIN SERPL ELPH-MCNC: 4 G/DL — SIGNIFICANT CHANGE UP (ref 3.5–5.2)
ALP SERPL-CCNC: 90 U/L — SIGNIFICANT CHANGE UP (ref 30–115)
ALT FLD-CCNC: 21 U/L — SIGNIFICANT CHANGE UP (ref 0–41)
ANION GAP SERPL CALC-SCNC: 16 MMOL/L — HIGH (ref 7–14)
AST SERPL-CCNC: 23 U/L — SIGNIFICANT CHANGE UP (ref 0–41)
BASOPHILS # BLD AUTO: 0 K/UL — SIGNIFICANT CHANGE UP (ref 0–0.2)
BASOPHILS NFR BLD AUTO: 0 % — SIGNIFICANT CHANGE UP (ref 0–1)
BILIRUB SERPL-MCNC: 0.3 MG/DL — SIGNIFICANT CHANGE UP (ref 0.2–1.2)
BUN SERPL-MCNC: 33 MG/DL — HIGH (ref 10–20)
CALCIUM SERPL-MCNC: 9.3 MG/DL — SIGNIFICANT CHANGE UP (ref 8.4–10.4)
CHLORIDE SERPL-SCNC: 102 MMOL/L — SIGNIFICANT CHANGE UP (ref 98–110)
CO2 SERPL-SCNC: 23 MMOL/L — SIGNIFICANT CHANGE UP (ref 17–32)
CREAT SERPL-MCNC: 1.4 MG/DL — SIGNIFICANT CHANGE UP (ref 0.7–1.5)
EGFR: 50 ML/MIN/1.73M2 — LOW
EOSINOPHIL # BLD AUTO: 0 K/UL — SIGNIFICANT CHANGE UP (ref 0–0.7)
EOSINOPHIL NFR BLD AUTO: 0 % — SIGNIFICANT CHANGE UP (ref 0–8)
GLUCOSE SERPL-MCNC: 194 MG/DL — HIGH (ref 70–99)
HCT VFR BLD CALC: 32.3 % — LOW (ref 42–52)
HGB BLD-MCNC: 11.8 G/DL — LOW (ref 14–18)
IMM GRANULOCYTES NFR BLD AUTO: 0.4 % — HIGH (ref 0.1–0.3)
LYMPHOCYTES # BLD AUTO: 0.49 K/UL — LOW (ref 1.2–3.4)
LYMPHOCYTES # BLD AUTO: 4.5 % — LOW (ref 20.5–51.1)
MAGNESIUM SERPL-MCNC: 2.2 MG/DL — SIGNIFICANT CHANGE UP (ref 1.8–2.4)
MCHC RBC-ENTMCNC: 36.5 G/DL — SIGNIFICANT CHANGE UP (ref 32–37)
MCHC RBC-ENTMCNC: 36.5 PG — HIGH (ref 27–31)
MCV RBC AUTO: 100 FL — HIGH (ref 80–94)
MONOCYTES # BLD AUTO: 0.52 K/UL — SIGNIFICANT CHANGE UP (ref 0.1–0.6)
MONOCYTES NFR BLD AUTO: 4.8 % — SIGNIFICANT CHANGE UP (ref 1.7–9.3)
NEUTROPHILS # BLD AUTO: 9.72 K/UL — HIGH (ref 1.4–6.5)
NEUTROPHILS NFR BLD AUTO: 90.3 % — HIGH (ref 42.2–75.2)
NRBC # BLD: 0 /100 WBCS — SIGNIFICANT CHANGE UP (ref 0–0)
PLATELET # BLD AUTO: 236 K/UL — SIGNIFICANT CHANGE UP (ref 130–400)
POTASSIUM SERPL-MCNC: 4.4 MMOL/L — SIGNIFICANT CHANGE UP (ref 3.5–5)
POTASSIUM SERPL-SCNC: 4.4 MMOL/L — SIGNIFICANT CHANGE UP (ref 3.5–5)
PROT SERPL-MCNC: 6.5 G/DL — SIGNIFICANT CHANGE UP (ref 6–8)
RBC # BLD: 3.23 M/UL — LOW (ref 4.7–6.1)
RBC # FLD: 17.2 % — HIGH (ref 11.5–14.5)
S PNEUM AG UR QL: NEGATIVE — SIGNIFICANT CHANGE UP
SODIUM SERPL-SCNC: 141 MMOL/L — SIGNIFICANT CHANGE UP (ref 135–146)
WBC # BLD: 10.77 K/UL — SIGNIFICANT CHANGE UP (ref 4.8–10.8)
WBC # FLD AUTO: 10.77 K/UL — SIGNIFICANT CHANGE UP (ref 4.8–10.8)

## 2023-03-05 PROCEDURE — 93306 TTE W/DOPPLER COMPLETE: CPT | Mod: 26

## 2023-03-05 PROCEDURE — 99233 SBSQ HOSP IP/OBS HIGH 50: CPT

## 2023-03-05 RX ORDER — FUROSEMIDE 40 MG
40 TABLET ORAL
Refills: 0 | Status: DISCONTINUED | OUTPATIENT
Start: 2023-03-05 | End: 2023-03-05

## 2023-03-05 RX ORDER — FUROSEMIDE 40 MG
40 TABLET ORAL DAILY
Refills: 0 | Status: DISCONTINUED | OUTPATIENT
Start: 2023-03-06 | End: 2023-03-06

## 2023-03-05 RX ORDER — PREGABALIN 225 MG/1
1000 CAPSULE ORAL DAILY
Refills: 0 | Status: DISCONTINUED | OUTPATIENT
Start: 2023-03-05 | End: 2023-03-06

## 2023-03-05 RX ORDER — POLYETHYLENE GLYCOL 3350 17 G/17G
17 POWDER, FOR SOLUTION ORAL EVERY 12 HOURS
Refills: 0 | Status: DISCONTINUED | OUTPATIENT
Start: 2023-03-05 | End: 2023-03-06

## 2023-03-05 RX ORDER — SIMETHICONE 80 MG/1
80 TABLET, CHEWABLE ORAL EVERY 6 HOURS
Refills: 0 | Status: DISCONTINUED | OUTPATIENT
Start: 2023-03-05 | End: 2023-03-06

## 2023-03-05 RX ORDER — LACTULOSE 10 G/15ML
20 SOLUTION ORAL
Refills: 0 | Status: COMPLETED | OUTPATIENT
Start: 2023-03-05 | End: 2023-03-05

## 2023-03-05 RX ADMIN — Medication 25 MILLIGRAM(S): at 05:13

## 2023-03-05 RX ADMIN — Medication 40 MILLIGRAM(S): at 05:13

## 2023-03-05 RX ADMIN — AZITHROMYCIN 250 MILLIGRAM(S): 500 TABLET, FILM COATED ORAL at 11:15

## 2023-03-05 RX ADMIN — PANTOPRAZOLE SODIUM 40 MILLIGRAM(S): 20 TABLET, DELAYED RELEASE ORAL at 06:14

## 2023-03-05 RX ADMIN — Medication 1000 UNIT(S): at 11:15

## 2023-03-05 RX ADMIN — Medication 3 MILLILITER(S): at 01:41

## 2023-03-05 RX ADMIN — CHLORHEXIDINE GLUCONATE 1 APPLICATION(S): 213 SOLUTION TOPICAL at 05:14

## 2023-03-05 RX ADMIN — LACTULOSE 20 GRAM(S): 10 SOLUTION ORAL at 12:25

## 2023-03-05 RX ADMIN — MONTELUKAST 10 MILLIGRAM(S): 4 TABLET, CHEWABLE ORAL at 11:15

## 2023-03-05 RX ADMIN — TAMSULOSIN HYDROCHLORIDE 0.4 MILLIGRAM(S): 0.4 CAPSULE ORAL at 21:10

## 2023-03-05 RX ADMIN — LISINOPRIL 20 MILLIGRAM(S): 2.5 TABLET ORAL at 05:13

## 2023-03-05 RX ADMIN — QUETIAPINE FUMARATE 100 MILLIGRAM(S): 200 TABLET, FILM COATED ORAL at 21:10

## 2023-03-05 RX ADMIN — HEPARIN SODIUM 5000 UNIT(S): 5000 INJECTION INTRAVENOUS; SUBCUTANEOUS at 17:03

## 2023-03-05 RX ADMIN — LACTULOSE 20 GRAM(S): 10 SOLUTION ORAL at 14:00

## 2023-03-05 RX ADMIN — SIMETHICONE 80 MILLIGRAM(S): 80 TABLET, CHEWABLE ORAL at 23:09

## 2023-03-05 RX ADMIN — Medication 3 MILLILITER(S): at 14:49

## 2023-03-05 RX ADMIN — MAGNESIUM OXIDE 400 MG ORAL TABLET 400 MILLIGRAM(S): 241.3 TABLET ORAL at 11:15

## 2023-03-05 RX ADMIN — HEPARIN SODIUM 5000 UNIT(S): 5000 INJECTION INTRAVENOUS; SUBCUTANEOUS at 05:13

## 2023-03-05 RX ADMIN — SERTRALINE 50 MILLIGRAM(S): 25 TABLET, FILM COATED ORAL at 11:16

## 2023-03-05 RX ADMIN — ATORVASTATIN CALCIUM 20 MILLIGRAM(S): 80 TABLET, FILM COATED ORAL at 21:10

## 2023-03-05 RX ADMIN — BUDESONIDE AND FORMOTEROL FUMARATE DIHYDRATE 2 PUFF(S): 160; 4.5 AEROSOL RESPIRATORY (INHALATION) at 08:15

## 2023-03-05 RX ADMIN — LACTULOSE 20 GRAM(S): 10 SOLUTION ORAL at 17:03

## 2023-03-05 RX ADMIN — LACTULOSE 20 GRAM(S): 10 SOLUTION ORAL at 21:11

## 2023-03-05 RX ADMIN — POLYETHYLENE GLYCOL 3350 17 GRAM(S): 17 POWDER, FOR SOLUTION ORAL at 11:16

## 2023-03-05 RX ADMIN — SIMETHICONE 80 MILLIGRAM(S): 80 TABLET, CHEWABLE ORAL at 12:25

## 2023-03-05 RX ADMIN — FINASTERIDE 5 MILLIGRAM(S): 5 TABLET, FILM COATED ORAL at 11:15

## 2023-03-05 RX ADMIN — POLYETHYLENE GLYCOL 3350 17 GRAM(S): 17 POWDER, FOR SOLUTION ORAL at 17:06

## 2023-03-05 RX ADMIN — SENNA PLUS 2 TABLET(S): 8.6 TABLET ORAL at 21:10

## 2023-03-05 RX ADMIN — Medication 60 MILLIGRAM(S): at 17:03

## 2023-03-05 RX ADMIN — CEFTRIAXONE 100 MILLIGRAM(S): 500 INJECTION, POWDER, FOR SOLUTION INTRAMUSCULAR; INTRAVENOUS at 05:09

## 2023-03-05 RX ADMIN — PREGABALIN 1000 MICROGRAM(S): 225 CAPSULE ORAL at 13:13

## 2023-03-05 RX ADMIN — BUDESONIDE AND FORMOTEROL FUMARATE DIHYDRATE 2 PUFF(S): 160; 4.5 AEROSOL RESPIRATORY (INHALATION) at 21:11

## 2023-03-05 RX ADMIN — Medication 3 MILLILITER(S): at 19:50

## 2023-03-05 RX ADMIN — SIMETHICONE 80 MILLIGRAM(S): 80 TABLET, CHEWABLE ORAL at 17:06

## 2023-03-05 NOTE — PROGRESS NOTE ADULT - SUBJECTIVE AND OBJECTIVE BOX
LATASHA WATTS  82y  Male  ***My note supersedes ALL resident notes that I sign.  My corrections for their notes are in my note.***    I can be reached directly on [employer]. My office number is [phone]. My personal cell number is [phone].    English Trans: [id number]    INTERVAL EVENTS: Here for f/u of SOB. Pt c/o wheezing, but starting to feel a little better. No sputum w/ minor cough. Starting to feel he can come off O2. Leg swelling is much better. Still c/o of abd bloating and no BM for days - would like oral laxative.    T(F): 97.3 (03-05-23 @ 04:32), Max: 98 (03-04-23 @ 13:23)  HR: 82 (03-05-23 @ 05:00) (80 - 88)  BP: 135/58 (03-05-23 @ 05:00) (115/39 - 145/66)  RR: 18 (03-05-23 @ 04:32) (18 - 18)  SpO2: 91% (03-05-23 @ 09:56) (91% - 93%) RA    Gen: NAD; off O2   HEENT: PERRL, EOMI, mouth clr, nose clr  Neck: no nodes, no JVD, thyroid nl  lungs: b/l wheeze on expiration (faint); no crackles  hrt: s1 s2 rrr no murmur  abd: soft, mild distention, no pain no HS megaly  ext: tr edema, no c/c  neuro: aa ox3, cn intact, can move all 4 ext    LABS:                      11.8    (    100.0  10.77 )-----------( ---------      236      ( 05 Mar 2023 08:05 )             32.3    (    17.2     Hemoglobin: 11.8 g/dL (03-05 @ 08:05)  Hemoglobin: 12.2 g/dL (03-04 @ 08:33)  Hemoglobin: 12.2 g/dL (03-03 @ 09:27)    141   (   102   (   194      03-05-23 @ 08:05  ----------------------               4.4   (   23   (   33                             -----                        1.4  Ca  9.3   Mg  2.2    P   --     Creatinine:   1.4 (03-05 @ 08:05)  eGFR:  50    Creatinine:   1.4 (03-04 @ 08:33)  eGFR:  50    Creatinine:   1.2 (03-03 @ 09:27)  eGFR:  61      LFT  6.5  (  0.3  (  23       03-05-23 @ 08:05  -------------------------  4.0  (  90  (  21    RADIOLOGY & ADDITIONAL TESTS:  < from: Xray Chest 1 View- PORTABLE-Routine (Xray Chest 1 View- PORTABLE-Routine in AM.) (03.04.23 @ 07:01) >  Impression:  Stable bilateral opacities and effusions.    < end of copied text >    < from: VA Duplex Lower Ext Vein Scan, Bilat (03.03.23 @ 15:55) >  IMPRESSION:  No evidence of deep venous thrombosis in either lower extremity.    < end of copied text >    < from: Xray Chest 1 View- PORTABLE-Urgent (03.03.23 @ 08:53) >  Impression:    Increased bibasilar opacities compared to prior.    < end of copied text >    < from: TTE Echo Complete w/o Contrast w/ Doppler (10.29.22 @ 13:39) >  Summary:   1. Normal global left ventricular systolic function.   2. LV Ejection Fraction by Montanez's Method with a biplane EF of 67 %.   3. Spectral Doppler shows pseudonormal pattern of left ventricular   myocardial filling (Grade II diastolic dysfunction).   4. Normal right ventricular size and function.   5. There is no evidence of pericardial effusion.   6. Moderate aortic valve regurgitation is seen.   7. Elevated left ventricular end-diastolic pressure. Using AR jet   method, estimated LVEDP is 44 mmHg.   8. Dilatation of the aortic root and ascending aorta. The aortic root   measures 4.7 cm. The ascending aorta measures 5.2 cm.   9. There is no aortic dissection noted.  10. Estimated pulmonary artery systolic pressure is 40.5 mmHg assuming a   right atrial pressure of 8 mmHg, which is consistent with mild pulmonary hypertension.    < end of copied text >    MEDICATIONS:    albuterol/ipratropium for Nebulization 3 milliLiter(s) Nebulizer every 6 hours  albuterol/ipratropium for Nebulization 3 milliLiter(s) Nebulizer every 4 hours PRN  atorvastatin 20 milliGRAM(s) Oral at bedtime  budesonide 160 MICROgram(s)/formoterol 4.5 MICROgram(s) Inhaler 2 Puff(s) Inhalation two times a day  chlorhexidine 4% Liquid 1 Application(s) Topical <User Schedule>  cholecalciferol 1000 Unit(s) Oral daily  finasteride 5 milliGRAM(s) Oral daily  heparin   Injectable 5000 Unit(s) SubCutaneous every 12 hours  lactulose Syrup 20 Gram(s) Oral every 3 hours  lisinopril 20 milliGRAM(s) Oral daily  magnesium oxide 400 milliGRAM(s) Oral daily  methylPREDNISolone sodium succinate Injectable 60 milliGRAM(s) IV Push every 12 hours  metoprolol succinate ER 25 milliGRAM(s) Oral daily  montelukast 10 milliGRAM(s) Oral daily  pantoprazole    Tablet 40 milliGRAM(s) Oral before breakfast  polyethylene glycol 3350 17 Gram(s) Oral every 12 hours  QUEtiapine 100 milliGRAM(s) Oral at bedtime  senna 2 Tablet(s) Oral at bedtime  sertraline 50 milliGRAM(s) Oral daily  simethicone 80 milliGRAM(s) Chew every 6 hours  tamsulosin 0.4 milliGRAM(s) Oral at bedtime

## 2023-03-06 ENCOUNTER — TRANSCRIPTION ENCOUNTER (OUTPATIENT)
Age: 83
End: 2023-03-06

## 2023-03-06 VITALS
DIASTOLIC BLOOD PRESSURE: 56 MMHG | RESPIRATION RATE: 18 BRPM | SYSTOLIC BLOOD PRESSURE: 160 MMHG | TEMPERATURE: 97 F | HEART RATE: 80 BPM

## 2023-03-06 LAB
ALBUMIN SERPL ELPH-MCNC: 3.7 G/DL — SIGNIFICANT CHANGE UP (ref 3.5–5.2)
ALP SERPL-CCNC: 87 U/L — SIGNIFICANT CHANGE UP (ref 30–115)
ALT FLD-CCNC: 65 U/L — HIGH (ref 0–41)
ANION GAP SERPL CALC-SCNC: 10 MMOL/L — SIGNIFICANT CHANGE UP (ref 7–14)
AST SERPL-CCNC: 67 U/L — HIGH (ref 0–41)
BASOPHILS # BLD AUTO: 0.01 K/UL — SIGNIFICANT CHANGE UP (ref 0–0.2)
BASOPHILS NFR BLD AUTO: 0.1 % — SIGNIFICANT CHANGE UP (ref 0–1)
BILIRUB SERPL-MCNC: 0.4 MG/DL — SIGNIFICANT CHANGE UP (ref 0.2–1.2)
BUN SERPL-MCNC: 35 MG/DL — HIGH (ref 10–20)
CALCIUM SERPL-MCNC: 9.8 MG/DL — SIGNIFICANT CHANGE UP (ref 8.4–10.5)
CHLORIDE SERPL-SCNC: 102 MMOL/L — SIGNIFICANT CHANGE UP (ref 98–110)
CO2 SERPL-SCNC: 27 MMOL/L — SIGNIFICANT CHANGE UP (ref 17–32)
CREAT SERPL-MCNC: 1.1 MG/DL — SIGNIFICANT CHANGE UP (ref 0.7–1.5)
EGFR: 67 ML/MIN/1.73M2 — SIGNIFICANT CHANGE UP
EOSINOPHIL # BLD AUTO: 0 K/UL — SIGNIFICANT CHANGE UP (ref 0–0.7)
EOSINOPHIL NFR BLD AUTO: 0 % — SIGNIFICANT CHANGE UP (ref 0–8)
GLUCOSE SERPL-MCNC: 134 MG/DL — HIGH (ref 70–99)
HCT VFR BLD CALC: 34.3 % — LOW (ref 42–52)
HGB BLD-MCNC: 12 G/DL — LOW (ref 14–18)
IMM GRANULOCYTES NFR BLD AUTO: 0.7 % — HIGH (ref 0.1–0.3)
LYMPHOCYTES # BLD AUTO: 0.83 K/UL — LOW (ref 1.2–3.4)
LYMPHOCYTES # BLD AUTO: 7.9 % — LOW (ref 20.5–51.1)
MAGNESIUM SERPL-MCNC: 2.2 MG/DL — SIGNIFICANT CHANGE UP (ref 1.8–2.4)
MCHC RBC-ENTMCNC: 35 G/DL — SIGNIFICANT CHANGE UP (ref 32–37)
MCHC RBC-ENTMCNC: 35.1 PG — HIGH (ref 27–31)
MCV RBC AUTO: 100.3 FL — HIGH (ref 80–94)
MONOCYTES # BLD AUTO: 0.69 K/UL — HIGH (ref 0.1–0.6)
MONOCYTES NFR BLD AUTO: 6.6 % — SIGNIFICANT CHANGE UP (ref 1.7–9.3)
NEUTROPHILS # BLD AUTO: 8.85 K/UL — HIGH (ref 1.4–6.5)
NEUTROPHILS NFR BLD AUTO: 84.7 % — HIGH (ref 42.2–75.2)
NRBC # BLD: 0 /100 WBCS — SIGNIFICANT CHANGE UP (ref 0–0)
PLATELET # BLD AUTO: 247 K/UL — SIGNIFICANT CHANGE UP (ref 130–400)
POTASSIUM SERPL-MCNC: 4.4 MMOL/L — SIGNIFICANT CHANGE UP (ref 3.5–5)
POTASSIUM SERPL-SCNC: 4.4 MMOL/L — SIGNIFICANT CHANGE UP (ref 3.5–5)
PROT SERPL-MCNC: 6.2 G/DL — SIGNIFICANT CHANGE UP (ref 6–8)
RBC # BLD: 3.42 M/UL — LOW (ref 4.7–6.1)
RBC # FLD: 16.3 % — HIGH (ref 11.5–14.5)
SODIUM SERPL-SCNC: 139 MMOL/L — SIGNIFICANT CHANGE UP (ref 135–146)
TSH SERPL-MCNC: 0.16 UIU/ML — LOW (ref 0.27–4.2)
WBC # BLD: 10.45 K/UL — SIGNIFICANT CHANGE UP (ref 4.8–10.8)
WBC # FLD AUTO: 10.45 K/UL — SIGNIFICANT CHANGE UP (ref 4.8–10.8)

## 2023-03-06 PROCEDURE — 99239 HOSP IP/OBS DSCHRG MGMT >30: CPT

## 2023-03-06 RX ORDER — SALICYLIC ACID 0.5 %
1 CLEANSER (GRAM) TOPICAL EVERY 12 HOURS
Refills: 0 | Status: DISCONTINUED | OUTPATIENT
Start: 2023-03-06 | End: 2023-03-06

## 2023-03-06 RX ADMIN — SERTRALINE 50 MILLIGRAM(S): 25 TABLET, FILM COATED ORAL at 11:40

## 2023-03-06 RX ADMIN — Medication 25 MILLIGRAM(S): at 05:11

## 2023-03-06 RX ADMIN — PANTOPRAZOLE SODIUM 40 MILLIGRAM(S): 20 TABLET, DELAYED RELEASE ORAL at 06:26

## 2023-03-06 RX ADMIN — Medication 3 MILLILITER(S): at 09:27

## 2023-03-06 RX ADMIN — SIMETHICONE 80 MILLIGRAM(S): 80 TABLET, CHEWABLE ORAL at 11:41

## 2023-03-06 RX ADMIN — MAGNESIUM OXIDE 400 MG ORAL TABLET 400 MILLIGRAM(S): 241.3 TABLET ORAL at 11:40

## 2023-03-06 RX ADMIN — LISINOPRIL 20 MILLIGRAM(S): 2.5 TABLET ORAL at 05:09

## 2023-03-06 RX ADMIN — FINASTERIDE 5 MILLIGRAM(S): 5 TABLET, FILM COATED ORAL at 11:40

## 2023-03-06 RX ADMIN — SIMETHICONE 80 MILLIGRAM(S): 80 TABLET, CHEWABLE ORAL at 05:09

## 2023-03-06 RX ADMIN — Medication 1000 UNIT(S): at 11:41

## 2023-03-06 RX ADMIN — MONTELUKAST 10 MILLIGRAM(S): 4 TABLET, CHEWABLE ORAL at 11:40

## 2023-03-06 RX ADMIN — Medication 60 MILLIGRAM(S): at 05:11

## 2023-03-06 RX ADMIN — Medication 40 MILLIGRAM(S): at 05:10

## 2023-03-06 RX ADMIN — PREGABALIN 1000 MICROGRAM(S): 225 CAPSULE ORAL at 12:19

## 2023-03-06 RX ADMIN — POLYETHYLENE GLYCOL 3350 17 GRAM(S): 17 POWDER, FOR SOLUTION ORAL at 05:12

## 2023-03-06 RX ADMIN — HEPARIN SODIUM 5000 UNIT(S): 5000 INJECTION INTRAVENOUS; SUBCUTANEOUS at 05:11

## 2023-03-06 RX ADMIN — CHLORHEXIDINE GLUCONATE 1 APPLICATION(S): 213 SOLUTION TOPICAL at 05:12

## 2023-03-06 NOTE — DISCHARGE NOTE PROVIDER - HOSPITAL COURSE
82M w/ PMHx of COPD/asthma, HFpEF, HTN, HLD, HO AAA, bladder ca, BPH, p/w dyspnea for 7 days that worsened on the day of admission.     # Acute hypoxemic respiratory failure 2/2 COPD exacerbation - improving  # HFpEF w/o acute exacerbation  No evidence of PNA. Not septic on admission. Not on home O2. Former smoker, quit 10y ago. BNP 2132. Dimer (-), LE duplex w/o DVT. UA, urine strep/legionella (-).  - Repeat TTE w/ EF 69%, G1DD, mod-sev AR, borderline pHTN  - S/p ceftriaxone/azithromycin (3/3 - 3/5); stopped as no evidence of acute infectious process  - C/w Solu-medrol 60mg IVP Q12H -> PO prednisone on discharge  - C/w home Lasix 40mg PO QD  - C/w Symbicort, Singulair 10mg QD  - Duoneb PRN    # Constipation and bloating - resolved  - C/w Miralax BID  - C/w Senna QHS  - C/w simethicone 80mg PO Q6H    # Bladder ca  Follows in [place]. Per patient, s/p cystoscopic tumor removal, no chemo/RT.  - F/u outpatient    # Macrocytic anemia - stable  Folate, TSH, LFTs wnl. B12 360.  - C/w cyanocobalamin 1mg PO QD    # Anxiety  - C/w sertraline 50mg PO QD  - C/w quetiapine 100mg PO QHS    # Essential Hypertension  - C/w metoprolol succinate 25mg PO QD  - C/w lisinopril 20mg PO QD    # Hyperlipidemia - stable  - C/w atorvastatin 20mg PO QHS    # BPH  - C/w finasteride 5mg PO QD  - C/w tamsulosin 0.4mg PO QHS    # Suspicion for vitamin D deficiency  - C/w cholecalciferol 1000u PO QD  - F/u outpatient for vit D level    # Hypomagnesemia - resolved  - C/w magnesium oxide 400mg PO QD    # CKD3 - stable  Baseline Cr ~1.1.    # HO AAA

## 2023-03-06 NOTE — PROGRESS NOTE ADULT - SUBJECTIVE AND OBJECTIVE BOX
LATASHA WATTS  82y  Male  ***My note supersedes ALL resident notes that I sign.  My corrections for their notes are in my note.***    I can be reached directly on [employer]. My office number is [phone]. My personal cell number is [phone].    Nepali Trans: [id number]    INTERVAL EVENTS: Here for f/u of SOB. Pt feels he can go home today. Pt had 4 BM after lactulose and abd feels much less bloated and feels better. Leg edema is gone and pt can walk.    T(F): 96.9 (03-06-23 @ 13:37), Max: 96.9 (03-06-23 @ 04:45)  HR: 80 (03-06-23 @ 13:37) (73 - 84)  BP: 160/56 (03-06-23 @ 13:37) (146/65 - 160/56)  RR: 18 (03-06-23 @ 13:37) (18 - 18)  SpO2: 95% (03-06-23 @ 07:43) (95% - 96%)    Gen: NAD; off O2   HEENT: PERRL, EOMI, mouth clr, nose clr  Neck: no nodes, no JVD, thyroid nl  lungs: b/l wheeze on expiration (faint); no crackles  hrt: s1 s2 rrr no murmur  abd: soft, no distention, no pain no HS megaly  ext: no edema, no c/c  neuro: aa ox3, cn intact, can move all 4 ext    LABS:                      12.0    (    100.3  10.45 )-----------( ---------      247      ( 06 Mar 2023 07:04 )             34.3    (    16.3     139   (   102   (   134      03-06-23 @ 07:04  ----------------------               4.4   (   27   (   35                             -----                        1.1  Ca  9.8   Mg  2.2    P   --     Creatinine:   1.1 (03-06 @ 07:04) - better  eGFR:  67    Creatinine:   1.4 (03-05 @ 08:05)  eGFR:  50    Creatinine:   1.4 (03-04 @ 08:33)  eGFR:  50    Creatinine:   1.2 (03-03 @ 09:27)  eGFR:  61      LFT  6.2  (  0.4  (  67       03-06-23 @ 07:04  -------------------------  3.7  (  87  (  65    Alb 3.7  T lizzy 0.4   AP 87  AST 67  ALT 65  03-06-23 @ 07:04  Alb 4.0  T lizzy 0.3   AP 90  AST 23  ALT 21  03-05-23 @ 08:05  Alb 3.4  T lizzy 0.4   AP 87  AST 21  ALT 19  03-04-23 @ 08:33  Alb 3.6  T lizzy 0.6   AP 98  AST 40  ALT 25  03-03-23 @ 09:27    RADIOLOGY & ADDITIONAL TESTS:  < from: TTE Echo Complete w/o Contrast w/ Doppler (03.05.23 @ 10:27) >  Summary:   1. Normal global left ventricular systolic function with moderate   concentric left ventricular hypertrophy. LV Ejection Fraction by   Montanez's Method with a biplane EF of 69 %.   2. Grade I diastolic dysfunction.   3. Normal right ventricular size and function.   4. Normal left atrial size.   5. Dilatation of the ascending aorta (4.0cm) and aortic root (5.1cm)   with resultant moderate-to-severe aortic regurgitation.   6. Mild mitral valve regurgitation.   7. Borderline pulmonary hypertension (PASP = 39mmHg).   8. No pericardial effusion.   9. Discrepant values of aortic root and ascending aorta noted compared   to prior study (previous measurement of 5.2 for the ascending aorta and   4.7cm for the aortic root). Consider CTA aorta for improved assessment.    < end of copied text >    MEDICATIONS:    albuterol/ipratropium for Nebulization 3 milliLiter(s) Nebulizer every 6 hours  albuterol/ipratropium for Nebulization 3 milliLiter(s) Nebulizer every 4 hours PRN  atorvastatin 20 milliGRAM(s) Oral at bedtime  budesonide 160 MICROgram(s)/formoterol 4.5 MICROgram(s) Inhaler 2 Puff(s) Inhalation two times a day  chlorhexidine 4% Liquid 1 Application(s) Topical <User Schedule>  cholecalciferol 1000 Unit(s) Oral daily  cyanocobalamin 1000 MICROGram(s) Oral daily  finasteride 5 milliGRAM(s) Oral daily  furosemide    Tablet 40 milliGRAM(s) Oral daily  heparin   Injectable 5000 Unit(s) SubCutaneous every 12 hours  lisinopril 20 milliGRAM(s) Oral daily  magnesium oxide 400 milliGRAM(s) Oral daily  methylPREDNISolone sodium succinate Injectable 60 milliGRAM(s) IV Push every 12 hours  metoprolol succinate ER 25 milliGRAM(s) Oral daily  montelukast 10 milliGRAM(s) Oral daily  pantoprazole    Tablet 40 milliGRAM(s) Oral before breakfast  polyethylene glycol 3350 17 Gram(s) Oral every 12 hours  QUEtiapine 100 milliGRAM(s) Oral at bedtime  sertraline 50 milliGRAM(s) Oral daily  simethicone 80 milliGRAM(s) Chew every 6 hours  tamsulosin 0.4 milliGRAM(s) Oral at bedtime  vitamin A &amp; D Ointment 1 Application(s) Topical every 12 hours

## 2023-03-06 NOTE — DISCHARGE NOTE PROVIDER - NSDCCPCAREPLAN_GEN_ALL_CORE_FT
PRINCIPAL DISCHARGE DIAGNOSIS  Diagnosis: COPD exacerbation  Assessment and Plan of Treatment: Chronic obstructive pulmonary disease (COPD) is a lung condition in which airflow from the lungs is limited. Causes include smoking, secondhand smoke exposure, genetics, or recurrent infections. Take all medicines (inhaled or pills) as directed by your health care provider. Avoid exposure to irritants such as smoke, chemicals, and fumes that aggravate your breathing.  If you are a smoker, the most important thing that you can do is stop smoking. Continuing to smoke will cause further lung damage and breathing trouble. Ask your health care provider for help with quitting smoking.  SEEK IMMEDIATE MEDICAL CARE IF YOU HAVE ANY OF THE FOLLOWING SYMPTOMS: shortness of breath at rest or when talking, bluish discoloration of lips, skin, fever, worsening cough, unexplained chest pain, or lightheadedness/dizziness.

## 2023-03-06 NOTE — PROGRESS NOTE ADULT - SUBJECTIVE AND OBJECTIVE BOX
HPI:  82M PMHx COPD/asthma, HFpEF, HTN, HLD, hx AAA, bladder cancer, BPH, BIBEMS to ED for shortness of breath x 7d that worsened this morning. Pt stated he was close to passing out this AM so decided to call ambulance. EMS gave duonebs, decadron, magnesium in scene. Did not take any meds or use any inhalers. Denies fever, chills, chest pain, abd pain, n/v/d. Endorses chronic nonproductive cough. Denies leg pain/swelling.    IN ED, pt O2 sat 92% on RA. Pt noted tachypneic/tachycardic and was placed on BiPAP. CXR revealed bibasilar opacities as well as hazy opacity overlying RLL. EKG rate 79 with old bifascicular block. Lact 1.3, BNP 2132, Trop <0.01, ph wnl. Pt received IV lasix for LE edema + crackles, IV solumedrol for significant wheezing, ceftriaxone/azithro for opacities on CXR, as well as albuterol. At time of exam pt resting comfortably on 3L NC without significant tachypnea (03 Mar 2023 13:51)    Currently admitted to medicine with the primary diagnosis of COPD exacerbation       Today is hospital day 3d.     INTERVAL HPI / OVERNIGHT EVENTS:  Patient was examined and seen at bedside. This morning he is resting comfortably in bed and reports no new issues or overnight events.     ROS: Otherwise unremarkable     PAST MEDICAL & SURGICAL HISTORY  HTN (hypertension)    HLD (hyperlipidemia)    BPH (benign prostatic hyperplasia)    No significant past surgical history      ALLERGIES  No Known Allergies    MEDICATIONS  STANDING MEDICATIONS  albuterol/ipratropium for Nebulization 3 milliLiter(s) Nebulizer every 6 hours  atorvastatin 20 milliGRAM(s) Oral at bedtime  budesonide 160 MICROgram(s)/formoterol 4.5 MICROgram(s) Inhaler 2 Puff(s) Inhalation two times a day  chlorhexidine 4% Liquid 1 Application(s) Topical <User Schedule>  cholecalciferol 1000 Unit(s) Oral daily  cyanocobalamin 1000 MICROGram(s) Oral daily  finasteride 5 milliGRAM(s) Oral daily  furosemide    Tablet 40 milliGRAM(s) Oral daily  heparin   Injectable 5000 Unit(s) SubCutaneous every 12 hours  lisinopril 20 milliGRAM(s) Oral daily  magnesium oxide 400 milliGRAM(s) Oral daily  methylPREDNISolone sodium succinate Injectable 60 milliGRAM(s) IV Push every 12 hours  metoprolol succinate ER 25 milliGRAM(s) Oral daily  montelukast 10 milliGRAM(s) Oral daily  pantoprazole    Tablet 40 milliGRAM(s) Oral before breakfast  polyethylene glycol 3350 17 Gram(s) Oral every 12 hours  QUEtiapine 100 milliGRAM(s) Oral at bedtime  sertraline 50 milliGRAM(s) Oral daily  simethicone 80 milliGRAM(s) Chew every 6 hours  tamsulosin 0.4 milliGRAM(s) Oral at bedtime    PRN MEDICATIONS  albuterol/ipratropium for Nebulization 3 milliLiter(s) Nebulizer every 4 hours PRN    VITALS:  T(F): 96.9  HR: 73  BP: 146/65  RR: 18  SpO2: 95%    PHYSICAL EXAM  GENERAL: NAD, resting comfortably in bed  HEAD:  Atraumatic, Normocephalic  EYES: EOMI, conjunctiva and sclera clear  ENT: Moist mucous membranes  CHEST/LUNG: + mild end-expiratory wheezing bilaterally  HEART: Regular rate and rhythm; No murmurs, rubs, or gallops  ABDOMEN: +BS; Soft, nontender, nondistended  EXTREMITIES:  No clubbing, cyanosis, or edema  NERVOUS SYSTEM:  A&Ox3, follows commands, no sensory or motor deficits    LABS                        12.0   10.45 )-----------( 247      ( 06 Mar 2023 07:04 )             34.3     03-06    139  |  102  |  35<H>  ----------------------------<  134<H>  4.4   |  27  |  1.1    Ca    9.8      06 Mar 2023 07:04  Mg     2.2     03-06    TPro  6.2  /  Alb  3.7  /  TBili  0.4  /  DBili  x   /  AST  67<H>  /  ALT  65<H>  /  AlkPhos  87  03-06

## 2023-03-06 NOTE — DISCHARGE NOTE PROVIDER - NSDCMRMEDTOKEN_GEN_ALL_CORE_FT
Albuterol (Eqv-ProAir HFA) 90 mcg/inh inhalation aerosol: 2 puff(s) inhaled every 6 hours  atorvastatin 10 mg oral tablet: 2 tab(s) orally once a day  esomeprazole 40 mg oral delayed release capsule: 1 cap(s) orally once a day  famotidine 40 mg oral tablet: 1 tab(s) orally once a day (at bedtime)  finasteride 5 mg oral tablet: 1 tab(s) orally once a day (at bedtime)  furosemide 40 mg oral tablet: 1 tab(s) orally once a day  lisinopril 20 mg oral tablet: 1 tab(s) orally once a day  magnesium oxide 400 mg oral tablet: 1 tab(s) orally once a day  metoprolol succinate 25 mg oral tablet, extended release: 1 tab(s) orally once a day  montelukast 10 mg oral tablet: 1 tab(s) orally once a day  Mucinex 600 mg oral tablet, extended release: 1 tab(s) orally every 12 hours  Nephplex Rx oral tablet: 1 tab(s) orally once a day  predniSONE 10 mg oral tablet: 6 tab(s) orally once a day x 1 days  5 tab(s) orally once a day x 1 days  4 tab(s) orally once a day x 1 days  3 tab(s) orally once a day x 1 days  2 tab(s) orally once a day x 1 days  1 tab(s) orally once a day x 1 days  SEROquel XR 50 mg oral tablet, extended release: 2 tab(s) orally once a day (in the evening)  sertraline 50 mg oral tablet: 1 tab(s) orally once a day  spironolactone 25 mg oral tablet: 1 tab(s) orally once a day  tamsulosin 0.4 mg oral capsule: 1 cap(s) orally once a day  Trelegy Ellipta 200 mcg-62.5 mcg-25 mcg/inh inhalation powder: 1 puff(s) inhaled once a day  Tums 500 oral tablet, chewable (obsolete): 1 tab(s) orally once a day  Vascepa 1 g oral capsule: 2 cap(s) orally 2 times a day  Vitamin D3 25 mcg (1000 intl units) oral capsule: 1 cap(s) orally once a day

## 2023-03-06 NOTE — PROGRESS NOTE ADULT - ASSESSMENT
82M w/ PMHx of COPD/asthma, HFpEF, HTN, HLD, HO AAA, bladder ca, BPH, p/w dyspnea for 7 days that worsened on the day of admission.     # Acute hypoxemic respiratory failure 2/2 COPD exacerbation - improving  # HFpEF w/o acute exacerbation  No evidence of PNA. Not septic on admission. Not on home O2. Former smoker, quit 10y ago. BNP 2132. Dimer (-), LE duplex w/o DVT. UA, urine strep/legionella (-).  - Repeat TTE w/ EF 69%, G1DD, mod-sev AR, borderline pHTN  - S/p ceftriaxone/azithromycin (3/3 - 3/5); stopped as no evidence of acute infectious process  - C/w Solu-medrol 60mg IVP Q12H -> switch to prednisone tomorrow if respiratory status continues to improve  - C/w home Lasix 40mg PO QD  - C/w Symbicort, Singulair 10mg QD  - Duoneb PRN    # Constipation and bloating - resolved  - C/w Miralax BID  - C/w Senna QHS  - C/w simethicone 80mg PO Q6H    # Bladder ca  Follows in [place]. Per patient, s/p cystoscopic tumor removal, no chemo/RT.  - F/u outpatient    # Macrocytic anemia - stable  Folate, TSH, LFTs wnl. B12 360.  - C/w cyanocobalamin 1mg PO QD    # Anxiety  - C/w sertraline 50mg PO QD  - C/w quetiapine 100mg PO QHS    # Essential Hypertension  - C/w metoprolol succinate 25mg PO QD  - C/w lisinopril 20mg PO QD    # Hyperlipidemia - stable  - C/w atorvastatin 20mg PO QHS    # BPH  - C/w finasteride 5mg PO QD  - C/w tamsulosin 0.4mg PO QHS    # Suspicion for vitamin D deficiency  - C/w cholecalciferol 1000u PO QD  - F/u outpatient for vit D level    # Hypomagnesemia - resolved  - C/w magnesium oxide 400mg PO QD    # CKD3 - stable  Baseline Cr ~1.1.    # HO AAA    # To follow up  - Switch to prednisone if respiratory status continues to improve  - Possible discharge tomorrow    # Misc  - DVT Prophylaxis: heparin   Injectable  - GI Prophylaxis: pantoprazole    Tablet 40 milliGRAM(s) Oral before breakfast  - Diet: Diet, DASH/TLC  - Activity: Activity - Increase As Tolerated  - Code Status: Full 
82M PMHx COPD/asthma, HFpEF, HTN, HLD, hx AAA, bladder cancer, BPH, BIBEMS to ED for shortness of breath x 7d that worsened morning of admit    # Acute hypoxemic respiratory failure 2/2 COPD Exac and acute ramey CHF  PNA has been RULED OUT  NO SEPSIS  Former smoker - quit 10 yrs ago  RA 91% - can use O2 to keep sat > 90-92% (no home O2)  make solumedrol 60mg iv q12  make lasix 40mg po q24 (usual home dose; Cr up a little)  c/w duoneb  c/w symbicort  c/w singulair 10 q24  Echo 10/2022: EF 67%; G2DD; mod AR; mild pHTN  rpt Echo: pending  BNP 2132  CXR: improving mild RLL infil; no eff  D-dimer: 440 negative for age    V Dupl: neg DVT  Sputum cx: nl christian; Urine leg: neg; Urine strep: neg   d/c abx; cx's neg; procal 0.05    # Constipation and bloating  simethicone 80mg po q6  miralax q12; senna 2 HS  lactulose 20gm q3 x4 doses, then re-eval    # bladder cancer  follows in [place]  says only had removal of tumor via cysto  no chemo or RT  outpt f/u    # Essential Hypertension  toprol xl 25mg q24  lisinopril 20mg q24 (switch to ARB if cough lingers)    # hypomagnesemia  resolved  on Mg ox 400 q24    # CKD 3  base Cr low 1s  stable    # macro anemia  some chr dz  B12 360 - can use B12 1mg po q24  FOL > 20  LFT nl  check TSH    # Hyperlipidemia  c/w lip 20 hs    # BPH  c/w proscar  c/w floamx    # Hx AAA    # suspect Vit D def  c/w suppl  outpt f/u lvl    # Anxiety  c/w zoloft 50 q24  c/w seroquel 100 hs    # A1c 5.9 Oct 2022    # DVT ppx: hep sc    # GI ppx: ppi q24 (d/c pepcid - both not needed)    # Activity: as tolerated    # Code: full code    Dispo: po lasix; d/c abx; iv steroids; O2 prn; fix constipation; B12 oral; TSH; echo  eventually, pt will go home w/ prob no needs - should be ready in 24-48 hrs
82M PMHx COPD/asthma, HFpEF, HTN, HLD, hx AAA, bladder cancer, BPH, BIBEMS to ED for shortness of breath x 7d that worsened morning of admit    # Acute hypoxemic respiratory failure 2/2 COPD Exac and acute ramey CHF  PNA has been RULED OUT; NO SEPSIS  Former smoker - quit 10 yrs ago  RA 91% - can use O2 to keep sat > 90-92% (no home O2)  solumedrol 60mg iv q12 -> convert to Pred 60mg po q24 and taper by 10mg/day til off  c/w lasix 40mg po q24 (usual home dose)  c/w duoneb  c/w symbicort  c/w singulair 10 q24  outpt pulm f/u (he has pulm MD)  Echo 10/2022: EF 67%; G2DD; mod AR; mild pHTN  rpt Echo: EF 69%; conc mod LVH; Grade 1 diastolic dysfunction; mod-sev AR; mild MR; borderline pHTN  outpt cardio eval re aortic valve and thoracic aorta dilation (not requiring sx yet)  BNP 2132  CXR: improving mild RLL infil; no eff  D-dimer: 440 negative for age    V Dupl: neg DVT  Sputum cx: nl christian; Urine leg: neg; Urine strep: neg   d/c abx; cx's neg; procal 0.05    # Constipation and bloating - better  simethicone 80mg po q6  miralax q12; senna 2 HS  s/p lactulose - helped    # bladder cancer  follows in [place]  says only had removal of tumor via cysto  no chemo or RT  outpt f/u    # Essential Hypertension  toprol xl 25mg q24  lisinopril 20mg q24 (switch to ARB if cough lingers)    # hypomagnesemia  resolved  on Mg ox 400 q24    # CKD 3  base Cr low 1s  stable    # macro anemia  some chr dz  B12 360 - can use B12 1mg po q24  FOL > 20  LFT nl  TSH 0.16 (poss subclin hyperthyroidism) -> outpt f/u: rpt TSH and FT4 and anti-TPO Abs in 6 wks (mid Apr 2023)    # Hyperlipidemia  c/w lip 20 hs    # BPH  c/w proscar  c/w floamx    # suspect Vit D def  c/w suppl  outpt f/u lvl    # Anxiety  c/w zoloft 50 q24  c/w seroquel 100 hs    # A1c 5.9 Oct 2022    # DVT ppx: hep sc    # GI ppx: ppi q24 (d/c pepcid - both not needed)    # Activity: as tolerated    # Code: full code    # updated son, Gael Fowler ([phone]), via phone; answered all ques and reviewed above A&P    Dispo: po lasix; oral steroids; B12 oral;   pt will go home w/ no needs - today

## 2023-03-06 NOTE — DISCHARGE NOTE PROVIDER - CARE PROVIDER_API CALL
MICHAEL SANDERS  [facility]  [address] [facility] [address], [address]  [place] [address]  Phone: [phone]  Fax: [phone]  Established Patient  Follow Up Time: 1 week    Floridalma Sweeney; MPH)  Internal Medicine  [address] [place] [address], [address]  [place] [address]  Phone: [phone]  Fax: [phone]  Follow Up Time: 1 week

## 2023-03-06 NOTE — DISCHARGE NOTE NURSING/CASE MANAGEMENT/SOCIAL WORK - PATIENT PORTAL LINK FT
You can access the FollowMyHealth Patient Portal offered by [facility] by registering at the following website: [email or web address]. By joining [employer]’s FollowMyHealth portal, you will also be able to view your health information using other applications (apps) compatible with our system.

## 2023-03-06 NOTE — DISCHARGE NOTE PROVIDER - PROVIDER TOKENS
PROVIDER:[TOKEN:[[id number]],FOLLOWUP:[1 week],ESTABLISHEDPATIENT:[T]],PROVIDER:[TOKEN:[[id number]],FOLLOWUP:[1 week]]

## 2023-03-09 DIAGNOSIS — Z91.199 PATIENT'S NONCOMPLIANCE WITH OTHER MEDICAL TREATMENT AND REGIMEN DUE TO UNSPECIFIED REASON: ICD-10-CM

## 2023-03-09 DIAGNOSIS — Z79.51 LONG TERM (CURRENT) USE OF INHALED STEROIDS: ICD-10-CM

## 2023-03-09 DIAGNOSIS — N18.30 CHRONIC KIDNEY DISEASE, STAGE 3 UNSPECIFIED: ICD-10-CM

## 2023-03-09 DIAGNOSIS — Z85.51 PERSONAL HISTORY OF MALIGNANT NEOPLASM OF BLADDER: ICD-10-CM

## 2023-03-09 DIAGNOSIS — I13.0 HYPERTENSIVE HEART AND CHRONIC KIDNEY DISEASE WITH HEART FAILURE AND STAGE 1 THROUGH STAGE 4 CHRONIC KIDNEY DISEASE, OR UNSPECIFIED CHRONIC KIDNEY DISEASE: ICD-10-CM

## 2023-03-09 DIAGNOSIS — E83.42 HYPOMAGNESEMIA: ICD-10-CM

## 2023-03-09 DIAGNOSIS — J44.1 CHRONIC OBSTRUCTIVE PULMONARY DISEASE WITH (ACUTE) EXACERBATION: ICD-10-CM

## 2023-03-09 DIAGNOSIS — E78.5 HYPERLIPIDEMIA, UNSPECIFIED: ICD-10-CM

## 2023-03-09 DIAGNOSIS — I45.10 UNSPECIFIED RIGHT BUNDLE-BRANCH BLOCK: ICD-10-CM

## 2023-03-09 DIAGNOSIS — I50.31 ACUTE DIASTOLIC (CONGESTIVE) HEART FAILURE: ICD-10-CM

## 2023-03-09 DIAGNOSIS — F41.9 ANXIETY DISORDER, UNSPECIFIED: ICD-10-CM

## 2023-03-09 DIAGNOSIS — J96.01 ACUTE RESPIRATORY FAILURE WITH HYPOXIA: ICD-10-CM

## 2023-03-09 DIAGNOSIS — D53.9 NUTRITIONAL ANEMIA, UNSPECIFIED: ICD-10-CM

## 2023-03-09 DIAGNOSIS — N40.0 BENIGN PROSTATIC HYPERPLASIA WITHOUT LOWER URINARY TRACT SYMPTOMS: ICD-10-CM

## 2023-03-09 DIAGNOSIS — Z87.891 PERSONAL HISTORY OF NICOTINE DEPENDENCE: ICD-10-CM

## 2023-03-09 DIAGNOSIS — K59.00 CONSTIPATION, UNSPECIFIED: ICD-10-CM

## 2023-05-30 NOTE — ED ADULT NURSE NOTE - CAS EDP DISCH DISPOSITION ADMI
Albendazole Counseling:  I discussed with the patient the risks of albendazole including but not limited to cytopenia, kidney damage, nausea/vomiting and severe allergy.  The patient understands that this medication is being used in an off-label manner.
anorexia nervosa/depression
Stepdown

## 2023-06-20 VITALS
WEIGHT: 149.91 LBS | SYSTOLIC BLOOD PRESSURE: 152 MMHG | HEART RATE: 58 BPM | OXYGEN SATURATION: 96 % | RESPIRATION RATE: 15 BRPM | HEIGHT: 65.75 IN | DIASTOLIC BLOOD PRESSURE: 67 MMHG | TEMPERATURE: 97 F

## 2023-06-20 RX ORDER — FUROSEMIDE 40 MG
1 TABLET ORAL
Qty: 0 | Refills: 0 | DISCHARGE

## 2023-06-20 RX ORDER — MONTELUKAST 4 MG/1
1 TABLET, CHEWABLE ORAL
Qty: 0 | Refills: 0 | DISCHARGE

## 2023-06-20 RX ORDER — ICOSAPENT ETHYL 500 MG/1
2 CAPSULE, LIQUID FILLED ORAL
Qty: 0 | Refills: 0 | DISCHARGE

## 2023-06-20 RX ORDER — QUETIAPINE FUMARATE 200 MG/1
2 TABLET, FILM COATED ORAL
Qty: 0 | Refills: 0 | DISCHARGE

## 2023-06-20 RX ORDER — SPIRONOLACTONE 25 MG/1
1 TABLET, FILM COATED ORAL
Qty: 0 | Refills: 0 | DISCHARGE

## 2023-06-20 RX ORDER — FINASTERIDE 5 MG/1
1 TABLET, FILM COATED ORAL
Qty: 0 | Refills: 0 | DISCHARGE

## 2023-06-20 RX ORDER — FLUTICASONE FUROATE, UMECLIDINIUM BROMIDE AND VILANTEROL TRIFENATATE 200; 62.5; 25 UG/1; UG/1; UG/1
1 POWDER RESPIRATORY (INHALATION)
Qty: 0 | Refills: 0 | DISCHARGE

## 2023-06-20 RX ORDER — ATORVASTATIN CALCIUM 80 MG/1
2 TABLET, FILM COATED ORAL
Qty: 0 | Refills: 0 | DISCHARGE

## 2023-06-20 RX ORDER — FAMOTIDINE 10 MG/ML
1 INJECTION INTRAVENOUS
Qty: 0 | Refills: 0 | DISCHARGE

## 2023-06-20 RX ORDER — CHOLECALCIFEROL (VITAMIN D3) 125 MCG
1 CAPSULE ORAL
Qty: 0 | Refills: 0 | DISCHARGE

## 2023-06-20 RX ORDER — ESOMEPRAZOLE MAGNESIUM 40 MG/1
1 CAPSULE, DELAYED RELEASE ORAL
Qty: 0 | Refills: 0 | DISCHARGE

## 2023-06-20 RX ORDER — CALCIUM CARBONATE 500(1250)
1 TABLET ORAL
Qty: 0 | Refills: 0 | DISCHARGE

## 2023-06-20 RX ORDER — MAGNESIUM OXIDE 400 MG ORAL TABLET 241.3 MG
1 TABLET ORAL
Qty: 0 | Refills: 0 | DISCHARGE

## 2023-06-20 RX ORDER — SERTRALINE 25 MG/1
1 TABLET, FILM COATED ORAL
Qty: 0 | Refills: 0 | DISCHARGE

## 2023-06-20 RX ORDER — ALBUTEROL 90 UG/1
2 AEROSOL, METERED ORAL
Qty: 0 | Refills: 0 | DISCHARGE

## 2023-06-20 RX ORDER — TAMSULOSIN HYDROCHLORIDE 0.4 MG/1
1 CAPSULE ORAL
Qty: 0 | Refills: 0 | DISCHARGE

## 2023-06-20 NOTE — H&P ADULT - ASSESSMENT
82-year-old male w/ PMHx of HTN, HLD, COPD (admitted 3/2023 requiring IV steroids and ABx), thoracic aortic aneurysm (10/2022 pt had CT w/ rapid progression to 5.5cm from 5.1cm), Hx of b ladder CA (surgery at [place] per outpatient) who is referred for cardiac catheterization in light of patient's risk factors and worsening CCS Class III anginal symptoms.     ASA II				Mallampati class: II	            Anginal Class: III    - VSS  - H/H stable, patient denies BRBPR, melena, hematuria, or further bleeding.   -  Sedation Plan:   [  ] None   [ x ] Moderate   [  ]  Deep    [  ]  General Anesthesia   - Patient Is Suitable Candidate For Sedation?     [ x ] Yes   [  ] No   [  ] Not Applicable   - Cath Order Entered: [ x ] Yes  - DAPT LOAD Ordered: [  ] Yes  [ x ] No load as patient has documented 5.5 cm thoracic aortic aneurysm and may be seen by cardiothoracic surgery; Dr. Berger (interventional cardiology fellow) made aware and confirms no load.   - PRE-CATH FLUIDS: [ x ] Yes; patient is euvolemic on exam, Cr 1.41, EF 56%.   - ALLERGY: [ x] no inidication for pre-cath steroid prophylaxis   - Informed consent is in the patient's chart. Consent obtained via [place]  Shankar, ID #[id number].     Risks Benefits Annotation:     CARDIAC CATH: Risks & benefits of procedure and sedation and risks and benefits for the alternative therapy have been explained to the patient and/or HCP in layman’s terms including but not limited to: allergic reaction, bleeding, infection, arrhythmia, respiratory compromise, renal and vascular compromise, limb damage, MI, CVA, emergent CABG/Vascular Surgery and death. Informed consent obtained and in chart. 82-year-old male w/ PMHx of HTN, HLD, COPD (admitted 3/2023 requiring IV steroids and ABx), thoracic aortic aneurysm (10/2022 pt had CT w/ rapid progression to 5.5cm from 5.1cm), Hx of b ladder CA (surgery at [place] per outpatient) who is referred for cardiac catheterization in light of patient's risk factors and worsening CCS Class III anginal symptoms.     ASA II				Mallampati class: II	            Anginal Class: III    - VSS  - H/H stable, patient denies BRBPR, melena, hematuria, or further bleeding.   -  Sedation Plan:   [  ] None   [ x ] Moderate   [  ]  Deep    [  ]  General Anesthesia   - Patient Is Suitable Candidate For Sedation?     [ x ] Yes   [  ] No   [  ] Not Applicable   - Cath Order Entered: [ x ] Yes  - DAPT LOAD Ordered: [  ] Yes  [ x ] No load as patient has documented 5.5 cm thoracic aortic aneurysm and may be seen by cardiothoracic surgery; Dr. Berger (interventional cardiology fellow) made aware and confirms not to load patient with ASA or Plavix.    - PRE-CATH FLUIDS: [ x ] Yes; patient is euvolemic on exam, Cr 1.41, EF 56%.   - ALLERGY: [ x] no inidication for pre-cath steroid prophylaxis   - Informed consent is in the patient's chart. Consent obtained via Swedish  Shankar, ID #[id number].     Risks Benefits Annotation:     CARDIAC CATH: Risks & benefits of procedure and sedation and risks and benefits for the alternative therapy have been explained to the patient and/or HCP in layman’s terms including but not limited to: allergic reaction, bleeding, infection, arrhythmia, respiratory compromise, renal and vascular compromise, limb damage, MI, CVA, emergent CABG/Vascular Surgery and death. Informed consent obtained and in chart.

## 2023-06-20 NOTE — H&P ADULT - HISTORY OF PRESENT ILLNESS
CARDIOLOGIST: Dr. 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 Mendieta, endorsing intermittent CP and SOB over the last month.CP is midline, substernal, exertional, progressive, and is relieved with rest. Patient's symptoms occure several times a week and have been increasing in severity, duration, and frequency. Pt reports his exercise tolerance is limited due to chest pain. Pt denies palpitations, dizziness, syncope, LE edema, orthopnea, PND, abdominal pain, N/V, fever/chills.     TTE (5/2023): LVEF 56%, no RWMA, mild-mod AR, mild MR.     In light of patient's risk factors and progressively worsening CCS Class III anginal symptoms, patient now presents to [facility] for cardiac catheterization with possible intervention if clinically indicated.      CARDIOLOGIST: Dr. 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 Mendieta, endorsing intermittent CP and SOB over the last month. CP is midline, substernal, exertional, progressive, and is relieved with rest. Patient's symptoms occure several times a week and have been increasing in severity, duration, and frequency. Pt reports his exercise tolerance is limited due to chest pain. Pt denies palpitations, dizziness, syncope, LE edema, orthopnea, PND, abdominal pain, N/V, fever/chills.     TTE (5/2023): LVEF 56%, no RWMA, mild-mod AR, mild MR.     In light of patient's risk factors and progressively worsening CCS Class III anginal symptoms, patient now presents to [facility] for cardiac catheterization with possible intervention if clinically indicated.      CARDIOLOGIST: Dr. 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 Mendieta, endorsing intermittent CP and SOB over the last month. CP is midline, substernal, exertional, progressive, and is relieved with rest. Patient's symptoms occure several times a week and have been increasing in severity, duration, and frequency. Pt reports his exercise tolerance is limited due to chest pain. Pt denies palpitations, dizziness, syncope, LE edema, orthopnea, PND, abdominal pain, N/V, fever/chills.     TTE (5/2023): LVEF 56%, no RWMA, mild-mod AR, mild MR.     In light of patient's risk factors and progressively worsening CCS Class III anginal symptoms, patient now presents to [facility] for cardiac catheterization with possible intervention if clinically indicated.

## 2023-06-20 NOTE — H&P ADULT - NSICDXPASTMEDICALHX_GEN_ALL_CORE_FT
PAST MEDICAL HISTORY:  BPH (benign prostatic hyperplasia)     HLD (hyperlipidemia)     HTN (hypertension)      PAST MEDICAL HISTORY:  BPH (benign prostatic hyperplasia)     History of COPD     HLD (hyperlipidemia)     HTN (hypertension)     Thoracic aortic aneurysm

## 2023-06-20 NOTE — H&P ADULT - NSHPLABSRESULTS_GEN_ALL_CORE
LABS:                          14.1   5.58  )-----------( 183      ( 23 Jun 2023 06:40 )             42.8     06-23    142  |  107  |  17  ----------------------------<  100<H>  4.0   |  26  |  1.41<H>    Ca    9.2      23 Jun 2023 06:40  Mg     2.3     06-23    TPro  6.9  /  Alb  4.2  /  TBili  0.6  /  DBili  x   /  AST  23  /  ALT  15  /  AlkPhos  78  06-23    LIVER FUNCTIONS - ( 23 Jun 2023 06:40 )  Alb: 4.2 g/dL / Pro: 6.9 g/dL / ALK PHOS: 78 U/L / ALT: 15 U/L / AST: 23 U/L / GGT: x           PT/INR - ( 23 Jun 2023 06:40 )   PT: 10.3 sec;   INR: 0.87          PTT - ( 23 Jun 2023 06:40 )  PTT:30.8 sec    EKG 6/23/23: sinus rhythm w/ 1st degree AV block at 62 bpm, RBBB, LAFB, no acute ischemic changes.

## 2023-06-23 ENCOUNTER — OUTPATIENT (OUTPATIENT)
Dept: OUTPATIENT SERVICES | Facility: HOSPITAL | Age: 83
LOS: 1 days | Discharge: HOME | End: 2023-06-23
Payer: MEDICARE

## 2023-06-23 LAB
A1C WITH ESTIMATED AVERAGE GLUCOSE RESULT: 5.8 % — HIGH (ref 4–5.6)
ALBUMIN SERPL ELPH-MCNC: 4.2 G/DL — SIGNIFICANT CHANGE UP (ref 3.3–5)
ALP SERPL-CCNC: 78 U/L — SIGNIFICANT CHANGE UP (ref 40–120)
ALT FLD-CCNC: 15 U/L — SIGNIFICANT CHANGE UP (ref 10–45)
ANION GAP SERPL CALC-SCNC: 9 MMOL/L — SIGNIFICANT CHANGE UP (ref 5–17)
APTT BLD: 30.8 SEC — SIGNIFICANT CHANGE UP (ref 27.5–35.5)
AST SERPL-CCNC: 23 U/L — SIGNIFICANT CHANGE UP (ref 10–40)
BASE EXCESS BLDV CALC-SCNC: 5.1 MMOL/L — HIGH (ref -2–3)
BASOPHILS # BLD AUTO: 0.06 K/UL — SIGNIFICANT CHANGE UP (ref 0–0.2)
BASOPHILS NFR BLD AUTO: 1.1 % — SIGNIFICANT CHANGE UP (ref 0–2)
BILIRUB SERPL-MCNC: 0.6 MG/DL — SIGNIFICANT CHANGE UP (ref 0.2–1.2)
BUN SERPL-MCNC: 17 MG/DL — SIGNIFICANT CHANGE UP (ref 7–23)
CA-I SERPL-SCNC: 1.2 MMOL/L — SIGNIFICANT CHANGE UP (ref 1.15–1.33)
CALCIUM SERPL-MCNC: 9.2 MG/DL — SIGNIFICANT CHANGE UP (ref 8.4–10.5)
CHLORIDE SERPL-SCNC: 107 MMOL/L — SIGNIFICANT CHANGE UP (ref 96–108)
CHOLEST SERPL-MCNC: 159 MG/DL — SIGNIFICANT CHANGE UP
CK MB CFR SERPL CALC: 1.3 NG/ML — SIGNIFICANT CHANGE UP (ref 0–6.7)
CK SERPL-CCNC: 61 U/L — SIGNIFICANT CHANGE UP (ref 30–200)
CO2 BLDV-SCNC: 34.8 MMOL/L — HIGH (ref 22–26)
CO2 SERPL-SCNC: 26 MMOL/L — SIGNIFICANT CHANGE UP (ref 22–31)
COHGB MFR BLDV: 0.4 % — SIGNIFICANT CHANGE UP
CREAT SERPL-MCNC: 1.41 MG/DL — HIGH (ref 0.5–1.3)
EGFR: 50 ML/MIN/1.73M2 — LOW
EOSINOPHIL # BLD AUTO: 0.15 K/UL — SIGNIFICANT CHANGE UP (ref 0–0.5)
EOSINOPHIL NFR BLD AUTO: 2.7 % — SIGNIFICANT CHANGE UP (ref 0–6)
ESTIMATED AVERAGE GLUCOSE: 120 MG/DL — HIGH (ref 68–114)
GAS PNL BLDV: 139 MMOL/L — SIGNIFICANT CHANGE UP (ref 136–145)
GLUCOSE BLDV-MCNC: 110 MG/DL — HIGH (ref 70–99)
GLUCOSE SERPL-MCNC: 100 MG/DL — HIGH (ref 70–99)
HCO3 BLDV-SCNC: 33 MMOL/L — HIGH (ref 22–29)
HCT VFR BLD CALC: 42.8 % — SIGNIFICANT CHANGE UP (ref 39–50)
HCT VFR BLDA CALC: 43 % — SIGNIFICANT CHANGE UP
HDLC SERPL-MCNC: 45 MG/DL — SIGNIFICANT CHANGE UP
HGB BLD CALC-MCNC: 14.3 G/DL — SIGNIFICANT CHANGE UP (ref 12.6–17.4)
HGB BLD-MCNC: 14.1 G/DL — SIGNIFICANT CHANGE UP (ref 13–17)
IMM GRANULOCYTES NFR BLD AUTO: 0.2 % — SIGNIFICANT CHANGE UP (ref 0–0.9)
INR BLD: 0.87 — LOW (ref 0.88–1.16)
ISTAT INR: 1 — SIGNIFICANT CHANGE UP (ref 0.88–1.16)
ISTAT PT: 11.8 SEC — SIGNIFICANT CHANGE UP (ref 10–12.9)
LIPID PNL WITH DIRECT LDL SERPL: 84 MG/DL — SIGNIFICANT CHANGE UP
LYMPHOCYTES # BLD AUTO: 2.33 K/UL — SIGNIFICANT CHANGE UP (ref 1–3.3)
LYMPHOCYTES # BLD AUTO: 41.8 % — SIGNIFICANT CHANGE UP (ref 13–44)
MAGNESIUM SERPL-MCNC: 2.3 MG/DL — SIGNIFICANT CHANGE UP (ref 1.6–2.6)
MCHC RBC-ENTMCNC: 30.3 PG — SIGNIFICANT CHANGE UP (ref 27–34)
MCHC RBC-ENTMCNC: 32.9 GM/DL — SIGNIFICANT CHANGE UP (ref 32–36)
MCV RBC AUTO: 92 FL — SIGNIFICANT CHANGE UP (ref 80–100)
METHGB MFR BLDV: 0 % — SIGNIFICANT CHANGE UP
MONOCYTES # BLD AUTO: 0.71 K/UL — SIGNIFICANT CHANGE UP (ref 0–0.9)
MONOCYTES NFR BLD AUTO: 12.7 % — SIGNIFICANT CHANGE UP (ref 2–14)
NEUTROPHILS # BLD AUTO: 2.32 K/UL — SIGNIFICANT CHANGE UP (ref 1.8–7.4)
NEUTROPHILS NFR BLD AUTO: 41.5 % — LOW (ref 43–77)
NON HDL CHOLESTEROL: 114 MG/DL — SIGNIFICANT CHANGE UP
NRBC # BLD: 0 /100 WBCS — SIGNIFICANT CHANGE UP (ref 0–0)
PCO2 BLDV: 61 MMHG — HIGH (ref 42–55)
PH BLDV: 7.34 — SIGNIFICANT CHANGE UP (ref 7.32–7.43)
PLATELET # BLD AUTO: 183 K/UL — SIGNIFICANT CHANGE UP (ref 150–400)
PO2 BLDV: <33 MMHG — LOW (ref 25–45)
POCT ISTAT CREATININE: 1.6 MG/DL — HIGH (ref 0.5–1.3)
POTASSIUM BLDV-SCNC: 4.9 MMOL/L — SIGNIFICANT CHANGE UP (ref 3.5–5.1)
POTASSIUM SERPL-MCNC: 4 MMOL/L — SIGNIFICANT CHANGE UP (ref 3.5–5.3)
POTASSIUM SERPL-SCNC: 4 MMOL/L — SIGNIFICANT CHANGE UP (ref 3.5–5.3)
PROT SERPL-MCNC: 6.9 G/DL — SIGNIFICANT CHANGE UP (ref 6–8.3)
PROTHROM AB SERPL-ACNC: 10.3 SEC — LOW (ref 10.5–13.4)
RBC # BLD: 4.65 M/UL — SIGNIFICANT CHANGE UP (ref 4.2–5.8)
RBC # FLD: 13.5 % — SIGNIFICANT CHANGE UP (ref 10.3–14.5)
SAO2 % BLDV: 27 % — LOW (ref 67–88)
SODIUM SERPL-SCNC: 142 MMOL/L — SIGNIFICANT CHANGE UP (ref 135–145)
TRIGL SERPL-MCNC: 150 MG/DL — HIGH
TSH SERPL-MCNC: 4.61 UIU/ML — HIGH (ref 0.27–4.2)
WBC # BLD: 5.58 K/UL — SIGNIFICANT CHANGE UP (ref 3.8–10.5)
WBC # FLD AUTO: 5.58 K/UL — SIGNIFICANT CHANGE UP (ref 3.8–10.5)

## 2023-06-23 PROCEDURE — 93458 L HRT ARTERY/VENTRICLE ANGIO: CPT | Mod: 26

## 2023-06-23 PROCEDURE — C1769: CPT

## 2023-06-23 PROCEDURE — 85730 THROMBOPLASTIN TIME PARTIAL: CPT

## 2023-06-23 PROCEDURE — 82565 ASSAY OF CREATININE: CPT

## 2023-06-23 PROCEDURE — 80061 LIPID PANEL: CPT

## 2023-06-23 PROCEDURE — 36415 COLL VENOUS BLD VENIPUNCTURE: CPT

## 2023-06-23 PROCEDURE — 84443 ASSAY THYROID STIM HORMONE: CPT

## 2023-06-23 PROCEDURE — C1887: CPT

## 2023-06-23 PROCEDURE — 82803 BLOOD GASES ANY COMBINATION: CPT

## 2023-06-23 PROCEDURE — 93458 L HRT ARTERY/VENTRICLE ANGIO: CPT

## 2023-06-23 PROCEDURE — 71045 X-RAY EXAM CHEST 1 VIEW: CPT

## 2023-06-23 PROCEDURE — 99152 MOD SED SAME PHYS/QHP 5/>YRS: CPT

## 2023-06-23 PROCEDURE — 83036 HEMOGLOBIN GLYCOSYLATED A1C: CPT

## 2023-06-23 PROCEDURE — 99153 MOD SED SAME PHYS/QHP EA: CPT

## 2023-06-23 PROCEDURE — 82553 CREATINE MB FRACTION: CPT

## 2023-06-23 PROCEDURE — 83735 ASSAY OF MAGNESIUM: CPT

## 2023-06-23 PROCEDURE — 85025 COMPLETE CBC W/AUTO DIFF WBC: CPT

## 2023-06-23 PROCEDURE — 80053 COMPREHEN METABOLIC PANEL: CPT

## 2023-06-23 PROCEDURE — C1894: CPT

## 2023-06-23 PROCEDURE — 93005 ELECTROCARDIOGRAM TRACING: CPT

## 2023-06-23 PROCEDURE — 93010 ELECTROCARDIOGRAM REPORT: CPT

## 2023-06-23 PROCEDURE — 82550 ASSAY OF CK (CPK): CPT

## 2023-06-23 PROCEDURE — 85610 PROTHROMBIN TIME: CPT

## 2023-06-23 PROCEDURE — 71045 X-RAY EXAM CHEST 1 VIEW: CPT | Mod: 26

## 2023-06-23 RX ORDER — FINASTERIDE 5 MG/1
1 TABLET, FILM COATED ORAL
Refills: 0 | DISCHARGE

## 2023-06-23 RX ORDER — TAMSULOSIN HYDROCHLORIDE 0.4 MG/1
1 CAPSULE ORAL
Refills: 0 | DISCHARGE

## 2023-06-23 RX ORDER — SODIUM CHLORIDE 9 MG/ML
250 INJECTION INTRAMUSCULAR; INTRAVENOUS; SUBCUTANEOUS ONCE
Refills: 0 | Status: COMPLETED | OUTPATIENT
Start: 2023-06-23 | End: 2023-06-23

## 2023-06-23 RX ORDER — ESOMEPRAZOLE MAGNESIUM 40 MG/1
1 CAPSULE, DELAYED RELEASE ORAL
Refills: 0 | DISCHARGE

## 2023-06-23 RX ORDER — LISINOPRIL 2.5 MG/1
1 TABLET ORAL
Refills: 0 | DISCHARGE

## 2023-06-23 RX ORDER — SODIUM CHLORIDE 9 MG/ML
500 INJECTION INTRAMUSCULAR; INTRAVENOUS; SUBCUTANEOUS
Refills: 0 | Status: DISCONTINUED | OUTPATIENT
Start: 2023-06-23 | End: 2023-06-23

## 2023-06-23 RX ORDER — SPIRONOLACTONE 25 MG/1
1 TABLET, FILM COATED ORAL
Refills: 0 | DISCHARGE

## 2023-06-23 RX ORDER — FAMOTIDINE 10 MG/ML
1 INJECTION INTRAVENOUS
Refills: 0 | DISCHARGE

## 2023-06-23 RX ORDER — CHLORHEXIDINE GLUCONATE 213 G/1000ML
1 SOLUTION TOPICAL ONCE
Refills: 0 | Status: DISCONTINUED | OUTPATIENT
Start: 2023-06-23 | End: 2023-06-23

## 2023-06-23 RX ORDER — CHOLECALCIFEROL (VITAMIN D3) 125 MCG
1 CAPSULE ORAL
Refills: 0 | DISCHARGE

## 2023-06-23 RX ORDER — LISINOPRIL/HYDROCHLOROTHIAZIDE 10-12.5 MG
1 TABLET ORAL
Refills: 0 | DISCHARGE

## 2023-06-23 RX ORDER — SODIUM CHLORIDE 9 MG/ML
500 INJECTION INTRAMUSCULAR; INTRAVENOUS; SUBCUTANEOUS
Refills: 0 | Status: DISCONTINUED | OUTPATIENT
Start: 2023-06-23 | End: 2023-07-07

## 2023-06-23 RX ORDER — SERTRALINE 25 MG/1
1 TABLET, FILM COATED ORAL
Refills: 0 | DISCHARGE

## 2023-06-23 RX ORDER — MAGNESIUM OXIDE 400 MG ORAL TABLET 241.3 MG
1 TABLET ORAL
Refills: 0 | DISCHARGE

## 2023-06-23 RX ORDER — FUROSEMIDE 40 MG
1 TABLET ORAL
Refills: 0 | DISCHARGE

## 2023-06-23 RX ORDER — ALBUTEROL 90 UG/1
2 AEROSOL, METERED ORAL
Refills: 0 | DISCHARGE

## 2023-06-23 RX ORDER — ICOSAPENT ETHYL 500 MG/1
2 CAPSULE, LIQUID FILLED ORAL
Refills: 0 | DISCHARGE

## 2023-06-23 RX ORDER — ASPIRIN/CALCIUM CARB/MAGNESIUM 324 MG
1 TABLET ORAL
Refills: 0 | DISCHARGE

## 2023-06-23 RX ORDER — ATORVASTATIN CALCIUM 80 MG/1
1 TABLET, FILM COATED ORAL
Refills: 0 | DISCHARGE

## 2023-06-23 RX ORDER — ROSUVASTATIN CALCIUM 5 MG/1
1 TABLET ORAL
Refills: 0 | DISCHARGE

## 2023-06-23 RX ORDER — QUETIAPINE FUMARATE 200 MG/1
1 TABLET, FILM COATED ORAL
Refills: 0 | DISCHARGE

## 2023-06-23 RX ADMIN — SODIUM CHLORIDE 50 MILLILITER(S): 9 INJECTION INTRAMUSCULAR; INTRAVENOUS; SUBCUTANEOUS at 07:55

## 2023-06-23 RX ADMIN — SODIUM CHLORIDE 500 MILLILITER(S): 9 INJECTION INTRAMUSCULAR; INTRAVENOUS; SUBCUTANEOUS at 07:54

## 2023-06-23 RX ADMIN — SODIUM CHLORIDE 136 MILLILITER(S): 9 INJECTION INTRAMUSCULAR; INTRAVENOUS; SUBCUTANEOUS at 10:34

## 2023-06-23 NOTE — PROGRESS NOTE ADULT - SUBJECTIVE AND OBJECTIVE BOX
Interventional Cardiology PA SDA Discharge Note    Patient without complaints. 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     Pt is now s/p cardiac cath on 6/23/23 revealing nonobstructive CAD, access via right radial artery. No EDP. EF 54% by echo. Dr. Villareal's team (CTS) was consulted post cath regaring expanding aortic aneurysm; patient will f/u with them outpt.       1.	Stable for discharge as per attending Dr. Garcias after bed rest, pt voids, groin/wrist stable and 30 minutes of ambulation.  2.	Follow-up with PMD/Cardiologist Dr. Mendieta in 1-2 weeks. He will also follow up with CTS Dr. Villareal's office in 6 months for repeat imaging studies regarding aortic aneurysm.   3.	Discharged forms signed and copies in chart    Interventional Cardiology PA SDA Discharge Note    Patient without complaints. Ambulated and voided without difficulties    Afebrile, VSS    Ext: Right Radial: no hematoma, no bleeding, dressing; C/D/I      Pulses:    intact RAD to baseline     A/P:  Pt is 81yo Male w/ PMHx of HTN, HLD, COPD (admitted 3/2023 requiring IV steroids and ABx), thoracic aortic aneurysm (10/2022 pt had CT w/ rapid progression to 5.5cm from 5.1cm), Hx of bladder CA (surgery at [place] per outpatient) who presented to [facility] cardiac cath for cardiac catheterization with possible intervention if clinically indicated in light of pt's [place] factors and progressively worsening CCS class III anginal sx.     Pt is now s/p cardiac cath on 6/23/23 revealing nonobstructive CAD, access via right radial artery. No EDP. EF 54% by echo. Dr. Villareal's team (CTS) was consulted post cath regarding expanding aortic aneurysm; patient will f/u with them outpt.       1.	Stable for discharge as per attending Dr. Garcias after bed rest, pt voids, groin/wrist stable and 30 minutes of ambulation.  2.	Follow-up with PMD/Cardiologist Dr. Mendieta in 1-2 weeks. He will also follow up with CTS Dr. Villareal's office in 6 months for repeat imaging studies regarding aortic aneurysm.   3.	Discharged forms signed and copies in chart

## 2023-06-26 DIAGNOSIS — I25.10 ATHEROSCLEROTIC HEART DISEASE OF NATIVE CORONARY ARTERY WITHOUT ANGINA PECTORIS: ICD-10-CM

## 2023-07-11 PROBLEM — I71.20 THORACIC AORTIC ANEURYSM, WITHOUT RUPTURE, UNSPECIFIED: Chronic | Status: ACTIVE | Noted: 2023-06-23

## 2023-07-11 PROBLEM — Z87.09 PERSONAL HISTORY OF OTHER DISEASES OF THE RESPIRATORY SYSTEM: Chronic | Status: ACTIVE | Noted: 2023-06-23

## 2023-08-02 NOTE — PATIENT PROFILE ADULT - MEDICATIONS/VISITS
"Chief Complaint   Patient presents with    Ent Problem     Pt here with mom and in person  for ear follow up.  Pt has drainage from the right ear.       Temp 98  F (36.7  C) (Temporal)   Ht 3' 2.58\" (98 cm)   Wt 28 lb 3.2 oz (12.8 kg)   BMI 13.32 kg/m      Jordana Madera    " no

## 2023-10-02 NOTE — ED PROVIDER NOTE - NS ED MD EM SELECTION
H&P EXAM - Outpatient Endoscopy   Caridad Escoto 60 y.o. male MRN: [id number]    [facility]   Encounter: [id number]        History and Physical - [facility]  Caridad Escoto 60 y.o. male MRN: [id number]                  HPI: Caridad Escoto is a 60y.JAE Franks ORIF;  Surgeon: Sixto Rachel [id number] [facility]

## 2023-12-04 DIAGNOSIS — I71.20 THORACIC AORTIC ANEURYSM, WITHOUT RUPTURE, UNSPECIFIED: ICD-10-CM

## 2023-12-15 NOTE — PATIENT PROFILE ADULT - NSPROMEDSADMININFO_GEN_A_NUR
Coronary artery calcium score at 296 May 2023  Explain the results and the atherosclerosis process with also detailed explanation of the treatment with statin therapy.  Recommend aspirin 81 mg daily  Optimize statin therapy to reduce LDL to less than 100  Patient was just started on Lipitor 10 mg daily we will check her lipid panel and LFTs in 3 months.  Patient is asymptomatic at this time.  Her blood pressure is normotensive without any antihypertensives  If patient has any further complaints or new symptoms she will let us know otherwise follow-up in 6 months  
Echocardiogram ordered since appears to be an ejection systolic murmur could be sclerosis of the aortic valve versus aortic stenosis.  
Lab Results   Component Value Date    CHOLESTEROL 155 10/04/2023    TRIGLYCERIDE 103 10/04/2023    HDL 55 10/04/2023    CALCLDL 79 10/04/2023     LDL triglyceride and HDL at goal.  Continue taking Lipitor 10 mg daily.    
no concerns

## 2024-01-08 NOTE — ED ADULT NURSE NOTE - CAS TRG GEN SKIN COLOR
Detail Level: Detailed Quality 431: Preventive Care And Screening: Unhealthy Alcohol Use - Screening: Patient not identified as an unhealthy alcohol user when screened for unhealthy alcohol use using a systematic screening method Quality 226: Preventive Care And Screening: Tobacco Use: Screening And Cessation Intervention: Patient screened for tobacco use and is an ex/non-smoker Normal for race

## 2024-01-09 ENCOUNTER — NON-APPOINTMENT (OUTPATIENT)
Age: 84
End: 2024-01-09

## 2024-01-10 NOTE — HISTORY OF PRESENT ILLNESS
[FreeTextEntry1] : 83 M w/ PMHx of COPD/asthma, HFpEF, HTN, HLD, bladder ca, BPH, presents for a follow up visit for evaluation and management of thoracic aortic aneurysm.   TTE 5/30/23 EF 56%. mild to moderate aortic regurgitation. mild mitral regurgitation.  aortic root 5.4cm. ascending aorta 4.7cm.   CTA chest 6/14/23 aortic root 5.5cm, previously 5.1cm. ascending aorta measures 4.7cm. aortic arch 4.4cm. proximal descending aorta measures 3.6cm.  Refer to report for additional findings.   Repeat CTA chest _________

## 2024-05-15 ENCOUNTER — APPOINTMENT (OUTPATIENT)
Dept: CARDIOTHORACIC SURGERY | Facility: CLINIC | Age: 84
End: 2024-05-15

## 2024-09-06 ENCOUNTER — EMERGENCY (EMERGENCY)
Facility: HOSPITAL | Age: 84
LOS: 0 days | Discharge: HOME | End: 2024-09-07
Attending: EMERGENCY MEDICINE
Payer: MEDICARE

## 2024-09-06 VITALS
RESPIRATION RATE: 17 BRPM | WEIGHT: 157.41 LBS | SYSTOLIC BLOOD PRESSURE: 136 MMHG | DIASTOLIC BLOOD PRESSURE: 68 MMHG | TEMPERATURE: 99 F | OXYGEN SATURATION: 95 % | HEART RATE: 86 BPM | HEIGHT: 64 IN

## 2024-09-06 PROCEDURE — 99285 EMERGENCY DEPT VISIT HI MDM: CPT

## 2024-09-06 PROCEDURE — 51702 INSERT TEMP BLADDER CATH: CPT

## 2024-09-06 PROCEDURE — 99284 EMERGENCY DEPT VISIT MOD MDM: CPT | Mod: 25

## 2024-09-06 NOTE — ED PROVIDER NOTE - PATIENT PORTAL LINK FT
You can access the FollowMyHealth Patient Portal offered by [facility] by registering at the following website: [email or web address]. By joining [employer]’s FollowMyHealth portal, you will also be able to view your health information using other applications (apps) compatible with our system. You can access the FollowMyHealth Patient Portal offered by [facility] by registering at the following website: [email or web address]. By joining [employer]’s FollowMyHealth portal, you will also be able to view your health information using other applications (apps) compatible with our system.

## 2024-09-06 NOTE — ED PROVIDER NOTE - OBJECTIVE STATEMENT
84 year old male, past medical history htn, bladder ca, who presents with urinary retention. patient with cystoscopy performed this morning with urologist @ [facility], minimal urine output since with hematuria prompting ed eval. denies f/c, nausea/vomiting, bowel changes.

## 2024-09-06 NOTE — ED PROVIDER NOTE - ATTENDING APP SHARED VISIT CONTRIBUTION OF CARE
84-year-old male with past ministry of hypertension, bladder cancer, who had a cystoscopy this morning presents to the emergency department for minimal urine output in the leg bag associate with hematuria.  Patient also states he has had some abdominal tenderness and distention.    Const: No apparent distress  Eyes: PERRL, no conjunctival injection  HENT:  Neck supple without meningismus   CV: RRR, Warm, well-perfused extremities  RESP: CTA B/L, no tachypnea   GI: soft,+-tender, +distended, painter in with blood around urethra meatus, cranberry colored urine   MSK: No gross deformities appreciated  Skin: Warm, dry. No rashes  Neuro: Alert, CNs II-XII grossly intact. Sensation and motor function of extremities grossly intact.  Psych: Appropriate mood and affect.

## 2024-09-06 NOTE — ED PROVIDER NOTE - NSFOLLOWUPINSTRUCTIONS_ED_ALL_ED_FT
Please follow up with your primary care doctor and urologist in 1-3 days   Please be aware of any new or worsening signs or symptoms that should prompt your return to the ER.      Acute Urinary Retention, Male  Acute urinary retention is a condition in which a person is unable to pass urine. This can last for a short time or for a long time. If left untreated, it can result in kidney damage or other serious complications.    What are the causes?  This condition may be caused by:    Obstruction or narrowing of the tube that drains the bladder (urethra). This may be caused by surgery or problems with nearby organs, such as the prostate gland, which can press or squeeze the urethra.  Problems with the nerves in the bladder. These can be caused by diseases, such as multiple sclerosis, or by spinal cord injuries.  Certain medicines.  Tumors in the area of the pelvis, bladder, or urethra.  Diabetes.  Degenerative cognitive conditions such as delirium or dementia.  Bladder or urinary tract infection.  Constipation.  Blood in the urine (hematuria).  Injury to the bladder or urethra.   Psychological (psychogenic) conditions. Someone may hold his urine due to trauma or because he does not want to use the bathroom.    What increases the risk?  This condition is more likely to develop in older men. As men age, their prostate may become larger and may start pressing or squeezing on the bladder or the urethra.    What are the signs or symptoms?  Symptoms of this condition include:    Trouble urinating.  Pain in the lower abdomen.    Symptoms usually come on slowly over a long period of time.    How is this diagnosed?  This condition is diagnosed based on a physical exam and a medical history. You may also have other tests, including:    An ultrasound of the bladder or kidneys or both.  Blood tests.  A urine analysis.  Additional tests may be needed such as an MRI, kidney, or bladder function tests.    How is this treated?  Treatment for this condition may include:    Medicines.  Placing a thin, sterile tube (catheter) into the bladder to drain urine out of the body. This is called an indwelling urinary catheter. After being inserted, the catheter is held in place with a small balloon that is filled with sterile water. Urine drains from the catheter into a collection bag outside of the body.  Behavioral therapy.  Treatment for any underlying conditions.  If needed, you may be treated in the hospital for kidney function problems or to manage other complications.    Follow these instructions at home:  Take over-the-counter and prescription medicines only as told by your health care provider. Avoid certain medicines, such as decongestants, antihistamines, and some prescription medicines. Do not take any medicine unless your health care provider has approved.  If you were given an indwelling urinary catheter, take care of it as told by your health care provider.  Drink enough fluid to keep your urine clear or pale yellow.  If you were prescribed an antibiotic, take it as told by your health care provider. Do not stop taking the antibiotic even if you start to feel better.  Do not use any products that contain nicotine or tobacco, such as cigarettes and e-cigarettes. If you need help quitting, ask your health care provider.  Monitor any changes in your symptoms. Tell your health care provider about any changes.  If instructed, monitor your blood pressure at home. Report changes as told by your health care provider.  Keep all follow-up visits as told by your health care provider. This is important.  Contact a health care provider if:  You have uncomfortable bladder contractions that you cannot control (spasms) or you leak urine with the spasms.  Get help right away if:  You have chills or fever.  You have blood in your urine.  You have a catheter and:    Your catheter stops draining urine.  Your catheter falls out.    Summary  Acute urinary retention is a condition in which a person is unable to pass urine. If left untreated, it can result in kidney damage or other serious complications.  The cause of this condition may include an enlarged prostate. As men age, their prostate gland may become larger and may start pressing or squeezing on the bladder or the urethra.  Treatment for this condition may include medicines and placement of an indwelling urinary catheter.  Monitor any changes in your symptoms. Tell your health care provider about any changes.  This information is not intended to replace advice given to you by your health care provider. Make sure you discuss any questions you have with your health care provider.

## 2024-09-06 NOTE — ED ADULT TRIAGE NOTE - CHIEF COMPLAINT QUOTE
Pt BIB daughter c/o bladder pain, decreased urine output from indwelling catheter, +grossly bloody urine output. Pt has bladder cancer, s/p cystoscopy w/ transurethral resection of bladder today @ [facility], started taking Cephalexin s/p sx. Pt on ASA.

## 2024-09-06 NOTE — ED PROVIDER NOTE - PHYSICAL EXAMINATION
CONSTITUTIONAL: non-toxic appearing male, no acute distress   SKIN: skin exam is warm and dry  HEAD: Normocephalic; atraumatic  NECK: ROM intact.  ABD: soft; +suprapubic tenderness, no rebound/guarding   EXT: Normal ROM.   NEURO: awake, alert, following commands, oriented, grossly unremarkable. No Focal deficits. GCS 15.   PSYCH: Cooperative, appropriate.

## 2024-09-06 NOTE — CONSULT NOTE ADULT - ASSESSMENT
85 y/o male with bladder ca, s/p cystoscopy TURBT today. Now painter catheter not draining and has hematuria.  - Painter cath repositioned and started draining well, clear yellow urine. Pt states he had immediate relief of presenting symptoms.  - Cont cath to leg bag  - f/u as o/p with  as scheduled  - Return to ED if painter cath not draining  - Pt understands and agrees with plan.

## 2024-09-06 NOTE — ED PROVIDER NOTE - PROGRESS NOTE DETAILS
urology @ bedside, bedside bladder us with retention, urology adjusted painter with +urine output that is clear with resolution of pain. patient has scheduled appt with outpatient urologist tomorrow. patient and family educated on signs and symptoms to be aware of that should prompt return to the er. patient and family verbalize understanding and are agreeable with plan.

## 2024-09-06 NOTE — CONSULT NOTE ADULT - SUBJECTIVE AND OBJECTIVE BOX
HPI:  85 y/o male with hx of Bladder ca. S/p cysto TURBT today at [facility] presents with hematuria and non draining painter cath for the past 5 hours. States he feels alot of suprapubic pressure and is in moderate discomfort. Foely has only been draining blood and his bladder is distended. Denies any fevers, chills, N/V. Ultrasound at bedside shows distended bladder without painter balloon in correct position.   Translation offered by granddaughter at bedside.  offered but pt declined.     PAST MEDICAL & SURGICAL HISTORY:  HTN (hypertension)  HLD (hyperlipidemia)  BPH (benign prostatic hyperplasia)  History of COPD  Thoracic aortic aneurysm  No significant past surgical history    Allergies  No Known Allergies    SOCIAL HISTORY: No illicit drug use    FAMILY HISTORY:  No pertinent family history in first degree relatives    REVIEW OF SYSTEMS   [x] A ten-point review of systems was otherwise negative except as noted.    Vital Signs Last 24 Hrs  T(C): 37 (06 Sep 2024 21:31), Max: 37 (06 Sep 2024 21:31)  T(F): 98.6 (06 Sep 2024 21:31), Max: 98.6 (06 Sep 2024 21:31)  HR: 86 (06 Sep 2024 21:31) (86 - 86)  BP: 136/68 (06 Sep 2024 21:31) (136/68 - 136/68)  RR: 17 (06 Sep 2024 21:31) (17 - 17)  SpO2: 95% (06 Sep 2024 21:31) (95% - 95%)    Parameters below as of 06 Sep 2024 21:31  Patient On (Oxygen Delivery Method): room air    PHYSICAL EXAM:  GEN: NAD, awake and alert.  SKIN: Good color, non diaphoretic.  RESP: Non-labored breathing. No use of accessory muscles.  CARDIO: +S1/S2  ABDO: Distended bladder. + suprapubic tenderness  BACK: No CVAT B/L  : 16f cath draining bloody urine.  EXT: CURTIS x 4

## 2024-09-07 NOTE — ED ADULT NURSE NOTE - CHIEF COMPLAINT QUOTE
Pt BIB daughter c/o bladder pain, decreased urine output from indwelling catheter, +grossly bloody urine output. Pt has bladder cancer, s/p cystoscopy w/ transurethral resection of bladder today @ [facility], started taking Cephalexin s/p sx. Pt on ASA.

## 2024-09-07 NOTE — PROCEDURE NOTE - NSAPPROACH_GEN_A_CORE
Case Management Initial Discharge Plan  Maggy Dillon,             Met with:patient to discuss discharge plans. Information verified: address, contacts, phone number, , insurance Yes  PCP: Lane Anne DO  Date of last visit: 2022 Brien Gibbs Provider: Nilson Treviño    Discharge Planning    Living Arrangements:  Spouse/Significant Other,Children   Support Systems:  Spouse/Significant Other,Children,Family Members    Home has 2 stories  3 stairs to climb to get into front door, 12 stairs to climb to reach second floor  Location of bedroom/bathroom in home  - second floor    Patient able to perform ADL's:Independent    Current Services (outpatient & in home) none  DME equipment: BIPAP, walker, crutches, shower chair, glucometer  DME provider: [address] [place] [address]: [facility]    Potential Assistance Needed: Follow with [address]    Patient agreeable to home care: No  [place] of choice provided:  n/a    Prior SNF/Rehab Placement and Facility: No  Agreeable to SNF/Rehab: No  [place] of choice provided: n/a   Evaluation: n/a    Expected Discharge date:   22  Patient expects to be discharged to:   home  Follow Up Appointment: Best Day/ Time:      Transportation provider:   Transportation arrangements needed for discharge: No    Readmission Risk              Risk of Unplanned Readmission:  0             Does patient have a readmission risk score greater than 14?: No  If yes, follow-up appointment must be made within 7 days of discharge. Goal of Care:       Discharge Plan: Met with patient at bedside. Lives with wife and son, independent and works full time. Has had non healing wound 2nd digit rt foot since February treated with IV ATB. POD #1 rt foot 2nd digit arthroplasty and application of graft lt foot. Follows with Dr. Clark Victor.     Also follows at [address] under Dr. Gary Altman and has appt  at 1pm.  Dressings bilateral feet to remain via natural/artificial opening

## 2024-09-25 ENCOUNTER — INPATIENT (INPATIENT)
Facility: HOSPITAL | Age: 84
LOS: 2 days | Discharge: HOME | DRG: 193 | End: 2024-09-28
Attending: INTERNAL MEDICINE | Admitting: INTERNAL MEDICINE
Payer: MEDICARE

## 2024-09-25 VITALS
DIASTOLIC BLOOD PRESSURE: 55 MMHG | TEMPERATURE: 98 F | HEIGHT: 64 IN | RESPIRATION RATE: 18 BRPM | SYSTOLIC BLOOD PRESSURE: 166 MMHG | HEART RATE: 98 BPM | WEIGHT: 149.91 LBS | OXYGEN SATURATION: 98 %

## 2024-09-25 LAB
ALBUMIN SERPL ELPH-MCNC: 3.8 G/DL — SIGNIFICANT CHANGE UP (ref 3.5–5.2)
ALP SERPL-CCNC: 78 U/L — SIGNIFICANT CHANGE UP (ref 30–115)
ALT FLD-CCNC: 12 U/L — SIGNIFICANT CHANGE UP (ref 0–41)
ANION GAP SERPL CALC-SCNC: 9 MMOL/L — SIGNIFICANT CHANGE UP (ref 7–14)
APTT BLD: 31.1 SEC — SIGNIFICANT CHANGE UP (ref 27–39.2)
AST SERPL-CCNC: 17 U/L — SIGNIFICANT CHANGE UP (ref 0–41)
BASE EXCESS BLDV CALC-SCNC: -1.2 MMOL/L — SIGNIFICANT CHANGE UP (ref -2–3)
BASOPHILS # BLD AUTO: 0.03 K/UL — SIGNIFICANT CHANGE UP (ref 0–0.2)
BASOPHILS NFR BLD AUTO: 0.4 % — SIGNIFICANT CHANGE UP (ref 0–1)
BILIRUB SERPL-MCNC: 1.8 MG/DL — HIGH (ref 0.2–1.2)
BUN SERPL-MCNC: 15 MG/DL — SIGNIFICANT CHANGE UP (ref 10–20)
CA-I SERPL-SCNC: 1.13 MMOL/L — LOW (ref 1.15–1.33)
CALCIUM SERPL-MCNC: 8.7 MG/DL — SIGNIFICANT CHANGE UP (ref 8.4–10.5)
CHLORIDE SERPL-SCNC: 108 MMOL/L — SIGNIFICANT CHANGE UP (ref 98–110)
CO2 SERPL-SCNC: 25 MMOL/L — SIGNIFICANT CHANGE UP (ref 17–32)
CREAT SERPL-MCNC: 1.6 MG/DL — HIGH (ref 0.7–1.5)
EGFR: 42 ML/MIN/1.73M2 — LOW
EGFR: 42 ML/MIN/1.73M2 — LOW
EOSINOPHIL # BLD AUTO: 0.05 K/UL — SIGNIFICANT CHANGE UP (ref 0–0.7)
EOSINOPHIL NFR BLD AUTO: 0.7 % — SIGNIFICANT CHANGE UP (ref 0–8)
FLUAV AG NPH QL: SIGNIFICANT CHANGE UP
FLUBV AG NPH QL: SIGNIFICANT CHANGE UP
GAS PNL BLDV: 132 MMOL/L — LOW (ref 136–145)
GAS PNL BLDV: SIGNIFICANT CHANGE UP
GAS PNL BLDV: SIGNIFICANT CHANGE UP
GLUCOSE SERPL-MCNC: 128 MG/DL — HIGH (ref 70–99)
HCO3 BLDV-SCNC: 26 MMOL/L — SIGNIFICANT CHANGE UP (ref 22–29)
HCT VFR BLD CALC: 35.8 % — LOW (ref 42–52)
HCT VFR BLDA CALC: 68 % — CRITICAL HIGH (ref 39–51)
HGB BLD CALC-MCNC: >20 G/DL — CRITICAL HIGH (ref 12.6–17.4)
HGB BLD-MCNC: 11.7 G/DL — LOW (ref 14–18)
IMM GRANULOCYTES NFR BLD AUTO: 0.4 % — HIGH (ref 0.1–0.3)
INR BLD: 1.14 RATIO — SIGNIFICANT CHANGE UP (ref 0.65–1.3)
LACTATE BLDV-MCNC: 1.6 MMOL/L — SIGNIFICANT CHANGE UP (ref 0.5–2)
LYMPHOCYTES # BLD AUTO: 1.08 K/UL — LOW (ref 1.2–3.4)
LYMPHOCYTES # BLD AUTO: 14.9 % — LOW (ref 20.5–51.1)
MCHC RBC-ENTMCNC: 30.4 PG — SIGNIFICANT CHANGE UP (ref 27–31)
MCHC RBC-ENTMCNC: 32.7 G/DL — SIGNIFICANT CHANGE UP (ref 32–37)
MCV RBC AUTO: 93 FL — SIGNIFICANT CHANGE UP (ref 80–94)
MONOCYTES # BLD AUTO: 0.98 K/UL — HIGH (ref 0.1–0.6)
MONOCYTES NFR BLD AUTO: 13.5 % — HIGH (ref 1.7–9.3)
NEUTROPHILS # BLD AUTO: 5.08 K/UL — SIGNIFICANT CHANGE UP (ref 1.4–6.5)
NEUTROPHILS NFR BLD AUTO: 70.1 % — SIGNIFICANT CHANGE UP (ref 42.2–75.2)
NRBC # BLD: 0 /100 WBCS — SIGNIFICANT CHANGE UP (ref 0–0)
NRBC BLD-RTO: 0 /100 WBCS — SIGNIFICANT CHANGE UP (ref 0–0)
NT-PROBNP SERPL-SCNC: 657 PG/ML — HIGH (ref 0–300)
PCO2 BLDV: 51 MMHG — SIGNIFICANT CHANGE UP (ref 42–55)
PH BLDV: 7.32 — SIGNIFICANT CHANGE UP (ref 7.32–7.43)
PLATELET # BLD AUTO: 151 K/UL — SIGNIFICANT CHANGE UP (ref 130–400)
PMV BLD: 9.3 FL — SIGNIFICANT CHANGE UP (ref 7.4–10.4)
PO2 BLDV: 19 MMHG — LOW (ref 25–45)
POTASSIUM BLDV-SCNC: 3.6 MMOL/L — SIGNIFICANT CHANGE UP (ref 3.5–5.1)
POTASSIUM SERPL-MCNC: 4.1 MMOL/L — SIGNIFICANT CHANGE UP (ref 3.5–5)
POTASSIUM SERPL-SCNC: 4.1 MMOL/L — SIGNIFICANT CHANGE UP (ref 3.5–5)
PROT SERPL-MCNC: 6 G/DL — SIGNIFICANT CHANGE UP (ref 6–8)
PROTHROM AB SERPL-ACNC: 13 SEC — HIGH (ref 9.95–12.87)
RBC # BLD: 3.85 M/UL — LOW (ref 4.7–6.1)
RBC # FLD: 13.1 % — SIGNIFICANT CHANGE UP (ref 11.5–14.5)
RSV RNA NPH QL NAA+NON-PROBE: SIGNIFICANT CHANGE UP
SAO2 % BLDV: 24.8 % — LOW (ref 67–88)
SARS-COV-2 RNA SPEC QL NAA+PROBE: SIGNIFICANT CHANGE UP
SODIUM SERPL-SCNC: 142 MMOL/L — SIGNIFICANT CHANGE UP (ref 135–146)
TROPONIN T, HIGH SENSITIVITY RESULT: 19 NG/L — SIGNIFICANT CHANGE UP (ref 6–21)
TROPONIN T, HIGH SENSITIVITY RESULT: 21 NG/L — SIGNIFICANT CHANGE UP (ref 6–21)
WBC # BLD: 7.25 K/UL — SIGNIFICANT CHANGE UP (ref 4.8–10.8)
WBC # FLD AUTO: 7.25 K/UL — SIGNIFICANT CHANGE UP (ref 4.8–10.8)

## 2024-09-25 PROCEDURE — 93005 ELECTROCARDIOGRAM TRACING: CPT

## 2024-09-25 PROCEDURE — 36415 COLL VENOUS BLD VENIPUNCTURE: CPT

## 2024-09-25 PROCEDURE — 71045 X-RAY EXAM CHEST 1 VIEW: CPT | Mod: 26

## 2024-09-25 PROCEDURE — 80061 LIPID PANEL: CPT

## 2024-09-25 PROCEDURE — 83735 ASSAY OF MAGNESIUM: CPT

## 2024-09-25 PROCEDURE — 97530 THERAPEUTIC ACTIVITIES: CPT | Mod: GP

## 2024-09-25 PROCEDURE — 93010 ELECTROCARDIOGRAM REPORT: CPT

## 2024-09-25 PROCEDURE — 85025 COMPLETE CBC W/AUTO DIFF WBC: CPT

## 2024-09-25 PROCEDURE — 99222 1ST HOSP IP/OBS MODERATE 55: CPT

## 2024-09-25 PROCEDURE — 97116 GAIT TRAINING THERAPY: CPT | Mod: GP

## 2024-09-25 PROCEDURE — 92610 EVALUATE SWALLOWING FUNCTION: CPT | Mod: GN

## 2024-09-25 PROCEDURE — 83036 HEMOGLOBIN GLYCOSYLATED A1C: CPT

## 2024-09-25 PROCEDURE — 80053 COMPREHEN METABOLIC PANEL: CPT

## 2024-09-25 PROCEDURE — 71275 CT ANGIOGRAPHY CHEST: CPT | Mod: 26,MC

## 2024-09-25 PROCEDURE — 84443 ASSAY THYROID STIM HORMONE: CPT

## 2024-09-25 PROCEDURE — 97162 PT EVAL MOD COMPLEX 30 MIN: CPT | Mod: GP

## 2024-09-25 PROCEDURE — 99285 EMERGENCY DEPT VISIT HI MDM: CPT

## 2024-09-25 PROCEDURE — 94640 AIRWAY INHALATION TREATMENT: CPT

## 2024-09-25 PROCEDURE — 84145 PROCALCITONIN (PCT): CPT

## 2024-09-25 PROCEDURE — 82962 GLUCOSE BLOOD TEST: CPT

## 2024-09-25 PROCEDURE — 87040 BLOOD CULTURE FOR BACTERIA: CPT

## 2024-09-25 RX ORDER — SODIUM CHLORIDE 9 G/1000ML
500 INJECTION, SOLUTION INTRAVENOUS
Refills: 0 | Status: COMPLETED | OUTPATIENT
Start: 2024-09-25 | End: 2024-09-26

## 2024-09-25 RX ORDER — CEFTRIAXONE 500 MG/1
1000 INJECTION, POWDER, FOR SOLUTION INTRAMUSCULAR; INTRAVENOUS ONCE
Refills: 0 | Status: COMPLETED | OUTPATIENT
Start: 2024-09-25 | End: 2024-09-25

## 2024-09-25 RX ORDER — AZITHROMYCIN 250 MG
500 CAPSULE ORAL EVERY 24 HOURS
Refills: 0 | Status: DISCONTINUED | OUTPATIENT
Start: 2024-09-25 | End: 2024-09-28

## 2024-09-25 RX ORDER — LISINOPRIL 5 MG/1
20 TABLET ORAL DAILY
Refills: 0 | Status: DISCONTINUED | OUTPATIENT
Start: 2024-09-25 | End: 2024-09-28

## 2024-09-25 RX ORDER — METOPROLOL SUCCINATE 50 MG/1
25 TABLET, EXTENDED RELEASE ORAL DAILY
Refills: 0 | Status: DISCONTINUED | OUTPATIENT
Start: 2024-09-25 | End: 2024-09-28

## 2024-09-25 RX ORDER — TIOTROPIUM BROMIDE INHALATION SPRAY 3.12 UG/1
2 SPRAY, METERED RESPIRATORY (INHALATION) DAILY
Refills: 0 | Status: DISCONTINUED | OUTPATIENT
Start: 2024-09-25 | End: 2024-09-28

## 2024-09-25 RX ORDER — SODIUM CHLORIDE 9 G/1000ML
1000 INJECTION, SOLUTION INTRAVENOUS ONCE
Refills: 0 | Status: COMPLETED | OUTPATIENT
Start: 2024-09-25 | End: 2024-09-25

## 2024-09-25 RX ORDER — QUETIAPINE FUMARATE 25 MG/1
150 TABLET ORAL AT BEDTIME
Refills: 0 | Status: DISCONTINUED | OUTPATIENT
Start: 2024-09-25 | End: 2024-09-28

## 2024-09-25 RX ORDER — CEFTRIAXONE 500 MG/1
1000 INJECTION, POWDER, FOR SOLUTION INTRAMUSCULAR; INTRAVENOUS EVERY 24 HOURS
Refills: 0 | Status: DISCONTINUED | OUTPATIENT
Start: 2024-09-25 | End: 2024-09-28

## 2024-09-25 RX ORDER — AZITHROMYCIN 250 MG
500 CAPSULE ORAL ONCE
Refills: 0 | Status: COMPLETED | OUTPATIENT
Start: 2024-09-25 | End: 2024-09-25

## 2024-09-25 RX ORDER — FINASTERIDE 1 MG/1
5 TABLET, FILM COATED ORAL DAILY
Refills: 0 | Status: DISCONTINUED | OUTPATIENT
Start: 2024-09-25 | End: 2024-09-28

## 2024-09-25 RX ORDER — ROSUVASTATIN CALCIUM 20 MG/1
20 TABLET, FILM COATED ORAL AT BEDTIME
Refills: 0 | Status: DISCONTINUED | OUTPATIENT
Start: 2024-09-25 | End: 2024-09-28

## 2024-09-25 RX ORDER — IPRATROPIUM BROMIDE AND ALBUTEROL SULFATE .5; 2.5 MG/3ML; MG/3ML
3 SOLUTION RESPIRATORY (INHALATION) ONCE
Refills: 0 | Status: COMPLETED | OUTPATIENT
Start: 2024-09-25 | End: 2024-09-25

## 2024-09-25 RX ORDER — HEPARIN SODIUM 1000 [USP'U]/ML
5000 INJECTION INTRAVENOUS; SUBCUTANEOUS EVERY 12 HOURS
Refills: 0 | Status: DISCONTINUED | OUTPATIENT
Start: 2024-09-25 | End: 2024-09-28

## 2024-09-25 RX ORDER — CALCIUM CARBONATE 750 MG/1
1 TABLET ORAL DAILY
Refills: 0 | Status: DISCONTINUED | OUTPATIENT
Start: 2024-09-25 | End: 2024-09-28

## 2024-09-25 RX ORDER — IPRATROPIUM BROMIDE AND ALBUTEROL SULFATE .5; 2.5 MG/3ML; MG/3ML
3 SOLUTION RESPIRATORY (INHALATION) EVERY 6 HOURS
Refills: 0 | Status: DISCONTINUED | OUTPATIENT
Start: 2024-09-25 | End: 2024-09-28

## 2024-09-25 RX ORDER — TAMSULOSIN HYDROCHLORIDE 0.4 MG/1
0.4 CAPSULE ORAL AT BEDTIME
Refills: 0 | Status: DISCONTINUED | OUTPATIENT
Start: 2024-09-25 | End: 2024-09-28

## 2024-09-25 RX ORDER — SIMETHICONE 80 MG
80 TABLET,CHEWABLE ORAL DAILY
Refills: 0 | Status: DISCONTINUED | OUTPATIENT
Start: 2024-09-25 | End: 2024-09-28

## 2024-09-25 RX ORDER — MONTELUKAST SODIUM 10 MG/1
10 TABLET ORAL DAILY
Refills: 0 | Status: DISCONTINUED | OUTPATIENT
Start: 2024-09-25 | End: 2024-09-28

## 2024-09-25 RX ORDER — PREDNISONE 20 MG/1
40 TABLET ORAL DAILY
Refills: 0 | Status: DISCONTINUED | OUTPATIENT
Start: 2024-09-25 | End: 2024-09-26

## 2024-09-25 RX ADMIN — QUETIAPINE FUMARATE 150 MILLIGRAM(S): 25 TABLET ORAL at 21:19

## 2024-09-25 RX ADMIN — SODIUM CHLORIDE 1000 MILLILITER(S): 9 INJECTION, SOLUTION INTRAVENOUS at 11:36

## 2024-09-25 RX ADMIN — ROSUVASTATIN CALCIUM 20 MILLIGRAM(S): 20 TABLET, FILM COATED ORAL at 21:19

## 2024-09-25 RX ADMIN — CEFTRIAXONE 100 MILLIGRAM(S): 500 INJECTION, POWDER, FOR SOLUTION INTRAMUSCULAR; INTRAVENOUS at 11:32

## 2024-09-25 RX ADMIN — Medication 255 MILLIGRAM(S): at 11:32

## 2024-09-25 RX ADMIN — SODIUM CHLORIDE 50 MILLILITER(S): 9 INJECTION, SOLUTION INTRAVENOUS at 18:31

## 2024-09-25 RX ADMIN — IPRATROPIUM BROMIDE AND ALBUTEROL SULFATE 3 MILLILITER(S): .5; 2.5 SOLUTION RESPIRATORY (INHALATION) at 08:51

## 2024-09-25 RX ADMIN — TAMSULOSIN HYDROCHLORIDE 0.4 MILLIGRAM(S): 0.4 CAPSULE ORAL at 21:20

## 2024-09-25 RX ADMIN — Medication 1 DOSE(S): at 21:19

## 2024-09-26 LAB
A1C WITH ESTIMATED AVERAGE GLUCOSE RESULT: 5.9 % — HIGH (ref 4–5.6)
ALBUMIN SERPL ELPH-MCNC: 3.2 G/DL — LOW (ref 3.5–5.2)
ALP SERPL-CCNC: 71 U/L — SIGNIFICANT CHANGE UP (ref 30–115)
ALT FLD-CCNC: 15 U/L — SIGNIFICANT CHANGE UP (ref 0–41)
ANION GAP SERPL CALC-SCNC: 10 MMOL/L — SIGNIFICANT CHANGE UP (ref 7–14)
AST SERPL-CCNC: 25 U/L — SIGNIFICANT CHANGE UP (ref 0–41)
BASOPHILS # BLD AUTO: 0.03 K/UL — SIGNIFICANT CHANGE UP (ref 0–0.2)
BASOPHILS NFR BLD AUTO: 0.5 % — SIGNIFICANT CHANGE UP (ref 0–1)
BILIRUB SERPL-MCNC: 1 MG/DL — SIGNIFICANT CHANGE UP (ref 0.2–1.2)
BUN SERPL-MCNC: 12 MG/DL — SIGNIFICANT CHANGE UP (ref 10–20)
CALCIUM SERPL-MCNC: 8.8 MG/DL — SIGNIFICANT CHANGE UP (ref 8.4–10.5)
CHLORIDE SERPL-SCNC: 108 MMOL/L — SIGNIFICANT CHANGE UP (ref 98–110)
CHOLEST SERPL-MCNC: 111 MG/DL — SIGNIFICANT CHANGE UP
CO2 SERPL-SCNC: 24 MMOL/L — SIGNIFICANT CHANGE UP (ref 17–32)
CREAT SERPL-MCNC: 1.3 MG/DL — SIGNIFICANT CHANGE UP (ref 0.7–1.5)
EGFR: 55 ML/MIN/1.73M2 — LOW
EGFR: 55 ML/MIN/1.73M2 — LOW
EOSINOPHIL # BLD AUTO: 0.11 K/UL — SIGNIFICANT CHANGE UP (ref 0–0.7)
EOSINOPHIL NFR BLD AUTO: 1.9 % — SIGNIFICANT CHANGE UP (ref 0–8)
ESTIMATED AVERAGE GLUCOSE: 123 MG/DL — HIGH (ref 68–114)
GLUCOSE SERPL-MCNC: 107 MG/DL — HIGH (ref 70–99)
HCT VFR BLD CALC: 33.1 % — LOW (ref 42–52)
HDLC SERPL-MCNC: 40 MG/DL — LOW
HGB BLD-MCNC: 11.1 G/DL — LOW (ref 14–18)
IMM GRANULOCYTES NFR BLD AUTO: 0.2 % — SIGNIFICANT CHANGE UP (ref 0.1–0.3)
LDLC SERPL-MCNC: 56 MG/DL — SIGNIFICANT CHANGE UP
LIPID PNL WITH DIRECT LDL SERPL: 56 MG/DL — SIGNIFICANT CHANGE UP
LYMPHOCYTES # BLD AUTO: 1.25 K/UL — SIGNIFICANT CHANGE UP (ref 1.2–3.4)
LYMPHOCYTES # BLD AUTO: 21.9 % — SIGNIFICANT CHANGE UP (ref 20.5–51.1)
MAGNESIUM SERPL-MCNC: 2 MG/DL — SIGNIFICANT CHANGE UP (ref 1.8–2.4)
MCHC RBC-ENTMCNC: 30.9 PG — SIGNIFICANT CHANGE UP (ref 27–31)
MCHC RBC-ENTMCNC: 33.5 G/DL — SIGNIFICANT CHANGE UP (ref 32–37)
MCV RBC AUTO: 92.2 FL — SIGNIFICANT CHANGE UP (ref 80–94)
MONOCYTES # BLD AUTO: 0.95 K/UL — HIGH (ref 0.1–0.6)
MONOCYTES NFR BLD AUTO: 16.6 % — HIGH (ref 1.7–9.3)
NEUTROPHILS # BLD AUTO: 3.36 K/UL — SIGNIFICANT CHANGE UP (ref 1.4–6.5)
NEUTROPHILS NFR BLD AUTO: 58.9 % — SIGNIFICANT CHANGE UP (ref 42.2–75.2)
NONHDLC SERPL-MCNC: 71 MG/DL — SIGNIFICANT CHANGE UP
NRBC # BLD: 0 /100 WBCS — SIGNIFICANT CHANGE UP (ref 0–0)
NRBC BLD-RTO: 0 /100 WBCS — SIGNIFICANT CHANGE UP (ref 0–0)
PLATELET # BLD AUTO: 157 K/UL — SIGNIFICANT CHANGE UP (ref 130–400)
PMV BLD: 9.7 FL — SIGNIFICANT CHANGE UP (ref 7.4–10.4)
POTASSIUM SERPL-MCNC: 4.1 MMOL/L — SIGNIFICANT CHANGE UP (ref 3.5–5)
POTASSIUM SERPL-SCNC: 4.1 MMOL/L — SIGNIFICANT CHANGE UP (ref 3.5–5)
PROT SERPL-MCNC: 5.6 G/DL — LOW (ref 6–8)
RBC # BLD: 3.59 M/UL — LOW (ref 4.7–6.1)
RBC # FLD: 12.8 % — SIGNIFICANT CHANGE UP (ref 11.5–14.5)
SODIUM SERPL-SCNC: 142 MMOL/L — SIGNIFICANT CHANGE UP (ref 135–146)
T4 FREE+ TSH PNL SERPL: 0.87 UIU/ML — SIGNIFICANT CHANGE UP (ref 0.27–4.2)
TRIGL SERPL-MCNC: 75 MG/DL — SIGNIFICANT CHANGE UP
WBC # BLD: 5.71 K/UL — SIGNIFICANT CHANGE UP (ref 4.8–10.8)
WBC # FLD AUTO: 5.71 K/UL — SIGNIFICANT CHANGE UP (ref 4.8–10.8)

## 2024-09-26 PROCEDURE — 99283 EMERGENCY DEPT VISIT LOW MDM: CPT

## 2024-09-26 PROCEDURE — 99233 SBSQ HOSP IP/OBS HIGH 50: CPT

## 2024-09-26 RX ORDER — PREDNISONE 20 MG/1
40 TABLET ORAL DAILY
Refills: 0 | Status: DISCONTINUED | OUTPATIENT
Start: 2024-09-26 | End: 2024-09-28

## 2024-09-26 RX ADMIN — MONTELUKAST SODIUM 10 MILLIGRAM(S): 10 TABLET ORAL at 12:21

## 2024-09-26 RX ADMIN — HEPARIN SODIUM 5000 UNIT(S): 1000 INJECTION INTRAVENOUS; SUBCUTANEOUS at 17:33

## 2024-09-26 RX ADMIN — FINASTERIDE 5 MILLIGRAM(S): 1 TABLET, FILM COATED ORAL at 12:21

## 2024-09-26 RX ADMIN — CEFTRIAXONE 100 MILLIGRAM(S): 500 INJECTION, POWDER, FOR SOLUTION INTRAMUSCULAR; INTRAVENOUS at 16:14

## 2024-09-26 RX ADMIN — TAMSULOSIN HYDROCHLORIDE 0.4 MILLIGRAM(S): 0.4 CAPSULE ORAL at 22:11

## 2024-09-26 RX ADMIN — TIOTROPIUM BROMIDE INHALATION SPRAY 2 PUFF(S): 3.12 SPRAY, METERED RESPIRATORY (INHALATION) at 08:35

## 2024-09-26 RX ADMIN — Medication 255 MILLIGRAM(S): at 16:27

## 2024-09-26 RX ADMIN — Medication 1 DOSE(S): at 08:07

## 2024-09-26 RX ADMIN — PREDNISONE 40 MILLIGRAM(S): 20 TABLET ORAL at 06:09

## 2024-09-26 RX ADMIN — Medication 1000 UNIT(S): at 12:22

## 2024-09-26 RX ADMIN — ROSUVASTATIN CALCIUM 20 MILLIGRAM(S): 20 TABLET, FILM COATED ORAL at 22:10

## 2024-09-26 RX ADMIN — PREDNISONE 40 MILLIGRAM(S): 20 TABLET ORAL at 22:11

## 2024-09-26 RX ADMIN — LISINOPRIL 20 MILLIGRAM(S): 5 TABLET ORAL at 06:09

## 2024-09-26 RX ADMIN — Medication 40 MILLIGRAM(S): at 06:09

## 2024-09-26 RX ADMIN — Medication 20 MILLIGRAM(S): at 12:22

## 2024-09-26 RX ADMIN — QUETIAPINE FUMARATE 150 MILLIGRAM(S): 25 TABLET ORAL at 22:10

## 2024-09-26 RX ADMIN — METOPROLOL SUCCINATE 25 MILLIGRAM(S): 50 TABLET, EXTENDED RELEASE ORAL at 06:09

## 2024-09-26 RX ADMIN — SODIUM CHLORIDE 50 MILLILITER(S): 9 INJECTION, SOLUTION INTRAVENOUS at 08:08

## 2024-09-27 ENCOUNTER — TRANSCRIPTION ENCOUNTER (OUTPATIENT)
Age: 84
End: 2024-09-27

## 2024-09-27 LAB
ALBUMIN SERPL ELPH-MCNC: 3.5 G/DL — SIGNIFICANT CHANGE UP (ref 3.5–5.2)
ALP SERPL-CCNC: 68 U/L — SIGNIFICANT CHANGE UP (ref 30–115)
ALT FLD-CCNC: 51 U/L — HIGH (ref 0–41)
ANION GAP SERPL CALC-SCNC: 11 MMOL/L — SIGNIFICANT CHANGE UP (ref 7–14)
AST SERPL-CCNC: 87 U/L — HIGH (ref 0–41)
BASOPHILS # BLD AUTO: 0.01 K/UL — SIGNIFICANT CHANGE UP (ref 0–0.2)
BASOPHILS NFR BLD AUTO: 0.2 % — SIGNIFICANT CHANGE UP (ref 0–1)
BILIRUB SERPL-MCNC: 0.4 MG/DL — SIGNIFICANT CHANGE UP (ref 0.2–1.2)
BUN SERPL-MCNC: 17 MG/DL — SIGNIFICANT CHANGE UP (ref 10–20)
CALCIUM SERPL-MCNC: 9.2 MG/DL — SIGNIFICANT CHANGE UP (ref 8.4–10.4)
CHLORIDE SERPL-SCNC: 106 MMOL/L — SIGNIFICANT CHANGE UP (ref 98–110)
CO2 SERPL-SCNC: 24 MMOL/L — SIGNIFICANT CHANGE UP (ref 17–32)
CREAT SERPL-MCNC: 1.3 MG/DL — SIGNIFICANT CHANGE UP (ref 0.7–1.5)
EGFR: 55 ML/MIN/1.73M2 — LOW
EGFR: 55 ML/MIN/1.73M2 — LOW
EOSINOPHIL # BLD AUTO: 0.01 K/UL — SIGNIFICANT CHANGE UP (ref 0–0.7)
EOSINOPHIL NFR BLD AUTO: 0.2 % — SIGNIFICANT CHANGE UP (ref 0–8)
GLUCOSE BLDC GLUCOMTR-MCNC: 199 MG/DL — HIGH (ref 70–99)
GLUCOSE SERPL-MCNC: 165 MG/DL — HIGH (ref 70–99)
HCT VFR BLD CALC: 32.9 % — LOW (ref 42–52)
HGB BLD-MCNC: 11.1 G/DL — LOW (ref 14–18)
IMM GRANULOCYTES NFR BLD AUTO: 0.5 % — HIGH (ref 0.1–0.3)
LYMPHOCYTES # BLD AUTO: 0.83 K/UL — LOW (ref 1.2–3.4)
LYMPHOCYTES # BLD AUTO: 14 % — LOW (ref 20.5–51.1)
MAGNESIUM SERPL-MCNC: 2.2 MG/DL — SIGNIFICANT CHANGE UP (ref 1.8–2.4)
MCHC RBC-ENTMCNC: 30.7 PG — SIGNIFICANT CHANGE UP (ref 27–31)
MCHC RBC-ENTMCNC: 33.7 G/DL — SIGNIFICANT CHANGE UP (ref 32–37)
MCV RBC AUTO: 90.9 FL — SIGNIFICANT CHANGE UP (ref 80–94)
MONOCYTES # BLD AUTO: 0.7 K/UL — HIGH (ref 0.1–0.6)
MONOCYTES NFR BLD AUTO: 11.8 % — HIGH (ref 1.7–9.3)
NEUTROPHILS # BLD AUTO: 4.35 K/UL — SIGNIFICANT CHANGE UP (ref 1.4–6.5)
NEUTROPHILS NFR BLD AUTO: 73.3 % — SIGNIFICANT CHANGE UP (ref 42.2–75.2)
NRBC # BLD: 0 /100 WBCS — SIGNIFICANT CHANGE UP (ref 0–0)
NRBC BLD-RTO: 0 /100 WBCS — SIGNIFICANT CHANGE UP (ref 0–0)
PLATELET # BLD AUTO: 171 K/UL — SIGNIFICANT CHANGE UP (ref 130–400)
PMV BLD: 9.8 FL — SIGNIFICANT CHANGE UP (ref 7.4–10.4)
POTASSIUM SERPL-MCNC: 4.7 MMOL/L — SIGNIFICANT CHANGE UP (ref 3.5–5)
POTASSIUM SERPL-SCNC: 4.7 MMOL/L — SIGNIFICANT CHANGE UP (ref 3.5–5)
PROT SERPL-MCNC: 5.9 G/DL — LOW (ref 6–8)
RBC # BLD: 3.62 M/UL — LOW (ref 4.7–6.1)
RBC # FLD: 12.5 % — SIGNIFICANT CHANGE UP (ref 11.5–14.5)
SODIUM SERPL-SCNC: 141 MMOL/L — SIGNIFICANT CHANGE UP (ref 135–146)
WBC # BLD: 5.93 K/UL — SIGNIFICANT CHANGE UP (ref 4.8–10.8)
WBC # FLD AUTO: 5.93 K/UL — SIGNIFICANT CHANGE UP (ref 4.8–10.8)

## 2024-09-27 PROCEDURE — 99232 SBSQ HOSP IP/OBS MODERATE 35: CPT

## 2024-09-27 RX ADMIN — FINASTERIDE 5 MILLIGRAM(S): 1 TABLET, FILM COATED ORAL at 11:16

## 2024-09-27 RX ADMIN — LISINOPRIL 20 MILLIGRAM(S): 5 TABLET ORAL at 07:01

## 2024-09-27 RX ADMIN — ROSUVASTATIN CALCIUM 20 MILLIGRAM(S): 20 TABLET, FILM COATED ORAL at 21:33

## 2024-09-27 RX ADMIN — Medication 1000 UNIT(S): at 11:16

## 2024-09-27 RX ADMIN — TIOTROPIUM BROMIDE INHALATION SPRAY 2 PUFF(S): 3.12 SPRAY, METERED RESPIRATORY (INHALATION) at 10:21

## 2024-09-27 RX ADMIN — QUETIAPINE FUMARATE 150 MILLIGRAM(S): 25 TABLET ORAL at 21:33

## 2024-09-27 RX ADMIN — TAMSULOSIN HYDROCHLORIDE 0.4 MILLIGRAM(S): 0.4 CAPSULE ORAL at 21:33

## 2024-09-27 RX ADMIN — Medication 255 MILLIGRAM(S): at 17:12

## 2024-09-27 RX ADMIN — MONTELUKAST SODIUM 10 MILLIGRAM(S): 10 TABLET ORAL at 11:16

## 2024-09-27 RX ADMIN — PREDNISONE 40 MILLIGRAM(S): 20 TABLET ORAL at 07:04

## 2024-09-27 RX ADMIN — CEFTRIAXONE 100 MILLIGRAM(S): 500 INJECTION, POWDER, FOR SOLUTION INTRAMUSCULAR; INTRAVENOUS at 17:12

## 2024-09-27 RX ADMIN — HEPARIN SODIUM 5000 UNIT(S): 1000 INJECTION INTRAVENOUS; SUBCUTANEOUS at 17:12

## 2024-09-27 RX ADMIN — Medication 40 MILLIGRAM(S): at 07:01

## 2024-09-27 RX ADMIN — Medication 20 MILLIGRAM(S): at 11:15

## 2024-09-27 RX ADMIN — Medication 1 APPLICATION(S): at 11:18

## 2024-09-27 RX ADMIN — METOPROLOL SUCCINATE 25 MILLIGRAM(S): 50 TABLET, EXTENDED RELEASE ORAL at 07:01

## 2024-09-27 RX ADMIN — HEPARIN SODIUM 5000 UNIT(S): 1000 INJECTION INTRAVENOUS; SUBCUTANEOUS at 07:02

## 2024-09-28 ENCOUNTER — TRANSCRIPTION ENCOUNTER (OUTPATIENT)
Age: 84
End: 2024-09-28

## 2024-09-28 VITALS — OXYGEN SATURATION: 89 % | RESPIRATION RATE: 18 BRPM

## 2024-09-28 LAB — PROCALCITONIN SERPL-MCNC: 0.05 NG/ML — SIGNIFICANT CHANGE UP (ref 0.02–0.1)

## 2024-09-28 PROCEDURE — 99238 HOSP IP/OBS DSCHRG MGMT 30/<: CPT

## 2024-09-28 RX ORDER — CEFPODOXIME PROXETIL 200 MG/1
1 TABLET, FILM COATED ORAL
Qty: 6 | Refills: 0
Start: 2024-09-28 | End: 2024-09-30

## 2024-09-28 RX ORDER — PREDNISONE 20 MG/1
1 TABLET ORAL
Qty: 5 | Refills: 0
Start: 2024-09-28 | End: 2024-10-02

## 2024-09-28 RX ADMIN — Medication 20 MILLIGRAM(S): at 11:38

## 2024-09-28 RX ADMIN — CEFTRIAXONE 100 MILLIGRAM(S): 500 INJECTION, POWDER, FOR SOLUTION INTRAMUSCULAR; INTRAVENOUS at 16:04

## 2024-09-28 RX ADMIN — Medication 255 MILLIGRAM(S): at 17:47

## 2024-09-28 RX ADMIN — FINASTERIDE 5 MILLIGRAM(S): 1 TABLET, FILM COATED ORAL at 11:38

## 2024-09-28 RX ADMIN — Medication 1 APPLICATION(S): at 11:37

## 2024-09-28 RX ADMIN — MONTELUKAST SODIUM 10 MILLIGRAM(S): 10 TABLET ORAL at 11:37

## 2024-09-28 RX ADMIN — METOPROLOL SUCCINATE 25 MILLIGRAM(S): 50 TABLET, EXTENDED RELEASE ORAL at 06:13

## 2024-09-28 RX ADMIN — HEPARIN SODIUM 5000 UNIT(S): 1000 INJECTION INTRAVENOUS; SUBCUTANEOUS at 17:48

## 2024-09-28 RX ADMIN — LISINOPRIL 20 MILLIGRAM(S): 5 TABLET ORAL at 06:13

## 2024-09-28 RX ADMIN — TIOTROPIUM BROMIDE INHALATION SPRAY 2 PUFF(S): 3.12 SPRAY, METERED RESPIRATORY (INHALATION) at 09:26

## 2024-09-28 RX ADMIN — PREDNISONE 40 MILLIGRAM(S): 20 TABLET ORAL at 06:13

## 2024-09-28 RX ADMIN — HEPARIN SODIUM 5000 UNIT(S): 1000 INJECTION INTRAVENOUS; SUBCUTANEOUS at 06:13

## 2024-09-28 RX ADMIN — Medication 1000 UNIT(S): at 11:38

## 2024-09-28 RX ADMIN — Medication 40 MILLIGRAM(S): at 06:13

## 2024-09-30 LAB
CULTURE RESULTS: SIGNIFICANT CHANGE UP
CULTURE RESULTS: SIGNIFICANT CHANGE UP
SPECIMEN SOURCE: SIGNIFICANT CHANGE UP
SPECIMEN SOURCE: SIGNIFICANT CHANGE UP

## 2024-10-07 DIAGNOSIS — J96.21 ACUTE AND CHRONIC RESPIRATORY FAILURE WITH HYPOXIA: ICD-10-CM

## 2024-10-07 DIAGNOSIS — N18.30 CHRONIC KIDNEY DISEASE, STAGE 3 UNSPECIFIED: ICD-10-CM

## 2024-10-07 DIAGNOSIS — J44.0 CHRONIC OBSTRUCTIVE PULMONARY DISEASE WITH (ACUTE) LOWER RESPIRATORY INFECTION: ICD-10-CM

## 2024-10-07 DIAGNOSIS — I71.20 THORACIC AORTIC ANEURYSM, WITHOUT RUPTURE, UNSPECIFIED: ICD-10-CM

## 2024-10-07 DIAGNOSIS — E78.5 HYPERLIPIDEMIA, UNSPECIFIED: ICD-10-CM

## 2024-10-07 DIAGNOSIS — J38.3 OTHER DISEASES OF VOCAL CORDS: ICD-10-CM

## 2024-10-07 DIAGNOSIS — N40.0 BENIGN PROSTATIC HYPERPLASIA WITHOUT LOWER URINARY TRACT SYMPTOMS: ICD-10-CM

## 2024-10-07 DIAGNOSIS — R91.1 SOLITARY PULMONARY NODULE: ICD-10-CM

## 2024-10-07 DIAGNOSIS — Z99.81 DEPENDENCE ON SUPPLEMENTAL OXYGEN: ICD-10-CM

## 2024-10-07 DIAGNOSIS — Z85.51 PERSONAL HISTORY OF MALIGNANT NEOPLASM OF BLADDER: ICD-10-CM

## 2024-10-07 DIAGNOSIS — I12.9 HYPERTENSIVE CHRONIC KIDNEY DISEASE WITH STAGE 1 THROUGH STAGE 4 CHRONIC KIDNEY DISEASE, OR UNSPECIFIED CHRONIC KIDNEY DISEASE: ICD-10-CM

## 2024-10-07 DIAGNOSIS — J44.1 CHRONIC OBSTRUCTIVE PULMONARY DISEASE WITH (ACUTE) EXACERBATION: ICD-10-CM

## 2024-10-07 DIAGNOSIS — K21.9 GASTRO-ESOPHAGEAL REFLUX DISEASE WITHOUT ESOPHAGITIS: ICD-10-CM

## 2024-10-07 DIAGNOSIS — Z87.891 PERSONAL HISTORY OF NICOTINE DEPENDENCE: ICD-10-CM

## 2024-10-07 DIAGNOSIS — R10.13 EPIGASTRIC PAIN: ICD-10-CM

## 2024-10-07 NOTE — PROGRESS NOTE ADULT - ASSESSMENT
83 yo male Macedonian speaking w/ PMH of HTN, HLD, ascending aortic aneurysm 4.5 cm, COPD not on home O2 presents for SOB that worsened over the past 4 days    Acute hypoxemic respiratory failure likely secondary to COPD exacerbation/ Severe emphysema  Chronic hypercapnic respiratory failure  HO HTN, HLD, BPH, COPD, mood disorder  HO Ascending aortic aneurysm, measuring 4.4cm 10/2022  Former smoker  Distended gallbladder.   Hx of bladder cancer ( unknown stage ) s/p surgery as per the patient daughter but no chemo or radiation      Solumedrol switched to prednisone   lasix po   NC during day  BiPAP 4hrs on and off and QHS  bladder scan 160 cc   Abd X ray noted -Repeat abd x ray   give feeding while off BiPAP  bowel regimen and monitor bowel movement   monitor I/O and electrolytes   will switch azithromycin to ceftriaxone and doxcy for now ( given abnormal EKGs ) would also avoid Levaquin given hx of Aortic aneurysm   RVP , MRSA and legionella urine ag are all negative   f/u procalcitonin - would consider d/c antibiotics if negative   c/w home meds for chronic medical conditions  CT noted with Severe emphysema. Small and large airways inflammation. Secretions in the right mainstem bronchus. Lingular nodular opacity for which 3 month   follow-up CT is recommended.   RUQ US - no acute findings   US  Bladder and kidney negative   Get collateral information regarding his bladder cancer - His Urologist is DR. GIFTY JAMISON ( [phone],  [phone])    SLP eval noted- regular /thins   Abd xrays with dilated loops with no report yet, surgery and gi consulted, surgery recommended CT abdomen, done and waiting for result   NPO with meds and then clear liquids awaiting for reports   Hypokalemia - repleted , patient could not tolerate IV so PO given         DVT and GI ppx     Pendng , respiratory status, surgery and GI    i discussed with the patient daughter in details    Detail Level: Detailed

## 2024-10-24 ENCOUNTER — EMERGENCY (EMERGENCY)
Facility: HOSPITAL | Age: 84
LOS: 0 days | Discharge: HOME | End: 2024-10-24
Attending: EMERGENCY MEDICINE
Payer: MEDICARE

## 2024-10-24 VITALS
OXYGEN SATURATION: 98 % | RESPIRATION RATE: 18 BRPM | DIASTOLIC BLOOD PRESSURE: 57 MMHG | HEART RATE: 67 BPM | HEIGHT: 64 IN | TEMPERATURE: 98 F | SYSTOLIC BLOOD PRESSURE: 141 MMHG
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
## 2024-10-24 DIAGNOSIS — N40.0 BENIGN PROSTATIC HYPERPLASIA WITHOUT LOWER URINARY TRACT SYMPTOMS: ICD-10-CM

## 2024-10-24 DIAGNOSIS — I10 ESSENTIAL (PRIMARY) HYPERTENSION: ICD-10-CM

## 2024-10-24 DIAGNOSIS — S61.411A LACERATION WITHOUT FOREIGN BODY OF RIGHT HAND, INITIAL ENCOUNTER: ICD-10-CM
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
## 2024-10-24 DIAGNOSIS — Z23 ENCOUNTER FOR IMMUNIZATION: ICD-10-CM

## 2024-10-24 DIAGNOSIS — K21.9 GASTRO-ESOPHAGEAL REFLUX DISEASE WITHOUT ESOPHAGITIS: ICD-10-CM

## 2024-10-24 LAB
ALBUMIN SERPL ELPH-MCNC: 4.1 G/DL — SIGNIFICANT CHANGE UP (ref 3.5–5.2)
ALP SERPL-CCNC: 71 U/L — SIGNIFICANT CHANGE UP (ref 30–115)
ALT FLD-CCNC: 18 U/L — SIGNIFICANT CHANGE UP (ref 0–41)
ANION GAP SERPL CALC-SCNC: 8 MMOL/L — SIGNIFICANT CHANGE UP (ref 7–14)
AST SERPL-CCNC: 53 U/L — HIGH (ref 0–41)
BASOPHILS # BLD AUTO: 0.04 K/UL — SIGNIFICANT CHANGE UP (ref 0–0.2)
BASOPHILS NFR BLD AUTO: 0.8 % — SIGNIFICANT CHANGE UP (ref 0–1)
BILIRUB SERPL-MCNC: 0.7 MG/DL — SIGNIFICANT CHANGE UP (ref 0.2–1.2)
BUN SERPL-MCNC: 12 MG/DL — SIGNIFICANT CHANGE UP (ref 10–20)
CALCIUM SERPL-MCNC: 9.6 MG/DL — SIGNIFICANT CHANGE UP (ref 8.4–10.5)
CHLORIDE SERPL-SCNC: 106 MMOL/L — SIGNIFICANT CHANGE UP (ref 98–110)
CO2 SERPL-SCNC: 26 MMOL/L — SIGNIFICANT CHANGE UP (ref 17–32)
CREAT SERPL-MCNC: 1.6 MG/DL — HIGH (ref 0.7–1.5)
EGFR: 42 ML/MIN/1.73M2 — LOW
EOSINOPHIL # BLD AUTO: 0.08 K/UL — SIGNIFICANT CHANGE UP (ref 0–0.7)
EOSINOPHIL NFR BLD AUTO: 1.5 % — SIGNIFICANT CHANGE UP (ref 0–8)
GLUCOSE SERPL-MCNC: 113 MG/DL — HIGH (ref 70–99)
HCT VFR BLD CALC: 41.9 % — LOW (ref 42–52)
HGB BLD-MCNC: 13.9 G/DL — LOW (ref 14–18)
IMM GRANULOCYTES NFR BLD AUTO: 0.2 % — SIGNIFICANT CHANGE UP (ref 0.1–0.3)
LYMPHOCYTES # BLD AUTO: 1.37 K/UL — SIGNIFICANT CHANGE UP (ref 1.2–3.4)
LYMPHOCYTES # BLD AUTO: 25.9 % — SIGNIFICANT CHANGE UP (ref 20.5–51.1)
MCHC RBC-ENTMCNC: 30 PG — SIGNIFICANT CHANGE UP (ref 27–31)
MCHC RBC-ENTMCNC: 33.2 G/DL — SIGNIFICANT CHANGE UP (ref 32–37)
MCV RBC AUTO: 90.5 FL — SIGNIFICANT CHANGE UP (ref 80–94)
MONOCYTES # BLD AUTO: 0.72 K/UL — HIGH (ref 0.1–0.6)
MONOCYTES NFR BLD AUTO: 13.6 % — HIGH (ref 1.7–9.3)
NEUTROPHILS # BLD AUTO: 3.06 K/UL — SIGNIFICANT CHANGE UP (ref 1.4–6.5)
NEUTROPHILS NFR BLD AUTO: 58 % — SIGNIFICANT CHANGE UP (ref 42.2–75.2)
NRBC # BLD: 0 /100 WBCS — SIGNIFICANT CHANGE UP (ref 0–0)
PLATELET # BLD AUTO: 175 K/UL — SIGNIFICANT CHANGE UP (ref 130–400)
PMV BLD: 9.5 FL — SIGNIFICANT CHANGE UP (ref 7.4–10.4)
POTASSIUM SERPL-MCNC: 5.9 MMOL/L — HIGH (ref 3.5–5)
POTASSIUM SERPL-SCNC: 5.9 MMOL/L — HIGH (ref 3.5–5)
PROT SERPL-MCNC: 7 G/DL — SIGNIFICANT CHANGE UP (ref 6–8)
RBC # BLD: 4.63 M/UL — LOW (ref 4.7–6.1)
RBC # FLD: 13.2 % — SIGNIFICANT CHANGE UP (ref 11.5–14.5)
SODIUM SERPL-SCNC: 140 MMOL/L — SIGNIFICANT CHANGE UP (ref 135–146)
WBC # BLD: 5.28 K/UL — SIGNIFICANT CHANGE UP (ref 4.8–10.8)
WBC # FLD AUTO: 5.28 K/UL — SIGNIFICANT CHANGE UP (ref 4.8–10.8)

## 2024-10-24 PROCEDURE — 80053 COMPREHEN METABOLIC PANEL: CPT

## 2024-10-24 PROCEDURE — T1013: CPT

## 2024-10-24 PROCEDURE — 90471 IMMUNIZATION ADMIN: CPT

## 2024-10-24 PROCEDURE — 73130 X-RAY EXAM OF HAND: CPT | Mod: RT

## 2024-10-24 PROCEDURE — 73130 X-RAY EXAM OF HAND: CPT | Mod: 26,RT

## 2024-10-24 PROCEDURE — 96375 TX/PRO/DX INJ NEW DRUG ADDON: CPT | Mod: XU

## 2024-10-24 PROCEDURE — 12004 RPR S/N/AX/GEN/TRK7.6-12.5CM: CPT

## 2024-10-24 PROCEDURE — 85025 COMPLETE CBC W/AUTO DIFF WBC: CPT

## 2024-10-24 PROCEDURE — 99284 EMERGENCY DEPT VISIT MOD MDM: CPT | Mod: 25

## 2024-10-24 PROCEDURE — 96374 THER/PROPH/DIAG INJ IV PUSH: CPT | Mod: XU

## 2024-10-24 PROCEDURE — 90715 TDAP VACCINE 7 YRS/> IM: CPT

## 2024-10-24 PROCEDURE — 99285 EMERGENCY DEPT VISIT HI MDM: CPT | Mod: 25

## 2024-10-24 PROCEDURE — 36415 COLL VENOUS BLD VENIPUNCTURE: CPT

## 2024-10-24 RX ORDER — KETOROLAC TROMETHAMINE 10 MG/1
30 TABLET, FILM COATED ORAL ONCE
Refills: 0 | Status: DISCONTINUED | OUTPATIENT
Start: 2024-10-24 | End: 2024-10-24

## 2024-10-24 RX ORDER — SODIUM CHLORIDE 0.9 % (FLUSH) 0.9 %
1000 SYRINGE (ML) INJECTION ONCE
Refills: 0 | Status: COMPLETED | OUTPATIENT
Start: 2024-10-24 | End: 2024-10-24

## 2024-10-24 RX ORDER — PIPERACILLIN SODIUM AND TAZOBACTAM SODIUM 12; 1.5 G/60ML; G/60ML
3.38 INJECTION, POWDER, LYOPHILIZED, FOR SOLUTION INTRAVENOUS ONCE
Refills: 0 | Status: DISCONTINUED | OUTPATIENT
Start: 2024-10-24 | End: 2024-10-24

## 2024-10-24 RX ORDER — ONDANSETRON HCL/PF 4 MG/2 ML
4 VIAL (ML) INJECTION ONCE
Refills: 0 | Status: COMPLETED | OUTPATIENT
Start: 2024-10-24 | End: 2024-10-24

## 2024-10-24 RX ORDER — MORPHINE SULFATE 30 MG/1
4 TABLET, FILM COATED, EXTENDED RELEASE ORAL ONCE
Refills: 0 | Status: DISCONTINUED | OUTPATIENT
Start: 2024-10-24 | End: 2024-10-24

## 2024-10-24 RX ORDER — AMPICILLIN, SULBACTAM 250; 125 MG/ML; MG/ML
3 INJECTION, POWDER, FOR SOLUTION INTRAMUSCULAR; INTRAVENOUS ONCE
Refills: 0 | Status: COMPLETED | OUTPATIENT
Start: 2024-10-24 | End: 2024-10-24

## 2024-10-24 RX ORDER — TETANUS TOXOID, REDUCED DIPHTHERIA TOXOID AND ACELLULAR PERTUSSIS VACCINE, ADSORBED 5; 2.5; 8; 8; 2.5 [IU]/.5ML; [IU]/.5ML; UG/.5ML; UG/.5ML; UG/.5ML
0.5 SUSPENSION INTRAMUSCULAR ONCE
Refills: 0 | Status: COMPLETED | OUTPATIENT
Start: 2024-10-24 | End: 2024-10-24

## 2024-10-24 RX ADMIN — Medication 4 MILLIGRAM(S): at 13:31

## 2024-10-24 RX ADMIN — Medication 2000 MILLILITER(S): at 12:56

## 2024-10-24 RX ADMIN — TETANUS TOXOID, REDUCED DIPHTHERIA TOXOID AND ACELLULAR PERTUSSIS VACCINE, ADSORBED 0.5 MILLILITER(S): 5; 2.5; 8; 8; 2.5 SUSPENSION INTRAMUSCULAR at 13:00

## 2024-10-24 RX ADMIN — MORPHINE SULFATE 4 MILLIGRAM(S): 30 TABLET, FILM COATED, EXTENDED RELEASE ORAL at 13:32

## 2024-10-24 RX ADMIN — AMPICILLIN, SULBACTAM 200 GRAM(S): 250; 125 INJECTION, POWDER, FOR SOLUTION INTRAMUSCULAR; INTRAVENOUS at 13:01

## 2024-10-24 RX ADMIN — KETOROLAC TROMETHAMINE 30 MILLIGRAM(S): 10 TABLET, FILM COATED ORAL at 13:31

## 2024-10-24 NOTE — ED PROVIDER NOTE - PHYSICAL EXAMINATION
CONSTITUTIONAL: NAD  SKIN: Warm, dry  HEAD: NCAT  EYES: Clear conjunctiva   ENT: MMM  NECK: Supple  CARD: RRR, S1, S2; no M/R/G  RESP: Normal respiratory effort, CTAB  RIGHT EXT: right hand with multiple lacerations and skin tears to the thenar eminence, largest 5cm, actively bleeding, numbness over ventral aspect of thenar eminence. Pulses palpable distally.  NEURO: Grossly intact. Awake, alert, moving all extremities, no facial asymmetry.

## 2024-10-24 NOTE — ED PROVIDER NOTE - NSFOLLOWUPINSTRUCTIONS_ED_ALL_ED_FT
Our Emergency Department Referral Coordinators will be reaching out to you in the next 24-48 hours from 9:00am to 5:00pm (Monday to Friday) with a follow up appointment. Please expect a phone call from the hospital in that time frame. If you do not receive a call or if you have any questions or concerns, you can reach them at [phone]

## 2024-10-24 NOTE — ED PROVIDER NOTE - NSDCPRINTRESULTS_ED_ALL_ED
Physical Medicine and Rehabilitation Progress Note  Mamadou General 67 y o  male MRN: [id number]  Unit/Bed#: -01 Encounter: [id number]    HPI: Patient is a 66 yo male who presented after a fall leading to rt tibial plateau fx and L frontal SAH both of which were managed non-operatively however course was complicated by ischemic CVA which was likely vessel to vessel in origin    Chief Complaint: CVA    Interval/subjective: patient communicated that he is currently comfortable     ROS: A 10 point ROS was performed; negative except as noted above       Assessment/Plan:      Dysphagia  Assessment & Plan  - modified diet per ST      CVA (cerebral vascular accident) [facility])  Assessment & Plan  - L sided cerebral infarcts in the setting of traumatic L frontal SAH  - likely vessel to vessel origin in the setting of occluded L carotid (also with L vertebral artery stenosis)   - neuro recommended to re-starting home ASA 81 mg qd while in acute care which was resumed on 11/17/19 and a FU CTH on 11/25 due to L frontal SAH (which is pending) and if stable then potentially patient may be cleared by neuro to start DAPT  - on home lipitor 40 mg qpm per neuro     Closed fracture of right tibial plateau with routine healing  Assessment & Plan  - non-op management per ortho  - NWB in hinged knee brace per ortho  - per ortho FU XR in 6 wks     [facility]  Assessment & Plan  - L frontal SAH  - evaluated by neurosx and no surgical intervention was taken  - completed 7 day keppra sx ppx course in acute care as recommended by neurosx   - cleared by neurology in acute care to re-start home ASA 81 mg qd for ischemic CVA (re-started on 11/17) with FU CTH on 11/25 and possible DAPT based on 14 [facility] findings     Peripheral vascular disease ([facility] )  Assessment & Plan  - L carotid dz, L vertebral artery dz, B/L LE arterial dz  - cleared by neurology to re-start home ASA 81 mg qd (ischemic CVA) in the setting of L frontal SAH which was resumed on 11/17 with FU CTH on 11/25 to determine if plavix can be added to regimen for ischemic CVA  - per neuro on home lipitor 40 mg qpm  - follows with Dr Maddy Veloz as OP and per Dr Rell Ledesma OP note patient refused to see vascular surgeon     Hypothyroidism  Assessment & Plan  - on home levothyroxine 50 mcg qd  - free T4 WNL however 2 TSH levels drawn on 11/14 and 11/15 with 1 level elevated and the other WNL; will repeat TSH level     Essential hypertension  Assessment & Plan  - at home on lopressor 50 mg q12, lisinopril 40 mg qd, and procardia XL (24 hr) 60 mg qd   - goal SBP 150s-160s per neuro due to stroke will need to clarify how long this is to be the goal SBP  - metanephrines, renin, aldosterone labs ordered by cards and are pending   - IM managing     Hyperlipidemia  Assessment & Plan  - on home lipitor 40 mg qpm per neuro in acute care     * CAD (coronary artery disease)  Assessment & Plan  - h/o CABG  - cleared by neurology to resume on home ASA 81 mg qd in the setting of L frontal SAH with FU CTH on 11/25 (which is pending)   - at home on lopressor 50 mg q12 (IM managing)  - on home lipitor 40 mg qpm   - mildly elevated troponins in acute care which peaked at 0 06  - seen by cards in acute care and did have an echo and no further KEBEDE/intevention recommended by cards  - follows with Dr Maddy Veloz as OP        Scheduled Meds:  Current Facility-Administered Medications:  acetaminophen 650 mg Oral Q6H PRN Dee Oneal MD   aspirin 81 mg Oral Daily Alannah Garcia MD   atorvastatin 40 mg Oral Daily With Marnie Zazueta MD   docusate sodium 100 mg Oral BID Dee Oneal MD   levothyroxine 50 mcg Oral Early Morning Dee Oneal MD   lisinopril 40 mg Oral Daily Dee Oneal MD   metoprolol tartrate 50 mg Oral Q12H [facility] Dee Oneal MD   nicotine 1 patch Transdermal Daily Dee Oneal MD   NIFEdipine 60 mg Oral Daily Dee Oneal MD   ondansetron 4 mg Oral Q6H PRN Dee Oneal MD          Incidental findings:    1) EKG abnormalities (LAD, PACs): OP FU with Dr Nisha Briseno  2) elevated PA pressure with moderate TR: OP FU with Dr Nisha Briseno  3) grade 2 DD: OP FU with Dr Nisha Briseno   4) elevated total bilirubin with alk phos WNL: will d/w IM     DVT ppx: SCDs in the setting of [address]  Code: per acute care notes patient insisted on being DNR/DNI when he was more cognitively intact, d/w patient's brother who also wishes for patient to be DNR/DNI and patient's brother verbalized his understanding of what this means        Objective:    Functional Update:  Mobility: PENDING  Transfers: PENDING   ADLs: PENDING       Physical Exam:    T: 97 9  HR: 72  BP: 118/70  RR: 18  POx: 96%      General: no apparent distress and comfortable  CARDIAC:  +S1/2  LUNGS:  respirations unlabored   ABDOMEN:  soft NT   EXTREMITIES:  volume status currently stable   NEURO:   inconsistent ability to answer questions and follow commands, 4/5 LUE/LLE, no movement appreciated RUE/RLE however command following was inconsistent   PSYCH:  patient currently calm     Laboratory:   Results from last 7 days   Lab Units 11/23/19  0438 11/21/19  0435 11/20/19  0437   HEMOGLOBIN g/dL 12 7 12 4 12 7   HEMATOCRIT % 38 0 37 0 38 1   WBC Thousand/uL 8 00 10 15 9 50     Results from last 7 days   Lab Units 11/23/19  0438 11/21/19  0435 11/20/19  0437   BUN mg/dL 27* 29* 22   SODIUM mmol/L 140 143 142   POTASSIUM mmol/L 3 6 3 8 3 7   CHLORIDE mmol/L 107 109* 110*   CREATININE mg/dL 1 06 1 00 0 96 Patient requests all Lab, Cardiology, and Radiology Results on their Discharge Instructions

## 2024-10-24 NOTE — ED PROVIDER NOTE - PATIENT/CAREGIVER ACCEPTED INTERPRETER SERVICES
LOV 5/14/18. Last lipid panel was done on 7/7/18. Refill pended and routed to Dr. Ramonia Kayser.
Pt called for refill to be sent to [facility].   Pt would like refills if possible:        Current Outpatient Medications:   •  atorvastatin 20 MG Oral Tab, Take 1 tablet (20 mg total) by mouth nightly., Disp: 30 tablet, Rfl: 5
yes

## 2024-10-24 NOTE — ED PROVIDER NOTE - ATTENDING CONTRIBUTION TO CARE
84-year-old male past medical history significant for hypertension, bladder cancer, BPH, GERD, complaining of dog bite to right hand this morning.  Patient is right-hand dominant.  Dog belongs to the family and all vaccinations are up-to-date.  Tetanus status is unknown.  Patient complaining of right hand pain.  No hand paresthesias or motor weakness.  CONSTITUTIONAL: Well-appearing; well-nourished; in no apparent distress.   EXT: R hand with  6 cm Laceration over thenar eminence which continues dorsally over the first metacarpal.  Skin tears over the dorsal aspect of the hand at the first MCP joint and area between first and second MC PEs.  Patient with 5/5 motor strength thumb flexion against resistance and 5/5 thumb lection against resistance.  Normal sensation to the right hand.  Normal cap refill all digits.  No exposed bone.  Normal range of motion at first MCP and IP joints.

## 2024-10-24 NOTE — ED ADULT NURSE NOTE - NSICDXPASTMEDICALHX_GEN_ALL_CORE_FT
PAST MEDICAL HISTORY:  BPH (benign prostatic hyperplasia)     History of COPD     HLD (hyperlipidemia)     HTN (hypertension)     Thoracic aortic aneurysm

## 2024-10-24 NOTE — ED PROVIDER NOTE - CLINICAL SUMMARY MEDICAL DECISION MAKING FREE TEXT BOX
84-year-old man past medical history as documented, right-hand-dominant, status post dog bite to his right hand this morning.  Tetanus given in the ED.  Right hand x-rays with no fracture or foreign body.  Patient given Unasyn IV in the ED.  Case discussed with Dr. Nickerson.  Laceration loosely approximated and patient given Ortho referral.  Patient given return instructions.  Son at bedside.

## 2024-10-24 NOTE — ED PROVIDER NOTE - PROGRESS NOTE DETAILS
Case discussed with Dr. Nickerson and patient evaluated by Ortho team, BRANDIN Sandoval, recommend washout and Augmentin at discharge and will see the patient in the office tomorrow.

## 2024-10-24 NOTE — ED PROVIDER NOTE - CARE PROVIDER_API CALL
Kalpesh Nickerson  Orthopaedic Surgery  [address] [place] [address]  Phone: [phone]  Fax: [phone]  Follow Up Time: Urgent

## 2024-10-24 NOTE — ED PROVIDER NOTE - OBJECTIVE STATEMENT
84yoM PMHx HTN presenting for dog bite to right hand at about 9am. Dog belongs to his granddaughter 84yoM PMHx HTN presenting for dog bite to right hand at about 9am. Dog belongs to his granddaughter, is fully vaccinated. Pt was seen initially at [facility] MD, where his hand was wrapped up. Denies fever, fall or any other injury

## 2024-10-24 NOTE — ED ADULT TRIAGE NOTE - LOCATION:
Right arm; I will START or STAY ON the medications listed below when I get home from the hospital:  None

## 2024-10-24 NOTE — ED ADULT NURSE NOTE - BREATHING, MLM
50 yr old Male with PMHX of Non-ischemic CM w/ Severe global LVSD, Grade IV DD EF 15%, HTN, HLD presents with CP and SOB x 2 weeks,acute on chronic systolic HF due to non-compliance.  1.Tele monitoring.  2.Echocardiogram.  3.Cont IV lasix 20mg qd, add aldactone 25mg qd.  4.CHF- coreg, entresto, dig .125mg qd.  5.GI and DVT prophylaxis.  6.D/W pt need for compliance with cardiac medication. Spontaneous, unlabored and symmetrical

## 2024-10-24 NOTE — ED PROVIDER NOTE - PATIENT PORTAL LINK FT
You can access the FollowMyHealth Patient Portal offered by [facility] by registering at the following website: [email or web address]. By joining [facility]’s FollowMyHealth portal, you will also be able to view your health information using other applications (apps) compatible with our system.

## 2024-10-25 ENCOUNTER — APPOINTMENT (OUTPATIENT)
Dept: ORTHOPEDIC SURGERY | Facility: CLINIC | Age: 84
End: 2024-10-25
Payer: MEDICARE

## 2024-10-25 VITALS — WEIGHT: 150 LBS | BODY MASS INDEX: 24.99 KG/M2 | HEIGHT: 65 IN

## 2024-10-25 PROCEDURE — 99203 OFFICE O/P NEW LOW 30 MIN: CPT

## 2024-10-30 ENCOUNTER — APPOINTMENT (OUTPATIENT)
Dept: ORTHOPEDIC SURGERY | Facility: CLINIC | Age: 84
End: 2024-10-30
Payer: MEDICARE

## 2024-10-30 DIAGNOSIS — S61.451A OPEN BITE OF RIGHT HAND, INITIAL ENCOUNTER: ICD-10-CM
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
## 2024-10-30 PROCEDURE — 99202 OFFICE O/P NEW SF 15 MIN: CPT

## 2024-11-06 ENCOUNTER — APPOINTMENT (OUTPATIENT)
Dept: ORTHOPEDIC SURGERY | Facility: CLINIC | Age: 84
End: 2024-11-06

## 2024-12-05 ENCOUNTER — APPOINTMENT (OUTPATIENT)
Dept: ORTHOPEDIC SURGERY | Facility: CLINIC | Age: 84
End: 2024-12-05

## 2024-12-16 NOTE — ED ADULT TRIAGE NOTE - PATIENT ON (OXYGEN DELIVERY METHOD)
Name: Shira Perez      : 1949      MRN: [id number]  Encounter Provider: LINDA Goodson  Encounter Date: 2024   Encounter department: [facility] CARE [facility]  :  Assessment & Plan  Anxiety  -Controlled substance agreement on file  -Start Zoloft 12.5 mg tablet daily  -Start Xanax as needed advised not to take with alcohol or marijuana    Orders:    ALPRAZolam (XANAX) 0.25 mg tablet; Take 1 tablet (0.25 mg total) by mouth daily at bedtime as needed for anxiety    sertraline (ZOLOFT) 25 mg tablet; Take 0.5 tablets (12.5 mg total) by mouth daily    Essential hypertension  -remains uncontrolled   -Continue metoprolol 12.5 mg tablet daily  -Increase lisinopril to 15 mg tablet daily  -Follow-up in 1 month for repeat blood pressure check  -Continue to monitor blood pressure at home.  Goal BP is < 130/80.  Contact our office for consistent elevations.  Recommend low sodium diet.  Exercise 30 minutes 5 times a week as tolerated.  Recommend yearly eye exam.     Orders:    lisinopril (ZESTRIL) 10 mg tablet; Take 1 tablet (10 mg total) by mouth daily    lisinopril (ZESTRIL) 5 mg tablet; Take 1 tablet (5 mg total) by mouth daily           History of Present Illness     Patient presents today for follow up on HTN.    She started with lisinopril 5mg, and continues on metoprolol 12.5 mg tablet daily, she reports that she has not been having palpitations, and denies side effects with this medication    Anxiety-she reports that her anxiety has been very bad lately, she reports that anxiety is typically triggered when she does not sleep well at night,her dog be a trigger as he is aging and needs additional help, she feels that she is anxious quite often, previously had taken Xanax with relief     Palpitations-Holter monitor results below:  1.Predominantly sinus rhythm during the study period with an average HR of 76 bpm ( bpm).  2.         Minimal supraventricular  and ventricular ectopy burden, mostly in the form of single PACs and PVCs.   Did not complete ordered blood work      At last office visit patient also noted to be bradycardic, metoprolol was reduced to 12.5 mg and a low-dose lisinopril was added, patient reported headaches and cough from lisinopril, she had stopped her lisinopril and continued on metoprolol      Note from last office visit:  She reports about 6 weeks ago she had palpitations.   She reports that she had about 2 days of palpation  She was having trouble sleeping and she took a THC gummy to help her sleep  She did not feel anxious at the time  Had similar symptoms back in December 2021, EKG normal sinus rhythm, was referred to cardiology cardiology did echocardiogram  Since that time she reports that the palpitations have resolved   Denies chest pain or SOB      She reports about 3 weeks ago she had GERD.   She reports that her diet was off   Lasted about 4-5 days, she had used tums and these symptoms involved   Pain did not radiate down arms or up into neck               Review of Systems   Constitutional:  Negative for activity change, appetite change, chills, diaphoresis and fever.   HENT:  Negative for congestion, ear discharge, ear pain, postnasal drip, rhinorrhea, sinus pressure, sinus pain and sore throat.    Eyes:  Negative for pain, discharge, itching and visual disturbance.   Respiratory:  Negative for cough, chest tightness, shortness of breath and wheezing.    Cardiovascular:  Negative for chest pain, palpitations and leg swelling.   Gastrointestinal:  Negative for abdominal pain, constipation, diarrhea, nausea and vomiting.   Endocrine: Negative for polydipsia, polyphagia and polyuria.   Genitourinary:  Negative for difficulty urinating, dysuria and urgency.   Musculoskeletal:  Negative for arthralgias, back pain and neck pain.   Skin:  Negative for rash and wound.   Neurological:  Negative for dizziness, weakness, numbness and headaches.  "  Psychiatric/Behavioral:  Positive for sleep disturbance. The patient is nervous/anxious.        Objective   /96 (BP Location: Left arm, Patient Position: Sitting, Cuff Size: Standard)   Pulse 88   Temp 97.7 °F (36.5 °C) (Temporal)   Ht 5' 9.09\" (1.755 m)   Wt 81.2 kg (179 lb)   SpO2 99%   BMI 26.36 kg/m²      Physical Exam  Constitutional:       General: She is not in acute distress.     Appearance: She is well-developed. She is not diaphoretic.   HENT:      Head: Normocephalic and atraumatic.      Right Ear: External ear normal.      Left Ear: External ear normal.      Nose: Nose normal.      Mouth/Throat:      Mouth: Mucous membranes are moist.      Pharynx: No oropharyngeal exudate or posterior oropharyngeal erythema.   Eyes:      General:         Right eye: No discharge.         Left eye: No discharge.      Conjunctiva/sclera: Conjunctivae normal.      Pupils: Pupils are equal, round, and reactive to light.   Neck:      Thyroid: No thyromegaly.   Cardiovascular:      Rate and Rhythm: Normal rate and regular rhythm.      Heart sounds: Normal heart sounds. No murmur heard.     No friction rub. No gallop.   Pulmonary:      Effort: Pulmonary effort is normal. No respiratory distress.      Breath sounds: Normal breath sounds. No stridor. No wheezing or rales.   Abdominal:      General: Bowel sounds are normal. There is no distension.      Palpations: Abdomen is soft.      Tenderness: There is no abdominal tenderness.   Musculoskeletal:      Cervical back: Normal range of motion and neck supple.   Lymphadenopathy:      Cervical: No cervical adenopathy.   Skin:     General: Skin is warm and dry.      Findings: No erythema or rash.   Neurological:      Mental Status: She is alert and oriented to person, place, and time.   Psychiatric:         Behavior: Behavior normal.         Thought Content: Thought content normal.         Judgment: Judgment normal.         " room air 2 = A lot of assistance

## 2025-02-03 NOTE — ED ADULT NURSE NOTE - CAS DISCH TRANSFER METHOD
Surgeon (Optional): Dr. Vishal Childers Biopsy Photograph Reviewed: Yes Accession #: [id number] Size Of Lesion In Cm: 0.8 X Size Of Lesion In Cm (Optional): 0 Size Of Margin In Cm: 0.4 Anesthesia Volume In Cc: 6 Was An Eye Clamp Used?: No Eye Clamp Note Details: An eye clamp was used during the procedure. Excision Method: Fusiform Saucerization Depth: dermis and superficial adipose tissue Repair Type: Intermediate Intermediate / Complex Repair - Final Wound Length In Cm: 3.1 Deep Sutures: 4-0 Monocryl Epidermal Sutures: 4-0 Ethilon Suture Removal: 14 days Suturegard Retention Suture: 2-0 Nylon Retention Suture Bite Size: 3 mm Length To Time In Minutes Device Was In Place: 10 Number Of Hemigard Strips Per Side: 1 Undermining Type: Entire Wound Debridement Text: The wound edges were debrided prior to proceeding with the closure to facilitate wound healing. Helical Rim Text: The closure involved the helical rim. Vermilion Border Text: The closure involved the vermilion border. Nostril Rim Text: The closure involved the nostril rim. Retention Suture Text: Retention sutures were placed to support the closure and prevent dehiscence. Lab Facility: [id number] Graft Donor Site Bandage (Optional-Leave Blank If You Don't Want In Note): Steri-strips and a pressure bandage were applied to the donor site. Epidermal Closure Graft Donor Site (Optional): simple interrupted Billing Type: Third-Party Bill Excision Depth: adipose tissue Scalpel Size: 15 blade Anesthesia Type: 1% lidocaine with epinephrine Hemostasis: Electrocautery Estimated Blood Loss (Cc): minimal Detail Level: Detailed Private car Repair Depth: use same depth as excision depth Epidermal Closure: running Wound Care: Polysporin ointment Dressing: pressure dressing Suturegard Intro: Intraoperative tissue expansion was performed, utilizing the SUTUREGARD device, in order to reduce wound tension. Suturegard Body: The suture ends were repeatedly re-tightened and re-clamped to achieve the desired tissue expansion. Hemigard Intro: Due to skin fragility and wound tension, it was decided to use HEMIGARD adhesive retention suture devices to permit a linear closure. The skin was cleaned and dried for a 6cm distance away from the wound. Excessive hair, if present, was removed to allow for adhesion. Hemigard Postcare Instructions: The HEMIGARD strips are to remain completely dry for at least 5-7 days. Positioning (Leave Blank If You Do Not Want): The patient was placed in a comfortable position exposing the surgical site. Pre-Excision Curettage Text (Leave Blank If You Do Not Want): Prior to drawing the surgical margin the visible lesion was removed with electrodesiccation and curettage to clearly define the lesion size. Complex Repair Preamble Text (Leave Blank If You Do Not Want): Extensive wide undermining was performed. Intermediate Repair Preamble Text (Leave Blank If You Do Not Want): Undermining was performed with blunt dissection. Curvilinear Excision Additional Text (Leave Blank If You Do Not Want): The margin was drawn around the clinically apparent lesion.  A curvilinear shape was then drawn on the skin incorporating the lesion and margins.  Incisions were then made along these lines to the appropriate tissue plane and the lesion was extirpated. Fusiform Excision Additional Text (Leave Blank If You Do Not Want): The margin was drawn around the clinically apparent lesion.  A fusiform shape was then drawn on the skin incorporating the lesion and margins.  Incisions were then made along these lines to the appropriate tissue plane and the lesion was extirpated. Elliptical Excision Additional Text (Leave Blank If You Do Not Want): The margin was drawn around the clinically apparent lesion.  An elliptical shape was then drawn on the skin incorporating the lesion and margins.  Incisions were then made along these lines to the appropriate tissue plane and the lesion was extirpated. Saucerization Excision Additional Text (Leave Blank If You Do Not Want): The margin was drawn around the clinically apparent lesion.  Incisions were then made along these lines, in a tangential fashion, to the appropriate tissue plane and the lesion was extirpated. Slit Excision Additional Text (Leave Blank If You Do Not Want): A linear line was drawn on the skin overlying the lesion. An incision was made slowly until the lesion was visualized.  Once visualized, the lesion was removed with blunt dissection. Excisional Biopsy Additional Text (Leave Blank If You Do Not Want): The margin was drawn around the clinically apparent lesion. An elliptical shape was then drawn on the skin incorporating the lesion and margins.  Incisions were then made along these lines to the appropriate tissue plane and the lesion was extirpated. Perilesional Excision Additional Text (Leave Blank If You Do Not Want): The margin was drawn around the clinically apparent lesion. Incisions were then made along these lines to the appropriate tissue plane and the lesion was extirpated. Repair Performed By Another Provider Text (Leave Blank If You Do Not Want): After the tissue was excised the defect was repaired by another provider. No Repair - Repaired With Adjacent Surgical Defect Text (Leave Blank If You Do Not Want): After the excision the defect was repaired concurrently with another surgical defect which was in close approximation. Adjacent Tissue Transfer Text: The defect edges were debeveled with a #15 scalpel blade. Given the location of the defect and the proximity to free margins an adjacent tissue transfer was deemed most appropriate. Using a sterile surgical marker, an appropriate flap was drawn incorporating the defect and placing the expected incisions within the relaxed skin tension lines where possible. The area thus outlined was incised deep to adipose tissue with a #15 scalpel blade. The skin margins were undermined to an appropriate distance in all directions utilizing iris scissors and carried over to close the primary defect. Advancement Flap (Single) Text: The defect edges were debeveled with a #15 scalpel blade.  Given the location of the defect and the proximity to free margins a single advancement flap was deemed most appropriate.  Using a sterile surgical marker, an appropriate advancement flap was drawn incorporating the defect and placing the expected incisions within the relaxed skin tension lines where possible.    The area thus outlined was incised deep to adipose tissue with a #15 scalpel blade.  The skin margins were undermined to an appropriate distance in all directions utilizing iris scissors. Advancement Flap (Double) Text: The defect edges were debeveled with a #15 scalpel blade.  Given the location of the defect and the proximity to free margins a double advancement flap was deemed most appropriate.  Using a sterile surgical marker, the appropriate advancement flaps were drawn incorporating the defect and placing the expected incisions within the relaxed skin tension lines where possible.    The area thus outlined was incised deep to adipose tissue with a #15 scalpel blade.  The skin margins were undermined to an appropriate distance in all directions utilizing iris scissors. Burow's Advancement Flap Text: The defect edges were debeveled with a #15 scalpel blade.  Given the location of the defect and the proximity to free margins a Burow's advancement flap was deemed most appropriate.  Using a sterile surgical marker, the appropriate advancement flap was drawn incorporating the defect and placing the expected incisions within the relaxed skin tension lines where possible.    The area thus outlined was incised deep to adipose tissue with a #15 scalpel blade.  The skin margins were undermined to an appropriate distance in all directions utilizing iris scissors. Chonodrocutaneous Helical Advancement Flap Text: The defect edges were debeveled with a #15 scalpel blade.  Given the location of the defect and the proximity to free margins a chondrocutaneous helical advancement flap was deemed most appropriate.  Using a sterile surgical marker, the appropriate advancement flap was drawn incorporating the defect and placing the expected incisions within the relaxed skin tension lines where possible.    The area thus outlined was incised deep to adipose tissue with a #15 scalpel blade.  The skin margins were undermined to an appropriate distance in all directions utilizing iris scissors. Crescentic Advancement Flap Text: The defect edges were debeveled with a #15 scalpel blade.  Given the location of the defect and the proximity to free margins a crescentic advancement flap was deemed most appropriate.  Using a sterile surgical marker, the appropriate advancement flap was drawn incorporating the defect and placing the expected incisions within the relaxed skin tension lines where possible.    The area thus outlined was incised deep to adipose tissue with a #15 scalpel blade.  The skin margins were undermined to an appropriate distance in all directions utilizing iris scissors. A-T Advancement Flap Text: The defect edges were debeveled with a #15 scalpel blade.  Given the location of the defect, shape of the defect and the proximity to free margins an A-T advancement flap was deemed most appropriate.  Using a sterile surgical marker, an appropriate advancement flap was drawn incorporating the defect and placing the expected incisions within the relaxed skin tension lines where possible.    The area thus outlined was incised deep to adipose tissue with a #15 scalpel blade.  The skin margins were undermined to an appropriate distance in all directions utilizing iris scissors. O-T Advancement Flap Text: The defect edges were debeveled with a #15 scalpel blade.  Given the location of the defect, shape of the defect and the proximity to free margins an O-T advancement flap was deemed most appropriate.  Using a sterile surgical marker, an appropriate advancement flap was drawn incorporating the defect and placing the expected incisions within the relaxed skin tension lines where possible.    The area thus outlined was incised deep to adipose tissue with a #15 scalpel blade.  The skin margins were undermined to an appropriate distance in all directions utilizing iris scissors. O-L Flap Text: The defect edges were debeveled with a #15 scalpel blade.  Given the location of the defect, shape of the defect and the proximity to free margins an O-L flap was deemed most appropriate.  Using a sterile surgical marker, an appropriate advancement flap was drawn incorporating the defect and placing the expected incisions within the relaxed skin tension lines where possible.    The area thus outlined was incised deep to adipose tissue with a #15 scalpel blade.  The skin margins were undermined to an appropriate distance in all directions utilizing iris scissors. O-Z Flap Text: The defect edges were debeveled with a #15 scalpel blade.  Given the location of the defect, shape of the defect and the proximity to free margins an O-Z flap was deemed most appropriate.  Using a sterile surgical marker, an appropriate transposition flap was drawn incorporating the defect and placing the expected incisions within the relaxed skin tension lines where possible. The area thus outlined was incised deep to adipose tissue with a #15 scalpel blade.  The skin margins were undermined to an appropriate distance in all directions utilizing iris scissors. Double O-Z Flap Text: The defect edges were debeveled with a #15 scalpel blade.  Given the location of the defect, shape of the defect and the proximity to free margins a Double O-Z flap was deemed most appropriate.  Using a sterile surgical marker, an appropriate transposition flap was drawn incorporating the defect and placing the expected incisions within the relaxed skin tension lines where possible. The area thus outlined was incised deep to adipose tissue with a #15 scalpel blade.  The skin margins were undermined to an appropriate distance in all directions utilizing iris scissors. V-Y Flap Text: The defect edges were debeveled with a #15 scalpel blade.  Given the location of the defect, shape of the defect and the proximity to free margins a V-Y flap was deemed most appropriate.  Using a sterile surgical marker, an appropriate advancement flap was drawn incorporating the defect and placing the expected incisions within the relaxed skin tension lines where possible.    The area thus outlined was incised deep to adipose tissue with a #15 scalpel blade.  The skin margins were undermined to an appropriate distance in all directions utilizing iris scissors. Advancement-Rotation Flap Text: The defect edges were debeveled with a #15 scalpel blade.  Given the location of the defect, shape of the defect and the proximity to free margins an advancement-rotation flap was deemed most appropriate.  Using a sterile surgical marker, an appropriate flap was drawn incorporating the defect and placing the expected incisions within the relaxed skin tension lines where possible. The area thus outlined was incised deep to adipose tissue with a #15 scalpel blade.  The skin margins were undermined to an appropriate distance in all directions utilizing iris scissors. Mercedes Flap Text: The defect edges were debeveled with a #15 scalpel blade.  Given the location of the defect, shape of the defect and the proximity to free margins a Mercedes flap was deemed most appropriate.  Using a sterile surgical marker, an appropriate advancement flap was drawn incorporating the defect and placing the expected incisions within the relaxed skin tension lines where possible. The area thus outlined was incised deep to adipose tissue with a #15 scalpel blade.  The skin margins were undermined to an appropriate distance in all directions utilizing iris scissors. Modified Advancement Flap Text: The defect edges were debeveled with a #15 scalpel blade.  Given the location of the defect, shape of the defect and the proximity to free margins a modified advancement flap was deemed most appropriate.  Using a sterile surgical marker, an appropriate advancement flap was drawn incorporating the defect and placing the expected incisions within the relaxed skin tension lines where possible.    The area thus outlined was incised deep to adipose tissue with a #15 scalpel blade.  The skin margins were undermined to an appropriate distance in all directions utilizing iris scissors. Mucosal Advancement Flap Text: Given the location of the defect, shape of the defect and the proximity to free margins a mucosal advancement flap was deemed most appropriate. Incisions were made with a 15 blade scalpel in the appropriate fashion along the cutaneous vermilion border and the mucosal lip. The remaining actinically damaged mucosal tissue was excised.  The mucosal advancement flap was then elevated to the gingival sulcus with care taken to preserve the neurovascular structures and advanced into the primary defect. Care was taken to ensure that precise realignment of the vermilion border was achieved. Peng Advancement Flap Text: The defect edges were debeveled with a #15 scalpel blade.  Given the location of the defect, shape of the defect and the proximity to free margins a Peng advancement flap was deemed most appropriate.  Using a sterile surgical marker, an appropriate advancement flap was drawn incorporating the defect and placing the expected incisions within the relaxed skin tension lines where possible. The area thus outlined was incised deep to adipose tissue with a #15 scalpel blade.  The skin margins were undermined to an appropriate distance in all directions utilizing iris scissors. Hatchet Flap Text: The defect edges were debeveled with a #15 scalpel blade.  Given the location of the defect, shape of the defect and the proximity to free margins a hatchet flap was deemed most appropriate.  Using a sterile surgical marker, an appropriate hatchet flap was drawn incorporating the defect and placing the expected incisions within the relaxed skin tension lines where possible.    The area thus outlined was incised deep to adipose tissue with a #15 scalpel blade.  The skin margins were undermined to an appropriate distance in all directions utilizing iris scissors. Rotation Flap Text: The defect edges were debeveled with a #15 scalpel blade.  Given the location of the defect, shape of the defect and the proximity to free margins a rotation flap was deemed most appropriate.  Using a sterile surgical marker, an appropriate rotation flap was drawn incorporating the defect and placing the expected incisions within the relaxed skin tension lines where possible.    The area thus outlined was incised deep to adipose tissue with a #15 scalpel blade.  The skin margins were undermined to an appropriate distance in all directions utilizing iris scissors. Bilateral Rotation Flap Text: The defect edges were debeveled with a #15 scalpel blade. Given the location of the defect, shape of the defect and the proximity to free margins a bilateral rotation flap was deemed most appropriate. Using a sterile surgical marker, an appropriate rotation flap was drawn incorporating the defect and placing the expected incisions within the relaxed skin tension lines where possible. The area thus outlined was incised deep to adipose tissue with a #15 scalpel blade. The skin margins were undermined to an appropriate distance in all directions utilizing iris scissors. Following this, the designed flap was carried over into the primary defect and sutured into place. Spiral Flap Text: The defect edges were debeveled with a #15 scalpel blade.  Given the location of the defect, shape of the defect and the proximity to free margins a spiral flap was deemed most appropriate.  Using a sterile surgical marker, an appropriate rotation flap was drawn incorporating the defect and placing the expected incisions within the relaxed skin tension lines where possible. The area thus outlined was incised deep to adipose tissue with a #15 scalpel blade.  The skin margins were undermined to an appropriate distance in all directions utilizing iris scissors. Staged Advancement Flap Text: The defect edges were debeveled with a #15 scalpel blade. Given the location of the defect, shape of the defect and the proximity to free margins a staged advancement flap was deemed most appropriate. Using a sterile surgical marker, an appropriate advancement flap was drawn incorporating the defect and placing the expected incisions within the relaxed skin tension lines where possible. The area thus outlined was incised deep to adipose tissue with a #15 scalpel blade. The skin margins were undermined to an appropriate distance in all directions utilizing iris scissors. Following this, the designed flap was carried over into the primary defect and sutured into place. Star Wedge Flap Text: The defect edges were debeveled with a #15 scalpel blade.  Given the location of the defect, shape of the defect and the proximity to free margins a star wedge flap was deemed most appropriate.  Using a sterile surgical marker, an appropriate rotation flap was drawn incorporating the defect and placing the expected incisions within the relaxed skin tension lines where possible. The area thus outlined was incised deep to adipose tissue with a #15 scalpel blade.  The skin margins were undermined to an appropriate distance in all directions utilizing iris scissors. Transposition Flap Text: The defect edges were debeveled with a #15 scalpel blade.  Given the location of the defect and the proximity to free margins a transposition flap was deemed most appropriate.  Using a sterile surgical marker, an appropriate transposition flap was drawn incorporating the defect.    The area thus outlined was incised deep to adipose tissue with a #15 scalpel blade.  The skin margins were undermined to an appropriate distance in all directions utilizing iris scissors. Muscle Hinge Flap Text: The defect edges were debeveled with a #15 scalpel blade.  Given the size, depth and location of the defect and the proximity to free margins a muscle hinge flap was deemed most appropriate.  Using a sterile surgical marker, an appropriate hinge flap was drawn incorporating the defect. The area thus outlined was incised with a #15 scalpel blade.  The skin margins were undermined to an appropriate distance in all directions utilizing iris scissors. Mustarde Flap Text: The defect edges were debeveled with a #15 scalpel blade.  Given the size, depth and location of the defect and the proximity to free margins a Mustarde flap was deemed most appropriate. Using a sterile surgical marker, an appropriate flap was drawn incorporating the defect. The area thus outlined was incised with a #15 scalpel blade. The skin margins were undermined to an appropriate distance in all directions utilizing iris scissors. Following this, the designed flap was carried into the primary defect and sutured into place. Nasal Turnover Hinge Flap Text: The defect edges were debeveled with a #15 scalpel blade.  Given the size, depth, location of the defect and the defect being full thickness a nasal turnover hinge flap was deemed most appropriate.  Using a sterile surgical marker, an appropriate hinge flap was drawn incorporating the defect. The area thus outlined was incised with a #15 scalpel blade. The flap was designed to recreate the nasal mucosal lining and the alar rim. The skin margins were undermined to an appropriate distance in all directions utilizing iris scissors. Nasalis-Muscle-Based Myocutaneous Island Pedicle Flap Text: Using a #15 blade, an incision was made around the donor flap to the level of the nasalis muscle. Wide lateral undermining was then performed in both the subcutaneous plane above the nasalis muscle, and in a submuscular plane just above periosteum. This allowed the formation of a free nasalis muscle axial pedicle (based on the angular artery) which was still attached to the actual cutaneous flap, increasing its mobility and vascular viability. Hemostasis was obtained with pinpoint electrocoagulation. The flap was mobilized into position and the pivotal anchor points positioned and stabilized with buried interrupted sutures. Subcutaneous and dermal tissues were closed in a multilayered fashion with sutures. Tissue redundancies were excised, and the epidermal edges were apposed without significant tension and sutured with sutures. Nasalis Myocutaneous Flap Text: Using a #15 blade, an incision was made around the donor flap to the level of the nasalis muscle. Wide lateral undermining was then performed in both the subcutaneous plane above the nasalis muscle, and in a submuscular plane just above periosteum. This allowed the formation of a free nasalis muscle axial pedicle which was still attached to the actual cutaneous flap, increasing its mobility and vascular viability. Hemostasis was obtained with pinpoint electrocoagulation. The flap was mobilized into position and the pivotal anchor points positioned and stabilized with buried interrupted sutures. Subcutaneous and dermal tissues were closed in a multilayered fashion with sutures. Tissue redundancies were excised, and the epidermal edges were apposed without significant tension and sutured with sutures. Nasolabial Transposition Flap Text: The defect edges were debeveled with a #15 scalpel blade.  Given the size, depth and location of the defect and the proximity to free margins a nasolabial transposition flap was deemed most appropriate. Using a sterile surgical marker, an appropriate flap was drawn incorporating the defect. The area thus outlined was incised with a #15 scalpel blade. The skin margins were undermined to an appropriate distance in all directions utilizing iris scissors. Following this, the designed flap was carried into the primary defect and sutured into place. Orbicularis Oris Muscle Flap Text: The defect edges were debeveled with a #15 scalpel blade.  Given that the defect affected the competency of the oral sphincter an obicularis oris muscle flap was deemed most appropriate to restore this competency and normal muscle function.  Using a sterile surgical marker, an appropriate flap was drawn incorporating the defect. The area thus outlined was incised with a #15 scalpel blade. Melolabial Transposition Flap Text: The defect edges were debeveled with a #15 scalpel blade.  Given the location of the defect and the proximity to free margins a melolabial flap was deemed most appropriate.  Using a sterile surgical marker, an appropriate melolabial transposition flap was drawn incorporating the defect.    The area thus outlined was incised deep to adipose tissue with a #15 scalpel blade.  The skin margins were undermined to an appropriate distance in all directions utilizing iris scissors. Rectangular Flap Text: The defect edges were debeveled with a #15 scalpel blade. Given the location of the defect and the proximity to free margins a rectangular flap was deemed most appropriate. Using a sterile surgical marker, an appropriate rectangular flap was drawn incorporating the defect. The area thus outlined was incised deep to adipose tissue with a #15 scalpel blade. The skin margins were undermined to an appropriate distance in all directions utilizing iris scissors. Following this, the designed flap was carried over into the primary defect and sutured into place. Rhombic Flap Text: The defect edges were debeveled with a #15 scalpel blade.  Given the location of the defect and the proximity to free margins a rhombic flap was deemed most appropriate.  Using a sterile surgical marker, an appropriate rhombic flap was drawn incorporating the defect.    The area thus outlined was incised deep to adipose tissue with a #15 scalpel blade.  The skin margins were undermined to an appropriate distance in all directions utilizing iris scissors. Rhomboid Transposition Flap Text: The defect edges were debeveled with a #15 scalpel blade.  Given the location of the defect and the proximity to free margins a rhomboid transposition flap was deemed most appropriate.  Using a sterile surgical marker, an appropriate rhomboid flap was drawn incorporating the defect.    The area thus outlined was incised deep to adipose tissue with a #15 scalpel blade.  The skin margins were undermined to an appropriate distance in all directions utilizing iris scissors. Bi-Rhombic Flap Text: The defect edges were debeveled with a #15 scalpel blade.  Given the location of the defect and the proximity to free margins a bi-rhombic flap was deemed most appropriate.  Using a sterile surgical marker, an appropriate rhombic flap was drawn incorporating the defect. The area thus outlined was incised deep to adipose tissue with a #15 scalpel blade.  The skin margins were undermined to an appropriate distance in all directions utilizing iris scissors. Helical Rim Advancement Flap Text: The defect edges were debeveled with a #15 blade scalpel.  Given the location of the defect and the proximity to free margins (helical rim) a double helical rim advancement flap was deemed most appropriate.  Using a sterile surgical marker, the appropriate advancement flaps were drawn incorporating the defect and placing the expected incisions between the helical rim and antihelix where possible.  The area thus outlined was incised through and through with a #15 scalpel blade.  With a skin hook and iris scissors, the flaps were gently and sharply undermined and freed up. Bilateral Helical Rim Advancement Flap Text: The defect edges were debeveled with a #15 blade scalpel.  Given the location of the defect and the proximity to free margins (helical rim) a bilateral helical rim advancement flap was deemed most appropriate.  Using a sterile surgical marker, the appropriate advancement flaps were drawn incorporating the defect and placing the expected incisions between the helical rim and antihelix where possible.  The area thus outlined was incised through and through with a #15 scalpel blade.  With a skin hook and iris scissors, the flaps were gently and sharply undermined and freed up. Ear Star Wedge Flap Text: The defect edges were debeveled with a #15 blade scalpel.  Given the location of the defect and the proximity to free margins (helical rim) an ear star wedge flap was deemed most appropriate.  Using a sterile surgical marker, the appropriate flap was drawn incorporating the defect and placing the expected incisions between the helical rim and antihelix where possible.  The area thus outlined was incised through and through with a #15 scalpel blade. Flip-Flop Flap Text: The defect edges were debeveled with a #15 blade scalpel.  Given the location of the defect and the proximity to free margins a flip-flop flap was deemed most appropriate. Using a sterile surgical marker, the appropriate flap was drawn incorporating the defect and placing the expected incisions between the helical rim and antihelix where possible.  The area thus outlined was incised through and through with a #15 scalpel blade. Following this, the designed flap was carried over into the primary defect and sutured into place. Banner Transposition Flap Text: The defect edges were debeveled with a #15 scalpel blade.  Given the location of the defect and the proximity to free margins a Banner transposition flap was deemed most appropriate.  Using a sterile surgical marker, an appropriate flap drawn around the defect. The area thus outlined was incised deep to adipose tissue with a #15 scalpel blade.  The skin margins were undermined to an appropriate distance in all directions utilizing iris scissors. Bilobed Flap Text: The defect edges were debeveled with a #15 scalpel blade.  Given the location of the defect and the proximity to free margins a bilobe flap was deemed most appropriate.  Using a sterile surgical marker, an appropriate bilobe flap drawn around the defect.    The area thus outlined was incised deep to adipose tissue with a #15 scalpel blade.  The skin margins were undermined to an appropriate distance in all directions utilizing iris scissors. Bilobed Transposition Flap Text: The defect edges were debeveled with a #15 scalpel blade.  Given the location of the defect and the proximity to free margins a bilobed transposition flap was deemed most appropriate.  Using a sterile surgical marker, an appropriate bilobe flap drawn around the defect.    The area thus outlined was incised deep to adipose tissue with a #15 scalpel blade.  The skin margins were undermined to an appropriate distance in all directions utilizing iris scissors. Trilobed Flap Text: The defect edges were debeveled with a #15 scalpel blade.  Given the location of the defect and the proximity to free margins a trilobed flap was deemed most appropriate.  Using a sterile surgical marker, an appropriate trilobed flap drawn around the defect.    The area thus outlined was incised deep to adipose tissue with a #15 scalpel blade.  The skin margins were undermined to an appropriate distance in all directions utilizing iris scissors. Dorsal Nasal Flap Text: The defect edges were debeveled with a #15 scalpel blade.  Given the location of the defect and the proximity to free margins a dorsal nasal flap was deemed most appropriate.  Using a sterile surgical marker, an appropriate dorsal nasal flap was drawn around the defect.    The area thus outlined was incised deep to adipose tissue with a #15 scalpel blade.  The skin margins were undermined to an appropriate distance in all directions utilizing iris scissors. Island Pedicle Flap Text: The defect edges were debeveled with a #15 scalpel blade.  Given the location of the defect, shape of the defect and the proximity to free margins an island pedicle advancement flap was deemed most appropriate.  Using a sterile surgical marker, an appropriate advancement flap was drawn incorporating the defect, outlining the appropriate donor tissue and placing the expected incisions within the relaxed skin tension lines where possible.    The area thus outlined was incised deep to adipose tissue with a #15 scalpel blade.  The skin margins were undermined to an appropriate distance in all directions around the primary defect and laterally outward around the island pedicle utilizing iris scissors.  There was minimal undermining beneath the pedicle flap. Island Pedicle Flap With Canthal Suspension Text: The defect edges were debeveled with a #15 scalpel blade.  Given the location of the defect, shape of the defect and the proximity to free margins an island pedicle advancement flap was deemed most appropriate.  Using a sterile surgical marker, an appropriate advancement flap was drawn incorporating the defect, outlining the appropriate donor tissue and placing the expected incisions within the relaxed skin tension lines where possible. The area thus outlined was incised deep to adipose tissue with a #15 scalpel blade.  The skin margins were undermined to an appropriate distance in all directions around the primary defect and laterally outward around the island pedicle utilizing iris scissors.  There was minimal undermining beneath the pedicle flap. A suspension suture was placed in the canthal tendon to prevent tension and prevent ectropion. Alar Island Pedicle Flap Text: The defect edges were debeveled with a #15 scalpel blade.  Given the location of the defect, shape of the defect and the proximity to the alar rim an island pedicle advancement flap was deemed most appropriate.  Using a sterile surgical marker, an appropriate advancement flap was drawn incorporating the defect, outlining the appropriate donor tissue and placing the expected incisions within the nasal ala running parallel to the alar rim. The area thus outlined was incised with a #15 scalpel blade.  The skin margins were undermined minimally to an appropriate distance in all directions around the primary defect and laterally outward around the island pedicle utilizing iris scissors.  There was minimal undermining beneath the pedicle flap. Double Island Pedicle Flap Text: The defect edges were debeveled with a #15 scalpel blade.  Given the location of the defect, shape of the defect and the proximity to free margins a double island pedicle advancement flap was deemed most appropriate.  Using a sterile surgical marker, an appropriate advancement flap was drawn incorporating the defect, outlining the appropriate donor tissue and placing the expected incisions within the relaxed skin tension lines where possible.    The area thus outlined was incised deep to adipose tissue with a #15 scalpel blade.  The skin margins were undermined to an appropriate distance in all directions around the primary defect and laterally outward around the island pedicle utilizing iris scissors.  There was minimal undermining beneath the pedicle flap. Island Pedicle Flap-Requiring Vessel Identification Text: The defect edges were debeveled with a #15 scalpel blade.  Given the location of the defect, shape of the defect and the proximity to free margins an island pedicle advancement flap was deemed most appropriate.  Using a sterile surgical marker, an appropriate advancement flap was drawn, based on the axial vessel mentioned above, incorporating the defect, outlining the appropriate donor tissue and placing the expected incisions within the relaxed skin tension lines where possible.    The area thus outlined was incised deep to adipose tissue with a #15 scalpel blade.  The skin margins were undermined to an appropriate distance in all directions around the primary defect and laterally outward around the island pedicle utilizing iris scissors.  There was minimal undermining beneath the pedicle flap. Keystone Flap Text: The defect edges were debeveled with a #15 scalpel blade.  Given the location of the defect, shape of the defect a keystone flap was deemed most appropriate.  Using a sterile surgical marker, an appropriate keystone flap was drawn incorporating the defect, outlining the appropriate donor tissue and placing the expected incisions within the relaxed skin tension lines where possible. The area thus outlined was incised deep to adipose tissue with a #15 scalpel blade.  The skin margins were undermined to an appropriate distance in all directions around the primary defect and laterally outward around the flap utilizing iris scissors. O-T Plasty Text: The defect edges were debeveled with a #15 scalpel blade.  Given the location of the defect, shape of the defect and the proximity to free margins an O-T plasty was deemed most appropriate.  Using a sterile surgical marker, an appropriate O-T plasty was drawn incorporating the defect and placing the expected incisions within the relaxed skin tension lines where possible.    The area thus outlined was incised deep to adipose tissue with a #15 scalpel blade.  The skin margins were undermined to an appropriate distance in all directions utilizing iris scissors. O-Z Plasty Text: The defect edges were debeveled with a #15 scalpel blade.  Given the location of the defect, shape of the defect and the proximity to free margins an O-Z plasty (double transposition flap) was deemed most appropriate.  Using a sterile surgical marker, the appropriate transposition flaps were drawn incorporating the defect and placing the expected incisions within the relaxed skin tension lines where possible.    The area thus outlined was incised deep to adipose tissue with a #15 scalpel blade.  The skin margins were undermined to an appropriate distance in all directions utilizing iris scissors.  Hemostasis was achieved with electrocautery.  The flaps were then transposed into place, one clockwise and the other counterclockwise, and anchored with interrupted buried subcutaneous sutures. Double O-Z Plasty Text: The defect edges were debeveled with a #15 scalpel blade.  Given the location of the defect, shape of the defect and the proximity to free margins a Double O-Z plasty (double transposition flap) was deemed most appropriate.  Using a sterile surgical marker, the appropriate transposition flaps were drawn incorporating the defect and placing the expected incisions within the relaxed skin tension lines where possible. The area thus outlined was incised deep to adipose tissue with a #15 scalpel blade.  The skin margins were undermined to an appropriate distance in all directions utilizing iris scissors.  Hemostasis was achieved with electrocautery.  The flaps were then transposed into place, one clockwise and the other counterclockwise, and anchored with interrupted buried subcutaneous sutures. V-Y Plasty Text: The defect edges were debeveled with a #15 scalpel blade.  Given the location of the defect, shape of the defect and the proximity to free margins an V-Y advancement flap was deemed most appropriate.  Using a sterile surgical marker, an appropriate advancement flap was drawn incorporating the defect and placing the expected incisions within the relaxed skin tension lines where possible.    The area thus outlined was incised deep to adipose tissue with a #15 scalpel blade.  The skin margins were undermined to an appropriate distance in all directions utilizing iris scissors. H Plasty Text: Given the location of the defect, shape of the defect and the proximity to free margins a H-plasty was deemed most appropriate for repair.  Using a sterile surgical marker, the appropriate advancement arms of the H-plasty were drawn incorporating the defect and placing the expected incisions within the relaxed skin tension lines where possible. The area thus outlined was incised deep to adipose tissue with a #15 scalpel blade. The skin margins were undermined to an appropriate distance in all directions utilizing iris scissors.  The opposing advancement arms were then advanced into place in opposite direction and anchored with interrupted buried subcutaneous sutures. W Plasty Text: The lesion was extirpated to the level of the fat with a #15 scalpel blade.  Given the location of the defect, shape of the defect and the proximity to free margins a W-plasty was deemed most appropriate for repair.  Using a sterile surgical marker, the appropriate transposition arms of the W-plasty were drawn incorporating the defect and placing the expected incisions within the relaxed skin tension lines where possible.    The area thus outlined was incised deep to adipose tissue with a #15 scalpel blade.  The skin margins were undermined to an appropriate distance in all directions utilizing iris scissors.  The opposing transposition arms were then transposed into place in opposite direction and anchored with interrupted buried subcutaneous sutures. Z Plasty Text: The lesion was extirpated to the level of the fat with a #15 scalpel blade.  Given the location of the defect, shape of the defect and the proximity to free margins a Z-plasty was deemed most appropriate for repair.  Using a sterile surgical marker, the appropriate transposition arms of the Z-plasty were drawn incorporating the defect and placing the expected incisions within the relaxed skin tension lines where possible.    The area thus outlined was incised deep to adipose tissue with a #15 scalpel blade.  The skin margins were undermined to an appropriate distance in all directions utilizing iris scissors.  The opposing transposition arms were then transposed into place in opposite direction and anchored with interrupted buried subcutaneous sutures. Double Z Plasty Text: The lesion was extirpated to the level of the fat with a #15 scalpel blade. Given the location of the defect, shape of the defect and the proximity to free margins a double Z-plasty was deemed most appropriate for repair. Using a sterile surgical marker, the appropriate transposition arms of the double Z-plasty were drawn incorporating the defect and placing the expected incisions within the relaxed skin tension lines where possible. The area thus outlined was incised deep to adipose tissue with a #15 scalpel blade. The skin margins were undermined to an appropriate distance in all directions utilizing iris scissors. The opposing transposition arms were then transposed and carried over into place in opposite direction and anchored with interrupted buried subcutaneous sutures. Zygomaticofacial Flap Text: Given the location of the defect, shape of the defect and the proximity to free margins a zygomaticofacial flap was deemed most appropriate for repair.  Using a sterile surgical marker, the appropriate flap was drawn incorporating the defect and placing the expected incisions within the relaxed skin tension lines where possible. The area thus outlined was incised deep to adipose tissue with a #15 scalpel blade with preservation of a vascular pedicle.  The skin margins were undermined to an appropriate distance in all directions utilizing iris scissors.  The flap was then placed into the defect and anchored with interrupted buried subcutaneous sutures. Cheek Interpolation Flap Text: A decision was made to reconstruct the defect utilizing an interpolation axial flap and a staged reconstruction.  A telfa template was made of the defect.  This telfa template was then used to outline the Cheek Interpolation flap.  The donor area for the pedicle flap was then injected with anesthesia.  The flap was excised through the skin and subcutaneous tissue down to the layer of the underlying musculature.  The interpolation flap was carefully excised within this deep plane to maintain its blood supply.  The edges of the donor site were undermined.   The donor site was closed in a primary fashion.  The pedicle was then rotated into position and sutured.  Once the tube was sutured into place, adequate blood supply was confirmed with blanching and refill.  The pedicle was then wrapped with xeroform gauze and dressed appropriately with a telfa and gauze bandage to ensure continued blood supply and protect the attached pedicle. Cheek-To-Nose Interpolation Flap Text: A decision was made to reconstruct the defect utilizing an interpolation axial flap and a staged reconstruction.  A telfa template was made of the defect.  This telfa template was then used to outline the Cheek-To-Nose Interpolation flap.  The donor area for the pedicle flap was then injected with anesthesia.  The flap was excised through the skin and subcutaneous tissue down to the layer of the underlying musculature.  The interpolation flap was carefully excised within this deep plane to maintain its blood supply.  The edges of the donor site were undermined.   The donor site was closed in a primary fashion.  The pedicle was then rotated into position and sutured.  Once the tube was sutured into place, adequate blood supply was confirmed with blanching and refill.  The pedicle was then wrapped with xeroform gauze and dressed appropriately with a telfa and gauze bandage to ensure continued blood supply and protect the attached pedicle. Interpolation Flap Text: A decision was made to reconstruct the defect utilizing an interpolation axial flap and a staged reconstruction.  A telfa template was made of the defect.  This telfa template was then used to outline the interpolation flap.  The donor area for the pedicle flap was then injected with anesthesia.  The flap was excised through the skin and subcutaneous tissue down to the layer of the underlying musculature.  The interpolation flap was carefully excised within this deep plane to maintain its blood supply.  The edges of the donor site were undermined.   The donor site was closed in a primary fashion.  The pedicle was then rotated into position and sutured.  Once the tube was sutured into place, adequate blood supply was confirmed with blanching and refill.  The pedicle was then wrapped with xeroform gauze and dressed appropriately with a telfa and gauze bandage to ensure continued blood supply and protect the attached pedicle. Melolabial Interpolation Flap Text: A decision was made to reconstruct the defect utilizing an interpolation axial flap and a staged reconstruction.  A telfa template was made of the defect.  This telfa template was then used to outline the melolabial interpolation flap.  The donor area for the pedicle flap was then injected with anesthesia.  The flap was excised through the skin and subcutaneous tissue down to the layer of the underlying musculature.  The pedicle flap was carefully excised within this deep plane to maintain its blood supply.  The edges of the donor site were undermined.   The donor site was closed in a primary fashion.  The pedicle was then rotated into position and sutured.  Once the tube was sutured into place, adequate blood supply was confirmed with blanching and refill.  The pedicle was then wrapped with xeroform gauze and dressed appropriately with a telfa and gauze bandage to ensure continued blood supply and protect the attached pedicle. Mastoid Interpolation Flap Text: A decision was made to reconstruct the defect utilizing an interpolation axial flap and a staged reconstruction.  A telfa template was made of the defect.  This telfa template was then used to outline the mastoid interpolation flap.  The donor area for the pedicle flap was then injected with anesthesia.  The flap was excised through the skin and subcutaneous tissue down to the layer of the underlying musculature.  The pedicle flap was carefully excised within this deep plane to maintain its blood supply.  The edges of the donor site were undermined.   The donor site was closed in a primary fashion.  The pedicle was then rotated into position and sutured.  Once the tube was sutured into place, adequate blood supply was confirmed with blanching and refill.  The pedicle was then wrapped with xeroform gauze and dressed appropriately with a telfa and gauze bandage to ensure continued blood supply and protect the attached pedicle. Posterior Auricular Interpolation Flap Text: A decision was made to reconstruct the defect utilizing an interpolation axial flap and a staged reconstruction.  A telfa template was made of the defect.  This telfa template was then used to outline the posterior auricular interpolation flap.  The donor area for the pedicle flap was then injected with anesthesia.  The flap was excised through the skin and subcutaneous tissue down to the layer of the underlying musculature.  The pedicle flap was carefully excised within this deep plane to maintain its blood supply.  The edges of the donor site were undermined.   The donor site was closed in a primary fashion.  The pedicle was then rotated into position and sutured.  Once the tube was sutured into place, adequate blood supply was confirmed with blanching and refill.  The pedicle was then wrapped with xeroform gauze and dressed appropriately with a telfa and gauze bandage to ensure continued blood supply and protect the attached pedicle. Paramedian Forehead Flap Text: A decision was made to reconstruct the defect utilizing an interpolation axial flap and a staged reconstruction.  A telfa template was made of the defect.  This telfa template was then used to outline the paramedian forehead pedicle flap.  The donor area for the pedicle flap was then injected with anesthesia.  The flap was excised through the skin and subcutaneous tissue down to the layer of the underlying musculature.  The pedicle flap was carefully excised within this deep plane to maintain its blood supply.  The edges of the donor site were undermined.   The donor site was closed in a primary fashion.  The pedicle was then rotated into position and sutured.  Once the tube was sutured into place, adequate blood supply was confirmed with blanching and refill.  The pedicle was then wrapped with xeroform gauze and dressed appropriately with a telfa and gauze bandage to ensure continued blood supply and protect the attached pedicle. Abbe Flap (Upper To Lower Lip) Text: The defect of the lower lip was assessed and measured.  Given the location and size of the defect, an Abbe flap was deemed most appropriate. Using a sterile surgical marker, an appropriate Abbe flap was measured and drawn on the upper lip. Local anesthesia was then infiltrated.  A scalpel was then used to incise the upper lip through and through the skin, vermilion, muscle and mucosa, leaving the flap pedicled on the opposite side.  The flap was then rotated and transferred to the lower lip defect.  The flap was then sutured into place with a three layer technique, closing the orbicularis oris muscle layer with subcutaneous buried sutures, followed by a mucosal layer and an epidermal layer. Abbe Flap (Lower To Upper Lip) Text: The defect of the upper lip was assessed and measured.  Given the location and size of the defect, an Abbe flap was deemed most appropriate. Using a sterile surgical marker, an appropriate Abbe flap was measured and drawn on the lower lip. Local anesthesia was then infiltrated. A scalpel was then used to incise the upper lip through and through the skin, vermilion, muscle and mucosa, leaving the flap pedicled on the opposite side.  The flap was then rotated and transferred to the lower lip defect.  The flap was then sutured into place with a three layer technique, closing the orbicularis oris muscle layer with subcutaneous buried sutures, followed by a mucosal layer and an epidermal layer. Estlander Flap (Upper To Lower Lip) Text: The defect of the lower lip was assessed and measured.  Given the location and size of the defect, an Estlander flap was deemed most appropriate. Using a sterile surgical marker, an appropriate Estlander flap was measured and drawn on the upper lip. Local anesthesia was then infiltrated. A scalpel was then used to incise the lateral aspect of the flap, through skin, muscle and mucosa, leaving the flap pedicled medially.  The flap was then rotated and positioned to fill the lower lip defect.  The flap was then sutured into place with a three layer technique, closing the orbicularis oris muscle layer with subcutaneous buried sutures, followed by a mucosal layer and an epidermal layer. Lip Wedge Excision Repair Text: Given the location of the defect and the proximity to free margins a full thickness wedge repair was deemed most appropriate.  Using a sterile surgical marker, the appropriate repair was drawn incorporating the defect and placing the expected incisions perpendicular to the vermilion border.  The vermilion border was also meticulously outlined to ensure appropriate reapproximation during the repair.  The area thus outlined was incised through and through with a #15 scalpel blade.  The muscularis and dermis were reaproximated with deep sutures following hemostasis. Care was taken to realign the vermilion border before proceeding with the superficial closure.  Once the vermilion was realigned the superfical and mucosal closure was finished. Ftsg Text: The defect edges were debeveled with a #15 scalpel blade.  Given the location of the defect, shape of the defect and the proximity to free margins a full thickness skin graft was deemed most appropriate.  Using a sterile surgical marker, the primary defect shape was transferred to the donor site. The area thus outlined was incised deep to adipose tissue with a #15 scalpel blade.  The harvested graft was then trimmed of adipose tissue until only dermis and epidermis was left.  The skin margins of the secondary defect were undermined to an appropriate distance in all directions utilizing iris scissors.  The secondary defect was closed with interrupted buried subcutaneous sutures.  The skin edges were then re-apposed with running  sutures.  The skin graft was then placed in the primary defect and oriented appropriately. Split-Thickness Skin Graft Text: The defect edges were debeveled with a #15 scalpel blade.  Given the location of the defect, shape of the defect and the proximity to free margins a split thickness skin graft was deemed most appropriate.  Using a sterile surgical marker, the primary defect shape was transferred to the donor site. The split thickness graft was then harvested.  The skin graft was then placed in the primary defect and oriented appropriately. Pinch Graft Text: The defect edges were debeveled with a #15 scalpel blade. Given the location of the defect, shape of the defect and the proximity to free margins a pinch graft was deemed most appropriate. Using a sterile surgical marker, the primary defect shape was transferred to the donor site. The area thus outlined was incised deep to adipose tissue with a #15 scalpel blade.  The harvested graft was then trimmed of adipose tissue until only dermis and epidermis was left. The skin margins of the secondary defect were undermined to an appropriate distance in all directions utilizing iris scissors.  The secondary defect was closed with interrupted buried subcutaneous sutures.  The skin edges were then re-apposed with running  sutures.  The skin graft was then placed in the primary defect and oriented appropriately. Burow's Graft Text: The defect edges were debeveled with a #15 scalpel blade.  Given the location of the defect, shape of the defect, the proximity to free margins and the presence of a standing cone deformity a Burow's skin graft was deemed most appropriate. The standing cone was removed and this tissue was then trimmed to the shape of the primary defect. The adipose tissue was also removed until only dermis and epidermis were left.  The skin margins of the secondary defect were undermined to an appropriate distance in all directions utilizing iris scissors.  The secondary defect was closed with interrupted buried subcutaneous sutures.  The skin edges were then re-apposed with running  sutures.  The skin graft was then placed in the primary defect and oriented appropriately. Cartilage Graft Text: The defect edges were debeveled with a #15 scalpel blade.  Given the location of the defect, shape of the defect, the fact the defect involved a full thickness cartilage defect a cartilage graft was deemed most appropriate.  An appropriate donor site was identified, cleansed, and anesthetized. The cartilage graft was then harvested and transferred to the recipient site, oriented appropriately and then sutured into place.  The secondary defect was then repaired using a primary closure. Composite Graft Text: The defect edges were debeveled with a #15 scalpel blade.  Given the location of the defect, shape of the defect, the proximity to free margins and the fact the defect was full thickness a composite graft was deemed most appropriate.  The defect was outline and then transferred to the donor site.  A full thickness graft was then excised from the donor site. The graft was then placed in the primary defect, oriented appropriately and then sutured into place.  The secondary defect was then repaired using a primary closure. Epidermal Autograft Text: The defect edges were debeveled with a #15 scalpel blade.  Given the location of the defect, shape of the defect and the proximity to free margins an epidermal autograft was deemed most appropriate.  Using a sterile surgical marker, the primary defect shape was transferred to the donor site. The epidermal graft was then harvested.  The skin graft was then placed in the primary defect and oriented appropriately. Dermal Autograft Text: The defect edges were debeveled with a #15 scalpel blade.  Given the location of the defect, shape of the defect and the proximity to free margins a dermal autograft was deemed most appropriate.  Using a sterile surgical marker, the primary defect shape was transferred to the donor site. The area thus outlined was incised deep to adipose tissue with a #15 scalpel blade.  The harvested graft was then trimmed of adipose and epidermal tissue until only dermis was left.  The skin graft was then placed in the primary defect and oriented appropriately. Skin Substitute Text: The defect edges were debeveled with a #15 scalpel blade.  Given the location of the defect, shape of the defect and the proximity to free margins a skin substitute graft was deemed most appropriate.  The graft material was trimmed to fit the size of the defect. The graft was then placed in the primary defect and oriented appropriately. Tissue Cultured Epidermal Autograft Text: The defect edges were debeveled with a #15 scalpel blade.  Given the location of the defect, shape of the defect and the proximity to free margins a tissue cultured epidermal autograft was deemed most appropriate.  The graft was then trimmed to fit the size of the defect.  The graft was then placed in the primary defect and oriented appropriately. Xenograft Text: The defect edges were debeveled with a #15 scalpel blade.  Given the location of the defect, shape of the defect and the proximity to free margins a xenograft was deemed most appropriate.  The graft was then trimmed to fit the size of the defect.  The graft was then placed in the primary defect and oriented appropriately. Purse String (Intermediate) Text: Given the location of the defect and the characteristics of the surrounding skin a purse string intermediate closure was deemed most appropriate.  Undermining was performed circumfirentially around the surgical defect.  A purse string suture was then placed and tightened. Purse String (Simple) Text: Given the location of the defect and the characteristics of the surrounding skin a purse string simple closure was deemed most appropriate.  Undermining was performed circumferentially around the surgical defect.  A purse string suture was then placed and tightened. Partial Purse String (Intermediate) Text: Given the location of the defect and the characteristics of the surrounding skin an intermediate purse string closure was deemed most appropriate.  Undermining was performed circumferentially around the surgical defect.  A purse string suture was then placed and tightened. Wound tension of the circular defect prevented complete closure of the wound. Partial Purse String (Simple) Text: Given the location of the defect and the characteristics of the surrounding skin a simple purse string closure was deemed most appropriate.  Undermining was performed circumferentially around the surgical defect.  A purse string suture was then placed and tightened. Wound tension of the circular defect prevented complete closure of the wound. Complex Repair And Single Advancement Flap Text: The defect edges were debeveled with a #15 scalpel blade.  The primary defect was closed partially with a complex linear closure.  Given the location of the remaining defect, shape of the defect and the proximity to free margins a single advancement flap was deemed most appropriate for complete closure of the defect.  Using a sterile surgical marker, an appropriate advancement flap was drawn incorporating the defect and placing the expected incisions within the relaxed skin tension lines where possible.    The area thus outlined was incised deep to adipose tissue with a #15 scalpel blade.  The skin margins were undermined to an appropriate distance in all directions utilizing iris scissors. Complex Repair And Double Advancement Flap Text: The defect edges were debeveled with a #15 scalpel blade.  The primary defect was closed partially with a complex linear closure.  Given the location of the remaining defect, shape of the defect and the proximity to free margins a double advancement flap was deemed most appropriate for complete closure of the defect.  Using a sterile surgical marker, an appropriate advancement flap was drawn incorporating the defect and placing the expected incisions within the relaxed skin tension lines where possible.    The area thus outlined was incised deep to adipose tissue with a #15 scalpel blade.  The skin margins were undermined to an appropriate distance in all directions utilizing iris scissors. Complex Repair And Modified Advancement Flap Text: The defect edges were debeveled with a #15 scalpel blade.  The primary defect was closed partially with a complex linear closure.  Given the location of the remaining defect, shape of the defect and the proximity to free margins a modified advancement flap was deemed most appropriate for complete closure of the defect.  Using a sterile surgical marker, an appropriate advancement flap was drawn incorporating the defect and placing the expected incisions within the relaxed skin tension lines where possible.    The area thus outlined was incised deep to adipose tissue with a #15 scalpel blade.  The skin margins were undermined to an appropriate distance in all directions utilizing iris scissors. Complex Repair And A-T Advancement Flap Text: The defect edges were debeveled with a #15 scalpel blade.  The primary defect was closed partially with a complex linear closure.  Given the location of the remaining defect, shape of the defect and the proximity to free margins an A-T advancement flap was deemed most appropriate for complete closure of the defect.  Using a sterile surgical marker, an appropriate advancement flap was drawn incorporating the defect and placing the expected incisions within the relaxed skin tension lines where possible.    The area thus outlined was incised deep to adipose tissue with a #15 scalpel blade.  The skin margins were undermined to an appropriate distance in all directions utilizing iris scissors. Complex Repair And O-T Advancement Flap Text: The defect edges were debeveled with a #15 scalpel blade.  The primary defect was closed partially with a complex linear closure.  Given the location of the remaining defect, shape of the defect and the proximity to free margins an O-T advancement flap was deemed most appropriate for complete closure of the defect.  Using a sterile surgical marker, an appropriate advancement flap was drawn incorporating the defect and placing the expected incisions within the relaxed skin tension lines where possible.    The area thus outlined was incised deep to adipose tissue with a #15 scalpel blade.  The skin margins were undermined to an appropriate distance in all directions utilizing iris scissors. Complex Repair And O-L Flap Text: The defect edges were debeveled with a #15 scalpel blade.  The primary defect was closed partially with a complex linear closure.  Given the location of the remaining defect, shape of the defect and the proximity to free margins an O-L flap was deemed most appropriate for complete closure of the defect.  Using a sterile surgical marker, an appropriate flap was drawn incorporating the defect and placing the expected incisions within the relaxed skin tension lines where possible.    The area thus outlined was incised deep to adipose tissue with a #15 scalpel blade.  The skin margins were undermined to an appropriate distance in all directions utilizing iris scissors. Complex Repair And Bilobe Flap Text: The defect edges were debeveled with a #15 scalpel blade.  The primary defect was closed partially with a complex linear closure.  Given the location of the remaining defect, shape of the defect and the proximity to free margins a bilobe flap was deemed most appropriate for complete closure of the defect.  Using a sterile surgical marker, an appropriate advancement flap was drawn incorporating the defect and placing the expected incisions within the relaxed skin tension lines where possible.    The area thus outlined was incised deep to adipose tissue with a #15 scalpel blade.  The skin margins were undermined to an appropriate distance in all directions utilizing iris scissors. Complex Repair And Melolabial Flap Text: The defect edges were debeveled with a #15 scalpel blade.  The primary defect was closed partially with a complex linear closure.  Given the location of the remaining defect, shape of the defect and the proximity to free margins a melolabial flap was deemed most appropriate for complete closure of the defect.  Using a sterile surgical marker, an appropriate advancement flap was drawn incorporating the defect and placing the expected incisions within the relaxed skin tension lines where possible.    The area thus outlined was incised deep to adipose tissue with a #15 scalpel blade.  The skin margins were undermined to an appropriate distance in all directions utilizing iris scissors. Complex Repair And Rotation Flap Text: The defect edges were debeveled with a #15 scalpel blade.  The primary defect was closed partially with a complex linear closure.  Given the location of the remaining defect, shape of the defect and the proximity to free margins a rotation flap was deemed most appropriate for complete closure of the defect.  Using a sterile surgical marker, an appropriate advancement flap was drawn incorporating the defect and placing the expected incisions within the relaxed skin tension lines where possible.    The area thus outlined was incised deep to adipose tissue with a #15 scalpel blade.  The skin margins were undermined to an appropriate distance in all directions utilizing iris scissors. Complex Repair And Rhombic Flap Text: The defect edges were debeveled with a #15 scalpel blade.  The primary defect was closed partially with a complex linear closure.  Given the location of the remaining defect, shape of the defect and the proximity to free margins a rhombic flap was deemed most appropriate for complete closure of the defect.  Using a sterile surgical marker, an appropriate advancement flap was drawn incorporating the defect and placing the expected incisions within the relaxed skin tension lines where possible.    The area thus outlined was incised deep to adipose tissue with a #15 scalpel blade.  The skin margins were undermined to an appropriate distance in all directions utilizing iris scissors. Complex Repair And Transposition Flap Text: The defect edges were debeveled with a #15 scalpel blade.  The primary defect was closed partially with a complex linear closure.  Given the location of the remaining defect, shape of the defect and the proximity to free margins a transposition flap was deemed most appropriate for complete closure of the defect.  Using a sterile surgical marker, an appropriate advancement flap was drawn incorporating the defect and placing the expected incisions within the relaxed skin tension lines where possible.    The area thus outlined was incised deep to adipose tissue with a #15 scalpel blade.  The skin margins were undermined to an appropriate distance in all directions utilizing iris scissors. Complex Repair And V-Y Plasty Text: The defect edges were debeveled with a #15 scalpel blade.  The primary defect was closed partially with a complex linear closure.  Given the location of the remaining defect, shape of the defect and the proximity to free margins a V-Y plasty was deemed most appropriate for complete closure of the defect.  Using a sterile surgical marker, an appropriate advancement flap was drawn incorporating the defect and placing the expected incisions within the relaxed skin tension lines where possible.    The area thus outlined was incised deep to adipose tissue with a #15 scalpel blade.  The skin margins were undermined to an appropriate distance in all directions utilizing iris scissors. Complex Repair And M Plasty Text: The defect edges were debeveled with a #15 scalpel blade.  The primary defect was closed partially with a complex linear closure.  Given the location of the remaining defect, shape of the defect and the proximity to free margins an M plasty was deemed most appropriate for complete closure of the defect.  Using a sterile surgical marker, an appropriate advancement flap was drawn incorporating the defect and placing the expected incisions within the relaxed skin tension lines where possible.    The area thus outlined was incised deep to adipose tissue with a #15 scalpel blade.  The skin margins were undermined to an appropriate distance in all directions utilizing iris scissors. Complex Repair And Double M Plasty Text: The defect edges were debeveled with a #15 scalpel blade.  The primary defect was closed partially with a complex linear closure.  Given the location of the remaining defect, shape of the defect and the proximity to free margins a double M plasty was deemed most appropriate for complete closure of the defect.  Using a sterile surgical marker, an appropriate advancement flap was drawn incorporating the defect and placing the expected incisions within the relaxed skin tension lines where possible.    The area thus outlined was incised deep to adipose tissue with a #15 scalpel blade.  The skin margins were undermined to an appropriate distance in all directions utilizing iris scissors. Complex Repair And W Plasty Text: The defect edges were debeveled with a #15 scalpel blade.  The primary defect was closed partially with a complex linear closure.  Given the location of the remaining defect, shape of the defect and the proximity to free margins a W plasty was deemed most appropriate for complete closure of the defect.  Using a sterile surgical marker, an appropriate advancement flap was drawn incorporating the defect and placing the expected incisions within the relaxed skin tension lines where possible.    The area thus outlined was incised deep to adipose tissue with a #15 scalpel blade.  The skin margins were undermined to an appropriate distance in all directions utilizing iris scissors. Complex Repair And Z Plasty Text: The defect edges were debeveled with a #15 scalpel blade.  The primary defect was closed partially with a complex linear closure.  Given the location of the remaining defect, shape of the defect and the proximity to free margins a Z plasty was deemed most appropriate for complete closure of the defect.  Using a sterile surgical marker, an appropriate advancement flap was drawn incorporating the defect and placing the expected incisions within the relaxed skin tension lines where possible.    The area thus outlined was incised deep to adipose tissue with a #15 scalpel blade.  The skin margins were undermined to an appropriate distance in all directions utilizing iris scissors. Complex Repair And Dorsal Nasal Flap Text: The defect edges were debeveled with a #15 scalpel blade.  The primary defect was closed partially with a complex linear closure.  Given the location of the remaining defect, shape of the defect and the proximity to free margins a dorsal nasal flap was deemed most appropriate for complete closure of the defect.  Using a sterile surgical marker, an appropriate flap was drawn incorporating the defect and placing the expected incisions within the relaxed skin tension lines where possible.    The area thus outlined was incised deep to adipose tissue with a #15 scalpel blade.  The skin margins were undermined to an appropriate distance in all directions utilizing iris scissors. Complex Repair And Ftsg Text: The defect edges were debeveled with a #15 scalpel blade.  The primary defect was closed partially with a complex linear closure.  Given the location of the defect, shape of the defect and the proximity to free margins a full thickness skin graft was deemed most appropriate to repair the remaining defect.  The graft was trimmed to fit the size of the remaining defect.  The graft was then placed in the primary defect, oriented appropriately, and sutured into place. Complex Repair And Burow's Graft Text: The defect edges were debeveled with a #15 scalpel blade.  The primary defect was closed partially with a complex linear closure.  Given the location of the defect, shape of the defect, the proximity to free margins and the presence of a standing cone deformity a Burow's graft was deemed most appropriate to repair the remaining defect.  The graft was trimmed to fit the size of the remaining defect.  The graft was then placed in the primary defect, oriented appropriately, and sutured into place. Complex Repair And Split-Thickness Skin Graft Text: The defect edges were debeveled with a #15 scalpel blade.  The primary defect was closed partially with a complex linear closure.  Given the location of the defect, shape of the defect and the proximity to free margins a split thickness skin graft was deemed most appropriate to repair the remaining defect.  The graft was trimmed to fit the size of the remaining defect.  The graft was then placed in the primary defect, oriented appropriately, and sutured into place. Complex Repair And Epidermal Autograft Text: The defect edges were debeveled with a #15 scalpel blade.  The primary defect was closed partially with a complex linear closure.  Given the location of the defect, shape of the defect and the proximity to free margins an epidermal autograft was deemed most appropriate to repair the remaining defect.  The graft was trimmed to fit the size of the remaining defect.  The graft was then placed in the primary defect, oriented appropriately, and sutured into place. Complex Repair And Dermal Autograft Text: The defect edges were debeveled with a #15 scalpel blade.  The primary defect was closed partially with a complex linear closure.  Given the location of the defect, shape of the defect and the proximity to free margins an dermal autograft was deemed most appropriate to repair the remaining defect.  The graft was trimmed to fit the size of the remaining defect.  The graft was then placed in the primary defect, oriented appropriately, and sutured into place. Complex Repair And Tissue Cultured Epidermal Autograft Text: The defect edges were debeveled with a #15 scalpel blade.  The primary defect was closed partially with a complex linear closure.  Given the location of the defect, shape of the defect and the proximity to free margins an tissue cultured epidermal autograft was deemed most appropriate to repair the remaining defect.  The graft was trimmed to fit the size of the remaining defect.  The graft was then placed in the primary defect, oriented appropriately, and sutured into place. Complex Repair And Xenograft Text: The defect edges were debeveled with a #15 scalpel blade.  The primary defect was closed partially with a complex linear closure.  Given the location of the defect, shape of the defect and the proximity to free margins a xenograft was deemed most appropriate to repair the remaining defect.  The graft was trimmed to fit the size of the remaining defect.  The graft was then placed in the primary defect, oriented appropriately, and sutured into place. Complex Repair And Skin Substitute Graft Text: The defect edges were debeveled with a #15 scalpel blade.  The primary defect was closed partially with a complex linear closure.  Given the location of the remaining defect, shape of the defect and the proximity to free margins a skin substitute graft was deemed most appropriate to repair the remaining defect.  The graft was trimmed to fit the size of the remaining defect.  The graft was then placed in the primary defect, oriented appropriately, and sutured into place. Include Anticoagulation In Mohs Note?: Please Select the Appropriate Response Path Notes (To The Dermatopathologist): Please check margins. Consent was obtained from the patient. The risks and benefits to therapy were discussed in detail. Specifically, the risks of infection, scarring, bleeding, prolonged wound healing, incomplete removal, allergy to anesthesia, nerve injury and recurrence were addressed. Prior to the procedure, the treatment site was clearly identified and confirmed by the patient. All components of Universal Protocol/PAUSE Rule completed. Post-Care Instructions: I reviewed with the patient in detail post-care instructions. Patient is not to engage in any heavy lifting, exercise, or swimming for the next 14 days. Should the patient develop any fevers, chills, bleeding, severe pain patient will contact the office immediately. Home Suture Removal Text: Patient was provided a home suture removal kit and will remove their sutures at home.  If they have any questions or difficulties they will call the office. Where Do You Want The Question To Include Opioid Counseling Located?: Case Summary Tab Information: Selecting Yes will display possible errors in your note based on the variables you have selected. This validation is only offered as a suggestion for you. PLEASE NOTE THAT THE VALIDATION TEXT WILL BE REMOVED WHEN YOU FINALIZE YOUR NOTE. IF YOU WANT TO FAX A PRELIMINARY NOTE YOU WILL NEED TO TOGGLE THIS TO 'NO' IF YOU DO NOT WANT IT IN YOUR FAXED NOTE.

## 2025-02-20 ENCOUNTER — INPATIENT (INPATIENT)
Facility: HOSPITAL | Age: 85
LOS: 1 days | Discharge: HOME | DRG: 195 | End: 2025-02-22
Attending: HOSPITALIST | Admitting: STUDENT IN AN ORGANIZED HEALTH CARE EDUCATION/TRAINING PROGRAM
Payer: MEDICARE

## 2025-02-20 VITALS
DIASTOLIC BLOOD PRESSURE: 63 MMHG | HEIGHT: 68 IN | TEMPERATURE: 98 F | SYSTOLIC BLOOD PRESSURE: 172 MMHG | WEIGHT: 169.98 LBS | RESPIRATION RATE: 20 BRPM | OXYGEN SATURATION: 95 % | HEART RATE: 110 BPM

## 2025-02-20 DIAGNOSIS — J18.9 PNEUMONIA, UNSPECIFIED ORGANISM: ICD-10-CM

## 2025-02-20 LAB
ALBUMIN SERPL ELPH-MCNC: 4 G/DL — SIGNIFICANT CHANGE UP (ref 3.5–5.2)
ALP SERPL-CCNC: 78 U/L — SIGNIFICANT CHANGE UP (ref 30–115)
ALT FLD-CCNC: 22 U/L — SIGNIFICANT CHANGE UP (ref 0–41)
ANION GAP SERPL CALC-SCNC: 11 MMOL/L — SIGNIFICANT CHANGE UP (ref 7–14)
AST SERPL-CCNC: 24 U/L — SIGNIFICANT CHANGE UP (ref 0–41)
BASOPHILS # BLD AUTO: 0.02 K/UL — SIGNIFICANT CHANGE UP (ref 0–0.2)
BASOPHILS NFR BLD AUTO: 0.2 % — SIGNIFICANT CHANGE UP (ref 0–1)
BILIRUB SERPL-MCNC: 0.6 MG/DL — SIGNIFICANT CHANGE UP (ref 0.2–1.2)
BUN SERPL-MCNC: 14 MG/DL — SIGNIFICANT CHANGE UP (ref 10–20)
CALCIUM SERPL-MCNC: 8.9 MG/DL — SIGNIFICANT CHANGE UP (ref 8.4–10.5)
CHLORIDE SERPL-SCNC: 103 MMOL/L — SIGNIFICANT CHANGE UP (ref 98–110)
CO2 SERPL-SCNC: 23 MMOL/L — SIGNIFICANT CHANGE UP (ref 17–32)
CREAT SERPL-MCNC: 1.3 MG/DL — SIGNIFICANT CHANGE UP (ref 0.7–1.5)
EGFR: 55 ML/MIN/1.73M2 — LOW
EOSINOPHIL # BLD AUTO: 0.01 K/UL — SIGNIFICANT CHANGE UP (ref 0–0.7)
EOSINOPHIL NFR BLD AUTO: 0.1 % — SIGNIFICANT CHANGE UP (ref 0–8)
FLUAV AG NPH QL: SIGNIFICANT CHANGE UP
FLUBV AG NPH QL: SIGNIFICANT CHANGE UP
GAS PNL BLDV: SIGNIFICANT CHANGE UP
GLUCOSE BLDC GLUCOMTR-MCNC: 210 MG/DL — HIGH (ref 70–99)
GLUCOSE SERPL-MCNC: 133 MG/DL — HIGH (ref 70–99)
HCT VFR BLD CALC: 38.7 % — LOW (ref 42–52)
HGB BLD-MCNC: 13.2 G/DL — LOW (ref 14–18)
IMM GRANULOCYTES NFR BLD AUTO: 0.5 % — HIGH (ref 0.1–0.3)
LYMPHOCYTES # BLD AUTO: 0.79 K/UL — LOW (ref 1.2–3.4)
LYMPHOCYTES # BLD AUTO: 8.2 % — LOW (ref 20.5–51.1)
MCHC RBC-ENTMCNC: 30.5 PG — SIGNIFICANT CHANGE UP (ref 27–31)
MCHC RBC-ENTMCNC: 34.1 G/DL — SIGNIFICANT CHANGE UP (ref 32–37)
MCV RBC AUTO: 89.4 FL — SIGNIFICANT CHANGE UP (ref 80–94)
MONOCYTES # BLD AUTO: 0.69 K/UL — HIGH (ref 0.1–0.6)
MONOCYTES NFR BLD AUTO: 7.1 % — SIGNIFICANT CHANGE UP (ref 1.7–9.3)
NEUTROPHILS # BLD AUTO: 8.12 K/UL — HIGH (ref 1.4–6.5)
NEUTROPHILS NFR BLD AUTO: 83.9 % — HIGH (ref 42.2–75.2)
NRBC BLD AUTO-RTO: 0 /100 WBCS — SIGNIFICANT CHANGE UP (ref 0–0)
NT-PROBNP SERPL-SCNC: 1038 PG/ML — HIGH (ref 0–300)
PLATELET # BLD AUTO: 174 K/UL — SIGNIFICANT CHANGE UP (ref 130–400)
PMV BLD: 9.3 FL — SIGNIFICANT CHANGE UP (ref 7.4–10.4)
POTASSIUM SERPL-MCNC: 3.9 MMOL/L — SIGNIFICANT CHANGE UP (ref 3.5–5)
POTASSIUM SERPL-SCNC: 3.9 MMOL/L — SIGNIFICANT CHANGE UP (ref 3.5–5)
PROT SERPL-MCNC: 6.2 G/DL — SIGNIFICANT CHANGE UP (ref 6–8)
RBC # BLD: 4.33 M/UL — LOW (ref 4.7–6.1)
RBC # FLD: 13.2 % — SIGNIFICANT CHANGE UP (ref 11.5–14.5)
RSV RNA NPH QL NAA+NON-PROBE: SIGNIFICANT CHANGE UP
SARS-COV-2 RNA SPEC QL NAA+PROBE: SIGNIFICANT CHANGE UP
SODIUM SERPL-SCNC: 137 MMOL/L — SIGNIFICANT CHANGE UP (ref 135–146)
TROPONIN T, HIGH SENSITIVITY RESULT: 32 NG/L — HIGH (ref 6–21)
TROPONIN T, HIGH SENSITIVITY RESULT: 36 NG/L — HIGH (ref 6–21)
WBC # BLD: 9.68 K/UL — SIGNIFICANT CHANGE UP (ref 4.8–10.8)
WBC # FLD AUTO: 9.68 K/UL — SIGNIFICANT CHANGE UP (ref 4.8–10.8)

## 2025-02-20 PROCEDURE — 71275 CT ANGIOGRAPHY CHEST: CPT | Mod: 26

## 2025-02-20 PROCEDURE — 99222 1ST HOSP IP/OBS MODERATE 55: CPT

## 2025-02-20 PROCEDURE — 82962 GLUCOSE BLOOD TEST: CPT

## 2025-02-20 PROCEDURE — 71045 X-RAY EXAM CHEST 1 VIEW: CPT | Mod: 26

## 2025-02-20 PROCEDURE — 83735 ASSAY OF MAGNESIUM: CPT

## 2025-02-20 PROCEDURE — 36415 COLL VENOUS BLD VENIPUNCTURE: CPT

## 2025-02-20 PROCEDURE — 97162 PT EVAL MOD COMPLEX 30 MIN: CPT | Mod: GP

## 2025-02-20 PROCEDURE — 94640 AIRWAY INHALATION TREATMENT: CPT

## 2025-02-20 PROCEDURE — 80048 BASIC METABOLIC PNL TOTAL CA: CPT

## 2025-02-20 PROCEDURE — 93010 ELECTROCARDIOGRAM REPORT: CPT

## 2025-02-20 PROCEDURE — 99285 EMERGENCY DEPT VISIT HI MDM: CPT | Mod: 25

## 2025-02-20 PROCEDURE — 80053 COMPREHEN METABOLIC PANEL: CPT

## 2025-02-20 PROCEDURE — 85025 COMPLETE CBC W/AUTO DIFF WBC: CPT

## 2025-02-20 RX ORDER — CEFTRIAXONE 500 MG/1
1000 INJECTION, POWDER, FOR SOLUTION INTRAMUSCULAR; INTRAVENOUS EVERY 24 HOURS
Refills: 0 | Status: DISCONTINUED | OUTPATIENT
Start: 2025-02-21 | End: 2025-02-22

## 2025-02-20 RX ORDER — MONTELUKAST SODIUM 10 MG/1
1 TABLET ORAL
Refills: 0 | DISCHARGE

## 2025-02-20 RX ORDER — AZITHROMYCIN 250 MG
500 CAPSULE ORAL ONCE
Refills: 0 | Status: COMPLETED | OUTPATIENT
Start: 2025-02-20 | End: 2025-02-20

## 2025-02-20 RX ORDER — FINASTERIDE 1 MG/1
5 TABLET, FILM COATED ORAL DAILY
Refills: 0 | Status: DISCONTINUED | OUTPATIENT
Start: 2025-02-20 | End: 2025-02-22

## 2025-02-20 RX ORDER — AZITHROMYCIN 250 MG
500 CAPSULE ORAL EVERY 24 HOURS
Refills: 0 | Status: DISCONTINUED | OUTPATIENT
Start: 2025-02-21 | End: 2025-02-22

## 2025-02-20 RX ORDER — ROSUVASTATIN CALCIUM 20 MG/1
20 TABLET, FILM COATED ORAL AT BEDTIME
Refills: 0 | Status: DISCONTINUED | OUTPATIENT
Start: 2025-02-20 | End: 2025-02-22

## 2025-02-20 RX ORDER — ENOXAPARIN SODIUM 100 MG/ML
40 INJECTION SUBCUTANEOUS EVERY 24 HOURS
Refills: 0 | Status: DISCONTINUED | OUTPATIENT
Start: 2025-02-20 | End: 2025-02-22

## 2025-02-20 RX ORDER — FLUTICASONE FUROATE, UMECLIDINIUM BROMIDE AND VILANTEROL TRIFENATATE 100; 62.5; 25 UG/1; UG/1; UG/1
1 POWDER RESPIRATORY (INHALATION)
Refills: 0 | DISCHARGE

## 2025-02-20 RX ORDER — METOPROLOL SUCCINATE 50 MG/1
1 TABLET, EXTENDED RELEASE ORAL
Refills: 0 | DISCHARGE

## 2025-02-20 RX ORDER — UMECLIDINIUM BROMIDE AND VILANTEROL TRIFENATATE 62.5; 25 UG/1; UG/1
1 POWDER RESPIRATORY (INHALATION)
Refills: 0 | DISCHARGE

## 2025-02-20 RX ORDER — MAGNESIUM OXIDE 400 MG
400 TABLET ORAL DAILY
Refills: 0 | Status: DISCONTINUED | OUTPATIENT
Start: 2025-02-20 | End: 2025-02-22

## 2025-02-20 RX ORDER — METOPROLOL SUCCINATE 50 MG/1
25 TABLET, EXTENDED RELEASE ORAL DAILY
Refills: 0 | Status: DISCONTINUED | OUTPATIENT
Start: 2025-02-20 | End: 2025-02-22

## 2025-02-20 RX ORDER — CEFTRIAXONE 500 MG/1
1000 INJECTION, POWDER, FOR SOLUTION INTRAMUSCULAR; INTRAVENOUS ONCE
Refills: 0 | Status: COMPLETED | OUTPATIENT
Start: 2025-02-20 | End: 2025-02-20

## 2025-02-20 RX ORDER — LISINOPRIL 5 MG/1
20 TABLET ORAL DAILY
Refills: 0 | Status: DISCONTINUED | OUTPATIENT
Start: 2025-02-20 | End: 2025-02-22

## 2025-02-20 RX ORDER — TIOTROPIUM BROMIDE INHALATION SPRAY 3.12 UG/1
2 SPRAY, METERED RESPIRATORY (INHALATION) DAILY
Refills: 0 | Status: DISCONTINUED | OUTPATIENT
Start: 2025-02-20 | End: 2025-02-22

## 2025-02-20 RX ORDER — METHYLPREDNISOLONE ACETATE 80 MG/ML
40 INJECTION, SUSPENSION INTRA-ARTICULAR; INTRALESIONAL; INTRAMUSCULAR; SOFT TISSUE DAILY
Refills: 0 | Status: DISCONTINUED | OUTPATIENT
Start: 2025-02-21 | End: 2025-02-21

## 2025-02-20 RX ORDER — MAGNESIUM SULFATE 500 MG/ML
2 SYRINGE (ML) INJECTION ONCE
Refills: 0 | Status: COMPLETED | OUTPATIENT
Start: 2025-02-20 | End: 2025-02-20

## 2025-02-20 RX ORDER — IPRATROPIUM BROMIDE AND ALBUTEROL SULFATE .5; 2.5 MG/3ML; MG/3ML
3 SOLUTION RESPIRATORY (INHALATION) EVERY 6 HOURS
Refills: 0 | Status: DISCONTINUED | OUTPATIENT
Start: 2025-02-20 | End: 2025-02-22

## 2025-02-20 RX ORDER — QUETIAPINE FUMARATE 25 MG/1
150 TABLET ORAL AT BEDTIME
Refills: 0 | Status: DISCONTINUED | OUTPATIENT
Start: 2025-02-20 | End: 2025-02-22

## 2025-02-20 RX ORDER — IPRATROPIUM BROMIDE AND ALBUTEROL SULFATE .5; 2.5 MG/3ML; MG/3ML
3 SOLUTION RESPIRATORY (INHALATION)
Refills: 0 | Status: COMPLETED | OUTPATIENT
Start: 2025-02-20 | End: 2025-02-20

## 2025-02-20 RX ORDER — MONTELUKAST SODIUM 10 MG/1
10 TABLET ORAL DAILY
Refills: 0 | Status: DISCONTINUED | OUTPATIENT
Start: 2025-02-20 | End: 2025-02-22

## 2025-02-20 RX ORDER — TAMSULOSIN HYDROCHLORIDE 0.4 MG/1
0.4 CAPSULE ORAL AT BEDTIME
Refills: 0 | Status: DISCONTINUED | OUTPATIENT
Start: 2025-02-20 | End: 2025-02-22

## 2025-02-20 RX ORDER — METHYLPREDNISOLONE ACETATE 80 MG/ML
125 INJECTION, SUSPENSION INTRA-ARTICULAR; INTRALESIONAL; INTRAMUSCULAR; SOFT TISSUE ONCE
Refills: 0 | Status: COMPLETED | OUTPATIENT
Start: 2025-02-20 | End: 2025-02-20

## 2025-02-20 RX ADMIN — IPRATROPIUM BROMIDE AND ALBUTEROL SULFATE 3 MILLILITER(S): .5; 2.5 SOLUTION RESPIRATORY (INHALATION) at 03:13

## 2025-02-20 RX ADMIN — Medication 400 MILLIGRAM(S): at 11:52

## 2025-02-20 RX ADMIN — Medication 150 GRAM(S): at 02:59

## 2025-02-20 RX ADMIN — Medication 1000 UNIT(S): at 11:52

## 2025-02-20 RX ADMIN — TAMSULOSIN HYDROCHLORIDE 0.4 MILLIGRAM(S): 0.4 CAPSULE ORAL at 21:25

## 2025-02-20 RX ADMIN — QUETIAPINE FUMARATE 150 MILLIGRAM(S): 25 TABLET ORAL at 21:25

## 2025-02-20 RX ADMIN — Medication 250 MILLIGRAM(S): at 02:59

## 2025-02-20 RX ADMIN — IPRATROPIUM BROMIDE AND ALBUTEROL SULFATE 3 MILLILITER(S): .5; 2.5 SOLUTION RESPIRATORY (INHALATION) at 04:13

## 2025-02-20 RX ADMIN — Medication 1 DOSE(S): at 21:22

## 2025-02-20 RX ADMIN — ROSUVASTATIN CALCIUM 20 MILLIGRAM(S): 20 TABLET, FILM COATED ORAL at 21:25

## 2025-02-20 RX ADMIN — IPRATROPIUM BROMIDE AND ALBUTEROL SULFATE 3 MILLILITER(S): .5; 2.5 SOLUTION RESPIRATORY (INHALATION) at 02:59

## 2025-02-20 RX ADMIN — FINASTERIDE 5 MILLIGRAM(S): 1 TABLET, FILM COATED ORAL at 11:52

## 2025-02-20 RX ADMIN — IPRATROPIUM BROMIDE AND ALBUTEROL SULFATE 3 MILLILITER(S): .5; 2.5 SOLUTION RESPIRATORY (INHALATION) at 21:22

## 2025-02-20 RX ADMIN — Medication 40 MILLIGRAM(S): at 11:51

## 2025-02-20 RX ADMIN — METHYLPREDNISOLONE ACETATE 125 MILLIGRAM(S): 80 INJECTION, SUSPENSION INTRA-ARTICULAR; INTRALESIONAL; INTRAMUSCULAR; SOFT TISSUE at 02:59

## 2025-02-20 RX ADMIN — MONTELUKAST SODIUM 10 MILLIGRAM(S): 10 TABLET ORAL at 11:52

## 2025-02-20 RX ADMIN — IPRATROPIUM BROMIDE AND ALBUTEROL SULFATE 3 MILLILITER(S): .5; 2.5 SOLUTION RESPIRATORY (INHALATION) at 06:54

## 2025-02-20 RX ADMIN — IPRATROPIUM BROMIDE AND ALBUTEROL SULFATE 3 MILLILITER(S): .5; 2.5 SOLUTION RESPIRATORY (INHALATION) at 15:01

## 2025-02-20 RX ADMIN — IPRATROPIUM BROMIDE AND ALBUTEROL SULFATE 3 MILLILITER(S): .5; 2.5 SOLUTION RESPIRATORY (INHALATION) at 05:30

## 2025-02-20 RX ADMIN — CEFTRIAXONE 100 MILLIGRAM(S): 500 INJECTION, POWDER, FOR SOLUTION INTRAMUSCULAR; INTRAVENOUS at 04:39

## 2025-02-20 NOTE — ED PROVIDER NOTE - CARE PLAN
1 Principal Discharge DX:	Pneumonia  Secondary Diagnosis:	Elevated troponin  Secondary Diagnosis:	COPD exacerbation

## 2025-02-20 NOTE — H&P ADULT - NSHPREVIEWOFSYSTEMS_GEN_ALL_CORE
Constitutional: No weakness, fevers, or chills  Eyes/ENT: No visual changes. No vertigo or throat pain   Neck: No pain or stiffness  Respiratory: Endorses cough and SOB  Cardiovascular: No chest pain or palpitations  Gastrointestinal: No abdominal or epigastric pain. No nausea, vomiting, or hematemesis. No diarrhea or constipation. No melena or hematochezia.  Genitourinary: No dysuria, frequency, or hematuria  Musculoskeletal: FROM all extremities, normal strength, no calf tenderness  Neurological: No numbness or weakness  Skin: No itching or rashes

## 2025-02-20 NOTE — H&P ADULT - ATTENDING COMMENTS
85 yo gentleman PMH of HTN, HLD, COPD with PRN home Oxygen, hx thoracic aortic aneurysm, and hx of bladder CA s/p TURBT presenting to the ED for increased shortness of breath for the last day. patient was admitted 9/24 for similar complaints  and treated for COPD and PNA.     CT Chest  Multifocal predominantly lower lobe patchy consolidations. Trace left   pleural effusion. No evidence of acute pulmonary embolism.  descending thoracic aortic aneurysmal dilatation. Scattered pulmonary nodules.      COPD Exacerbation secondary to Multifocal PNA  uses PRN home O2   Former smoker   HTN  Hx of BPH  Hx of Bladder Ca s/p TURBT  HLD  Elevated troponins likely demand ,denies chest pain   Trace Left pleural effusion   descending thoracic aortic aneurysmal dilatation.   cardiac cath on 6/23/23 revealing nonobstructive CAD  Scattered pulmonary nodules    Handoff: C/w Ceftriaxone and Azithromycin ,Duonebs q6h ,Solumedrol 40mg IV QD ,check BCx, and pneumonia workup ,Sputum Cx, Urine strep and legionella ,trend troponin ,recommend follow up CT scan in 6 week for resolution ,PT/OT 83 yo gentleman PMH of HTN, HLD, COPD with PRN home Oxygen, hx thoracic aortic aneurysm, and hx of bladder CA s/p TURBT presenting to the ED for increased shortness of breath for the last day. patient was admitted 9/24 for similar complaints  and treated for COPD and PNA.     CT Chest  Multifocal predominantly lower lobe patchy consolidations. Trace left   pleural effusion. No evidence of acute pulmonary embolism.  descending thoracic aortic aneurysmal dilatation. Scattered pulmonary nodules.      COPD Exacerbation secondary to Multifocal PNA  uses PRN home O2   Former smoker   HTN  Hx of BPH  Hx of Bladder Ca s/p TURBT  HLD  Elevated troponins likely demand ,denies chest pain   Trace Left pleural effusion   descending thoracic aortic aneurysmal dilatation.   cardiac cath on 6/23/23 revealing nonobstructive CAD  Scattered pulmonary nodules    Handoff: C/w Ceftriaxone and Azithromycin ,Duonebs q6h ,Solumedrol 40mg IV QD ,check BCx, and pneumonia workup ,Sputum Cx, Urine strep and legionella ,trend troponin ,pulm consult ,recommend follow up CT scan in 6 week for resolution ,PT/OT

## 2025-02-20 NOTE — ED ADULT NURSE NOTE - NS TRANSFER DISPOSITION PATIENT BELONGINGS
S/p  MYLES/DCCV  for atrial fibrillation. MYLES: R[place]: no clot.  Underwent successful DCCV with conversion to sinus rhythm. Post DCCV EKG: Sinus rhythm; ventricular rate: 65 bpm. Tolerated procedure.  Awake and alert. Without complaints.  D/c instructions provided to patient, specifically to continue anticoagulant Eliquis; stop digoxin; start amio load.  Patient verbalized understanding.      
with patient

## 2025-02-20 NOTE — H&P ADULT - HISTORY OF PRESENT ILLNESS
Vitals  Temp: 98.3 -> 99.2  HR: 110 -> 76  BP: 172/63 -> 120/51  O2: 95% on 3L NC -> 97% on 3L NC  RR: 20 -> 18    Labs  WBC: 9.68  HgB: 13.2 (baseline)  Plt: 174  Cr: 1.3 (baseline)  Trop: 32 -> 36  Pro-BNP: 1038 (657 in 9/2024)  Lactate: 1.7  Flu/COVID/RSV: Negative    Imaging  CXR: Bibasilar opacity/atelectasis.    CTA PE Protocol: Respiratory motion artifact limits evaluation. 1. No evidence of acute pulmonary embolism. 2. Multifocal predominantly lower lobe patchy consolidations. Trace left pleural effusion.    In the ED  Ceftriaxone 1g IV x1  Azithromycin 500mg IV x1  Duonebs x3  Mg Sulfate 2g IV x1  Methylprednisolone 125mg IV x1 Mr. Washington is an 83 yo Portuguese speaking gentleman PMH of HTN, HLD, COPD with PRN home Oxygen, hx thoracic aortic aneurysm, and hx of bladder CA s/p TURBT presenting to the ED for the last 1d. He states that he noticed he became short of breath more easily when climbing stairs and endorsed a mild cough. He states he rarely uses his home O2 but felt he could not ambulate without it last night, which prompted him to come to the ED. He denies chest pain, nausea, vomiting, and diarrhea. He endorses cough and SOB. He is admitted to medicine for further management.    Vitals  Temp: 98.3 -> 99.2  HR: 110 -> 76  BP: 172/63 -> 120/51  O2: 95% on 3L NC -> 97% on 3L NC  RR: 20 -> 18    Labs  WBC: 9.68  HgB: 13.2 (baseline)  Plt: 174  Cr: 1.3 (baseline)  Trop: 32 -> 36  Pro-BNP: 1038 (657 in 9/2024)  Lactate: 1.7  Flu/COVID/RSV: Negative    Imaging  CXR: Bibasilar opacity/atelectasis.    CTA PE Protocol: Respiratory motion artifact limits evaluation. 1. No evidence of acute pulmonary embolism. 2. Multifocal predominantly lower lobe patchy consolidations. Trace left pleural effusion.    In the ED  Ceftriaxone 1g IV x1  Azithromycin 500mg IV x1  Duonebs x3  Mg Sulfate 2g IV x1  Methylprednisolone 125mg IV x1

## 2025-02-20 NOTE — ED PROVIDER NOTE - CLINICAL SUMMARY MEDICAL DECISION MAKING FREE TEXT BOX
Throughout ED observation period, pt remained clinically and hemodynamically stable.  Lab results as interpreted by me- stable anemia, no significant electrolyte abnormalities, normal renal function, serial troponins w/o significant delta, vbg w/o acidosis or hypercapniea  cta chest pe- no pe, multifocal ggo, c/f pna- will treat w/ abx and admit for copd/pna, further w/u, treatment and monitoring

## 2025-02-20 NOTE — H&P ADULT - NSHPPHYSICALEXAM_GEN_ALL_CORE
General: NAD, lying in bed comfortably  Head: NC in place  Neck: Supple, no JVD  Cardiac: Regular rate and rhythm  Pulmonary: Soft wheeze b/l  Abdominal: Soft, nontender, and nondistended  Extremities: Trace edema b/l LE  Neurologic: AOx3, nonfocal  Skin: No rashes or lesions

## 2025-02-20 NOTE — H&P ADULT - ASSESSMENT
#Misc  #Code Status:  #DVT ppx:  #GI ppx:  #Diet:  #Activity:  #Inpatient Dispo: Telemetry  #Discharge Dispo: #COPD Exacerbation secondary to PNA  - Endorses SOB since yesterday, uses PRN home O2 sparingly but still had symptoms while using  - CT scan negative for PE, suspect PNA  - C/w Ceftriaxone and Azithromycin  - Duonebs q6h  - Solumedrol 40mg IV QD  - BCx, Sputum Cx, Urine strep and legionella  - Ambulatory saturation  - PT eval    #HTN  - C/w Lisinopril, Metoprolol    #Hx of BPH  #Hx of Bladder Ca s/p TURBT  - C/w Flomax and Finasteride    #HLD  - C/w Rosuvastatin    #Misc  #Code Status: Full Code  #DVT ppx: Lovenox  #GI ppx: Famotidine  #Diet: DASH  #Activity: AAT  #Inpatient Dispo: Telemetry  #Discharge Dispo: From Home

## 2025-02-20 NOTE — ED PROVIDER NOTE - OBJECTIVE STATEMENT
84-year-old male with past medical history of COPD, hypertension, hyperlipidemia, history of bladder cancer status post TURBT, history of thoracic aortic aneurysm, prior admission 9/28/2024 for acute on chronic hypoxic respiratory failure secondary to community-acquired pneumonia/COPD exacerbation, who presents for shortness of breath ongoing for the past 10 hours.  Reports mild dry cough.  Denies any recent illness including no recent fevers, runny nose, nausea/vomiting, abdominal pain, chest pain, leg swelling.   Reports on home oxygen although unclear how much. 84-year-old male with past medical history of COPD, hypertension, hyperlipidemia, history of bladder cancer status post TURBT, history of thoracic aortic aneurysm, prior admission 9/28/2024 for acute on chronic hypoxic respiratory failure secondary to community-acquired pneumonia/COPD exacerbation, who presents for shortness of breath ongoing for the past 10 hours.  Reports mild dry cough.  Denies any recent illness including no recent fevers, runny nose, nausea/vomiting, abdominal pain, chest pain, leg swelling.   Reports on home oxygen although unclear how much.    Greenlandic Speaking  : Carol #[phone]

## 2025-02-20 NOTE — ED ADULT NURSE REASSESSMENT NOTE - NS ED NURSE REASSESS COMMENT FT1
Report received from RN. Pt assessed. Pt has no complaints of pain or discomfort at this time. Pt updated on plan of care. Will update as needed.

## 2025-02-20 NOTE — ED PROVIDER NOTE - PHYSICAL EXAMINATION
Initial vital signs reviewed.  General: NAD, nontoxic appearing.  HENT: AT/NC.  Eyes: non-injected conjunctivae b/l.  Neck: supple.  CV: mild tachycardia, regular rate, no murmurs. 2+ distal pulses x4.  Pulm: mild tachypnea, mild diminished breath sounds throughout.  Abd: soft, nondistended, nontender.  MSK: no joint deformity.  Skin: warm, dry, well-perfused.  Neuro: A&Ox4.  Psych: appropriate mood and affect.

## 2025-02-20 NOTE — ED PROVIDER NOTE - ATTENDING CONTRIBUTION TO CARE
84-year-old male with past medical history of COPD, hypertension, hyperlipidemia, history of bladder cancer status post TURBT, history of thoracic aortic aneurysm, prior admission 9/28/2024 for acute on chronic hypoxic respiratory failure secondary to community-acquired pneumonia/COPD exacerbation  pt presents for eval of shortness of breath. sx going on for the past day. + dry cough. no fevers, chills, cp, leg swelling, ap, n,v,d.  no syncope or trauma.      vs- tachycardic, hypoxemic  gen- NAD, aaox3  card-sinus tach  lungs-mild tachypniae, scattered wheeze  abd-sntnd, no guarding or rebound  neuro- full str/sensation, cn ii-xii grossly intact, normal coordination and gait

## 2025-02-20 NOTE — ED ADULT NURSE NOTE - NS ED NOTE ABUSE RESPONSE YN
Final Anesthesia Post-op Assessment    Patient: Enid Roberson  Procedure(s) Performed: FLEXIBLE SIGMOIDOSCOPY  Anesthesia type: MAC    Vitals Value Taken Time   Temp 36.4 11/1/2019 10:12 AM   Pulse 69 11/1/2019 10:11 AM   Resp 14 11/1/2019 10:11 AM   SpO2 94 % 11/1/2019 10:11 AM   /58 11/1/2019 10:12 AM   Vitals shown include unvalidated device data.    Last 24 I/O:     Intake/Output Summary (Last 24 hours) at 11/1/2019 1012  Last data filed at 11/1/2019 1004  Gross per 24 hour   Intake 150 ml   Output 1 ml   Net 149 ml         Patient Location: PACU Phase 1  Post-op Vital Signs:stable  Level of Consciousness: awake and alert  Respiratory Status: spontaneous ventilation  Cardiovascular stable  Hydration: euvolemic  Pain Management: adequately controlled  Handoff: Handoff to receiving nurse was performed and questions were answered  Nausea: None  Airway Patency:patent  Post-op Assessment: no complications and patient tolerated procedure well with no complications       Yes

## 2025-02-21 ENCOUNTER — TRANSCRIPTION ENCOUNTER (OUTPATIENT)
Age: 85
End: 2025-02-21

## 2025-02-21 LAB
ANION GAP SERPL CALC-SCNC: 9 MMOL/L — SIGNIFICANT CHANGE UP (ref 7–14)
BASOPHILS # BLD AUTO: 0.01 K/UL — SIGNIFICANT CHANGE UP (ref 0–0.2)
BASOPHILS NFR BLD AUTO: 0.1 % — SIGNIFICANT CHANGE UP (ref 0–1)
BUN SERPL-MCNC: 28 MG/DL — HIGH (ref 10–20)
CALCIUM SERPL-MCNC: 8.5 MG/DL — SIGNIFICANT CHANGE UP (ref 8.4–10.5)
CHLORIDE SERPL-SCNC: 104 MMOL/L — SIGNIFICANT CHANGE UP (ref 98–110)
CO2 SERPL-SCNC: 25 MMOL/L — SIGNIFICANT CHANGE UP (ref 17–32)
CREAT SERPL-MCNC: 1.2 MG/DL — SIGNIFICANT CHANGE UP (ref 0.7–1.5)
EGFR: 60 ML/MIN/1.73M2 — SIGNIFICANT CHANGE UP
EOSINOPHIL # BLD AUTO: 0 K/UL — SIGNIFICANT CHANGE UP (ref 0–0.7)
EOSINOPHIL NFR BLD AUTO: 0 % — SIGNIFICANT CHANGE UP (ref 0–8)
GLUCOSE SERPL-MCNC: 137 MG/DL — HIGH (ref 70–99)
HCT VFR BLD CALC: 36.5 % — LOW (ref 42–52)
HGB BLD-MCNC: 11.9 G/DL — LOW (ref 14–18)
IMM GRANULOCYTES NFR BLD AUTO: 0.5 % — HIGH (ref 0.1–0.3)
LYMPHOCYTES # BLD AUTO: 1.04 K/UL — LOW (ref 1.2–3.4)
LYMPHOCYTES # BLD AUTO: 9.5 % — LOW (ref 20.5–51.1)
MAGNESIUM SERPL-MCNC: 2.5 MG/DL — HIGH (ref 1.8–2.4)
MCHC RBC-ENTMCNC: 29.3 PG — SIGNIFICANT CHANGE UP (ref 27–31)
MCHC RBC-ENTMCNC: 32.6 G/DL — SIGNIFICANT CHANGE UP (ref 32–37)
MCV RBC AUTO: 89.9 FL — SIGNIFICANT CHANGE UP (ref 80–94)
MONOCYTES # BLD AUTO: 0.48 K/UL — SIGNIFICANT CHANGE UP (ref 0.1–0.6)
MONOCYTES NFR BLD AUTO: 4.4 % — SIGNIFICANT CHANGE UP (ref 1.7–9.3)
NEUTROPHILS # BLD AUTO: 9.33 K/UL — HIGH (ref 1.4–6.5)
NEUTROPHILS NFR BLD AUTO: 85.5 % — HIGH (ref 42.2–75.2)
NRBC BLD AUTO-RTO: 0 /100 WBCS — SIGNIFICANT CHANGE UP (ref 0–0)
PLATELET # BLD AUTO: 191 K/UL — SIGNIFICANT CHANGE UP (ref 130–400)
PMV BLD: 9.9 FL — SIGNIFICANT CHANGE UP (ref 7.4–10.4)
POTASSIUM SERPL-MCNC: 4.7 MMOL/L — SIGNIFICANT CHANGE UP (ref 3.5–5)
POTASSIUM SERPL-SCNC: 4.7 MMOL/L — SIGNIFICANT CHANGE UP (ref 3.5–5)
RBC # BLD: 4.06 M/UL — LOW (ref 4.7–6.1)
RBC # FLD: 13.2 % — SIGNIFICANT CHANGE UP (ref 11.5–14.5)
SODIUM SERPL-SCNC: 138 MMOL/L — SIGNIFICANT CHANGE UP (ref 135–146)
WBC # BLD: 10.91 K/UL — HIGH (ref 4.8–10.8)
WBC # FLD AUTO: 10.91 K/UL — HIGH (ref 4.8–10.8)

## 2025-02-21 PROCEDURE — 99239 HOSP IP/OBS DSCHRG MGMT >30: CPT

## 2025-02-21 RX ORDER — METHYLPREDNISOLONE ACETATE 80 MG/ML
40 INJECTION, SUSPENSION INTRA-ARTICULAR; INTRALESIONAL; INTRAMUSCULAR; SOFT TISSUE ONCE
Refills: 0 | Status: COMPLETED | OUTPATIENT
Start: 2025-02-21 | End: 2025-02-21

## 2025-02-21 RX ORDER — PREDNISONE 20 MG/1
2 TABLET ORAL
Qty: 0 | Refills: 0 | DISCHARGE
Start: 2025-02-21

## 2025-02-21 RX ORDER — AZITHROMYCIN 250 MG
1 CAPSULE ORAL
Qty: 3 | Refills: 0
Start: 2025-02-21 | End: 2025-02-23

## 2025-02-21 RX ORDER — CEFPODOXIME PROXETIL 200 MG/1
1 TABLET, FILM COATED ORAL
Qty: 14 | Refills: 0
Start: 2025-02-21 | End: 2025-02-27

## 2025-02-21 RX ORDER — PREDNISONE 20 MG/1
40 TABLET ORAL DAILY
Refills: 0 | Status: DISCONTINUED | OUTPATIENT
Start: 2025-02-21 | End: 2025-02-21

## 2025-02-21 RX ORDER — PREDNISONE 20 MG/1
2 TABLET ORAL
Qty: 10 | Refills: 0
Start: 2025-02-21 | End: 2025-02-25

## 2025-02-21 RX ADMIN — Medication 20 MILLIGRAM(S): at 13:03

## 2025-02-21 RX ADMIN — LISINOPRIL 20 MILLIGRAM(S): 5 TABLET ORAL at 05:47

## 2025-02-21 RX ADMIN — ROSUVASTATIN CALCIUM 20 MILLIGRAM(S): 20 TABLET, FILM COATED ORAL at 21:17

## 2025-02-21 RX ADMIN — Medication 1 DOSE(S): at 12:48

## 2025-02-21 RX ADMIN — CEFTRIAXONE 100 MILLIGRAM(S): 500 INJECTION, POWDER, FOR SOLUTION INTRAMUSCULAR; INTRAVENOUS at 02:45

## 2025-02-21 RX ADMIN — TAMSULOSIN HYDROCHLORIDE 0.4 MILLIGRAM(S): 0.4 CAPSULE ORAL at 21:17

## 2025-02-21 RX ADMIN — FINASTERIDE 5 MILLIGRAM(S): 1 TABLET, FILM COATED ORAL at 13:02

## 2025-02-21 RX ADMIN — METHYLPREDNISOLONE ACETATE 40 MILLIGRAM(S): 80 INJECTION, SUSPENSION INTRA-ARTICULAR; INTRALESIONAL; INTRAMUSCULAR; SOFT TISSUE at 21:18

## 2025-02-21 RX ADMIN — MONTELUKAST SODIUM 10 MILLIGRAM(S): 10 TABLET ORAL at 13:03

## 2025-02-21 RX ADMIN — QUETIAPINE FUMARATE 150 MILLIGRAM(S): 25 TABLET ORAL at 21:17

## 2025-02-21 RX ADMIN — ENOXAPARIN SODIUM 40 MILLIGRAM(S): 100 INJECTION SUBCUTANEOUS at 05:47

## 2025-02-21 RX ADMIN — IPRATROPIUM BROMIDE AND ALBUTEROL SULFATE 3 MILLILITER(S): .5; 2.5 SOLUTION RESPIRATORY (INHALATION) at 12:49

## 2025-02-21 RX ADMIN — METOPROLOL SUCCINATE 25 MILLIGRAM(S): 50 TABLET, EXTENDED RELEASE ORAL at 05:47

## 2025-02-21 RX ADMIN — Medication 400 MILLIGRAM(S): at 13:03

## 2025-02-21 RX ADMIN — Medication 1000 UNIT(S): at 13:04

## 2025-02-21 RX ADMIN — TIOTROPIUM BROMIDE INHALATION SPRAY 2 PUFF(S): 3.12 SPRAY, METERED RESPIRATORY (INHALATION) at 12:48

## 2025-02-21 RX ADMIN — IPRATROPIUM BROMIDE AND ALBUTEROL SULFATE 3 MILLILITER(S): .5; 2.5 SOLUTION RESPIRATORY (INHALATION) at 13:04

## 2025-02-21 RX ADMIN — IPRATROPIUM BROMIDE AND ALBUTEROL SULFATE 3 MILLILITER(S): .5; 2.5 SOLUTION RESPIRATORY (INHALATION) at 21:17

## 2025-02-21 RX ADMIN — METHYLPREDNISOLONE ACETATE 40 MILLIGRAM(S): 80 INJECTION, SUSPENSION INTRA-ARTICULAR; INTRALESIONAL; INTRAMUSCULAR; SOFT TISSUE at 05:47

## 2025-02-21 RX ADMIN — Medication 250 MILLIGRAM(S): at 02:45

## 2025-02-21 RX ADMIN — Medication 1 DOSE(S): at 21:17

## 2025-02-21 NOTE — DISCHARGE NOTE NURSING/CASE MANAGEMENT/SOCIAL WORK - PATIENT PORTAL LINK FT
You can access the FollowMyHealth Patient Portal offered by [facility] by registering at the following website: [email or web address]. By joining [facility]’s FollowMyHealth portal, you will also be able to view your health information using other applications (apps) compatible with our system.

## 2025-02-21 NOTE — DISCHARGE NOTE PROVIDER - ATTENDING DISCHARGE PHYSICAL EXAMINATION:
Attending attestation  Attending DC note  Pt seen and examined at bedside. No cp or sob. Not septic, pneumonia 97 on RA on ambulation  vitals, labs, exam stable  Hospital course as above.  Plan dw pt and agreed to plan  Medically cleared for DC. Med recc completed.  [facility] resident. Spent 32 mins on case

## 2025-02-21 NOTE — DISCHARGE NOTE PROVIDER - HOSPITAL COURSE
Mr. Washington is an 85 yo Georgian speaking gentleman PMH of HTN, HLD, COPD with PRN home Oxygen, hx thoracic aortic aneurysm, and hx of bladder CA s/p TURBT presenting to the ED for the last 1d. He states that he noticed he became short of breath more easily when climbing stairs and endorsed a mild cough. He states he rarely uses his home O2 but felt he could not ambulate without it last night, which prompted him to come to the ED. He denies chest pain, nausea, vomiting, and diarrhea. He endorses cough and SOB. He is admitted to medicine for further management.      Imaging  CXR: Bibasilar opacity/atelectasis.    CTA PE Protocol: Respiratory motion artifact limits evaluation. 1. No evidence of acute pulmonary embolism. 2. Multifocal predominantly lower lobe patchy consolidations. Trace left pleural effusion.      A/P  #COPD Exacerbation secondary to PNA  - Endorses SOB since 2 days ago, uses PRN home O2 sparingly but still had symptoms while using  - CT scan negative for PE, suspect PNA  - C/w Ceftriaxone and Azithromycin-> vantin and azithro on dc  - Solumedrol 40mg IV QD-> pred 40 for 5 days on dc  - Ambulatory saturation-> 93 on RA while walking    #HTN  - C/w Lisinopril, Metoprolol    #Hx of BPH  #Hx of Bladder Ca s/p TURBT  - C/w Flomax and Finasteride    #HLD  - C/w Rosuvastatin    Discussion of discharge, discharge diagnoses, and med rec was conducted with Dr. Perez on 2/21/25 and discharge was approved.    Mr. Washington is an 83 yo Tamaz[place] speaking gentleman PMH of HTN, HLD, COPD with PRN home Oxygen, hx thoracic aortic aneurysm, and hx of bladder CA s/p TURBT presenting to the ED. He states that he noticed he became short of breath more easily when climbing stairs and endorsed a mild cough. He states he rarely uses his home O2 but felt he could not ambulate without it last night, which prompted him to come to the ED. He denies chest pain, nausea, vomiting, and diarrhea. He endorses cough and SOB. He is admitted to medicine for further management.      Imaging  CXR: Bibasilar opacity/atelectasis.    CTA PE Protocol: Respiratory motion artifact limits evaluation. 1. No evidence of acute pulmonary embolism. 2. Multifocal predominantly lower lobe patchy consolidations. Trace left pleural effusion.      A/P  #COPD Exacerbation secondary to PNA  - Endorses SOB since 2 days ago, uses PRN home O2 sparingly but still had symptoms while using  - CT scan negative for PE, suspect PNA  - C/w Ceftriaxone and Azithromycin-> vantin and azithro on dc  - Solumedrol 40mg IV QD-> pred 40 for 5 days on dc  - Ambulatory saturation-> 93 on RA while walking    #HTN  - C/w Lisinopril, Metoprolol    #Hx of BPH  #Hx of Bladder Ca s/p TURBT  - C/w Flomax and Finasteride    #HLD  - C/w Rosuvastatin    Discussion of discharge, discharge diagnoses, and med rec was conducted with Dr. Perez on 2/22/25 and discharge was approved.

## 2025-02-21 NOTE — PROGRESS NOTE ADULT - ASSESSMENT
83 yo M gentleman PMH of HTN, HLD, COPD with PRN home Oxygen, hx thoracic aortic aneurysm, and hx of bladder CA s/p TURBT presenting to the ED for SOB. He states that he noticed he became short of breath more easily when climbing stairs and endorsed a mild cough. He states he rarely uses his home O2 but felt he could not ambulate without it last night, which prompted him to come to the ED. He is admitted to medicine for further management.  ID used for translation today is [id number].    #COPD Exacerbation secondary to PNA  - Endorses SOB since 2 days ago, uses PRN home O2 sparingly but still had symptoms while using  - CT scan negative for PE, suspect PNA  - C/w Ceftriaxone and Azithromycin-> vantin and azithro on dc  - Solumedrol 40mg IV QD-> will switch to pred 40 today   - Ambulatory saturation-> 93 on RA while walking    #HTN  - C/w Lisinopril, Metoprolol    #Hx of BPH  #Hx of Bladder Ca s/p TURBT  - C/w Flomax and Finasteride    #HLD  - C/w Rosuvastatin   85 yo M gentleman PMH of HTN, HLD, COPD with PRN home Oxygen, hx thoracic aortic aneurysm, and hx of bladder CA s/p TURBT presenting to the ED for SOB. He states that he noticed he became short of breath more easily when climbing stairs and endorsed a mild cough. He states he rarely uses his home O2 but felt he could not ambulate without it last night, which prompted him to come to the ED. He is admitted to medicine for further management.  ID used for translation today is [id number].    #COPD Exacerbation secondary to PNA  - Endorses SOB since 2 days ago, uses PRN home O2 sparingly but still had symptoms while using  - CT scan negative for PE, suspect PNA  - C/w Ceftriaxone and Azithromycin-> vantin and azithro on dc  - Solumedrol 40mg IV QD-> will switch to pred 40 today   - Ambulatory saturation-> 93 on RA while walking, but was sob on exertion, opted to stay until 2/22    #HTN  - C/w Lisinopril, Metoprolol    #Hx of BPH  #Hx of Bladder Ca s/p TURBT  - C/w Flomax and Finasteride    #HLD  - C/w Rosuvastatin

## 2025-02-21 NOTE — DISCHARGE NOTE PROVIDER - NSDCCPCAREPLAN_GEN_ALL_CORE_FT
PRINCIPAL DISCHARGE DIAGNOSIS  Diagnosis: Pneumonia  Assessment and Plan of Treatment: You came to the hospital because you were feeling more short of breath than usual, especially when climbing stairs, and you were coughing. You were also using your home oxygen more than you typically do. We did some tests, including a chest x-ray and a CT scan. The CT scan ruled out a blood clot in your lungs, but it did show some signs of pneumonia, which is likely causing your breathing troubles. We also saw some signs of atelectasis, which means some of the small air sacs in your lungs collapsed, likely because you weren't taking deep breaths due to the shortness of breath. Since it looked like your chronic lung disease (COPD) was acting up possibly due to pneumonia, we treated you with antibiotics (Ceftriaxone and Azithromycin in the hospital, and you'll go home on Vantin and Azithromycin) and steroids (Solu-Medrol in the hospital, and you'll take Prednisone for five days at home). This should help clear up the infection and reduce the inflammation in your lungs. You're now breathing better, and your oxygen levels are good when you're walking around.  Make sure you finish all your antibiotics and steroids as prescribed.      SECONDARY DISCHARGE DIAGNOSES  Diagnosis: Elevated troponin  Assessment and Plan of Treatment:     Diagnosis: COPD exacerbation  Assessment and Plan of Treatment:

## 2025-02-21 NOTE — PROGRESS NOTE ADULT - ATTENDING COMMENTS
HPI as above.  Interval history: Pt seen and examined at bedside. No cp or sob.   Vital Signs (24 Hrs):  T(C): 36.6 (02-21-25 @ 15:08), Max: 36.8 (02-20-25 @ 23:01)  HR: 78 (02-21-25 @ 15:08) (68 - 81)  BP: 147/58 (02-21-25 @ 15:08) (127/72 - 147/58)  RR: 18 (02-21-25 @ 15:08) (18 - 20)  SpO2: 96% (02-21-25 @ 15:08) (91% - 98%)  Wt(kg): --  Daily     Daily     I&O's Summary    PHYSICAL EXAM:  GENERAL: NAD, well-developed  HEAD:  Atraumatic, Normocephalic  EYES: EOMI, PERRLA, conjunctiva and sclera clear  NECK: Supple, No JVD  CHEST/LUNG: Clear to auscultation bilaterally; No wheeze  HEART: Regular rate and rhythm; No murmurs, rubs, or gallops  ABDOMEN: Soft, Nontender, Nondistended; Bowel sounds present  EXTREMITIES:  2+ Peripheral Pulses, No clubbing, cyanosis, or edema  PSYCH: AAOx3  NEUROLOGY: non-focal  SKIN: No rashes or lesions  Labs reviewed  Imaging reviewed independently and reviewed read  EKG reviewed independently and reviewed read    Plan as above. DW resident. Agree to plan. Made edits    #Progress Note Handoff  Pending (specify):  clinical improvemnt  Family discussion: house staff updated pt family  Disposition: anticipated to 2/22  Decision to admit the pt is based on acuity as above

## 2025-02-21 NOTE — PHYSICAL THERAPY INITIAL EVALUATION ADULT - FUNCTIONAL LIMITATIONS, PT EVAL
[facility]      05/04/22      Dear Ms. Glo Higgins,    Thank you for taking the time to talk to me.   I have included several items for your review.     Your patient education folder is enclosed. Please read through the folder, it will help you prepare for your upcoming surgery and recovery.     You will also find a “Before Surgery Skin Preparation” sheet. It is very important for you to read and follow these instructions to help prevent an infection.       If you have any questions about the information you have received, please call me at [phone].      Sincerely,        Anabella NOBLE   – [facility]   self-care/home management/community/leisure

## 2025-02-21 NOTE — DISCHARGE NOTE PROVIDER - CARE PROVIDER_API CALL
Prasanth Chow  [facility]  [address], [address]  [place] [address]  Phone: ([phone]  Fax: ([phone]  Follow Up Time: 2 weeks

## 2025-02-21 NOTE — DISCHARGE NOTE NURSING/CASE MANAGEMENT/SOCIAL WORK - NSDCPEFALRISK_GEN_ALL_CORE
For information on Fall & Injury Prevention, visit: [email or web address] OR  [email or web address] OR  https://www.cdc.gov/steadi/patient.html

## 2025-02-21 NOTE — PHYSICAL THERAPY INITIAL EVALUATION ADULT - ADDITIONAL COMMENTS
As per patient, independent in ambulation, transfers, and bed mobility prior to admission. Pt reports being independent in dressing, but requires help with bathing. Lives in  with two sons and wife, who can assist as needed. No steps to enter, 2 flight of stairs inside. Pt has RW and cane, uses intermittently. Has home O2 prn, unable to specify liters used.

## 2025-02-21 NOTE — DISCHARGE NOTE NURSING/CASE MANAGEMENT/SOCIAL WORK - NSDCVIVACCINE_GEN_ALL_CORE_FT
Tdap; 24-Oct-2024 13:00; Rayne Guerrero (RN); [employer]; [id number] (Exp. Date: 24-Oct-2026); IntraMuscular; Deltoid Left.; 0.5 milliLiter(s); VIS (VIS Published: 09-May-2013, VIS Presented: 24-Oct-2024);

## 2025-02-21 NOTE — PHYSICAL THERAPY INITIAL EVALUATION ADULT - GENERAL OBSERVATIONS, REHAB EVAL
8:20-8:45. Pt encountered and left semifowler in bed. +tele +SPO2. NAD. Agreeable to PT. Welsh  #[phone]

## 2025-02-21 NOTE — PHYSICAL THERAPY INITIAL EVALUATION ADULT - GAIT DISTANCE, PT EVAL
Ambulated on RA SPO2 remained above 93%. 170'x1 using RW, Hand held assist 30x1, patient demonstrated good minus dynamic balance without assistive device. SPO2 returned to semifowler position on RA remained about 93%./200 feet

## 2025-02-21 NOTE — DISCHARGE NOTE PROVIDER - NSDCMRMEDTOKEN_GEN_ALL_CORE_FT
azithromycin 500 mg oral tablet: 1 tab(s) orally once a day  cefpodoxime 200 mg oral tablet: 1 tab(s) orally 2 times a day  cholecalciferol 25 mcg (1000 intl units) oral tablet: 1 tab(s) orally once a day  famotidine 40 mg oral tablet: 1 tab(s) orally once a day  finasteride 5 mg oral tablet: 1 tab(s) orally once a day  lisinopril 20 mg oral tablet: 1 tab(s) orally once a day  magnesium oxide 400 mg oral tablet: 1 tab(s) orally once a day  metoprolol succinate 25 mg oral capsule, extended release: 1 cap(s) orally once a day  montelukast 10 mg oral tablet: 1 tab(s) orally once a day  predniSONE 20 mg oral tablet: 2 tab(s) orally once a day  predniSONE 20 mg oral tablet: 2 tab(s) orally once a day  QUEtiapine 150 mg oral tablet: 1 tab(s) orally once a day  rosuvastatin 20 mg oral capsule: 1 cap(s) orally once a day  tamsulosin 0.4 mg oral capsule: 1 cap(s) orally once a day  Trelegy Ellipta 100 mcg-62.5 mcg-25 mcg/inh inhalation powder: 1 puff(s) inhaled once a day   azithromycin 500 mg oral tablet: 1 tab(s) orally once a day  cefpodoxime 200 mg oral tablet: 1 tab(s) orally 2 times a day  cholecalciferol 25 mcg (1000 intl units) oral tablet: 1 tab(s) orally once a day  famotidine 40 mg oral tablet: 1 tab(s) orally once a day  finasteride 5 mg oral tablet: 1 tab(s) orally once a day  lisinopril 20 mg oral tablet: 1 tab(s) orally once a day  magnesium oxide 400 mg oral tablet: 1 tab(s) orally once a day  metoprolol succinate 25 mg oral capsule, extended release: 1 cap(s) orally once a day  montelukast 10 mg oral tablet: 1 tab(s) orally once a day  predniSONE 20 mg oral tablet: 2 tab(s) orally once a day  QUEtiapine 150 mg oral tablet: 1 tab(s) orally once a day  rosuvastatin 20 mg oral capsule: 1 cap(s) orally once a day  tamsulosin 0.4 mg oral capsule: 1 cap(s) orally once a day  Trelegy Ellipta 100 mcg-62.5 mcg-25 mcg/inh inhalation powder: 1 puff(s) inhaled once a day

## 2025-02-21 NOTE — PHYSICAL THERAPY INITIAL EVALUATION ADULT - PERTINENT HX OF CURRENT PROBLEM, REHAB EVAL
Mr. Washington is an 85 yo Armenian speaking gentleman PMH of HTN, HLD, COPD with PRN home Oxygen, hx thoracic aortic aneurysm, and hx of bladder CA s/p TURBT presenting to the ED for the last 1d. He states that he noticed he became short of breath more easily when climbing stairs and endorsed a mild cough. He states he rarely uses his home O2 but felt he could not ambulate without it last night, which prompted him to come to the ED. He denies chest pain, nausea, vomiting, and diarrhea. He endorses cough and SOB. He is admitted to medicine for further management.

## 2025-02-21 NOTE — DISCHARGE NOTE NURSING/CASE MANAGEMENT/SOCIAL WORK - FINANCIAL ASSISTANCE
[facility] provides services at a reduced cost to those who are determined to be eligible through [facility]’s financial assistance program. Information regarding [facility]’s financial assistance program can be found by going to [email or web address] or by calling [phone].

## 2025-02-22 VITALS
DIASTOLIC BLOOD PRESSURE: 58 MMHG | OXYGEN SATURATION: 95 % | HEART RATE: 79 BPM | SYSTOLIC BLOOD PRESSURE: 141 MMHG | RESPIRATION RATE: 18 BRPM | TEMPERATURE: 98 F

## 2025-02-22 LAB
ALBUMIN SERPL ELPH-MCNC: 3.7 G/DL — SIGNIFICANT CHANGE UP (ref 3.5–5.2)
ALP SERPL-CCNC: 69 U/L — SIGNIFICANT CHANGE UP (ref 30–115)
ALT FLD-CCNC: 38 U/L — SIGNIFICANT CHANGE UP (ref 0–41)
ANION GAP SERPL CALC-SCNC: 11 MMOL/L — SIGNIFICANT CHANGE UP (ref 7–14)
AST SERPL-CCNC: 38 U/L — SIGNIFICANT CHANGE UP (ref 0–41)
BASOPHILS # BLD AUTO: 0.01 K/UL — SIGNIFICANT CHANGE UP (ref 0–0.2)
BASOPHILS NFR BLD AUTO: 0.1 % — SIGNIFICANT CHANGE UP (ref 0–1)
BILIRUB SERPL-MCNC: 0.4 MG/DL — SIGNIFICANT CHANGE UP (ref 0.2–1.2)
BUN SERPL-MCNC: 30 MG/DL — HIGH (ref 10–20)
CALCIUM SERPL-MCNC: 9.2 MG/DL — SIGNIFICANT CHANGE UP (ref 8.4–10.5)
CHLORIDE SERPL-SCNC: 102 MMOL/L — SIGNIFICANT CHANGE UP (ref 98–110)
CO2 SERPL-SCNC: 24 MMOL/L — SIGNIFICANT CHANGE UP (ref 17–32)
CREAT SERPL-MCNC: 1.4 MG/DL — SIGNIFICANT CHANGE UP (ref 0.7–1.5)
EGFR: 50 ML/MIN/1.73M2 — LOW
EOSINOPHIL # BLD AUTO: 0 K/UL — SIGNIFICANT CHANGE UP (ref 0–0.7)
EOSINOPHIL NFR BLD AUTO: 0 % — SIGNIFICANT CHANGE UP (ref 0–8)
GLUCOSE SERPL-MCNC: 166 MG/DL — HIGH (ref 70–99)
HCT VFR BLD CALC: 36.6 % — LOW (ref 42–52)
HGB BLD-MCNC: 12.1 G/DL — LOW (ref 14–18)
IMM GRANULOCYTES NFR BLD AUTO: 0.6 % — HIGH (ref 0.1–0.3)
LYMPHOCYTES # BLD AUTO: 0.62 K/UL — LOW (ref 1.2–3.4)
LYMPHOCYTES # BLD AUTO: 5.7 % — LOW (ref 20.5–51.1)
MAGNESIUM SERPL-MCNC: 2.2 MG/DL — SIGNIFICANT CHANGE UP (ref 1.8–2.4)
MCHC RBC-ENTMCNC: 29.7 PG — SIGNIFICANT CHANGE UP (ref 27–31)
MCHC RBC-ENTMCNC: 33.1 G/DL — SIGNIFICANT CHANGE UP (ref 32–37)
MCV RBC AUTO: 89.7 FL — SIGNIFICANT CHANGE UP (ref 80–94)
MONOCYTES # BLD AUTO: 0.27 K/UL — SIGNIFICANT CHANGE UP (ref 0.1–0.6)
MONOCYTES NFR BLD AUTO: 2.5 % — SIGNIFICANT CHANGE UP (ref 1.7–9.3)
NEUTROPHILS # BLD AUTO: 9.84 K/UL — HIGH (ref 1.4–6.5)
NEUTROPHILS NFR BLD AUTO: 91.1 % — HIGH (ref 42.2–75.2)
NRBC BLD AUTO-RTO: 0 /100 WBCS — SIGNIFICANT CHANGE UP (ref 0–0)
PLATELET # BLD AUTO: 196 K/UL — SIGNIFICANT CHANGE UP (ref 130–400)
PMV BLD: 9.6 FL — SIGNIFICANT CHANGE UP (ref 7.4–10.4)
POTASSIUM SERPL-MCNC: 4.6 MMOL/L — SIGNIFICANT CHANGE UP (ref 3.5–5)
POTASSIUM SERPL-SCNC: 4.6 MMOL/L — SIGNIFICANT CHANGE UP (ref 3.5–5)
PROT SERPL-MCNC: 6.1 G/DL — SIGNIFICANT CHANGE UP (ref 6–8)
RBC # BLD: 4.08 M/UL — LOW (ref 4.7–6.1)
RBC # FLD: 13.2 % — SIGNIFICANT CHANGE UP (ref 11.5–14.5)
SODIUM SERPL-SCNC: 137 MMOL/L — SIGNIFICANT CHANGE UP (ref 135–146)
WBC # BLD: 10.8 K/UL — SIGNIFICANT CHANGE UP (ref 4.8–10.8)
WBC # FLD AUTO: 10.8 K/UL — SIGNIFICANT CHANGE UP (ref 4.8–10.8)

## 2025-02-22 PROCEDURE — 99239 HOSP IP/OBS DSCHRG MGMT >30: CPT

## 2025-02-22 RX ADMIN — CEFTRIAXONE 100 MILLIGRAM(S): 500 INJECTION, POWDER, FOR SOLUTION INTRAMUSCULAR; INTRAVENOUS at 03:19

## 2025-02-22 RX ADMIN — IPRATROPIUM BROMIDE AND ALBUTEROL SULFATE 3 MILLILITER(S): .5; 2.5 SOLUTION RESPIRATORY (INHALATION) at 08:07

## 2025-02-22 RX ADMIN — ENOXAPARIN SODIUM 40 MILLIGRAM(S): 100 INJECTION SUBCUTANEOUS at 06:44

## 2025-02-22 RX ADMIN — METOPROLOL SUCCINATE 25 MILLIGRAM(S): 50 TABLET, EXTENDED RELEASE ORAL at 06:44

## 2025-02-22 RX ADMIN — Medication 1 DOSE(S): at 08:07

## 2025-02-22 RX ADMIN — LISINOPRIL 20 MILLIGRAM(S): 5 TABLET ORAL at 06:44

## 2025-02-22 RX ADMIN — Medication 250 MILLIGRAM(S): at 01:27

## 2025-02-22 RX ADMIN — TIOTROPIUM BROMIDE INHALATION SPRAY 2 PUFF(S): 3.12 SPRAY, METERED RESPIRATORY (INHALATION) at 08:08

## 2025-02-22 NOTE — PROGRESS NOTE ADULT - ASSESSMENT
85 yo M gentleman PMH of HTN, HLD, COPD with PRN home Oxygen, hx thoracic aortic aneurysm, and hx of bladder CA s/p TURBT presenting to the ED for SOB. He states that he noticed he became short of breath more easily when climbing stairs and endorsed a mild cough. He states he rarely uses his home O2 but felt he could not ambulate without it last night, which prompted him to come to the ED. He is admitted to medicine for further management.     #COPD Exacerbation secondary to PNA  - Endorses SOB since 2 days ago, uses PRN home O2 sparingly but still had symptoms while using  - CT scan negative for PE, suspect PNA  - C/w Ceftriaxone and Azithromycin-> vantin and azithro on dc  -s/p  Solumedrol 40mg IV QD->on  pred 40 - total of 5 days   - Ambulatory saturation-> 93 on RA while walking today     #HTN  - C/w Lisinopril, Metoprolol    #Hx of BPH  #Hx of Bladder Ca s/p TURBT  - C/w Flomax and Finasteride    #HLD  - C/w Rosuvastatin

## 2025-02-22 NOTE — PROGRESS NOTE ADULT - SUBJECTIVE AND OBJECTIVE BOX
SUBJECTIVE/OVERNIGHT EVENTS  Today is hospital day 1d. This morning patient was seen and examined at bedside, resting comfortably in bed. No acute or major events overnight.      MEDICATIONS  STANDING MEDICATIONS  albuterol/ipratropium for Nebulization 3 milliLiter(s) Nebulizer every 6 hours  azithromycin  IVPB 500 milliGRAM(s) IV Intermittent every 24 hours  cefTRIAXone   IVPB 1000 milliGRAM(s) IV Intermittent every 24 hours  cholecalciferol 1000 Unit(s) Oral daily  enoxaparin Injectable 40 milliGRAM(s) SubCutaneous every 24 hours  famotidine    Tablet 40 milliGRAM(s) Oral daily  finasteride 5 milliGRAM(s) Oral daily  fluticasone propionate/ salmeterol 100-50 MICROgram(s) Diskus 1 Dose(s) Inhalation two times a day  lisinopril 20 milliGRAM(s) Oral daily  magnesium oxide 400 milliGRAM(s) Oral daily  methylPREDNISolone sodium succinate Injectable 40 milliGRAM(s) IV Push once  metoprolol succinate ER 25 milliGRAM(s) Oral daily  montelukast 10 milliGRAM(s) Oral daily  QUEtiapine 150 milliGRAM(s) Oral at bedtime  rosuvastatin 20 milliGRAM(s) Oral at bedtime  tamsulosin 0.4 milliGRAM(s) Oral at bedtime  tiotropium 2.5 MICROgram(s) Inhaler 2 Puff(s) Inhalation daily    PRN MEDICATIONS    VITALS  T(F): 97.8 (02-21-25 @ 10:24), Max: 98.2 (02-20-25 @ 23:01)  HR: 81 (02-21-25 @ 10:24) (68 - 81)  BP: 141/55 (02-21-25 @ 10:24) (127/72 - 141/55)  RR: 20 (02-21-25 @ 11:15) (18 - 20)  SpO2: 94% (02-21-25 @ 11:15) (91% - 98%)  POCT Blood Glucose.: 210 mg/dL (02-20-25 @ 16:09)    PHYSICAL EXAM  GENERAL  ( x ) NAD, lying in bed comfortably     (  ) obtunded     (  ) lethargic     (  ) somnolent    HEAD  (  ) Atraumatic     (  ) hematoma     (  ) laceration (specify location:       )     NECK  (  ) Supple     (  ) neck stiffness     (  ) nuchal rigidity     (  )  no JVD     (  ) JVD present ( -- cm)    HEART  Rate -->  (x  ) normal rate    (  ) bradycardic    (  ) tachycardic  Rhythm -->  ( x ) regular    (  ) regularly irregular    (  ) irregularly irregular  Murmurs -->  (  ) normal s1/s2    (  ) systolic murmur    (  ) diastolic murmur    (  ) continuous murmur     (  ) S3 present    (  ) S4 present    LUNGS  (  )Unlabored respirations     (  ) tachypnea  ( xx ) B/L air entry     (  ) decreased breath sounds in:  (location     )    (  ) no adventitious sound     (  ) crackles     ( x ) wheezing      (  ) rhonchi      (specify location:       )  (  ) chest wall tenderness (specify location:       )    ABDOMEN  ( x ) Soft     (  ) tense   |   (  ) nondistended     (  ) distended   |   (  ) +BS     (  ) hypoactive bowel sounds     (  ) hyperactive bowel sounds  (  ) nontender     (  ) RUQ tenderness     (  ) RLQ tenderness     (  ) LLQ tenderness     (  ) epigastric tenderness     (  ) diffuse tenderness  (  ) Splenomegaly      (  ) Hepatomegaly      (  ) Jaundice     (  ) ecchymosis     EXTREMITIES  (  x) Normal     (  ) Rash     (  ) ecchymosis     (  ) varicose veins      (  ) pitting edema     (  ) non-pitting edema   (  ) ulceration     (  ) gangrene:     (location:     )    NERVOUS SYSTEM  (x  ) A&Ox3     (  ) confused     (  ) lethargic  CN II-XII:     (  ) Intact     (  ) focal deficits  (Specify:     )   Upper extremities:     (  ) strength X/5     (  ) focal deficit (specify:    )  Lower extremities:     (  ) strength  X/5    (  ) focal deficit (specify:    )    LABS             11.9   10.91 )-----------( 191      ( 02-21-25 @ 07:33 )             36.5     138  |  104  |  28  -------------------------<  137   02-21-25 @ 07:33  4.7  |  25  |  1.2    Ca      8.5     02-21-25 @ 07:33  Mg     2.5     02-21-25 @ 07:33    TPro  6.2  /  Alb  4.0  /  TBili  0.6  /  DBili  x   /  AST  24  /  ALT  22  /  AlkPhos  78  /  GGT  x     02-20-25 @ 03:00      Troponin T, High Sensitivity Result: 36 ng/L (02-20-25 @ 06:25)  Pro-Brain Natriuretic Peptide: 1038 pg/mL (02-20-25 @ 03:00)  Troponin T, High Sensitivity Result: 32 ng/L (02-20-25 @ 03:00)    Urinalysis Basic - ( 21 Feb 2025 07:33 )    Color: x / Appearance: x / SG: x / pH: x  Gluc: 137 mg/dL / Ketone: x  / Bili: x / Urobili: x   Blood: x / Protein: x / Nitrite: x   Leuk Esterase: x / RBC: x / WBC x   Sq Epi: x / Non Sq Epi: x / Bacteria: x          IMAGING
SUBJECTIVE/OVERNIGHT EVENTS  Today is hospital day 2d. This morning patient was seen and examined at bedside, resting comfortably in bed. No acute or major events overnight.    MEDICATIONS  STANDING MEDICATIONS  albuterol/ipratropium for Nebulization 3 milliLiter(s) Nebulizer every 6 hours  azithromycin  IVPB 500 milliGRAM(s) IV Intermittent every 24 hours  cefTRIAXone   IVPB 1000 milliGRAM(s) IV Intermittent every 24 hours  cholecalciferol 1000 Unit(s) Oral daily  enoxaparin Injectable 40 milliGRAM(s) SubCutaneous every 24 hours  famotidine    Tablet 40 milliGRAM(s) Oral daily  finasteride 5 milliGRAM(s) Oral daily  fluticasone propionate/ salmeterol 100-50 MICROgram(s) Diskus 1 Dose(s) Inhalation two times a day  lisinopril 20 milliGRAM(s) Oral daily  magnesium oxide 400 milliGRAM(s) Oral daily  metoprolol succinate ER 25 milliGRAM(s) Oral daily  montelukast 10 milliGRAM(s) Oral daily  QUEtiapine 150 milliGRAM(s) Oral at bedtime  rosuvastatin 20 milliGRAM(s) Oral at bedtime  tamsulosin 0.4 milliGRAM(s) Oral at bedtime  tiotropium 2.5 MICROgram(s) Inhaler 2 Puff(s) Inhalation daily    PRN MEDICATIONS    VITALS  T(F): 97.9 (02-22-25 @ 07:34), Max: 97.9 (02-22-25 @ 07:34)  HR: 79 (02-22-25 @ 07:34) (75 - 79)  BP: 141/58 (02-22-25 @ 07:34) (141/58 - 150/53)  RR: 18 (02-22-25 @ 07:34) (18 - 18)  SpO2: 95% (02-22-25 @ 07:34) (95% - 98%)    PHYSICAL EXAM  GENERAL  (  x) NAD, lying in bed comfortably     (  ) obtunded     (  ) lethargic     (  ) somnolent    HEAD  (  ) Atraumatic     (  ) hematoma     (  ) laceration (specify location:       )     NECK  (  ) Supple     (  ) neck stiffness     (  ) nuchal rigidity     (  )  no JVD     (  ) JVD present ( -- cm)    HEART  Rate -->  ( x ) normal rate    (  ) bradycardic    (  ) tachycardic  Rhythm -->  ( x ) regular    (  ) regularly irregular    (  ) irregularly irregular  Murmurs -->  (  ) normal s1/s2    (  ) systolic murmur    (  ) diastolic murmur    (  ) continuous murmur     (  ) S3 present    (  ) S4 present    LUNGS  ( x)Unlabored respirations     (  ) tachypnea  ( x ) B/L air entry     (  ) decreased breath sounds in:  (location     )    (  ) no adventitious sound     (  ) crackles     (  ) wheezing      (  ) rhonchi      (specify location:       )  (  ) chest wall tenderness (specify location:       )    ABDOMEN  (  x) Soft     (  ) tense   |   (  ) nondistended     (  ) distended   |   (  ) +BS     (  ) hypoactive bowel sounds     (  ) hyperactive bowel sounds  (  ) nontender     (  ) RUQ tenderness     (  ) RLQ tenderness     (  ) LLQ tenderness     (  ) epigastric tenderness     (  ) diffuse tenderness  (  ) Splenomegaly      (  ) Hepatomegaly      (  ) Jaundice     (  ) ecchymosis     EXTREMITIES  ( x ) Normal     (  ) Rash     (  ) ecchymosis     (  ) varicose veins      (  ) pitting edema     (  ) non-pitting edema   (  ) ulceration     (  ) gangrene:     (location:     )    NERVOUS SYSTEM  (  x) A&Ox3     (  ) confused     (  ) lethargic  CN II-XII:     (  ) Intact     (  ) focal deficits  (Specify:     )   Upper extremities:     (  ) strength X/5     (  ) focal deficit (specify:    )  Lower extremities:     (  ) strength  X/5    (  ) focal deficit (specify:    )    LABS             12.1   10.80 )-----------( 196      ( 02-22-25 @ 06:26 )             36.6     137  |  102  |  30  -------------------------<  166   02-22-25 @ 06:26  4.6  |  24  |  1.4    Ca      9.2     02-22-25 @ 06:26  Mg     2.2     02-22-25 @ 06:26    TPro  6.1  /  Alb  3.7  /  TBili  0.4  /  DBili  x   /  AST  38  /  ALT  38  /  AlkPhos  69  /  GGT  x     02-22-25 @ 06:26      Troponin T, High Sensitivity Result: 36 ng/L (02-20-25 @ 06:25)  Pro-Brain Natriuretic Peptide: 1038 pg/mL (02-20-25 @ 03:00)  Troponin T, High Sensitivity Result: 32 ng/L (02-20-25 @ 03:00)    Urinalysis Basic - ( 22 Feb 2025 06:26 )    Color: x / Appearance: x / SG: x / pH: x  Gluc: 166 mg/dL / Ketone: x  / Bili: x / Urobili: x   Blood: x / Protein: x / Nitrite: x   Leuk Esterase: x / RBC: x / WBC x   Sq Epi: x / Non Sq Epi: x / Bacteria: x          IMAGING

## 2025-02-25 DIAGNOSIS — J44.1 CHRONIC OBSTRUCTIVE PULMONARY DISEASE WITH (ACUTE) EXACERBATION: ICD-10-CM

## 2025-02-25 DIAGNOSIS — J44.0 CHRONIC OBSTRUCTIVE PULMONARY DISEASE WITH (ACUTE) LOWER RESPIRATORY INFECTION: ICD-10-CM

## 2025-02-25 DIAGNOSIS — N40.0 BENIGN PROSTATIC HYPERPLASIA WITHOUT LOWER URINARY TRACT SYMPTOMS: ICD-10-CM

## 2025-02-25 DIAGNOSIS — R91.8 OTHER NONSPECIFIC ABNORMAL FINDING OF LUNG FIELD: ICD-10-CM

## 2025-02-25 DIAGNOSIS — Z90.6 ACQUIRED ABSENCE OF OTHER PARTS OF URINARY TRACT: ICD-10-CM

## 2025-02-25 DIAGNOSIS — Z87.891 PERSONAL HISTORY OF NICOTINE DEPENDENCE: ICD-10-CM

## 2025-02-25 DIAGNOSIS — J98.11 ATELECTASIS: ICD-10-CM

## 2025-02-25 DIAGNOSIS — I10 ESSENTIAL (PRIMARY) HYPERTENSION: ICD-10-CM

## 2025-02-25 DIAGNOSIS — I71.23 ANEURYSM OF THE DESCENDING THORACIC AORTA, WITHOUT RUPTURE: ICD-10-CM

## 2025-02-25 DIAGNOSIS — J96.11 CHRONIC RESPIRATORY FAILURE WITH HYPOXIA: ICD-10-CM

## 2025-02-25 DIAGNOSIS — I25.10 ATHEROSCLEROTIC HEART DISEASE OF NATIVE CORONARY ARTERY WITHOUT ANGINA PECTORIS: ICD-10-CM

## 2025-02-25 DIAGNOSIS — Z85.51 PERSONAL HISTORY OF MALIGNANT NEOPLASM OF BLADDER: ICD-10-CM

## 2025-02-25 DIAGNOSIS — E78.5 HYPERLIPIDEMIA, UNSPECIFIED: ICD-10-CM

## 2025-02-25 DIAGNOSIS — R79.89 OTHER SPECIFIED ABNORMAL FINDINGS OF BLOOD CHEMISTRY: ICD-10-CM

## 2025-02-25 DIAGNOSIS — Z11.52 ENCOUNTER FOR SCREENING FOR COVID-19: ICD-10-CM

## 2025-02-25 DIAGNOSIS — Z79.51 LONG TERM (CURRENT) USE OF INHALED STEROIDS: ICD-10-CM

## 2025-02-25 DIAGNOSIS — Z79.899 OTHER LONG TERM (CURRENT) DRUG THERAPY: ICD-10-CM

## 2025-03-27 ENCOUNTER — APPOINTMENT (OUTPATIENT)
Dept: CARDIOLOGY | Facility: CLINIC | Age: 85
End: 2025-03-27

## 2025-06-26 ENCOUNTER — APPOINTMENT (OUTPATIENT)
Dept: CARDIOLOGY | Facility: CLINIC | Age: 85
End: 2025-06-26

## 2025-07-15 NOTE — PATIENT PROFILE ADULT - DATE/TIME OF ACCEPTANCE
Zoladex administered to Left Lower Abdomen without incident. Patient iced site prior.    Administrations This Visit       goserelin (ZOLADEX) injection 3.6 mg       Admin Date  07/15/2025 Action  $Given Dose  3.6 mg Route  Subcutaneous Documented By  Lesley De, RN                 Bessy De RN    17-Jul-2021 03:54

## 2025-09-12 ENCOUNTER — APPOINTMENT (OUTPATIENT)
Age: 85
End: 2025-09-12
Payer: MEDICARE

## 2025-09-12 VITALS
RESPIRATION RATE: 16 BRPM | BODY MASS INDEX: 24.99 KG/M2 | DIASTOLIC BLOOD PRESSURE: 50 MMHG | SYSTOLIC BLOOD PRESSURE: 142 MMHG | OXYGEN SATURATION: 96 % | HEART RATE: 60 BPM | HEIGHT: 65 IN | WEIGHT: 150 LBS

## 2025-09-12 PROCEDURE — G2211 COMPLEX E/M VISIT ADD ON: CPT

## 2025-09-12 PROCEDURE — 99204 OFFICE O/P NEW MOD 45 MIN: CPT
